# Patient Record
Sex: FEMALE | Race: WHITE | NOT HISPANIC OR LATINO | Employment: OTHER | URBAN - METROPOLITAN AREA
[De-identification: names, ages, dates, MRNs, and addresses within clinical notes are randomized per-mention and may not be internally consistent; named-entity substitution may affect disease eponyms.]

---

## 2018-12-11 ENCOUNTER — OFFICE VISIT (OUTPATIENT)
Dept: URGENT CARE | Facility: CLINIC | Age: 53
End: 2018-12-11
Payer: MEDICARE

## 2018-12-11 VITALS
BODY MASS INDEX: 36.96 KG/M2 | SYSTOLIC BLOOD PRESSURE: 153 MMHG | HEIGHT: 66 IN | DIASTOLIC BLOOD PRESSURE: 99 MMHG | RESPIRATION RATE: 18 BRPM | TEMPERATURE: 98 F | WEIGHT: 230 LBS | HEART RATE: 93 BPM | OXYGEN SATURATION: 96 %

## 2018-12-11 DIAGNOSIS — J40 BRONCHITIS: Primary | ICD-10-CM

## 2018-12-11 DIAGNOSIS — R35.0 FREQUENCY OF URINATION: ICD-10-CM

## 2018-12-11 LAB
BILIRUB UR QL STRIP: NEGATIVE
GLUCOSE UR QL STRIP: NEGATIVE
KETONES UR QL STRIP: NEGATIVE
LEUKOCYTE ESTERASE UR QL STRIP: NEGATIVE
PH, POC UA: 7 (ref 5–8)
POC BLOOD, URINE: NEGATIVE
POC NITRATES, URINE: NEGATIVE
PROT UR QL STRIP: NEGATIVE
SP GR UR STRIP: 1 (ref 1–1.03)
UROBILINOGEN UR STRIP-ACNC: NORMAL (ref 0.1–1.1)

## 2018-12-11 PROCEDURE — 96372 THER/PROPH/DIAG INJ SC/IM: CPT | Mod: 59,S$GLB,, | Performed by: PHYSICIAN ASSISTANT

## 2018-12-11 PROCEDURE — 81003 URINALYSIS AUTO W/O SCOPE: CPT | Mod: QW,S$GLB,, | Performed by: PHYSICIAN ASSISTANT

## 2018-12-11 PROCEDURE — 99215 OFFICE O/P EST HI 40 MIN: CPT | Mod: 25,S$GLB,, | Performed by: PHYSICIAN ASSISTANT

## 2018-12-11 PROCEDURE — 94640 AIRWAY INHALATION TREATMENT: CPT | Mod: S$GLB,,, | Performed by: PHYSICIAN ASSISTANT

## 2018-12-11 RX ORDER — ALBUTEROL SULFATE 0.83 MG/ML
2.5 SOLUTION RESPIRATORY (INHALATION)
Status: COMPLETED | OUTPATIENT
Start: 2018-12-11 | End: 2018-12-11

## 2018-12-11 RX ORDER — IPRATROPIUM BROMIDE 0.5 MG/2.5ML
0.5 SOLUTION RESPIRATORY (INHALATION)
Status: COMPLETED | OUTPATIENT
Start: 2018-12-11 | End: 2018-12-11

## 2018-12-11 RX ORDER — BETAMETHASONE SODIUM PHOSPHATE AND BETAMETHASONE ACETATE 3; 3 MG/ML; MG/ML
6 INJECTION, SUSPENSION INTRA-ARTICULAR; INTRALESIONAL; INTRAMUSCULAR; SOFT TISSUE
Status: COMPLETED | OUTPATIENT
Start: 2018-12-11 | End: 2018-12-11

## 2018-12-11 RX ORDER — PHENAZOPYRIDINE HYDROCHLORIDE 200 MG/1
200 TABLET, FILM COATED ORAL 3 TIMES DAILY PRN
Qty: 30 TABLET | Refills: 0 | Status: SHIPPED | OUTPATIENT
Start: 2018-12-11 | End: 2018-12-18

## 2018-12-11 RX ORDER — IPRATROPIUM BROMIDE AND ALBUTEROL SULFATE 2.5; .5 MG/3ML; MG/3ML
3 SOLUTION RESPIRATORY (INHALATION) EVERY 6 HOURS PRN
Qty: 25 VIAL | Refills: 2 | Status: SHIPPED | OUTPATIENT
Start: 2018-12-11 | End: 2019-04-01

## 2018-12-11 RX ORDER — BENZONATATE 200 MG/1
200 CAPSULE ORAL 3 TIMES DAILY PRN
Qty: 60 CAPSULE | Refills: 1 | Status: SHIPPED | OUTPATIENT
Start: 2018-12-11 | End: 2018-12-18

## 2018-12-11 RX ADMIN — IPRATROPIUM BROMIDE 0.5 MG: 0.5 SOLUTION RESPIRATORY (INHALATION) at 01:12

## 2018-12-11 RX ADMIN — ALBUTEROL SULFATE 2.5 MG: 0.83 SOLUTION RESPIRATORY (INHALATION) at 01:12

## 2018-12-11 RX ADMIN — BETAMETHASONE SODIUM PHOSPHATE AND BETAMETHASONE ACETATE 6 MG: 3; 3 INJECTION, SUSPENSION INTRA-ARTICULAR; INTRALESIONAL; INTRAMUSCULAR; SOFT TISSUE at 02:12

## 2018-12-11 NOTE — PROGRESS NOTES
"Subjective:       Patient ID: Yara Santos is a 53 y.o. female.    Vitals:  height is 5' 6" (1.676 m) and weight is 104.3 kg (230 lb). Her oral temperature is 97.6 °F (36.4 °C). Her blood pressure is 153/99 (abnormal) and her pulse is 93. Her respiration is 18 and oxygen saturation is 96%.     Chief Complaint: Urinary Tract Infection    Pt states cough is getting worse with brown sputum. Also reports dysuria, frequency x 1 month. States urine is concentrated and foul smelling.      Urinary Tract Infection    This is a new problem. The current episode started more than 1 month ago. The problem has been gradually worsening. The quality of the pain is described as shooting. Associated symptoms include frequency and urgency. Pertinent negatives include no chills, nausea, vomiting or rash. She has tried nothing for the symptoms. The treatment provided no relief.       Constitution: Negative for chills, fatigue and fever.   HENT: Negative for congestion and sore throat.    Neck: Negative for painful lymph nodes.   Cardiovascular: Negative for chest pain and leg swelling.   Eyes: Negative for double vision and blurred vision.   Respiratory: Negative for cough and shortness of breath.    Gastrointestinal: Negative for nausea, vomiting and diarrhea.   Genitourinary: Positive for frequency and urgency. Negative for dysuria and history of kidney stones.   Musculoskeletal: Negative for joint pain, joint swelling, muscle cramps and muscle ache.   Skin: Negative for color change, pale, rash and bruising.   Allergic/Immunologic: Negative for seasonal allergies.   Neurological: Negative for dizziness, history of vertigo, light-headedness, passing out and headaches.   Hematologic/Lymphatic: Negative for swollen lymph nodes.   Psychiatric/Behavioral: Negative for nervous/anxious, sleep disturbance and depression. The patient is not nervous/anxious.        Objective:      Physical Exam   Constitutional: She is oriented to person, " place, and time. She appears well-developed and well-nourished. She is cooperative.  Non-toxic appearance. She does not appear ill. No distress.   HENT:   Head: Normocephalic and atraumatic.   Right Ear: Hearing, tympanic membrane, external ear and ear canal normal.   Left Ear: Hearing, tympanic membrane, external ear and ear canal normal.   Nose: Nose normal. No mucosal edema, rhinorrhea or nasal deformity. No epistaxis. Right sinus exhibits no maxillary sinus tenderness and no frontal sinus tenderness. Left sinus exhibits no maxillary sinus tenderness and no frontal sinus tenderness.   Mouth/Throat: Uvula is midline, oropharynx is clear and moist and mucous membranes are normal. No trismus in the jaw. Normal dentition. No uvula swelling. No posterior oropharyngeal erythema.   Eyes: Conjunctivae and lids are normal. No scleral icterus.   Sclera clear bilat   Neck: Trachea normal, full passive range of motion without pain and phonation normal. Neck supple.   Cardiovascular: Normal rate, regular rhythm, normal heart sounds, intact distal pulses and normal pulses.   Pulmonary/Chest: Effort normal. No respiratory distress. She has decreased breath sounds in the right upper field, the right middle field, the right lower field, the left upper field, the left middle field and the left lower field. She has wheezes in the right upper field and the left upper field.   Abdominal: Soft. Normal appearance and bowel sounds are normal. She exhibits no distension. There is no tenderness. There is no rigidity, no rebound, no guarding, no CVA tenderness, no tenderness at McBurney's point and negative Jimenez's sign.   Musculoskeletal: Normal range of motion. She exhibits no edema or deformity.   Neurological: She is alert and oriented to person, place, and time. She exhibits normal muscle tone. Coordination normal.   Skin: Skin is warm, dry and intact. She is not diaphoretic. No pallor.   Psychiatric: She has a normal mood and  affect. Her speech is normal and behavior is normal. Judgment and thought content normal. Cognition and memory are normal.   Nursing note and vitals reviewed.      Assessment:       1. Bronchitis    2. Frequency of urination        Plan:         Bronchitis  -     ipratropium 0.02 % nebulizer solution 0.5 mg  -     albuterol nebulizer solution 2.5 mg  -     betamethasone acetate-betamethasone sodium phosphate injection 6 mg  -     benzonatate (TESSALON) 200 MG capsule; Take 1 capsule (200 mg total) by mouth 3 (three) times daily as needed for Cough.  Dispense: 60 capsule; Refill: 1  -     NEBULIZER FOR HOME USE  -     NEBULIZER KIT (SUPPLIES) FOR HOME USE  -     albuterol-ipratropium (DUO-NEB) 2.5 mg-0.5 mg/3 mL nebulizer solution; Take 3 mLs by nebulization every 6 (six) hours as needed for Wheezing. Rescue  Dispense: 25 vial; Refill: 2    Frequency of urination  -     POCT Urinalysis, Dipstick, Automated, W/O Scope  -     phenazopyridine (PYRIDIUM) 200 MG tablet; Take 1 tablet (200 mg total) by mouth 3 (three) times daily as needed for Pain.  Dispense: 30 tablet; Refill: 0  -     Ambulatory referral to Urology      Results for orders placed or performed in visit on 12/11/18   POCT Urinalysis, Dipstick, Automated, W/O Scope   Result Value Ref Range    POC Blood, Urine Negative Negative    POC Bilirubin, Urine Negative Negative    POC Urobilinogen, Urine normal 0.1 - 1.1    POC Ketones, Urine Negative Negative    POC Protein, Urine Negative Negative    POC Nitrates, Urine Negative Negative    POC Glucose, Urine Negative Negative    pH, UA 7.0 5 - 8    POC Specific Gravity, Urine 1.005 1.003 - 1.029    POC Leukocytes, Urine Negative Negative        patient reports urinary symptoms for 1 month but no pain. Will refer to Urology given negative UA.     Patient Instructions       How to Quit Smoking  Smoking is one of the hardest habits to break. About half of all people who have ever smoked have been able to quit. Most  people who still smoke want to quit. Here are some of the best ways to stop smoking.    Keep trying  Most smokers make many attempts at quitting before they are successful. Its important not to give up.  Go cold turkey  Most former smokers quit cold turkey (all at once). Trying to cut back gradually doesn't seem to work as well, perhaps because it continues the smoking habit. Also, it is possible to inhale more while smoking fewer cigarettes. This results in the same amount of nicotine in your body.  Get support  Support programs can be a big help, especially for heavy smokers. These groups offer lectures, ways to change behavior, and peer support. Here are some ways to find a support program:  · Free national quitline: 800-QUIT-NOW (243-808-1742).  · Hospital quit-smoking programs.  · American Lung Association: (615.151.4362).  · American Cancer Society (311-310-9055).  Support at home is important too. Nonsmokers can offer praise and encouragement. If the smoker in your life finds it hard to quit, encourage them to keep trying.  Over-the-counter medicines  Nicotine replacement therapy may make quitting easier. Certain aids, such as the nicotine patch, gum, and lozenges, are available without a prescription. It is best to use these under a doctors care, though. The skin patch provides a steady supply of nicotine. Nicotine gum and lozenges give temporary bursts of low levels of nicotine. Both methods reduce the craving for cigarettes. Warning: If you have nausea, vomiting, dizziness, weakness, or a fast heartbeat, stop using these products and see your doctor.  Prescription medicines  After reviewing your smoking patterns and past attempts to quit, your doctor may offer a prescription medicine such as bupropion, varenicline, a nicotine inhaler, or nasal spray. Each has advantages and side effects. Your doctor can review these with you.  Health benefits of quitting  The benefits of quitting start right away and  "keep improving the longer you go without smoking. These benefits occur at any age.  So whether you are 17 or 70, quitting is a good decision. Some of the benefits include:  · 20 minutes: Blood pressure and pulse return to normal.  · 8 hours: Oxygen levels return to normal.  · 2 days: Ability to smell and taste begin to improve as damaged nerves regrow.  · 2 to 3 weeks: Circulation and lung function improve.  · 1 to 9 months: Coughing, congestion, and shortness of breath decrease; tiredness decreases.  · 1 year: Risk of heart attack decreases by half.  · 5 years: Risk of lung cancer decreases by half; risk of stroke becomes the same as a nonsmokers.  For more on how to quit smoking, try these online resources:   · [employer].gov  · "Clearing the Air" booklet from the National [employer] [place]: smokefree.gov/sites/default/files/pdf/clearing-the-air-accessible.pdf  Date Last Reviewed: 3/1/2017  © 9647-9847 [employer]. [address] [place] [address]. All rights reserved. This information is not intended as a substitute for professional medical care. Always follow your healthcare professional's instructions.        COPD Flare    You have had a flare-up of your COPD.  COPD, or chronic obstructive pulmonary disease, is a common lung disease. It causes your airways to become irritated and narrower. This makes it harder for you to breathe. Emphysema and chronic bronchitis are both types of COPD. This is a chronic condition, which means you always have it. Sometimes it gets worse. When this happens, it is called a flare-up.  Symptoms of COPD  People with COPD may have symptoms most of the time. In a flare-up, your symptoms get worse. These symptoms may mean you are having a flare-up:  · Shortness of breath, shallow or rapid breathing, or wheezing that gets worse  · Lung infection  · Cough that gets worse  · More mucus, thicker mucus or mucus of a different color  · Tiredness, decreased energy, or " trouble doing your usual activities  · Fever  · Chest tightness  · Your symptoms dont get better even when you use your usual medicines, inhalers, and nebulizer  · Trouble talking  · You feel confused  Causes of flare-ups  Unfortunately, a flare-up can happen even though you did everything right, and you followed your doctors instructions. Some causes of flare-ups are:  · Smoking or secondhand smoke  · Colds, the flu, or respiratory infections  · Air pollution  · Sudden change in the weather  · Dust, irritating chemicals, or strong fumes  · Not taking your medicines as prescribed  Home care  Here are some things you can do at home to treat a flare-up:  · Try not to panic. This makes it harder to breathe, and keeps you from doing the right things.  · Dont smoke or be around others who are smoking.  · Try to drink more fluids than usual during a flare-up, unless your doctor has told you not to because of heart and kidney problems. More fluids can help loosen the mucus.  · Use your inhalers and nebulizer, if you have one, as you have been told to.  · If you were given antibiotics, take them until they are used up or your doctor tells you to stop. Its important to finish the antibiotics, even though you feel better. This will make sure the infection has cleared.  · If you were given prednisone or another steroid, finish it even if you feel better.  Preventing a flare-up  Even though flare-ups happen, the best way to treat one is to prevent it before it starts. Here are some pointers:  · Dont smoke or be around others who are smoking.  · Take your medicines as you have been told.  · Talk with your doctor about getting a flu shot every year. Also find out if you need a pneumonia shot.  · If there is a weather advisory warning to stay indoors, try to stay inside when possible.  · Try to eat healthy and get plenty of sleep.  · Try to avoid things that usually set you off, like dust, chemical fumes, hairsprays, or  strong perfumes.  Follow-up care  Follow up with your healthcare provider, or as advised.  If a culture was done, you will be told if your treatment needs to be changed. You can call as directed for the results.  If X-rays were done, you will be notified of any new findings that may affect your care.  Call 911  Call 911 if any of these occur:  · You have trouble breathing  · You feel confused or its difficult to wake you up  · You faint or lose consciousness  · You have a rapid heart rate  · You have new pain in your chest, arm, shoulder, neck or upper back  When to seek medical advice  Call your healthcare provider right away if any of these occur:  · Wheezing or shortness of breath gets worse  · You need to use your inhalers more often than usual without relief  · Fever of 100.4°F (38ºC) or higher, or as directed by your healthcare provider  · Coughing up lots of dark-colored or bloody mucus (sputum)  · Chest pain with each breath  · You do not start to get better within 24 hours  · Swelling of your ankles gets worse  · Dizziness or weakness  Date Last Reviewed: 9/1/2016  © 2974-1997 [employer]. [address] [place] [address]. All rights reserved. This information is not intended as a substitute for professional medical care. Always follow your healthcare professional's instructions.        Using a Nebulizer (Adult)    A nebulizer turns medicine into a mist. You breathe the mist in through a mask or a mouthpiece. To use your nebulizer, follow the steps below.  With a mask  · Put the correct dose of medicine in the cup. Attach the top as directed.  · Connect one end of the tubing to the cup and the other end to the nebulizer.  · Attach the mask to the cup.  · Place the mask over your nose and mouth. Make sure it fits securely and comfortably.  · Turn on the nebulizer.  · Take slow, deep breaths, keeping the nebulizer upright, until all the medicine is gone. This takes 10-15 minutes.  Be  sure to follow directions you are given for cleaning the nebulizer and the mask.   With a mouthpiece    · Put the correct dose of medicine in the cup. Attach the top as directed.  · Connect one end of the tubing to the cup and the other end to the nebulizer.  · Attach the mouthpiece to the cup.  · Put the mouthpiece between your teeth and close your lips around it. Keep your tongue below the mouthpiece.  · Turn on the nebulizer.  · Take slow, deep breaths through the mouthpiece, keeping the nebulizer upright, until all the medicine is gone. This takes 10-15 minutes.  Be sure to follow directions you are given for cleaning the nebulizer and the mouthpiece.   Date Last Reviewed: 10/1/2016  © 5417-2610 [employer]. [address] [place] [address], [place] [address]. All rights reserved. This information is not intended as a substitute for professional medical care. Always follow your healthcare professional's instructions.    You received a steroid shot today - this can elevate your blood pressure, elevate your blood sugar, water weight gain, nervous energy, redness to the face and dimpling of the skin where the shot goes in.    If you were prescribed a narcotic medication, do not drive or operate heavy equipment or machinery while taking these medications.  You must understand that you've received an Urgent Care treatment only and that you may be released before all your medical problems are known or treated. You, the patient, will arrange for follow up care as instructed.  Follow up with your PCP or specialty clinic as directed in the next 1-2 weeks if not improved or as needed.  You can call [phone] to schedule an appointment with the appropriate provider.  If your condition worsens we recommend that you receive another evaluation at the emergency room immediately or contact your primary medical clinics after hours call service to discuss your concerns.  Please return here or go to the Emergency  Department for any concerns or worsening of condition.

## 2018-12-11 NOTE — PATIENT INSTRUCTIONS
How to Quit Smoking  Smoking is one of the hardest habits to break. About half of all people who have ever smoked have been able to quit. Most people who still smoke want to quit. Here are some of the best ways to stop smoking.    Keep trying  Most smokers make many attempts at quitting before they are successful. Its important not to give up.  Go cold turkey  Most former smokers quit cold turkey (all at once). Trying to cut back gradually doesn't seem to work as well, perhaps because it continues the smoking habit. Also, it is possible to inhale more while smoking fewer cigarettes. This results in the same amount of nicotine in your body.  Get support  Support programs can be a big help, especially for heavy smokers. These groups offer lectures, ways to change behavior, and peer support. Here are some ways to find a support program:  · Free national quitline: 800-QUIT-NOW (428-742-8027).  · Hospital quit-smoking programs.  · American Lung Association: (543.160.4057).  · American Cancer Society (676-491-6467).  Support at home is important too. Nonsmokers can offer praise and encouragement. If the smoker in your life finds it hard to quit, encourage them to keep trying.  Over-the-counter medicines  Nicotine replacement therapy may make quitting easier. Certain aids, such as the nicotine patch, gum, and lozenges, are available without a prescription. It is best to use these under a doctors care, though. The skin patch provides a steady supply of nicotine. Nicotine gum and lozenges give temporary bursts of low levels of nicotine. Both methods reduce the craving for cigarettes. Warning: If you have nausea, vomiting, dizziness, weakness, or a fast heartbeat, stop using these products and see your doctor.  Prescription medicines  After reviewing your smoking patterns and past attempts to quit, your doctor may offer a prescription medicine such as bupropion, varenicline, a nicotine inhaler, or nasal spray. Each has  "advantages and side effects. Your doctor can review these with you.  Health benefits of quitting  The benefits of quitting start right away and keep improving the longer you go without smoking. These benefits occur at any age.  So whether you are 17 or 70, quitting is a good decision. Some of the benefits include:  · 20 minutes: Blood pressure and pulse return to normal.  · 8 hours: Oxygen levels return to normal.  · 2 days: Ability to smell and taste begin to improve as damaged nerves regrow.  · 2 to 3 weeks: Circulation and lung function improve.  · 1 to 9 months: Coughing, congestion, and shortness of breath decrease; tiredness decreases.  · 1 year: Risk of heart attack decreases by half.  · 5 years: Risk of lung cancer decreases by half; risk of stroke becomes the same as a nonsmokers.  For more on how to quit smoking, try these online resources:   · [employer]  · "Clearing the Air" booklet from the National Cancer [place]: [employer].gov/sites/default/files/pdf/clearing-the-air-accessible.pdf  Date Last Reviewed: 3/1/2017  © 7829-8172 [employer]. [address] [place] [address]. All rights reserved. This information is not intended as a substitute for professional medical care. Always follow your healthcare professional's instructions.        COPD Flare    You have had a flare-up of your COPD.  COPD, or chronic obstructive pulmonary disease, is a common lung disease. It causes your airways to become irritated and narrower. This makes it harder for you to breathe. Emphysema and chronic bronchitis are both types of COPD. This is a chronic condition, which means you always have it. Sometimes it gets worse. When this happens, it is called a flare-up.  Symptoms of COPD  People with COPD may have symptoms most of the time. In a flare-up, your symptoms get worse. These symptoms may mean you are having a flare-up:  · Shortness of breath, shallow or rapid breathing, or wheezing that gets " worse  · Lung infection  · Cough that gets worse  · More mucus, thicker mucus or mucus of a different color  · Tiredness, decreased energy, or trouble doing your usual activities  · Fever  · Chest tightness  · Your symptoms dont get better even when you use your usual medicines, inhalers, and nebulizer  · Trouble talking  · You feel confused  Causes of flare-ups  Unfortunately, a flare-up can happen even though you did everything right, and you followed your doctors instructions. Some causes of flare-ups are:  · Smoking or secondhand smoke  · Colds, the flu, or respiratory infections  · Air pollution  · Sudden change in the weather  · Dust, irritating chemicals, or strong fumes  · Not taking your medicines as prescribed  Home care  Here are some things you can do at home to treat a flare-up:  · Try not to panic. This makes it harder to breathe, and keeps you from doing the right things.  · Dont smoke or be around others who are smoking.  · Try to drink more fluids than usual during a flare-up, unless your doctor has told you not to because of heart and kidney problems. More fluids can help loosen the mucus.  · Use your inhalers and nebulizer, if you have one, as you have been told to.  · If you were given antibiotics, take them until they are used up or your doctor tells you to stop. Its important to finish the antibiotics, even though you feel better. This will make sure the infection has cleared.  · If you were given prednisone or another steroid, finish it even if you feel better.  Preventing a flare-up  Even though flare-ups happen, the best way to treat one is to prevent it before it starts. Here are some pointers:  · Dont smoke or be around others who are smoking.  · Take your medicines as you have been told.  · Talk with your doctor about getting a flu shot every year. Also find out if you need a pneumonia shot.  · If there is a weather advisory warning to stay indoors, try to stay inside when  possible.  · Try to eat healthy and get plenty of sleep.  · Try to avoid things that usually set you off, like dust, chemical fumes, hairsprays, or strong perfumes.  Follow-up care  Follow up with your healthcare provider, or as advised.  If a culture was done, you will be told if your treatment needs to be changed. You can call as directed for the results.  If X-rays were done, you will be notified of any new findings that may affect your care.  Call 911  Call 911 if any of these occur:  · You have trouble breathing  · You feel confused or its difficult to wake you up  · You faint or lose consciousness  · You have a rapid heart rate  · You have new pain in your chest, arm, shoulder, neck or upper back  When to seek medical advice  Call your healthcare provider right away if any of these occur:  · Wheezing or shortness of breath gets worse  · You need to use your inhalers more often than usual without relief  · Fever of 100.4°F (38ºC) or higher, or as directed by your healthcare provider  · Coughing up lots of dark-colored or bloody mucus (sputum)  · Chest pain with each breath  · You do not start to get better within 24 hours  · Swelling of your ankles gets worse  · Dizziness or weakness  Date Last Reviewed: 9/1/2016  © 3498-0824 [employer]. [address] [place] [address], [place] [address]. All rights reserved. This information is not intended as a substitute for professional medical care. Always follow your healthcare professional's instructions.        Using a Nebulizer (Adult)    A nebulizer turns medicine into a mist. You breathe the mist in through a mask or a mouthpiece. To use your nebulizer, follow the steps below.  With a mask  · Put the correct dose of medicine in the cup. Attach the top as directed.  · Connect one end of the tubing to the cup and the other end to the nebulizer.  · Attach the mask to the cup.  · Place the mask over your nose and mouth. Make sure it fits securely and  comfortably.  · Turn on the nebulizer.  · Take slow, deep breaths, keeping the nebulizer upright, until all the medicine is gone. This takes 10-15 minutes.  Be sure to follow directions you are given for cleaning the nebulizer and the mask.   With a mouthpiece    · Put the correct dose of medicine in the cup. Attach the top as directed.  · Connect one end of the tubing to the cup and the other end to the nebulizer.  · Attach the mouthpiece to the cup.  · Put the mouthpiece between your teeth and close your lips around it. Keep your tongue below the mouthpiece.  · Turn on the nebulizer.  · Take slow, deep breaths through the mouthpiece, keeping the nebulizer upright, until all the medicine is gone. This takes 10-15 minutes.  Be sure to follow directions you are given for cleaning the nebulizer and the mouthpiece.   Date Last Reviewed: 10/1/2016  © 6348-8362 [employer]. [address] [place] [address]. All rights reserved. This information is not intended as a substitute for professional medical care. Always follow your healthcare professional's instructions.    You received a steroid shot today - this can elevate your blood pressure, elevate your blood sugar, water weight gain, nervous energy, redness to the face and dimpling of the skin where the shot goes in.    If you were prescribed a narcotic medication, do not drive or operate heavy equipment or machinery while taking these medications.  You must understand that you've received an Urgent Care treatment only and that you may be released before all your medical problems are known or treated. You, the patient, will arrange for follow up care as instructed.  Follow up with your PCP or specialty clinic as directed in the next 1-2 weeks if not improved or as needed.  You can call [phone] to schedule an appointment with the appropriate provider.  If your condition worsens we recommend that you receive another evaluation at the emergency  room immediately or contact your primary medical clinics after hours call service to discuss your concerns.  Please return here or go to the Emergency Department for any concerns or worsening of condition.

## 2019-01-05 ENCOUNTER — TELEPHONE (OUTPATIENT)
Dept: UROLOGY | Facility: CLINIC | Age: 54
End: 2019-01-05

## 2019-10-04 ENCOUNTER — TELEPHONE (OUTPATIENT)
Dept: NEUROLOGY | Facility: CLINIC | Age: 54
End: 2019-10-04

## 2019-10-04 NOTE — TELEPHONE ENCOUNTER
----- Message from Kimmie Jose sent at 10/4/2019  3:14 PM CDT -----  Contact: patient  Type:  Sooner Apoointment Request    Caller is requesting a sooner appointment.  Caller declined first available appointment listed below.  Caller will not accept being placed on the waitlist and is requesting a message be sent to doctor.    Name of Caller:  patient  When is the first available appointment? n/a  Best Call Back Number:  [phone]  Additional Information:  Patient has referral in [facility], not able to schedule- please call to schedule, thank you!

## 2019-10-04 NOTE — TELEPHONE ENCOUNTER
Spoke with pt and informed of no available appointments at this time. Pt added to wait list. Given [facility] [place] number for sooner appointment. Verbalized understanding.

## 2019-11-21 ENCOUNTER — TELEPHONE (OUTPATIENT)
Dept: NEUROLOGY | Facility: CLINIC | Age: 54
End: 2019-11-21

## 2019-11-21 NOTE — TELEPHONE ENCOUNTER
Spoke to the pt's daughter, appt scheduled.  Directions given.  Notified to be at appt for 0915 am.  Verbalized understanding.

## 2020-01-17 ENCOUNTER — TELEPHONE (OUTPATIENT)
Dept: NEUROLOGY | Facility: CLINIC | Age: 55
End: 2020-01-17

## 2020-01-17 NOTE — TELEPHONE ENCOUNTER
----- Message from Janice Patiño sent at 1/17/2020 12:58 PM CST -----  Contact: Margy Frey (Daughter)  Type: Needs Medical Advice    Who Called:  Margy Frey (Daughter)  Best Call Back Number:   Additional Information: Calling to reschedule patient's upcoming appointment. Wanting to move to any date in February around the same time. She advised that if she does not answer then to just pick a date and schedule it and leave it in a message and she will make it work.

## 2020-01-22 ENCOUNTER — LAB VISIT (OUTPATIENT)
Dept: LAB | Facility: HOSPITAL | Age: 55
End: 2020-01-22
Attending: PSYCHIATRY & NEUROLOGY
Payer: MEDICARE

## 2020-01-22 ENCOUNTER — OFFICE VISIT (OUTPATIENT)
Dept: NEUROLOGY | Facility: CLINIC | Age: 55
End: 2020-01-22
Payer: MEDICARE

## 2020-01-22 VITALS
WEIGHT: 242.31 LBS | HEART RATE: 84 BPM | DIASTOLIC BLOOD PRESSURE: 75 MMHG | SYSTOLIC BLOOD PRESSURE: 127 MMHG | RESPIRATION RATE: 20 BRPM | BODY MASS INDEX: 38.03 KG/M2 | HEIGHT: 67 IN

## 2020-01-22 DIAGNOSIS — F32.A DEPRESSION, UNSPECIFIED DEPRESSION TYPE: ICD-10-CM

## 2020-01-22 DIAGNOSIS — M06.9 RHEUMATOID ARTHRITIS, INVOLVING UNSPECIFIED SITE, UNSPECIFIED RHEUMATOID FACTOR PRESENCE: ICD-10-CM

## 2020-01-22 DIAGNOSIS — R40.0 HAS DAYTIME DROWSINESS: ICD-10-CM

## 2020-01-22 DIAGNOSIS — M79.7 FIBROMYALGIA: ICD-10-CM

## 2020-01-22 DIAGNOSIS — G47.00 INSOMNIA, UNSPECIFIED TYPE: ICD-10-CM

## 2020-01-22 DIAGNOSIS — R41.3 MEMORY LOSS: Primary | ICD-10-CM

## 2020-01-22 DIAGNOSIS — R41.3 MEMORY LOSS: ICD-10-CM

## 2020-01-22 LAB
AMMONIA PLAS-SCNC: 20 UMOL/L (ref 10–50)
FOLATE SERPL-MCNC: >40 NG/ML (ref 4–24)

## 2020-01-22 PROCEDURE — 36415 COLL VENOUS BLD VENIPUNCTURE: CPT | Mod: PO

## 2020-01-22 PROCEDURE — 99205 OFFICE O/P NEW HI 60 MIN: CPT | Mod: S$PBB,,, | Performed by: PSYCHIATRY & NEUROLOGY

## 2020-01-22 PROCEDURE — 99999 PR PBB SHADOW E&M-EST. PATIENT-LVL IV: ICD-10-PCS | Mod: PBBFAC,,, | Performed by: PSYCHIATRY & NEUROLOGY

## 2020-01-22 PROCEDURE — 82746 ASSAY OF FOLIC ACID SERUM: CPT

## 2020-01-22 PROCEDURE — 99999 PR PBB SHADOW E&M-EST. PATIENT-LVL IV: CPT | Mod: PBBFAC,,, | Performed by: PSYCHIATRY & NEUROLOGY

## 2020-01-22 PROCEDURE — 99205 PR OFFICE/OUTPT VISIT, NEW, LEVL V, 60-74 MIN: ICD-10-PCS | Mod: S$PBB,,, | Performed by: PSYCHIATRY & NEUROLOGY

## 2020-01-22 PROCEDURE — 84425 ASSAY OF VITAMIN B-1: CPT

## 2020-01-22 PROCEDURE — 82140 ASSAY OF AMMONIA: CPT

## 2020-01-22 PROCEDURE — 99214 OFFICE O/P EST MOD 30 MIN: CPT | Mod: PBBFAC,PN | Performed by: PSYCHIATRY & NEUROLOGY

## 2020-01-22 NOTE — LETTER
January 22, 2020      Remy Gibson MD  [address]  [place] [address]           [facility]  [address] [place] [address]  Phone: [phone]  Fax: [phone]          Patient: Yara Santos   MR Number: [id number]   YOB: 1965   Date of Visit: 1/22/2020       Dear Dr. Remy Gibson:    Thank you for referring Yara Santos to me for evaluation. Attached you will find relevant portions of my assessment and plan of care.    If you have questions, please do not hesitate to call me. I look forward to following Yara Santos along with you.    Sincerely,    Soham Molina Jr., MD    Enclosure  CC:  No Recipients    If you would like to receive this communication electronically, please contact [email or web address] or [phone] to request more information on [employer] access.    For providers and/or their staff who would like to refer a patient to [facility], please contact us through our one-stop-shop provider referral line, [facility], at [phone].    If you feel you have received this communication in error or would no longer like to receive these types of communications, please e-mail [email or web address]

## 2020-01-22 NOTE — PROGRESS NOTES
Date of service:  1/22/2020    Chief complaint:  Memory Loss    History of present illness:  The patient is a 54 y.o. female referred for evaluation of memory loss. This issue began 6-7 years ago. It involves short-term memory more than long-term memory.  She reports no clear difficulties with executive function.  There are some issues with multiple step processes. She has no problems with ADLs. She does get lost in familiar areas commonly.  She is actually not driving because of this. There are no hallucinations. There are no obvious personality changes.  She endorse depression.  She denies SI/HI.     She does endorse daytime drowsiness.  It is unclear if she snores or not.  She has issues with both falling asleep and staying asleep.    She also endorses chronic pain.      Past Medical History:   Diagnosis Date    b Hypertension     12/21/17 RXd Valsartan 40 Mg 1/2 Tab Daily; Losartan 25 Mg Caused Confusion; Lisinopril Caused Hives; 9/15/17 RXd A Low Salt Diet    b White Coat Syndrome     c Hypercholesterolemia     3/8/17 Referred To Dr. Parnell For Repatha; Statins Worsen Her Lichen Planus; Was On Pravastatin 20 Mg qHS); Other Statins Have Also Worsened Her Lichen Planus    d Type 2 DM     ** 2/18/16 Referred To [facility]; She Stopped Her NPH Insulin In 01/2016    e Hypothyroidism     6/3/19 Increased 25 Mcg qAM Levothyroxine To 50 Mcg qAM With TFTs In 4 [place]    f Obesity     Her Mother Weighed 600 Lbs    f Osteopenia     8/12/19 Offered RX For Fosamax 35 Mg Weekly, And RXd OTC D3 5K IU Daily, And OTC CaCO3 600 Mg Daily    i COPD With TUD     i Tobacco Use Disorder     4/1/19 RXd Wellbutrin- Mg qAM X 4-6 Months And PRN 2 Mg Nicotine Gum; Chantix Caused Side Effects    j Chronic constipation     Dr. CHIKIS kumar GERD With dysphagia     Dr. CHIKIS kumar H/O Colon Polyps     Dr. CHIKIS kumar Irritable Bowel Syndrome     Dr. CHIKIS JEAN.H.      Dr. CHIKIS Pearl    l Bilateral Knee Arthritis     l Carpal tunnel syndrome     l Lumbar Spinal DDD     Dr. Charissa Govea    l Rheumatoid Arthritis ###    19 Referred To Dr. Jose Cruz Kaur; 10/2/19 RXd Neurontin 300 Mg BID; 10/7/19 RF = 16.7 (0.0-15.0); 19 STEPHANIE, CPK, ESR = Normal    l Tarsal Tunnel Syndrome     m Chronic Fatigue 2015     m Family H/O Alzheimer's Disease     m Memory Loss ###    10/2/19 And 19 Referred To Dr. Alie Levin    n Alcoholism, Sober Since 2013     n Depression And Anxiety     19 RXd Effexor-XR 37.5 Mg qAM; 10/2/19 RXd Lexapro 10 Mg qHS (But This Caused S/Es)    o Allergic rhinosinusitis     p OU Cataracts     Dr. Xi Cordero    q Bilateral Lower Extremity Cellulitis 17 Dr. Webb RXd Doxycycline 100 Mg Bid X 10 Days    q BLE Lichen Planus     Dr. Louise In [place]    q BLE Varicose Veins     q Chronic Bilateral Lower Extremity Edema     q Facial flushing     q Vitamin B12 Deficiency     q Vitamin D Deficiency     On OTC D3 5K IU Daily       Past Surgical History:   Procedure Laterality Date     SECTION      x2       Family History   Problem Relation Age of Onset    Arthritis Mother     Diabetes Mother     Hyperlipidemia Mother     Cancer Mother         uterine     Allergies Mother     Ovarian cancer Mother 58    Aneurysm Father     Hyperlipidemia Father     Cancer Maternal Uncle         brain    Cancer Paternal Aunt         breast, bone and lung     Breast cancer Paternal Aunt 78    Breast cancer Maternal Aunt 65       Social History     Socioeconomic History    Marital status:      Spouse name: Not on file    Number of children: Not on file    Years of education: Not on file    Highest education level: Not on file   Occupational History    Not on file   Social Needs    Financial resource strain: Very hard    Food insecurity:     Worry: Often true     Inability: Often true     Transportation needs:     Medical: Yes     Non-medical: Yes   Tobacco Use    Smoking status: Former Smoker     Packs/day: 0.50     Start date: 1960     Last attempt to quit: 2019     Years since quittin.8    Smokeless tobacco: Never Used   Substance and Sexual Activity    Alcohol use: No     Frequency: Never     Binge frequency: Never    Drug use: No    Sexual activity: Not on file   Lifestyle    Physical activity:     Days per week: 0 days     Minutes per session: 0 min    Stress: Very much   Relationships    Social connections:     Talks on phone: Never     Gets together: Never     Attends Judaism service: Not on file     Active member of club or organization: No     Attends meetings of clubs or organizations: Never     Relationship status:    Other Topics Concern    Not on file   Social History Narrative    Not on file        Review of patient's allergies indicates:   Allergen Reactions    Iodine and iodide containing products Blisters    Asa [aspirin] Itching and Other (See Comments)     Skin problem      Atorvastatin      Worsened Lichen Planus    Crestor [rosuvastatin]      Worsens her lichen planus    Lisinopril      Hives    Losartan      Confusion    Lovastatin      Worsened Lichen Planus    Salicylates     Sulfur, elemental     Valsartan      breakouts    Latex Other (See Comments)     Skin problem      Nsaids (non-steroidal anti-inflammatory drug)     Tylenol [acetaminophen] Other (See Comments)     Skin inflammation        Review of Systems   General/Constitutional:  No unintentional weight loss, No change in appetite  Eyes/Vision:  No change in vision, No double vision  ENT:  No frequent nose bleeds, No ringing in the ears  Respiratory:  No cough, No wheezing  Cardiovascular:  No chest pain, No palpitations  Gastrointestinal:  No jaundice, No nausea/vomiting  Genitourinary:  No incontinence, No burning with urination  Hematologic/Lymphatic:  + easy  "bruising/bleeding, + night sweats  Neurological:  + numbness, + weakness  Endocrine:  + fatigue, + heat/cold intolerance  Allergy/Immunologic:  No fevers, No chills  Musculoskeletal:  + muscle pain, + joint pain   Psychiatric:  No thoughts of harming self/others, + depression  Integumentary:  + rashes, + sores that do not heal    Physical exam:  /75   Pulse 84   Resp 20   Ht 5' 7" (1.702 m)   Wt 109.9 kg (242 lb 4.6 oz)   BMI 37.95 kg/m²   General: Well developed, obese.  No acute distress.  ENT: Mucus membranes moist.  Atraumatic external nose and ears.  Lymphatic: No apparent lymphadenopathy.  Cardiovascular: Regular rate and rhythm.  Pulmonary: No increased work of breathing.  Abdomen/GI: No guarding.  Musculoskeletal: No obvious joint deformities, moves all extremities well.    Neurological exam:  Mental status: Awake and alert.  Oriented x4.  Speech fluent and appropriate.  Recent and remote memory appear to be intact.  Fund of knowledge seems normal.  MMSE = 30/30.  Cranial nerves: Pupils equal round and reactive to light, extraocular movements intact, facial strength and sensation intact bilaterally, palate and tongue midline, hearing grossly intact bilaterally.  Motor: 5 out of 5 strength throughout the upper and lower extremities bilaterally. Normal bulk and tone.  Sensation: Intact to light touch and temperature bilaterally.  DTR: 1+ at the knees and biceps bilaterally.  Coordination: Finger-nose-finger testing intact bilaterally.  Gait: Antalgic gait. Mild difficulty with tandem.      Activities of Daily Living    Bathing: Independent  Dressing: Independent  Grooming: Independent  Mouth Care: Independent  Toileting: Independent  Transferring Bed/Chair: Independent  Walking: Independent  Climbing: Needs Help  Eating: Independent      Instrumental Activities of Daily Living    Shopping: Needs Help  Cooking: Dependent  Managing Medications: Independent  Using the phone and looking up numbers: " "Needs Help     Doing Housework: Needs Help  Doing Laundry: Needs Help  Driving or using public transportation: Dependent  Managing finances: Needs Help     Caregiver name: Margy Frey   Relationship to the patient: daughter  Does the patient have a living will? no  Does the patient have a designated healthcare POA? no  Does the patient have a designated general POA? no    Have educational materials/resources been provided? yes   Functional Assessment Staging  4   Decreased ability to perform complex tasks, e.g. planning dinner for guests, handling personal finances (such as forgetting to pay bills), difficulty marketing, etc.*     Depression Patient Health Questionnaire 1/22/2020   Over the last two weeks how often have you been bothered by little interest or pleasure in doing things 3   Over the last two weeks how often have you been bothered by feeling down, depressed or hopeless 3   PHQ-2 Total Score 6       Data base:  Notes of the referring physician were reviewed.  Briefly summarized, these indicate concern for a number of issues, including memory loss, fibromyalgia, DM, and depression.    CT head:  "No acute intracranial abnormality."  I independently visualized the images of this study and interpreted them.  No acute intracranial process was appreciated.     Neuropsychological testing:  NA    Assessment and plan:  The patient is a 54 y.o. female with a complicated PMH referred for evaluation of memory loss. I feel that the differential diagnosis includes both MCI and pseudodementia.  I favor the latter with contributions from depression, chronic pain, and poor sleep.  Given her reported history of recurrent head trauma, though, she does have some potential cause for the former. I think a reasonable workup would feature serologies and neuropsychological testing.  I think a sleep clinic evaluation would be of benefit.  I also think that the involvement of psychiatry to provide intensive focus on addressing " her mood disorder would be of help, so I have made such a referral.  She does have rheumatologic consultation pending for fibromyalgia and rheumatoid arthritis.  I have encouraged her to keep this. We will plan on seeing the patient back in a few months.

## 2020-01-23 ENCOUNTER — PATIENT MESSAGE (OUTPATIENT)
Dept: NEUROLOGY | Facility: CLINIC | Age: 55
End: 2020-01-23

## 2020-01-28 LAB — VIT B1 BLD-MCNC: 63 UG/L (ref 38–122)

## 2020-09-15 ENCOUNTER — OFFICE VISIT (OUTPATIENT)
Dept: PAIN MEDICINE | Facility: CLINIC | Age: 55
End: 2020-09-15
Payer: MEDICARE

## 2020-09-15 VITALS
TEMPERATURE: 98 F | RESPIRATION RATE: 20 BRPM | HEART RATE: 96 BPM | SYSTOLIC BLOOD PRESSURE: 168 MMHG | OXYGEN SATURATION: 99 % | DIASTOLIC BLOOD PRESSURE: 79 MMHG | WEIGHT: 258.69 LBS | HEIGHT: 67 IN | BODY MASS INDEX: 40.6 KG/M2

## 2020-09-15 DIAGNOSIS — M47.816 LUMBAR SPONDYLOSIS: ICD-10-CM

## 2020-09-15 DIAGNOSIS — G89.29 CHRONIC LOW BACK PAIN, UNSPECIFIED BACK PAIN LATERALITY, UNSPECIFIED WHETHER SCIATICA PRESENT: ICD-10-CM

## 2020-09-15 DIAGNOSIS — M54.50 CHRONIC LOW BACK PAIN, UNSPECIFIED BACK PAIN LATERALITY, UNSPECIFIED WHETHER SCIATICA PRESENT: ICD-10-CM

## 2020-09-15 DIAGNOSIS — M54.9 DORSALGIA, UNSPECIFIED: ICD-10-CM

## 2020-09-15 DIAGNOSIS — M51.36 DDD (DEGENERATIVE DISC DISEASE), LUMBAR: ICD-10-CM

## 2020-09-15 DIAGNOSIS — Z11.9 SCREENING EXAMINATION FOR INFECTIOUS DISEASE: ICD-10-CM

## 2020-09-15 DIAGNOSIS — M54.12 CERVICAL RADICULOPATHY: ICD-10-CM

## 2020-09-15 DIAGNOSIS — M54.16 BILATERAL LUMBAR RADICULOPATHY: Primary | ICD-10-CM

## 2020-09-15 DIAGNOSIS — M47.812 CERVICAL SPONDYLOSIS: ICD-10-CM

## 2020-09-15 DIAGNOSIS — G89.29 CHRONIC NECK PAIN: ICD-10-CM

## 2020-09-15 DIAGNOSIS — M50.30 DDD (DEGENERATIVE DISC DISEASE), CERVICAL: ICD-10-CM

## 2020-09-15 DIAGNOSIS — M54.2 CHRONIC NECK PAIN: ICD-10-CM

## 2020-09-15 PROCEDURE — 99205 PR OFFICE/OUTPT VISIT, NEW, LEVL V, 60-74 MIN: ICD-10-PCS | Mod: S$PBB,,, | Performed by: ANESTHESIOLOGY

## 2020-09-15 PROCEDURE — 99205 OFFICE O/P NEW HI 60 MIN: CPT | Mod: S$PBB,,, | Performed by: ANESTHESIOLOGY

## 2020-09-15 PROCEDURE — 99999 PR PBB SHADOW E&M-EST. PATIENT-LVL V: ICD-10-PCS | Mod: PBBFAC,,, | Performed by: ANESTHESIOLOGY

## 2020-09-15 PROCEDURE — 99215 OFFICE O/P EST HI 40 MIN: CPT | Mod: PBBFAC,PN | Performed by: ANESTHESIOLOGY

## 2020-09-15 PROCEDURE — 99999 PR PBB SHADOW E&M-EST. PATIENT-LVL V: CPT | Mod: PBBFAC,,, | Performed by: ANESTHESIOLOGY

## 2020-09-15 RX ORDER — SODIUM CHLORIDE, SODIUM LACTATE, POTASSIUM CHLORIDE, CALCIUM CHLORIDE 600; 310; 30; 20 MG/100ML; MG/100ML; MG/100ML; MG/100ML
INJECTION, SOLUTION INTRAVENOUS CONTINUOUS
Status: CANCELLED | OUTPATIENT
Start: 2020-09-24
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
## 2020-09-15 NOTE — LETTER
September 15, 2020      Remy Gibson MD  [address]  [place] [address]           [place] [facility]  [address]  [facility] [address]  Phone: [phone]  Fax: [phone]          Patient: Yara Santos   MR Number: [id number]   YOB: 1965   Date of Visit: 9/15/2020       Dear Dr. Remy Gibson:    Thank you for referring Yara Santos to me for evaluation. Attached you will find relevant portions of my assessment and plan of care.    If you have questions, please do not hesitate to call me. I look forward to following Yara Santos along with you.    Sincerely,    John Vinson MD    Enclosure  CC:  No Recipients    If you would like to receive this communication electronically, please contact [email or web address] or [phone] to request more information on [facility] Link access.    For providers and/or their staff who would like to refer a patient to [facility], please contact us through our one-stop-shop provider referral line, [facility], [place], at [phone].    If you feel you have received this communication in error or would no longer like to receive these types of communications, please e-mail [email or web address]

## 2020-09-21 ENCOUNTER — LAB VISIT (OUTPATIENT)
Dept: FAMILY MEDICINE | Facility: CLINIC | Age: 55
End: 2020-09-21
Payer: MEDICARE

## 2020-09-21 DIAGNOSIS — Z11.9 SCREENING EXAMINATION FOR INFECTIOUS DISEASE: ICD-10-CM

## 2020-09-21 PROCEDURE — U0003 INFECTIOUS AGENT DETECTION BY NUCLEIC ACID (DNA OR RNA); SEVERE ACUTE RESPIRATORY SYNDROME CORONAVIRUS 2 (SARS-COV-2) (CORONAVIRUS DISEASE [COVID-19]), AMPLIFIED PROBE TECHNIQUE, MAKING USE OF HIGH THROUGHPUT TECHNOLOGIES AS DESCRIBED BY CMS-2020-01-R: HCPCS

## 2020-09-22 LAB — SARS-COV-2 RNA RESP QL NAA+PROBE: NOT DETECTED

## 2020-09-23 ENCOUNTER — TELEPHONE (OUTPATIENT)
Dept: SURGERY | Facility: HOSPITAL | Age: 55
End: 2020-09-23

## 2020-09-23 NOTE — TELEPHONE ENCOUNTER
Spoke with pt's daughter to complete preop phone call. Pt scheduled for cervical CORBY tomorrow, 9/24/20, with Dr. Vinson. Daughter states pt takes Prednisone daily. Please advise. Thank you!

## 2020-09-24 ENCOUNTER — HOSPITAL ENCOUNTER (OUTPATIENT)
Facility: HOSPITAL | Age: 55
Discharge: HOME | End: 2020-09-24
Attending: ANESTHESIOLOGY | Admitting: ANESTHESIOLOGY
Payer: MEDICARE

## 2020-09-24 ENCOUNTER — HOSPITAL ENCOUNTER (OUTPATIENT)
Dept: RADIOLOGY | Facility: HOSPITAL | Age: 55
Discharge: HOME | End: 2020-09-24
Attending: ANESTHESIOLOGY
Payer: MEDICARE

## 2020-09-24 DIAGNOSIS — M54.12 CERVICAL RADICULOPATHY: Primary | ICD-10-CM

## 2020-09-24 DIAGNOSIS — M54.16 BILATERAL LUMBAR RADICULOPATHY: ICD-10-CM

## 2020-09-24 LAB — GLUCOSE SERPL-MCNC: 124 MG/DL (ref 70–110)

## 2020-09-24 PROCEDURE — 62321 NJX INTERLAMINAR CRV/THRC: CPT | Mod: ,,, | Performed by: ANESTHESIOLOGY

## 2020-09-24 PROCEDURE — 82962 GLUCOSE BLOOD TEST: CPT | Mod: PO | Performed by: ANESTHESIOLOGY

## 2020-09-24 PROCEDURE — 62321 NJX INTERLAMINAR CRV/THRC: CPT | Mod: PO | Performed by: ANESTHESIOLOGY

## 2020-09-24 PROCEDURE — A4216 STERILE WATER/SALINE, 10 ML: HCPCS | Mod: PO | Performed by: ANESTHESIOLOGY

## 2020-09-24 PROCEDURE — 76000 FLUOROSCOPY <1 HR PHYS/QHP: CPT | Mod: TC,PO

## 2020-09-24 PROCEDURE — A9579 GAD-BASE MR CONTRAST NOS,1ML: HCPCS | Mod: PO | Performed by: ANESTHESIOLOGY

## 2020-09-24 PROCEDURE — 25000003 PHARM REV CODE 250: Mod: PO | Performed by: ANESTHESIOLOGY

## 2020-09-24 PROCEDURE — 63600175 PHARM REV CODE 636 W HCPCS: Mod: PO | Performed by: ANESTHESIOLOGY

## 2020-09-24 PROCEDURE — 25500020 PHARM REV CODE 255: Mod: PO | Performed by: ANESTHESIOLOGY

## 2020-09-24 PROCEDURE — 62321 PR INJ CERV/THORAC, W/GUIDANCE: ICD-10-PCS | Mod: ,,, | Performed by: ANESTHESIOLOGY

## 2020-09-24 RX ORDER — SODIUM CHLORIDE 9 MG/ML
INJECTION, SOLUTION INTRAMUSCULAR; INTRAVENOUS; SUBCUTANEOUS
Status: DISCONTINUED | OUTPATIENT
Start: 2020-09-24 | End: 2020-09-24 | Stop reason: HOSPADM

## 2020-09-24 RX ORDER — METHYLPREDNISOLONE ACETATE 80 MG/ML
INJECTION, SUSPENSION INTRA-ARTICULAR; INTRALESIONAL; INTRAMUSCULAR; SOFT TISSUE
Status: DISCONTINUED | OUTPATIENT
Start: 2020-09-24 | End: 2020-09-24 | Stop reason: HOSPADM

## 2020-09-24 RX ORDER — SODIUM CHLORIDE, SODIUM LACTATE, POTASSIUM CHLORIDE, CALCIUM CHLORIDE 600; 310; 30; 20 MG/100ML; MG/100ML; MG/100ML; MG/100ML
INJECTION, SOLUTION INTRAVENOUS CONTINUOUS
Status: DISCONTINUED | OUTPATIENT
Start: 2020-09-24 | End: 2020-09-24 | Stop reason: HOSPADM

## 2020-09-24 RX ORDER — MIDAZOLAM HYDROCHLORIDE 2 MG/2ML
INJECTION, SOLUTION INTRAMUSCULAR; INTRAVENOUS
Status: DISCONTINUED | OUTPATIENT
Start: 2020-09-24 | End: 2020-09-24 | Stop reason: HOSPADM

## 2020-09-24 RX ORDER — LIDOCAINE HYDROCHLORIDE 10 MG/ML
INJECTION, SOLUTION EPIDURAL; INFILTRATION; INTRACAUDAL; PERINEURAL
Status: DISCONTINUED | OUTPATIENT
Start: 2020-09-24 | End: 2020-09-24 | Stop reason: HOSPADM

## 2020-09-24 RX ADMIN — SODIUM CHLORIDE, SODIUM LACTATE, POTASSIUM CHLORIDE, AND CALCIUM CHLORIDE: .6; .31; .03; .02 INJECTION, SOLUTION INTRAVENOUS at 02:09

## 2020-09-24 NOTE — DISCHARGE SUMMARY
[facility]  Discharge Note  Short Stay    Admit Date: 9/24/2020    Discharge Date: 9/24/2020    Attending Physician: John Vinson MD     Discharge Provider: John Vinson    Diagnoses:  Active Hospital Problems    Diagnosis  POA    *Cervical radiculopathy [M54.12]  Yes      Resolved Hospital Problems   No resolved problems to display.       Discharged Condition: good    Final Diagnoses: Cervical radiculopathy [M54.12]    Disposition: Home or Self Care    Hospital Course: no complications, uneventful    Outcome of Hospitalization, Treatment, Procedure, or Surgery:  Patient was admitted for outpatient procedure. The patient underwent procedure without complications and are discharged home    Follow up/Patient Instructions:  Follow up as scheduled in Pain Management clinic in 3-4 weeks/Patient has received instructions and follow up date and time    Medications:  Continue previous medications    Discharge Procedure Orders   Call MD for:  temperature >100.4     Call MD for:  severe uncontrolled pain     Call MD for:  redness, tenderness, or signs of infection (pain, swelling, redness, odor or green/yellow discharge around incision site)     Call MD for:  severe persistent headache     No dressing needed         Discharge Procedure Orders (must include Diet, Follow-up, Activity):   Discharge Procedure Orders (must include Diet, Follow-up, Activity)   Call MD for:  temperature >100.4     Call MD for:  severe uncontrolled pain     Call MD for:  redness, tenderness, or signs of infection (pain, swelling, redness, odor or green/yellow discharge around incision site)     Call MD for:  severe persistent headache     No dressing needed

## 2020-09-24 NOTE — OP NOTE
PROCEDURE DATE: 9/24/2020    Procedure: C7-T1 cervical interlaminar epidural steroid injection under utilizing fluoroscopy.    Diagnosis: Cervical Radiculopathy    POSTOP DIAGNOSIS: SAME    Physician: John Vinson MD    Medications injected:  Methylprednisone 80mg followed by a slow injection of 4 mL sterile, preservative-free normal saline.    Local anesthetic used: Lidocaine 1%, 4 ml.    Sedation Medications: 2mg versed    Complications:  none    Estimated blood loss: none    Technique:  A time-out was taken to identify patient and procedure prior to starting the procedure.  With the patient laying in a prone position with the neck in a mid-flexed forward position, the area was prepped and draped in the usual sterile fashion using ChloraPrep and a fenestrated drape.  The area was determined under AP fluoroscopic guidance.  Local anesthetic was given using a 25-gauge 1.5 inch needle by raising a wheal and then infiltrating ventrally.  A 3.5 inch 20-gauge Touhy needle was introduced under fluoroscopic guidance to meet the lamina of C7.  The needle was then hinged under the lamina then advanced using loss of resistance technique.  Once the tip of the needle was in the desired position, the contrast dye Omnipaque was injected to determine placement and no uptake.  The steroid was then injected slowly followed by a slow injection of 4 mL of the sterile preservative-free normal saline.  The patient tolerated the procedure well.    The patient was monitored after the procedure and was given post-procedure and discharge instructions to follow at home. The patient was discharged in a stable condition.    Event Time In   In Facility 1333   In Pre-Procedure 1353   Physician Available    Anesthesia Available    Pre-Op: Bedside Procedure Start    Pre-Op: Bedside Procedure Stop    Pre-Procedure Complete 1430   Out of Pre-Procedure    Anesthesia Start    Anesthesia Start Data Collection    Setup Start    Setup Complete    In  Room 1507   Prep Start    Procedure Prep Complete    Procedure Start 1512   Procedure Closing    Emergence    Procedure Finish 1516   Sedation Start 1506   Scope In    Extent Reached    Scope Out    Sedation End 1516   Out of Room 1518   Cleanup Start    Cleanup Complete    Cosmetic Start    Cosmetic Stop    Pain Mgmt In Room    Pain Mgmt Out Room    In Recovery    Anesthesia Finish    Bedside Procedure Start    Bedside Procedure Stop    Recovery Care Complete    Out of Recovery    To Phase II    In Phase II    Pain Mgmt Recovery Start    Pain Mgmt Recovery Stop    Obs Rec Start    Obs Rec Stop    Phase II Care Complete    Out of Phase II    Procedural Care Complete    Discharge    Pain Follow Up Needed    Pain Follow Up Complete      Moderate sedation was achieved with midazolam 2mg.  Continuous monitoring of EKG, blood pressure and pulse oximetry was provided by a registered nurse during the entire course of the procedure under my supervision and recorded in the patient's medical record.   Total time for sedation was 10 minutes.

## 2020-09-25 VITALS
WEIGHT: 258 LBS | BODY MASS INDEX: 40.49 KG/M2 | SYSTOLIC BLOOD PRESSURE: 140 MMHG | HEART RATE: 84 BPM | OXYGEN SATURATION: 97 % | HEIGHT: 67 IN | DIASTOLIC BLOOD PRESSURE: 71 MMHG | RESPIRATION RATE: 16 BRPM | TEMPERATURE: 98 F

## 2020-09-29 ENCOUNTER — HOSPITAL ENCOUNTER (OUTPATIENT)
Dept: RADIOLOGY | Facility: HOSPITAL | Age: 55
Discharge: HOME | End: 2020-09-29
Attending: ANESTHESIOLOGY
Payer: MEDICARE

## 2020-09-29 DIAGNOSIS — M47.816 LUMBAR SPONDYLOSIS: ICD-10-CM

## 2020-09-29 DIAGNOSIS — M54.16 BILATERAL LUMBAR RADICULOPATHY: ICD-10-CM

## 2020-09-29 DIAGNOSIS — M54.9 DORSALGIA, UNSPECIFIED: ICD-10-CM

## 2020-09-29 DIAGNOSIS — M51.36 DDD (DEGENERATIVE DISC DISEASE), LUMBAR: ICD-10-CM

## 2020-09-29 PROCEDURE — 72148 MRI LUMBAR SPINE W/O DYE: CPT | Mod: 26,,, | Performed by: RADIOLOGY

## 2020-09-29 PROCEDURE — 72148 MRI LUMBAR SPINE WITHOUT CONTRAST: ICD-10-PCS | Mod: 26,,, | Performed by: RADIOLOGY

## 2020-09-29 PROCEDURE — 72148 MRI LUMBAR SPINE W/O DYE: CPT | Mod: TC,PO

## 2020-10-20 ENCOUNTER — OFFICE VISIT (OUTPATIENT)
Dept: PAIN MEDICINE | Facility: CLINIC | Age: 55
End: 2020-10-20
Payer: MEDICARE

## 2020-10-20 VITALS
WEIGHT: 264.13 LBS | DIASTOLIC BLOOD PRESSURE: 76 MMHG | HEART RATE: 90 BPM | SYSTOLIC BLOOD PRESSURE: 152 MMHG | BODY MASS INDEX: 41.37 KG/M2 | RESPIRATION RATE: 20 BRPM | TEMPERATURE: 98 F

## 2020-10-20 DIAGNOSIS — M47.816 LUMBAR SPONDYLOSIS: ICD-10-CM

## 2020-10-20 DIAGNOSIS — Z11.9 SCREENING EXAMINATION FOR INFECTIOUS DISEASE: ICD-10-CM

## 2020-10-20 DIAGNOSIS — M54.16 LUMBAR RADICULOPATHY: Primary | ICD-10-CM

## 2020-10-20 DIAGNOSIS — M54.12 CERVICAL RADICULOPATHY: ICD-10-CM

## 2020-10-20 DIAGNOSIS — M51.36 DDD (DEGENERATIVE DISC DISEASE), LUMBAR: ICD-10-CM

## 2020-10-20 PROCEDURE — 99213 PR OFFICE/OUTPT VISIT, EST, LEVL III, 20-29 MIN: ICD-10-PCS | Mod: S$PBB,,, | Performed by: PHYSICIAN ASSISTANT

## 2020-10-20 PROCEDURE — 99215 OFFICE O/P EST HI 40 MIN: CPT | Mod: PBBFAC,PN | Performed by: PHYSICIAN ASSISTANT

## 2020-10-20 PROCEDURE — 99999 PR PBB SHADOW E&M-EST. PATIENT-LVL V: ICD-10-PCS | Mod: PBBFAC,,, | Performed by: PHYSICIAN ASSISTANT

## 2020-10-20 PROCEDURE — 99213 OFFICE O/P EST LOW 20 MIN: CPT | Mod: S$PBB,,, | Performed by: PHYSICIAN ASSISTANT

## 2020-10-20 PROCEDURE — 99999 PR PBB SHADOW E&M-EST. PATIENT-LVL V: CPT | Mod: PBBFAC,,, | Performed by: PHYSICIAN ASSISTANT

## 2020-10-20 RX ORDER — ALPRAZOLAM 0.5 MG/1
1 TABLET, ORALLY DISINTEGRATING ORAL ONCE AS NEEDED
Status: CANCELLED | OUTPATIENT
Start: 2020-11-12 | End: 2032-04-09

## 2020-10-25 NOTE — H&P (VIEW-ONLY)
This note was completed with dictation software and grammatical errors may exist.    CC: neck pain, left arm pain, back pain left greater than right leg pain    HPI:   The patient is a 55-year-old woman with a history of diabetes, rheumatoid arthritis with chronic back pain who presents in referral from Dr. Gibson for  Back pain radiating into the bilateral legs, neck pain radiating into the left arm.  She is status post C7-T1 interlaminar epidural steroid injection on 09/24/2020 with 100% relief.  The patient is new to me.  She no longer has any neck pain.  She does report some intermittent numbness in her left hand but otherwise she is doing well in terms of her cervical spine.  She does have pain across the low back radiating to the left buttock and posterior thigh.  The pain is worse with standing and walking and improved with sitting.  She reports intermittent weakness in her left leg as well as intermittent numbness in both legs.  She denies bladder or bowel incontinence.    Pain intervention history: She had done epidural steroid injections for her back in the past, many years ago.   She is status post C7-T1 interlaminar epidural steroid injection on 09/24/2020 with 100% relief.      Antineuropathics: gabapentin 400 milligrams twice daily with some relief of her upper back and low back pain  NSAIDs: cannot tolerate NSAIDs  Physical therapy: had done multiple rounds of physical therapy for her back in the past with increased pain  Antidepressants:  Muscle relaxers:  Opioids: She had received a prescription for Percocet 10/325 on 07/10/2020 which she takes sparingly, she still has some left over, does not like taking these  Antiplatelets/Anticoagulants:   She smokes marijuana on a regular basis, reports that this seems to help her pain in the neck and back and much of her arthritic pain, denies any major side effects from this.  She has used oils and edibles as well, generally has been using a vaporizer as  well.    ROS:  She reports weight gain, easy bruising, diabetes, joint stiffness, joint swelling, back pain, memory loss, dizziness, difficulty sleeping, anxiety, depression and loss of balance.  Balance of review of systems is negative.    Lab Results   Component Value Date    HGBA1C 6.7 (H) 07/01/2020       Lab Results   Component Value Date    WBC 10.72 07/01/2020    HGB 15.4 07/01/2020    HCT 47.7 07/01/2020    MCV 94 07/01/2020     07/01/2020             Past Medical History:   Diagnosis Date    a Premature Ventricular Contractions     Dr. Austin Collier    b Hypertension     12/21/17 RXd Valsartan 40 Mg 1/2 Tab Daily; Losartan 25 Mg Caused Confusion; Lisinopril Caused Hives; 9/15/17 RXd A Low Salt Diet    b Microalbuminuria ########    Losartan Caused Confusion; Lisinopril Caused Hives    b White Coat Syndrome     c Hypercholesterolemia     7/5/20 And 3/8/17 Referred To Dr. Christian Parnell For Repatha Or Praluent; Statins Worsen Her Lichen Planus; Was On Pravastatin 20 Mg qHS); Other Statins Have Also Worsened Her Lichen Planus    d Type 2 DM     ** 2/18/16 Referred To [facility]; She Stopped Her NPH Insulin In 01/2016    e Hypothyroidism     7/5/20 Decreased 50 Mcg qAM Levothyroxine To 50 Mcg QOD And 25 Mcg QOD With TFTs In 4 Months; 6/3/19 Increased 25 Mcg qAM Levothyroxine To 50 Mcg qAM     f Obesity     Her Mother Weighed 600 Lbs    f Osteopenia     8/12/19 Offered RX For Fosamax 35 Mg Weekly, And RXd OTC D3 5K IU Daily, And OTC CaCO3 600 Mg Daily    i COPD With TUD     i Dyspnea 09/08/2020    Dr. Austin Collier On 9/8/20 Ordered An Echocardiogram    i Tobacco Use Disorder #############    4/1/19 RXd Wellbutrin- Mg qAM X 4-6 Months And PRN 2 Mg Nicotine Gum; Chantix Caused Side Effects    j Chronic constipation     Dr. CHIKIS Pearl    j Chronic Elevated Hepatic Transaminases ####    Dr. CHIKIS Pearl; 7/5/20 RXd OTC Vitamin E 800 IU Daily    j GERD With dysphagia     Dr. DIOP  Pancho kumar H/O Colon Polyps ###    Dr. CHIKIS kumar Irritable Bowel Syndrome     Dr. CHIKIS kumar Nonalcoholic Steatohepatitis (N.A.S.H.) ####    Dr. CHIKIS Pearl; 20 RXd OTC Vitamin E 800 IU Daily    l Bilateral Knee Arthritis     l Carpal tunnel syndrome     l Lumbar Spinal DDD     Dr. Charissa alicea Rheumatoid Arthritis ###    19 Referred To Dr. Jose Cruz Kaur; 10/2/19 RXd Neurontin 300 Mg BID; 10/7/19 RF = 16.7 (0.0-15.0); 19 STEPHANIE, CPK, ESR = Normal    l Tarsal Tunnel Syndrome     m Chronic Fatigue ###    m Family H/O Alzheimer's Disease     m Memory Loss ###    10/2/19 And 19 Referred To Dr. Alie naranjo Vitamin B12 Deficiency ###    n Alcoholism, Sober Since 2013     n Depression And Anxiety     19 RXd Effexor-XR 37.5 Mg qAM; 10/2/19 RXd Lexapro 10 Mg qHS (But This Caused S/Es)    o Allergic rhinosinusitis     p OU Cataracts     Dr. Xi Cordero    q BLE Lichen Planus #############    Dr. Louise In [place]    q BLE Varicose Veins     q Chronic Bilateral Lower Extremity Edema     q Facial flushing     q Vitamin D Deficiency     On OTC D3 5K IU Daily       Past Surgical History:   Procedure Laterality Date     SECTION      x2    COLONOSCOPY      EPIDURAL STEROID INJECTION INTO CERVICAL SPINE N/A 2020    Procedure: Injection-steroid-epidural-cervical to left;  Surgeon: John Vinson MD;  Location: [facility] OR;  Service: Pain Management;  Laterality: N/A;    ESOPHAGOGASTRODUODENOSCOPY         Social History     Socioeconomic History    Marital status:      Spouse name: Not on file    Number of children: Not on file    Years of education: Not on file    Highest education level: Not on file   Occupational History    Not on file   Social Needs    Financial resource strain: Very hard    Food insecurity     Worry: Often true     Inability: Often true    Transportation needs      Medical: Yes     Non-medical: Yes   Tobacco Use    Smoking status: Former Smoker     Packs/day: 0.50     Start date: 1960     Quit date: 2019     Years since quittin.5    Smokeless tobacco: Never Used   Substance and Sexual Activity    Alcohol use: No     Frequency: Never     Binge frequency: Never    Drug use: No    Sexual activity: Not on file   Lifestyle    Physical activity     Days per week: 0 days     Minutes per session: 0 min    Stress: Very much   Relationships    Social connections     Talks on phone: Never     Gets together: Never     Attends Rastafari service: Not on file     Active member of club or organization: No     Attends meetings of clubs or organizations: Never     Relationship status:    Other Topics Concern    Not on file   Social History Narrative    Not on file         Medications/Allergies: See med card    Vitals:    10/20/20 1110   BP: (!) 152/76   Pulse: 90   Resp: 20   Temp: 98 °F (36.7 °C)   TempSrc: Temporal   Weight: 119.8 kg (264 lb 1.8 oz)   PainSc:   8   PainLoc: Back         Physical exam:  Gen: A and O x3, pleasant, well-groomed  Skin: No rashes or obvious lesions  HEENT: PERRLA, no obvious deformities on ears or in canals.Trachea midline.  CVS: Regular rate and rhythm, normal palpable pulses.  Resp: Clear to auscultation bilaterally, no wheezes or rales.  Abdomen: Soft, NT/ND.  Musculoskeletal: Able to heel walk, toe walk. No antalgic gait.     Neuro:  Upper extremities: 5/5 strength bilaterally   Lower extremities: 5/5 strength bilaterally  Reflexes: Brachioradialis 2+, Bicep 0+, Tricep 1+. Patellar 1+, Achilles 1+ bilaterally.  Sensory: Intact and symmetrical to light touch and pinprick in C2-T1 dermatomes bilaterally , except for decreased sensation to light touch throughout the 1st through 3rd digits and left lateral forearm.  Intact and symmetrical to light touch and pinprick in L2-S1 dermatomes bilaterally.    Cervical Spine:  Cervical spine:  ROM is full in flexion, extension and lateral rotation without pain.  Spurling's maneuver is negative bilaterally.  Myofascial exam:  No tenderness to palpation to the cervical paraspinous muscles.    Lumbar spine:  Lumbar spine:   Range of motion is moderately decreased with flexion with increased pain at about 50°, severely reduced with extension with severe increased pain in the midline low back especially with oblique extension either side.  Rony's test causes no increased pain on either side.    Supine straight leg raise is   Positive at 45° on the left side.  Internal and external rotation of the hip causes no increased pain on either side.  Myofascial exam:  There is tenderness to palpation over the midline lumbar spine and lumbar paraspinous musculature.      Imagin20 MRI C-spine:  ALIGNMENT: Normal.  Lateral masses of C1 and C2 are congruent.  Slight reversal of the normal lordotic curvature.   BONES: Vertebral body heights are maintained.  Multilevel endplate changes with mild type 1 endplate changes at C4-5.  No aggressive bone marrow signal.   PARASPINAL AREA: Normal.   CERVICAL DISC LEVELS:  C2-C3: No disc herniation or significant posterior osseous ridging. No significant spinal canal or foraminal stenosis.   C3-C4: Mild disc osteophyte complex with prominent central component.  Moderate left facet hypertrophy.  Slight ventral cord flattening with preserved ventral and dorsal CSF.  Mild left foraminal stenosis.   C4-C5: Mild disc osteophyte complex.  Mild-moderate left facet hypertrophy.  Mild ventral cord flattening within sliver preserved ventral and preserved dorsal CSF.  Moderate right and mild-moderate left foraminal stenosis.   C5-C6: Mild-moderate disc osteophyte complex with prominent bilateral uncovertebral spurring.  Mild ventral cord flattening within sliver preserved ventral and preserved dorsal CSF.  Moderate-severe bilateral foraminal stenosis.   C6-C7: Mild-moderate disc  osteophyte complex.  Slight ventral cord flattening with preserved ventral and dorsal CSF.  Moderate left and mild right foraminal stenosis.   C7-T1: No disc herniation or significant posterior osseous ridging.  Mild left facet hypertrophy.  No significant spinal canal or foraminal stenosis.     6/4/18 MRI C-spine:  There is mild lumbar dextrocurvature.  The vertebral body alignment and vertebral body heights are maintained.  The paravertebral soft tissues appear within normal limits.  No marrow replacement process or fracture.  The visualized distal spinal cord and conus medullaris are within normal limits.  The conus terminates at L1.   L1-2: Normal disc signal and disc space height.  Minimal disc bulge seen.  No central canal foraminal stenosis   L2-3: There is disc desiccation and mild disc space narrowing.  There is mild moderate diffuse disc bulge asymmetric right results in mild right lateral recess narrowing with mild abutment of the descending right L3 nerve root.  There is mild moderate right-sided foraminal stenosis.  No central canal stenosis   L3-4: There is disc desiccation.  There is mild moderate diffuse disc bulge.  There is mild bilateral facet arthrosis.  There is mild moderate left neural foraminal stenosis.  There is no central canal stenosis.   L4-5 there is disc desiccation and mild disc space narrowing.  There is mild moderate bilateral facet arthrosis.  There is moderate diffuse disc bulge asymmetric left resulting in mild moderate left-sided foraminal stenosis with mild abutment of the exited left L4 nerve root.  No central canal stenosis.   L5-S1: There is mild diffuse disc bulge.  There is moderate to severe right and mild moderate left facet arthrosis.  There is no central canal stenosis or significant neural foraminal narrowing.      X-ray right and left hip 08/28/2018:  Normal right and left hips.  No significant osteoarthritis.    DEXA 7/29/19:  Spine bone mineral density is 1.047  g/cm 2 with T-score -1.1 and Z-score -1.4.  This compares to previous bone mineral density measurement of 1.075 and T-score of -0.9.  Femoral total mean bone mineral density measurement is 0.958 g/cm 2 with T-score -0.4 and Z-score -0.5.  This compares to previous bone mineral density measurement of 1.028 and T-score of 0.2.  FRAX measurement is provided of 10 year major osteoporotic fracture 10.9 % and hip fracture 0.8 %.    Assessment:  The patient is a 55-year-old woman with a history of diabetes, rheumatoid arthritis with chronic back pain who presents in referral from Dr. Gibson for  Back pain radiating into the bilateral legs, neck pain radiating into the left arm.    1. Lumbar radiculopathy  Vital signs    Verify informed consent    Notify physician     Notify physician     Notify physician (specify)    Diet NPO    Case Request Operating Room: Injection-steroid-epidural-lumbar, l5/s1 to left    Place in Outpatient    alprazolam ODT dissolvable tablet 1 mg   2. DDD (degenerative disc disease), lumbar     3. Lumbar spondylosis     4. Screening examination for infectious disease  COVID-19 Routine Screening   5. Cervical radiculopathy           Plan:  1.  The patient had complete relief following the cervical CORBY.  This can be repeated in the future if necessary.  2.  For her low back and leg pain I will schedule her for an L5/S1 interlaminar epidural steroid injection to the left.  If she does not have relief we can consider diagnostic medial branch nerve blocks for her back pain.  3.  Follow-up in 4 weeks postprocedure sooner as needed.

## 2020-10-25 NOTE — PROGRESS NOTES
This note was completed with dictation software and grammatical errors may exist.    CC: neck pain, left arm pain, back pain left greater than right leg pain    HPI:   The patient is a 55-year-old woman with a history of diabetes, rheumatoid arthritis with chronic back pain who presents in referral from Dr. Gibson for  Back pain radiating into the bilateral legs, neck pain radiating into the left arm.  She is status post C7-T1 interlaminar epidural steroid injection on 09/24/2020 with 100% relief.  The patient is new to me.  She no longer has any neck pain.  She does report some intermittent numbness in her left hand but otherwise she is doing well in terms of her cervical spine.  She does have pain across the low back radiating to the left buttock and posterior thigh.  The pain is worse with standing and walking and improved with sitting.  She reports intermittent weakness in her left leg as well as intermittent numbness in both legs.  She denies bladder or bowel incontinence.    Pain intervention history: She had done epidural steroid injections for her back in the past, many years ago.   She is status post C7-T1 interlaminar epidural steroid injection on 09/24/2020 with 100% relief.      Antineuropathics: gabapentin 400 milligrams twice daily with some relief of her upper back and low back pain  NSAIDs: cannot tolerate NSAIDs  Physical therapy: had done multiple rounds of physical therapy for her back in the past with increased pain  Antidepressants:  Muscle relaxers:  Opioids: She had received a prescription for Percocet 10/325 on 07/10/2020 which she takes sparingly, she still has some left over, does not like taking these  Antiplatelets/Anticoagulants:   She smokes marijuana on a regular basis, reports that this seems to help her pain in the neck and back and much of her arthritic pain, denies any major side effects from this.  She has used oils and edibles as well, generally has been using a vaporizer as  well.    ROS:  She reports weight gain, easy bruising, diabetes, joint stiffness, joint swelling, back pain, memory loss, dizziness, difficulty sleeping, anxiety, depression and loss of balance.  Balance of review of systems is negative.    Lab Results   Component Value Date    HGBA1C 6.7 (H) 07/01/2020       Lab Results   Component Value Date    WBC 10.72 07/01/2020    HGB 15.4 07/01/2020    HCT 47.7 07/01/2020    MCV 94 07/01/2020     07/01/2020             Past Medical History:   Diagnosis Date    a Premature Ventricular Contractions     Dr. Austin Collier    b Hypertension     12/21/17 RXd Valsartan 40 Mg 1/2 Tab Daily; Losartan 25 Mg Caused Confusion; Lisinopril Caused Hives; 9/15/17 RXd A Low Salt Diet    b Microalbuminuria ########    Losartan Caused Confusion; Lisinopril Caused Hives    b White Coat Syndrome     c Hypercholesterolemia     7/5/20 And 3/8/17 Referred To Dr. Christian Parnell For Repatha Or Praluent; Statins Worsen Her Lichen Planus; Was On Pravastatin 20 Mg qHS); Other Statins Have Also Worsened Her Lichen Planus    d Type 2 DM     ** 2/18/16 Referred To [facility]; She Stopped Her NPH Insulin In 01/2016    e Hypothyroidism     7/5/20 Decreased 50 Mcg qAM Levothyroxine To 50 Mcg QOD And 25 Mcg QOD With TFTs In 4 Months; 6/3/19 Increased 25 Mcg qAM Levothyroxine To 50 Mcg qAM     f Obesity     Her Mother Weighed 600 Lbs    f Osteopenia     8/12/19 Offered RX For Fosamax 35 Mg Weekly, And RXd OTC D3 5K IU Daily, And OTC CaCO3 600 Mg Daily    i COPD With TUD     i Dyspnea 09/08/2020    Dr. Austin Collier On 9/8/20 Ordered An Echocardiogram    i Tobacco Use Disorder #############    4/1/19 RXd Wellbutrin- Mg qAM X 4-6 Months And PRN 2 Mg Nicotine Gum; Chantix Caused Side Effects    j Chronic constipation     Dr. CHIKIS Pearl    j Chronic Elevated Hepatic Transaminases ####    Dr. CHIKIS Pearl; 7/5/20 RXd OTC Vitamin E 800 IU Daily    j GERD With dysphagia     Dr. DIOP  Pancho kumar H/O Colon Polyps ###    Dr. CHIKIS kumar Irritable Bowel Syndrome     Dr. CHIKIS kumar Nonalcoholic Steatohepatitis (N.A.S.H.) ####    Dr. CHIKIS Pearl; 20 RXd OTC Vitamin E 800 IU Daily    l Bilateral Knee Arthritis     l Carpal tunnel syndrome     l Lumbar Spinal DDD     Dr. Charissa alicea Rheumatoid Arthritis ###    19 Referred To Dr. Jose Cruz Kaur; 10/2/19 RXd Neurontin 300 Mg BID; 10/7/19 RF = 16.7 (0.0-15.0); 19 STEPHANIE, CPK, ESR = Normal    l Tarsal Tunnel Syndrome     m Chronic Fatigue ###    m Family H/O Alzheimer's Disease     m Memory Loss ###    10/2/19 And 19 Referred To Dr. Alie naranjo Vitamin B12 Deficiency ###    n Alcoholism, Sober Since 2013     n Depression And Anxiety     19 RXd Effexor-XR 37.5 Mg qAM; 10/2/19 RXd Lexapro 10 Mg qHS (But This Caused S/Es)    o Allergic rhinosinusitis     p OU Cataracts     Dr. Xi Cordero    q BLE Lichen Planus #############    Dr. Louise In [place]    q BLE Varicose Veins     q Chronic Bilateral Lower Extremity Edema     q Facial flushing     q Vitamin D Deficiency     On OTC D3 5K IU Daily       Past Surgical History:   Procedure Laterality Date     SECTION      x2    COLONOSCOPY      EPIDURAL STEROID INJECTION INTO CERVICAL SPINE N/A 2020    Procedure: Injection-steroid-epidural-cervical to left;  Surgeon: John Vinson MD;  Location[facility];  Service: Pain Management;  Laterality: N/A;    ESOPHAGOGASTRODUODENOSCOPY         Social History     Socioeconomic History    Marital status:      Spouse name: Not on file    Number of children: Not on file    Years of education: Not on file    Highest education level: Not on file   Occupational History    Not on file   Social Needs    Financial resource strain: Very hard    Food insecurity     Worry: Often true     Inability: Often true    Transportation needs      Medical: Yes     Non-medical: Yes   Tobacco Use    Smoking status: Former Smoker     Packs/day: 0.50     Start date: 1960     Quit date: 2019     Years since quittin.5    Smokeless tobacco: Never Used   Substance and Sexual Activity    Alcohol use: No     Frequency: Never     Binge frequency: Never    Drug use: No    Sexual activity: Not on file   Lifestyle    Physical activity     Days per week: 0 days     Minutes per session: 0 min    Stress: Very much   Relationships    Social connections     Talks on phone: Never     Gets together: Never     Attends [employer] service: Not on file     Active member of club or organization: No     Attends meetings of clubs or organizations: Never     Relationship status:    Other Topics Concern    Not on file   Social History Narrative    Not on file         Medications/Allergies: See med card    Vitals:    10/20/20 1110   BP: (!) 152/76   Pulse: 90   Resp: 20   Temp: 98 °F (36.7 °C)   TempSrc: Temporal   Weight: 119.8 kg (264 lb 1.8 oz)   PainSc:   8   PainLoc: Back         Physical exam:  Gen: A and O x3, pleasant, well-groomed  Skin: No rashes or obvious lesions  HEENT: PERRLA, no obvious deformities on ears or in canals.Trachea midline.  CVS: Regular rate and rhythm, normal palpable pulses.  Resp: Clear to auscultation bilaterally, no wheezes or rales.  Abdomen: Soft, NT/ND.  Musculoskeletal: Able to heel walk, toe walk. No antalgic gait.     Neuro:  Upper extremities: 5/5 strength bilaterally   Lower extremities: 5/5 strength bilaterally  Reflexes: Brachioradialis 2+, Bicep 0+, Tricep 1+. Patellar 1+, Achilles 1+ bilaterally.  Sensory: Intact and symmetrical to light touch and pinprick in C2-T1 dermatomes bilaterally , except for decreased sensation to light touch throughout the 1st through 3rd digits and left lateral forearm.  Intact and symmetrical to light touch and pinprick in L2-S1 dermatomes bilaterally.    Cervical Spine:  Cervical spine:  ROM is full in flexion, extension and lateral rotation without pain.  Spurling's maneuver is negative bilaterally.  Myofascial exam:  No tenderness to palpation to the cervical paraspinous muscles.    Lumbar spine:  Lumbar spine:   Range of motion is moderately decreased with flexion with increased pain at about 50°, severely reduced with extension with severe increased pain in the midline low back especially with oblique extension either side.  Rony's test causes no increased pain on either side.    Supine straight leg raise is   Positive at 45° on the left side.  Internal and external rotation of the hip causes no increased pain on either side.  Myofascial exam:  There is tenderness to palpation over the midline lumbar spine and lumbar paraspinous musculature.      Imagin20 MRI C-spine:  ALIGNMENT: Normal.  Lateral masses of C1 and C2 are congruent.  Slight reversal of the normal lordotic curvature.   BONES: Vertebral body heights are maintained.  Multilevel endplate changes with mild type 1 endplate changes at C4-5.  No aggressive bone marrow signal.   PARASPINAL AREA: Normal.   CERVICAL DISC LEVELS:  C2-C3: No disc herniation or significant posterior osseous ridging. No significant spinal canal or foraminal stenosis.   C3-C4: Mild disc osteophyte complex with prominent central component.  Moderate left facet hypertrophy.  Slight ventral cord flattening with preserved ventral and dorsal CSF.  Mild left foraminal stenosis.   C4-C5: Mild disc osteophyte complex.  Mild-moderate left facet hypertrophy.  Mild ventral cord flattening within sliver preserved ventral and preserved dorsal CSF.  Moderate right and mild-moderate left foraminal stenosis.   C5-C6: Mild-moderate disc osteophyte complex with prominent bilateral uncovertebral spurring.  Mild ventral cord flattening within sliver preserved ventral and preserved dorsal CSF.  Moderate-severe bilateral foraminal stenosis.   C6-C7: Mild-moderate disc  osteophyte complex.  Slight ventral cord flattening with preserved ventral and dorsal CSF.  Moderate left and mild right foraminal stenosis.   C7-T1: No disc herniation or significant posterior osseous ridging.  Mild left facet hypertrophy.  No significant spinal canal or foraminal stenosis.     6/4/18 MRI C-spine:  There is mild lumbar dextrocurvature.  The vertebral body alignment and vertebral body heights are maintained.  The paravertebral soft tissues appear within normal limits.  No marrow replacement process or fracture.  The visualized distal spinal cord and conus medullaris are within normal limits.  The conus terminates at L1.   L1-2: Normal disc signal and disc space height.  Minimal disc bulge seen.  No central canal foraminal stenosis   L2-3: There is disc desiccation and mild disc space narrowing.  There is mild moderate diffuse disc bulge asymmetric right results in mild right lateral recess narrowing with mild abutment of the descending right L3 nerve root.  There is mild moderate right-sided foraminal stenosis.  No central canal stenosis   L3-4: There is disc desiccation.  There is mild moderate diffuse disc bulge.  There is mild bilateral facet arthrosis.  There is mild moderate left neural foraminal stenosis.  There is no central canal stenosis.   L4-5 there is disc desiccation and mild disc space narrowing.  There is mild moderate bilateral facet arthrosis.  There is moderate diffuse disc bulge asymmetric left resulting in mild moderate left-sided foraminal stenosis with mild abutment of the exited left L4 nerve root.  No central canal stenosis.   L5-S1: There is mild diffuse disc bulge.  There is moderate to severe right and mild moderate left facet arthrosis.  There is no central canal stenosis or significant neural foraminal narrowing.      X-ray right and left hip 08/28/2018:  Normal right and left hips.  No significant osteoarthritis.    DEXA 7/29/19:  Spine bone mineral density is 1.047  g/cm 2 with T-score -1.1 and Z-score -1.4.  This compares to previous bone mineral density measurement of 1.075 and T-score of -0.9.  Femoral total mean bone mineral density measurement is 0.958 g/cm 2 with T-score -0.4 and Z-score -0.5.  This compares to previous bone mineral density measurement of 1.028 and T-score of 0.2.  FRAX measurement is provided of 10 year major osteoporotic fracture 10.9 % and hip fracture 0.8 %.    Assessment:  The patient is a 55-year-old woman with a history of diabetes, rheumatoid arthritis with chronic back pain who presents in referral from Dr. Gibson for  Back pain radiating into the bilateral legs, neck pain radiating into the left arm.    1. Lumbar radiculopathy  Vital signs    Verify informed consent    Notify physician     Notify physician     Notify physician (specify)    Diet NPO    Case Request Operating Room: Injection-steroid-epidural-lumbar, l5/s1 to left    Place in Outpatient    alprazolam ODT dissolvable tablet 1 mg   2. DDD (degenerative disc disease), lumbar     3. Lumbar spondylosis     4. Screening examination for infectious disease  COVID-19 Routine Screening   5. Cervical radiculopathy           Plan:  1.  The patient had complete relief following the cervical CORBY.  This can be repeated in the future if necessary.  2.  For her low back and leg pain I will schedule her for an L5/S1 interlaminar epidural steroid injection to the left.  If she does not have relief we can consider diagnostic medial branch nerve blocks for her back pain.  3.  Follow-up in 4 weeks postprocedure sooner as needed.

## 2020-11-09 ENCOUNTER — LAB VISIT (OUTPATIENT)
Dept: FAMILY MEDICINE | Facility: CLINIC | Age: 55
End: 2020-11-09
Payer: MEDICARE

## 2020-11-09 DIAGNOSIS — Z11.9 SCREENING EXAMINATION FOR INFECTIOUS DISEASE: ICD-10-CM

## 2020-11-09 PROCEDURE — U0003 INFECTIOUS AGENT DETECTION BY NUCLEIC ACID (DNA OR RNA); SEVERE ACUTE RESPIRATORY SYNDROME CORONAVIRUS 2 (SARS-COV-2) (CORONAVIRUS DISEASE [COVID-19]), AMPLIFIED PROBE TECHNIQUE, MAKING USE OF HIGH THROUGHPUT TECHNOLOGIES AS DESCRIBED BY CMS-2020-01-R: HCPCS

## 2020-11-10 LAB — SARS-COV-2 RNA RESP QL NAA+PROBE: NOT DETECTED

## 2020-11-11 ENCOUNTER — TELEPHONE (OUTPATIENT)
Dept: PAIN MEDICINE | Facility: CLINIC | Age: 55
End: 2020-11-11

## 2020-11-11 NOTE — TELEPHONE ENCOUNTER
----- Message from Azul Pereira RN sent at 11/11/2020  2:30 PM CST -----  Patient is scheduled for lumbar CORBY tomorrow 11/12. Patient's daughter states she has been taking minocycline for skin allergies, but has not used in 1 week and prednisone which she stopped taking 2 days ago. Please advise on if this will interfere with procedure. Thanks!

## 2020-11-12 ENCOUNTER — HOSPITAL ENCOUNTER (OUTPATIENT)
Facility: HOSPITAL | Age: 55
Discharge: HOME | End: 2020-11-12
Attending: ANESTHESIOLOGY | Admitting: ANESTHESIOLOGY
Payer: MEDICARE

## 2020-11-12 ENCOUNTER — HOSPITAL ENCOUNTER (OUTPATIENT)
Dept: RADIOLOGY | Facility: HOSPITAL | Age: 55
Discharge: HOME | End: 2020-11-12
Attending: ANESTHESIOLOGY
Payer: MEDICARE

## 2020-11-12 DIAGNOSIS — M54.16 LUMBAR RADICULOPATHY: ICD-10-CM

## 2020-11-12 DIAGNOSIS — M51.36 DDD (DEGENERATIVE DISC DISEASE), LUMBAR: ICD-10-CM

## 2020-11-12 LAB — GLUCOSE SERPL-MCNC: 118 MG/DL (ref 70–110)

## 2020-11-12 PROCEDURE — 62323 NJX INTERLAMINAR LMBR/SAC: CPT | Mod: PO | Performed by: ANESTHESIOLOGY

## 2020-11-12 PROCEDURE — 25000003 PHARM REV CODE 250: Mod: PO | Performed by: ANESTHESIOLOGY

## 2020-11-12 PROCEDURE — 63600175 PHARM REV CODE 636 W HCPCS: Mod: PO | Performed by: ANESTHESIOLOGY

## 2020-11-12 PROCEDURE — 76000 FLUOROSCOPY <1 HR PHYS/QHP: CPT | Mod: TC,PO

## 2020-11-12 PROCEDURE — A9579 GAD-BASE MR CONTRAST NOS,1ML: HCPCS | Mod: PO | Performed by: ANESTHESIOLOGY

## 2020-11-12 PROCEDURE — 62323 NJX INTERLAMINAR LMBR/SAC: CPT | Mod: ,,, | Performed by: ANESTHESIOLOGY

## 2020-11-12 PROCEDURE — 62323 PR INJ LUMBAR/SACRAL, W/IMAGING GUIDANCE: ICD-10-PCS | Mod: ,,, | Performed by: ANESTHESIOLOGY

## 2020-11-12 PROCEDURE — 25500020 PHARM REV CODE 255: Mod: PO | Performed by: ANESTHESIOLOGY

## 2020-11-12 RX ORDER — ALPRAZOLAM 0.5 MG/1
1 TABLET, ORALLY DISINTEGRATING ORAL ONCE AS NEEDED
Status: COMPLETED | OUTPATIENT
Start: 2020-11-12 | End: 2020-11-12

## 2020-11-12 RX ORDER — METHYLPREDNISOLONE ACETATE 80 MG/ML
INJECTION, SUSPENSION INTRA-ARTICULAR; INTRALESIONAL; INTRAMUSCULAR; SOFT TISSUE
Status: DISCONTINUED | OUTPATIENT
Start: 2020-11-12 | End: 2020-11-12 | Stop reason: HOSPADM

## 2020-11-12 RX ORDER — LIDOCAINE HYDROCHLORIDE 10 MG/ML
INJECTION, SOLUTION EPIDURAL; INFILTRATION; INTRACAUDAL; PERINEURAL
Status: DISCONTINUED | OUTPATIENT
Start: 2020-11-12 | End: 2020-11-12 | Stop reason: HOSPADM

## 2020-11-12 RX ADMIN — ALPRAZOLAM 1 MG: 0.5 TABLET, ORALLY DISINTEGRATING ORAL at 02:11

## 2020-11-12 NOTE — PLAN OF CARE
Patient awake, alert and oriented. Tolerating PO. Denies complaints of pain. Incision without redness swelling or drainage. Discharge instructions reviewed with patient. Patient verbalized understanding. Patient discharged home with family.

## 2020-11-12 NOTE — OP NOTE

## 2020-11-12 NOTE — DISCHARGE SUMMARY
[facility]  Discharge Note  Short Stay    Procedure(s) (LRB):  Injection-steroid-epidural-lumbar, l5/s1 to left (N/A)    OUTCOME: Patient tolerated treatment/procedure well without complication and is now ready for discharge.    DISPOSITION: Home or Self Care    FINAL DIAGNOSIS:  Lumbar radiculopathy    FOLLOWUP: In clinic    DISCHARGE INSTRUCTIONS:    Discharge Procedure Orders   No dressing needed

## 2020-11-13 VITALS
SYSTOLIC BLOOD PRESSURE: 129 MMHG | RESPIRATION RATE: 16 BRPM | OXYGEN SATURATION: 97 % | TEMPERATURE: 97 F | HEART RATE: 90 BPM | DIASTOLIC BLOOD PRESSURE: 60 MMHG

## 2020-11-18 ENCOUNTER — HOSPITAL ENCOUNTER (OUTPATIENT)
Dept: RADIOLOGY | Facility: HOSPITAL | Age: 55
Discharge: HOME | End: 2020-11-18
Attending: INTERNAL MEDICINE
Payer: MEDICARE

## 2020-11-18 ENCOUNTER — OFFICE VISIT (OUTPATIENT)
Dept: RHEUMATOLOGY | Facility: CLINIC | Age: 55
End: 2020-11-18
Payer: MEDICARE

## 2020-11-18 VITALS
BODY MASS INDEX: 41.38 KG/M2 | DIASTOLIC BLOOD PRESSURE: 82 MMHG | WEIGHT: 264.19 LBS | TEMPERATURE: 97 F | SYSTOLIC BLOOD PRESSURE: 139 MMHG

## 2020-11-18 DIAGNOSIS — M79.7 FIBROMYALGIA: ICD-10-CM

## 2020-11-18 DIAGNOSIS — M16.0 PRIMARY OSTEOARTHRITIS OF BOTH HIPS: ICD-10-CM

## 2020-11-18 DIAGNOSIS — M19.90 OSTEOARTHRITIS, UNSPECIFIED OSTEOARTHRITIS TYPE, UNSPECIFIED SITE: ICD-10-CM

## 2020-11-18 DIAGNOSIS — R76.8 RHEUMATOID FACTOR POSITIVE: ICD-10-CM

## 2020-11-18 DIAGNOSIS — E66.01 CLASS 2 SEVERE OBESITY DUE TO EXCESS CALORIES WITH SERIOUS COMORBIDITY IN ADULT, UNSPECIFIED BMI: ICD-10-CM

## 2020-11-18 DIAGNOSIS — R76.8 RHEUMATOID FACTOR POSITIVE: Primary | ICD-10-CM

## 2020-11-18 DIAGNOSIS — R53.81 PHYSICAL DECONDITIONING: ICD-10-CM

## 2020-11-18 DIAGNOSIS — F41.8 MIXED ANXIETY DEPRESSIVE DISORDER: ICD-10-CM

## 2020-11-18 PROCEDURE — 73560 X-RAY EXAM OF KNEE 1 OR 2: CPT | Mod: TC,50

## 2020-11-18 PROCEDURE — 73521 X-RAY EXAM HIPS BI 2 VIEWS: CPT | Mod: TC

## 2020-11-18 PROCEDURE — 99205 PR OFFICE/OUTPT VISIT, NEW, LEVL V, 60-74 MIN: ICD-10-PCS | Mod: S$GLB,,, | Performed by: INTERNAL MEDICINE

## 2020-11-18 PROCEDURE — 99205 OFFICE O/P NEW HI 60 MIN: CPT | Mod: S$GLB,,, | Performed by: INTERNAL MEDICINE

## 2020-11-18 RX ORDER — MELOXICAM 15 MG/1
15 TABLET ORAL DAILY PRN
Qty: 30 TABLET | Refills: 5 | Status: SHIPPED | OUTPATIENT
Start: 2020-11-18 | End: 2021-05-05

## 2020-11-18 NOTE — PROGRESS NOTES
"      [facility] RHEUMATOLOGY           New patient visit    Notes dictated to M*Modal. Please forgive any unintentional errors.  Subjective:       Patient ID:   NAME: Yara Santos : 1965     55 y.o. female    Referring Doc: No ref. provider found  Other Physicians:    Chief Complaint:  Rheumatoid factor elevated      HPI:  This lady was referred for the evaluation of a positive rheumatoid factor.  This test was drawn sometime in late 2019 based on a presentation of generalized muscle and joint pain.  The patient states that she has been having more muscle pain than joint pain though she has noted some enlargement of the joints of both hands.  She has not noted though any red, hot, and/or grossly swollen joints.  She has stiffness throughout the day though this is worse with exertion than it is in the morning.  She has taken over-the-counter medications with limited benefit.  She has had "reactions to several anti-inflammatory drugs including Celebrex and Aleve.  These reactions did not include swelling of the lips or difficulty breathing.  She was diagnosed many years ago with fibromyalgia.  She also has a diagnosis of chronic anxiety-depressive disorder.  She has also had reactions to multiple antidepressants including Prozac, Celexa, and Cymbalta.  All of these medications made her more depressed, and in all 3 instances, an increase in suicidal ideations.  She denies any suicidal gestures and has not been seen in the emergency room at any time for psychiatric issues.  She is not under the care of a psychologist or psychiatrist tells me that that is not necessary.  She has a great deal of difficulty with sleep and wakes many times at night for issues of generalized pain.  She also is awakened by nocturia 3-4 times nightly.    Her past medical history is positive for hypertension, hyperlipidemia, diabetes, and COPD.  Additionally, she has a history of reflux with esophageal stricture requiring " dilatation.    ROS:   GEN:      no fever, night sweats or weight loss  SKIN:     no rashes, bruising, no Raynauds, no photosensitivity  HEENT:  no acute changes in vision, no mouth ulcers, no sicca symptoms, no scalp tenderness, jaw claudication.  CV:        no CP, PND, BRICEÑO or orthopnea, no palpitations  PULM:   no SOB, cough, hemoptysis, sputum or pleuritic pain  GI:          No dysphagia, GERD, hematemesis; no abdominal pain, nausea, vomiting, constipation, diarrhea, melanotic stools, BRBPR.  :        no hematuria, dysuria  NEURO: no paresthesias, headaches, visual disturbances  MUSCULOSKELETAL:    PSYCH: No insomnia, no anxiety, no depression    Past Medical/Surgical History:  Past Medical History:   Diagnosis Date    a Premature Ventricular Contractions     Dr. Austin Collier    b Hypertension     12/21/17 RXd Valsartan 40 Mg 1/2 Tab Daily; Losartan 25 Mg Caused Confusion; Lisinopril Caused Hives; 9/15/17 RXd A Low Salt Diet    b Microalbuminuria ########    Losartan Caused Confusion; Lisinopril Caused Hives    b White Coat Syndrome     c Hypercholesterolemia     7/5/20 And 3/8/17 Referred To Dr. Christian Parnell For Repatha Or Praluent; Statins Worsen Her Lichen Planus; Was On Pravastatin 20 Mg qHS); Other Statins Have Also Worsened Her Lichen Planus    d Type 2 DM     ** 2/18/16 Referred To [facility]; She Stopped Her NPH Insulin In 01/2016    e Hypothyroidism     7/5/20 Decreased 50 Mcg qAM Levothyroxine To 50 Mcg QOD And 25 Mcg QOD With TFTs In 4 Months; 6/3/19 Increased 25 Mcg qAM Levothyroxine To 50 Mcg qAM     f Obesity     Her Mother Weighed 600 Lbs    f Osteopenia     8/12/19 Offered RX For Fosamax 35 Mg Weekly, And RXd OTC D3 5K IU Daily, And OTC CaCO3 600 Mg Daily    i COPD With TUD     i Dyspnea 09/08/2020    Dr. Austin Collier On 9/8/20 Ordered An Echocardiogram    i Tobacco Use Disorder #############    4/1/19 RXd Wellbutrin- Mg qAM X 4-6 Months And PRN 2 Mg Nicotine Gum; Chantix Caused Side  Effects    j Chronic constipation     Dr. CHIKIS kumar Chronic Elevated Hepatic Transaminases ####    Dr. CHIKIS Pearl; 20 RXd OTC Vitamin E 800 IU Daily    j GERD With dysphagia     Dr. CHIKIS kumar H/O Colon Polyps ###    Dr. CHIKIS kumar Irritable Bowel Syndrome     Dr. CHIKIS kumar Nonalcoholic Steatohepatitis (N.A.S.H.) ####    Dr. CHIKIS Pearl; 20 RXd OTC Vitamin E 800 IU Daily    l Bilateral Knee Arthritis     l Carpal tunnel syndrome     l Lumbar Spinal DDD     Dr. Charissa Govea    l Rheumatoid Arthritis ###    19 Referred To Dr. Jose Cruz Kaur; 10/2/19 RXd Neurontin 300 Mg BID; 10/7/19 RF = 16.7 (0.0-15.0); 19 STEPHANIE, CPK, ESR = Normal    l Tarsal Tunnel Syndrome     m Chronic Fatigue ###    m Family H/O Alzheimer's Disease     m Memory Loss ###    10/2/19 And 19 Referred To Dr. Alie Levin    m Vitamin B12 Deficiency ###    n Alcoholism, Sober Since 2013     n Depression And Anxiety     19 RXd Effexor-XR 37.5 Mg qAM; 10/2/19 RXd Lexapro 10 Mg qHS (But This Caused S/Es)    o Allergic rhinosinusitis     p OU Cataracts     Dr. Xi Cordero    q BLE Lichen Planus #############    Dr. Louise In [place]    q BLE Varicose Veins     q Chronic Bilateral Lower Extremity Edema     q Facial flushing     q Vitamin D Deficiency     On OTC D3 5K IU Daily     Past Surgical History:   Procedure Laterality Date     SECTION      x2    COLONOSCOPY      EPIDURAL STEROID INJECTION INTO CERVICAL SPINE N/A 2020    Procedure: Injection-steroid-epidural-cervical to left;  Surgeon: John Vinson MD;  Location: [facility] OR;  Service: Pain Management;  Laterality: N/A;    EPIDURAL STEROID INJECTION INTO LUMBAR SPINE N/A 2020    Procedure: Injection-steroid-epidural-lumbar, l5/s1 to left;  Surgeon: John Vinson MD;  Location: [facility] OR;  Service: Pain Management;  Laterality: N/A;     ESOPHAGOGASTRODUODENOSCOPY         Allergies:  Review of patient's allergies indicates:   Allergen Reactions    Iodine and iodide containing products Blisters    Asa [aspirin] Itching and Other (See Comments)     Skin problem      Atorvastatin      Worsened Lichen Planus    Contact metal agent      Other reaction(s): Other (See Comments)    Crestor [rosuvastatin]      Worsens her lichen planus    Lisinopril      Hives    Losartan      Confusion    Lovastatin      Worsened Lichen Planus    Salicylates     Sulfur      Other reaction(s): Other (See Comments)    Sulfur, elemental     Valsartan      breakouts    Latex Other (See Comments)     Skin problem      Nsaids (non-steroidal anti-inflammatory drug)     Tylenol [acetaminophen] Other (See Comments)     Skin inflammation        Social/Family History:  Social History     Socioeconomic History    Marital status:      Spouse name: Not on file    Number of children: Not on file    Years of education: Not on file    Highest education level: Not on file   Occupational History    Not on file   Social Needs    Financial resource strain: Very hard    Food insecurity     Worry: Often true     Inability: Often true    Transportation needs     Medical: Yes     Non-medical: Yes   Tobacco Use    Smoking status: Former Smoker     Packs/day: 0.50     Start date: 1960     Quit date: 2019     Years since quittin.6    Smokeless tobacco: Never Used   Substance and Sexual Activity    Alcohol use: No     Frequency: Never     Binge frequency: Never    Drug use: No    Sexual activity: Not on file   Lifestyle    Physical activity     Days per week: 0 days     Minutes per session: 0 min    Stress: Very much   Relationships    Social connections     Talks on phone: Never     Gets together: Never     Attends Baptism service: Not on file     Active member of club or organization: No     Attends meetings of clubs or organizations: Never      Relationship status:    Other Topics Concern    Not on file   Social History Narrative    Not on file     Family History   Problem Relation Age of Onset    Arthritis Mother     Diabetes Mother     Hyperlipidemia Mother     Cancer Mother         uterine     Allergies Mother     Ovarian cancer Mother 58    Aneurysm Father     Hyperlipidemia Father     Cancer Maternal Uncle         brain    Cancer Paternal Aunt         breast, bone and lung     Breast cancer Paternal Aunt 78    Breast cancer Maternal Aunt 65     FAMILY HISTORY: negative for Connective Tissue Disease      Medications:    Current Outpatient Medications:     azelastine (ASTELIN) 137 mcg (0.1 %) nasal spray, 1 spray (137 mcg total) by Nasal route 2 (two) times daily as needed for Rhinitis., Disp: 90 mL, Rfl: 3    blood sugar diagnostic (ACCU-CHEK TEJA PLUS TEST STRP) Strp, 1 each by Other route before breakfast. For diagnosis code E11.65, Disp: 100 strip, Rfl: 3    calcium carbonate (OS-DEANNA) 600 mg calcium (1,500 mg) Tab, Take 1 tablet (600 mg total) by mouth once daily., Disp: 90 tablet, Rfl: 1    cetirizine (ZYRTEC) 10 MG tablet, Take 1 tablet (10 mg total) by mouth daily as needed., Disp: 90 tablet, Rfl: 3    cholecalciferol, vitamin D3, 5,000 unit Tab, VITAMIN D-3 5000 UNIT TABS, Disp: , Rfl:     compressor, for nebulizer [place], Take 1 each by nebulization every 6 (six) hours as needed., Disp: 1 each, Rfl: 0    cyclobenzaprine (FLEXERIL) 10 MG tablet, Take 1 tablet by mouth twice daily as needed, Disp: 60 tablet, Rfl: 0    ezetimibe (ZETIA) 10 mg tablet, Take 1 tablet (10 mg total) by mouth once daily., Disp: 30 tablet, Rfl: 6    folic acid (FOLVITE) 1 MG tablet, TK 1 T PO  D, Disp: , Rfl:     gabapentin (NEURONTIN) 400 MG capsule, Take 1 capsule (400 mg total) by mouth 2 (two) times daily., Disp: 180 capsule, Rfl: 1    halobetasol (ULTRAVATE) 0.05 % cream, SHAKA EXT AA  BID, Disp: , Rfl:     ipratropium (ATROVENT HFA)  "17 mcg/actuation inhaler, INHALE 2 PUFFS BY MOUTH TWICE DAILY AS DIRECTED, Disp: 38.7 g, Rfl: 6    LANCETS (ACCU-CHEK MULTICLIX LANCET MISC), 1 lancet by Misc.(Non-Drug; Combo Route) route once daily., Disp: , Rfl:     levothyroxine (SYNTHROID) 50 MCG tablet, Take 50 Mcg every other morning alternating with 25 Mcg every other morning, Disp: 45 tablet, Rfl: 3    methotrexate 2.5 MG Tab, Take 15 mg by mouth every 7 days. 8 tablets once a week, Disp: , Rfl:     mupirocin (BACTROBAN) 2 % ointment, Apply topically 2 (two) times daily as needed., Disp: 30 g, Rfl: 1    omalizumab (XOLAIR) 150 mg injection, Inject into the skin every 28 days., Disp: , Rfl:     omeprazole (PRILOSEC) 20 MG capsule, TAKE 2 CAPSULES BY MOUTH DAILY, Disp: 180 capsule, Rfl: 1    promethazine (PHENERGAN) 6.25 mg/5 mL syrup, TAKE 1 TEASPOONFUL BY MOUTH EVERY 6 HOURS AS NEEDED, Disp: , Rfl:     syringe with needle (MEDSAVER SYRINGE) 3 mL 22 x 1 1/2" Syrg, 1 Syringe by Misc.(Non-Drug; Combo Route) route every 30 days., Disp: 12 each, Rfl: 1    TRULICITY 0.75 mg/0.5 mL PnIj, INJ 1 PEN WEEKLY, Disp: , Rfl: 5    VENTOLIN HFA 90 mcg/actuation inhaler, INHALE 2 PUFFS BY MOUTH EVERY 4 TO 6 HOURS AS NEEDED, Disp: 54 g, Rfl: 1    vitamin E 400 UNIT capsule, Take 2 capsules (800 Units total) by mouth once daily., Disp: , Rfl:     hydrOXYzine HCL (ATARAX) 25 MG tablet, , Disp: , Rfl:     meloxicam (MOBIC) 15 MG tablet, Take 1 tablet (15 mg total) by mouth daily as needed for Pain., Disp: 30 tablet, Rfl: 5    minocycline (MINOCIN,DYNACIN) 100 MG capsule, Take 100 mg by mouth 2 (two) times daily., Disp: , Rfl:     predniSONE (DELTASONE) 5 MG tablet, Take 5 mg by mouth 2 (two) times daily. , Disp: , Rfl:     Objective:     Vitals:  Blood pressure 139/82, temperature 96.8 °F (36 °C), weight 119.8 kg (264 lb 3.2 oz).    Physical Examination:   GEN:     wn/wd female in no apparent distress  SKIN:    no rashes, no sclerodactyly, no Raynaud's, no " periungual erythema, no digital tip tapering, no nailbed pitting  HEAD:   no alopecia, no scalp tenderness, no temporal artery tenderness or induration.  EYES:   no conjunctival pallor, no icterus  ENT:     no thrush, no mucosal dryness or ulcerations, adequate oral hygiene & dentition.  NECK:  supple x 6, no masses, no thyromegaly, no lymphadenopathy.  CV:        S1 and S2, RRR, no murmurs, gallop or rubs  CHEST: Normal respiratory effort;  normal breath sounds/no adventitious sounds. No signs of consolidation.  ABD:      non-tender and non-distended; soft; normal bowel sounds; no rebound, guarding, or tenderness. No hepatosplenomegaly.  Musculoskeletal:  No evidence of inflammatory change of the small joints of the hands and feet.  Small Heberden's and Bonnie's nodes are found bilaterally.  There is no interosseous atrophy.  Range of motion of the wrists is preserved bilaterally.  Examination of large joints, including the elbows, shoulders, hips, and knees is significant for mild to moderate degenerative change with crepitus noted in both shoulders and a 5 degree valgus deformity of the right knee.  Rony's sign is equivocal on the right and negative on the left.  Muscle strength is 5-/5 x 2 in the upper extremities and 4+/5 x 2 in the lowers.  There is moderate atrophy of the vastii bilaterally.  Trigger points are reactive in 8 of 8 tested locations.  Range of motion of the back is greater than 90° lumbar flexion.  Posture is unremarkable.  Gait is antalgic favoring the right knee and hip.  EXTREM: no clubbing, cyanosis or edema. normal pulses. Tomas's - x2  NEURO:  grossly intact; motor/sensory WNL; no tremors  PSYCH:  Mental Status Exam      Pt was alert and oriented to date, time and place. Pt was a good historian throughout the evaluation.      General: Pt presented as well nourished, casually dressed, neatly groomed with good hygiene. Pt had good eye contact with evaluator and was cooperative.       Speech: Pt's speech was accelerated with good articulation.      Thought process is logical, coherent, sequential, organized, and goal-oriented.     Attention span, memory, and concentration appear intact.      Psychomotor activity: Pt ambulates with an antalgic gait, and did not exhibit psychomotor restlessness or retardation.      Judgement/Insight: Pt's insight and judgment are intact.      Intellectual functioning: Consistent with educational level and within normal limits.      Mood and Affect: Pt presents with anxious mood and affect.     Risks:  Patient does not appear at high risk of suicide or homicide      Labs:   Lab Results   Component Value Date    WBC 10.72 07/01/2020    HGB 15.4 07/01/2020    HCT 47.7 07/01/2020    MCV 94 07/01/2020     07/01/2020   CMP@  Sodium   Date Value Ref Range Status   07/01/2020 139 136 - 145 mmol/L Final     Potassium   Date Value Ref Range Status   07/01/2020 4.7 3.5 - 5.1 mmol/L Final     Chloride   Date Value Ref Range Status   07/01/2020 103 95 - 110 mmol/L Final     CO2   Date Value Ref Range Status   07/01/2020 32 (H) 22 - 31 mmol/L Final     Glucose   Date Value Ref Range Status   07/01/2020 116 (H) 70 - 110 mg/dL Final     Comment:     The ADA recommends the following guidelines for fasting glucose:  Normal:       less than 100 mg/dL  Prediabetes:  100 mg/dL to 125 mg/dL  Diabetes:     126 mg/dL or higher       BUN   Date Value Ref Range Status   07/01/2020 13 7 - 18 mg/dL Final     Creatinine   Date Value Ref Range Status   07/01/2020 0.83 0.50 - 1.40 mg/dL Final     Calcium   Date Value Ref Range Status   07/01/2020 9.7 8.4 - 10.2 mg/dL Final     Total Protein   Date Value Ref Range Status   07/01/2020 7.1 6.0 - 8.4 g/dL Final     Albumin   Date Value Ref Range Status   07/01/2020 4.4 3.5 - 5.2 g/dL Final     Total Bilirubin   Date Value Ref Range Status   07/01/2020 0.9 0.2 - 1.3 mg/dL Final     Alkaline Phosphatase   Date Value Ref Range Status    07/01/2020 90 38 - 145 U/L Final     AST ([place])   Date Value Ref Range Status   02/08/2016 25 14 - 36 U/L Final     AST   Date Value Ref Range Status   07/01/2020 77 (H) 14 - 36 U/L Final     ALT   Date Value Ref Range Status   07/01/2020 104 (H) 0 - 35 U/L Final     CPK   Date Value Ref Range Status   10/07/2019 54 (L) 55 - 170 U/L Final     Rheumatoid Factor   Date Value Ref Range Status   10/07/2019 16.7 (H) 0.0 - 15.0 IU/mL Final     Comment:     Warning:  Pathologically high levels of IgG (>2500 mg/dL) may   interfere with quantification of RF.         Radiology/Diagnostic Studies:    RF= 16.7 IU//ml    Assessment/Discussion/Plan:   55 y.o. female with generalized musculoskeletal pain without evidence of inflammatory arthritis or myositis, with a very low titer RF-not suggestive of rheumatoid arthritis  2) history of fibromyalgia-active and untreated  3) osteoarthritis, generalized with evidence of advanced disease of the weight-bearing joints, particularly the right hip  4) obesity and deconditioning  5) anxiety-depressive disorder    PLAN:  To begin with, I discussed rheumatoid arthritis with her in detail.  She was accompanied by her daughter followed the conversation closely.  I explained to her that I do not see any evidence of inflammatory joint disease nor do I see changes of chronic inflammatory disease.  Furthermore, I discussed the rheumatoid factor itself and the extremely low titer of her result.  I have ordered a CCP and acute phase reactants.  If these are normal, I am confident that RA is not the diagnosis.  I note that she has been on methotrexate from her dermatologist who explained to her that he wanted to help her control the RA until she actually got to see a rheumatologist.  She has been on it for about 2 months does not feel it is made any difference in how she feels.  I have instructed her to discontinue it today.    We discussed fibromyalgia and the linkage between  anxiety-depressive disorders and a dysfunctional sleep cycle that results in muscular exhaustion.  I have explained to her that I usually recommend an SSRI or SNRI though given her history of suicidal thinking under the influence of such drugs, I did not recommend trying another drug in those 2 classes.  I have strongly recommended counseling as a means to developed coping strategies.  She promises to consider it.    The osteoarthritis will be addressed by increasing acetaminophen to 1 g 3 times daily.  I also provided her with a prescription for meloxicam 15 mg which she may take once daily as needed for joint pain that does not respond to Tylenol.  As she has had reactions to other NSAIDs in the past, I asked her to take her 1st dose in the presence of her daughter and to discontinue it if she has any problems, physical or otherwise.  I have also ordered x-rays of her hips and knees today.  I will likely refer her to orthopedics once I have reviewed the films.    Discussing her weight problem and deconditioning, I have begun by asking her to walk 100 ft daily using a 4 pronged cane for support.  She seemed willing try that and her daughter seemed supportive.  I have told her that if she does it on a daily basis she will likely find it easier to do after just a couple of weeks.  If that is the case, she should extend the walk further.  I talked about weight loss in general terms only and suggested a goal of 1 lb per month.  Her daughter does most of the shopping and does quite a bit of baking.  Strategies for avoiding high calorie foods were discussed with them both.    Her anxiety depressive disorder is complicated by its chronicity and its severity.  I have talked about stress management with her in general terms and I have provided her with literature on that.  I have also discussed a referral to mental health as discussed above.  I will approach this in more depth at the next visit.  She is not suicidal  today.    Appropriate blood testing was ordered.  Literature was provided on osteoarthritis, fibromyalgia, stress management, and the rheumatoid factor.    RTC:  I will see her back in 2-3 months or sooner if needed            Electronically signed by Homer Kaur MD    More than 1 hour was spent on this patient            Answers for HPI/ROS submitted by the patient on 11/17/2020   fever: No  eye redness: No  mouth sores: No  headaches: No  shortness of breath: No  chest pain: No  trouble swallowing: No  diarrhea: No  constipation: No  unexpected weight change: Yes  genital sore: No  dysuria: No  During the last 3 days, have you had a skin rash?: Yes  Bruises or bleeds easily: Yes  cough: No

## 2020-11-19 DIAGNOSIS — M19.90 OSTEOARTHRITIS, UNSPECIFIED OSTEOARTHRITIS TYPE, UNSPECIFIED SITE: ICD-10-CM

## 2020-11-19 DIAGNOSIS — M25.552 HIP PAIN, LEFT: ICD-10-CM

## 2020-11-19 DIAGNOSIS — M25.551 RIGHT HIP PAIN: Primary | ICD-10-CM

## 2020-11-24 ENCOUNTER — OFFICE VISIT (OUTPATIENT)
Dept: PAIN MEDICINE | Facility: CLINIC | Age: 55
End: 2020-11-24
Payer: MEDICARE

## 2020-11-24 VITALS
BODY MASS INDEX: 41.73 KG/M2 | WEIGHT: 266.44 LBS | TEMPERATURE: 98 F | RESPIRATION RATE: 20 BRPM | OXYGEN SATURATION: 98 % | DIASTOLIC BLOOD PRESSURE: 67 MMHG | HEART RATE: 88 BPM | SYSTOLIC BLOOD PRESSURE: 140 MMHG

## 2020-11-24 DIAGNOSIS — M25.552 BILATERAL HIP PAIN: ICD-10-CM

## 2020-11-24 DIAGNOSIS — M25.551 BILATERAL HIP PAIN: ICD-10-CM

## 2020-11-24 DIAGNOSIS — M54.16 LUMBAR RADICULOPATHY: Primary | ICD-10-CM

## 2020-11-24 DIAGNOSIS — M51.36 DDD (DEGENERATIVE DISC DISEASE), LUMBAR: ICD-10-CM

## 2020-11-24 PROCEDURE — 99213 OFFICE O/P EST LOW 20 MIN: CPT | Mod: S$PBB,,, | Performed by: PHYSICIAN ASSISTANT

## 2020-11-24 PROCEDURE — 99213 PR OFFICE/OUTPT VISIT, EST, LEVL III, 20-29 MIN: ICD-10-PCS | Mod: S$PBB,,, | Performed by: PHYSICIAN ASSISTANT

## 2020-11-24 PROCEDURE — 99215 OFFICE O/P EST HI 40 MIN: CPT | Mod: PBBFAC,PN | Performed by: PHYSICIAN ASSISTANT

## 2020-11-24 PROCEDURE — 99999 PR PBB SHADOW E&M-EST. PATIENT-LVL V: ICD-10-PCS | Mod: PBBFAC,,, | Performed by: PHYSICIAN ASSISTANT

## 2020-11-24 PROCEDURE — 99999 PR PBB SHADOW E&M-EST. PATIENT-LVL V: CPT | Mod: PBBFAC,,, | Performed by: PHYSICIAN ASSISTANT

## 2020-11-29 ENCOUNTER — PATIENT MESSAGE (OUTPATIENT)
Dept: ADMINISTRATIVE | Facility: OTHER | Age: 55
End: 2020-11-29

## 2020-11-29 NOTE — PROGRESS NOTES
This note was completed with dictation software and grammatical errors may exist.    CC: neck pain, left arm pain, back pain left greater than right leg pain    HPI:   The patient is a 55-year-old woman with a history of diabetes, rheumatoid arthritis with chronic back pain who presents in referral from Dr. Gibson for  Back pain radiating into the bilateral legs, neck pain radiating into the left arm.  She is status post L5/S1 interlaminar epidural steroid injection to the left on 11/12/2020 with 50% relief of her back pain and 100% relief of her left leg pain.  Her main complaint today is bilateral groin pain and popping in her hips.  She is going to have an MRI next week.  She denies weakness, numbness, bladder or bowel incontinence.    Pain intervention history: She had done epidural steroid injections for her back in the past, many years ago.   She is status post C7-T1 interlaminar epidural steroid injection on 09/24/2020 with 100% relief.   She is status post L5/S1 interlaminar epidural steroid injection to the left on 11/12/2020 with 50% relief of her back pain and 100% relief of her left leg pain.      Antineuropathics: gabapentin 400 milligrams twice daily with some relief of her upper back and low back pain  NSAIDs: cannot tolerate NSAIDs  Physical therapy: had done multiple rounds of physical therapy for her back in the past with increased pain  Antidepressants:  Muscle relaxers:  Opioids: She had received a prescription for Percocet 10/325 on 07/10/2020 which she takes sparingly, she still has some left over, does not like taking these  Antiplatelets/Anticoagulants:   She smokes marijuana on a regular basis, reports that this seems to help her pain in the neck and back and much of her arthritic pain, denies any major side effects from this.  She has used oils and edibles as well, generally has been using a vaporizer as well.    ROS:  She reports weight gain, easy bruising, diabetes, joint stiffness,  joint swelling, back pain, memory loss, dizziness, difficulty sleeping, anxiety, depression and loss of balance.  Balance of review of systems is negative.    Lab Results   Component Value Date    HGBA1C 6.7 (H) 07/01/2020       Lab Results   Component Value Date    WBC 10.72 07/01/2020    HGB 15.4 07/01/2020    HCT 47.7 07/01/2020    MCV 94 07/01/2020     07/01/2020             Past Medical History:   Diagnosis Date    a Premature Ventricular Contractions     Dr. Austin Collier    b Hypertension     12/21/17 RXd Valsartan 40 Mg 1/2 Tab Daily; Losartan 25 Mg Caused Confusion; Lisinopril Caused Hives; 9/15/17 RXd A Low Salt Diet    b Microalbuminuria ########    Losartan Caused Confusion; Lisinopril Caused Hives    b White Coat Syndrome     c Hypercholesterolemia     7/5/20 And 3/8/17 Referred To Dr. Christian Parnell For Repatha Or Praluent; Statins Worsen Her Lichen Planus; Was On Pravastatin 20 Mg qHS); Other Statins Have Also Worsened Her Lichen Planus    d Type 2 DM     ** 2/18/16 Referred To DM [facility]; She Stopped Her NPH Insulin In 01/2016    e Hypothyroidism     7/5/20 Decreased 50 Mcg qAM Levothyroxine To 50 Mcg QOD And 25 Mcg QOD With TFTs In 4 Months; 6/3/19 Increased 25 Mcg qAM Levothyroxine To 50 Mcg qAM     f Obesity     Her Mother Weighed 600 Lbs    f Osteopenia     8/12/19 Offered RX For Fosamax 35 Mg Weekly, And RXd OTC D3 5K IU Daily, And OTC CaCO3 600 Mg Daily    i COPD With TUD     i Dyspnea 09/08/2020    Dr. Austin Collier On 9/8/20 Ordered An Echocardiogram    i Tobacco Use Disorder #############    4/1/19 RXd Wellbutrin- Mg qAM X 4-6 Months And PRN 2 Mg Nicotine Gum; Chantix Caused Side Effects    j Chronic constipation     Dr. CHIKIS kumar Chronic Elevated Hepatic Transaminases ####    Dr. CHIKIS Pearl; 7/5/20 RXd OTC Vitamin E 800 IU Daily    j GERD With dysphagia     Dr. CHIKIS kumar H/O Colon Polyps ###    Dr. CHIKIS Pearl    j  Irritable Bowel Syndrome     Dr. CHIKIS kumar Nonalcoholic Steatohepatitis (N.A.S.H.) ####    Dr. CHIKIS Pearl; 20 RXd OTC Vitamin E 800 IU Daily    l Bilateral Knee Arthritis     l Carpal tunnel syndrome     l Lumbar Spinal DDD     Dr. Charissa alicea Rheumatoid Arthritis ###    19 Referred To Dr. Jose Cruz Kaur; 10/2/19 RXd Neurontin 300 Mg BID; 10/7/19 RF = 16.7 (0.0-15.0); 19 STEPHANIE, CPK, ESR = Normal    l Tarsal Tunnel Syndrome     m Chronic Fatigue ###    m Family H/O Alzheimer's Disease     m Memory Loss ###    10/2/19 And 19 Referred To Dr. Alie naranjo Vitamin B12 Deficiency ###    n Alcoholism, Sober Since 2013     n Depression And Anxiety     19 RXd Effexor-XR 37.5 Mg qAM; 10/2/19 RXd Lexapro 10 Mg qHS (But This Caused S/Es)    o Allergic rhinosinusitis     p OU Cataracts     Dr. Xi Cordero    q BLE Lichen Planus #############    Dr. Louise In [place]    q BLE Varicose Veins     q Chronic Bilateral Lower Extremity Edema     q Facial flushing     q Vitamin D Deficiency     On OTC D3 5K IU Daily       Past Surgical History:   Procedure Laterality Date     SECTION      x2    COLONOSCOPY      EPIDURAL STEROID INJECTION INTO CERVICAL SPINE N/A 2020    Procedure: Injection-steroid-epidural-cervical to left;  Surgeon: John Vinson MD;  Location: [facility] OR;  Service: Pain Management;  Laterality: N/A;    EPIDURAL STEROID INJECTION INTO LUMBAR SPINE N/A 2020    Procedure: Injection-steroid-epidural-lumbar, l5/s1 to left;  Surgeon: John Vinson MD;  Location: [facility] OR;  Service: Pain Management;  Laterality: N/A;    ESOPHAGOGASTRODUODENOSCOPY         Social History     Socioeconomic History    Marital status:      Spouse name: Not on file    Number of children: Not on file    Years of education: Not on file    Highest education level: Not on file   Occupational History    Not on file    Social Needs    Financial resource strain: Very hard    Food insecurity     Worry: Often true     Inability: Often true    Transportation needs     Medical: Yes     Non-medical: Yes   Tobacco Use    Smoking status: Former Smoker     Packs/day: 0.50     Start date: 1960     Quit date: 2019     Years since quittin.6    Smokeless tobacco: Never Used   Substance and Sexual Activity    Alcohol use: No     Frequency: Never     Binge frequency: Never    Drug use: No    Sexual activity: Not on file   Lifestyle    Physical activity     Days per week: 0 days     Minutes per session: 0 min    Stress: Very much   Relationships    Social connections     Talks on phone: Never     Gets together: Never     Attends Yazidi service: Not on file     Active member of club or organization: No     Attends meetings of clubs or organizations: Never     Relationship status:    Other Topics Concern    Not on file   Social History Narrative    Not on file         Medications/Allergies: See med card    Vitals:    20 1123   BP: (!) 140/67   Pulse: 88   Resp: 20   Temp: 98.4 °F (36.9 °C)   TempSrc: Temporal   SpO2: 98%   Weight: 120.8 kg (266 lb 6.8 oz)   PainSc:   6   PainLoc: Hip         Physical exam:  Gen: A and O x3, pleasant, well-groomed  Skin: No rashes or obvious lesions  HEENT: PERRLA, no obvious deformities on ears or in canals.Trachea midline.  CVS: Regular rate and rhythm, normal palpable pulses.  Resp: Clear to auscultation bilaterally, no wheezes or rales.  Abdomen: Soft, NT/ND.  Musculoskeletal: Able to heel walk, toe walk. No antalgic gait.     Neuro:  Upper extremities: 5/5 strength bilaterally   Lower extremities: 5/5 strength bilaterally  Reflexes: Brachioradialis 2+, Bicep 0+, Tricep 1+. Patellar 1+, Achilles 1+ bilaterally.  Sensory: Intact and symmetrical to light touch and pinprick in C2-T1 dermatomes bilaterally , except for decreased sensation to light touch throughout the 1st  through 3rd digits and left lateral forearm.  Intact and symmetrical to light touch and pinprick in L2-S1 dermatomes bilaterally.    Cervical Spine:  Cervical spine: ROM is full in flexion, extension and lateral rotation without pain.  Spurling's maneuver is negative bilaterally.  Myofascial exam:  No tenderness to palpation to the cervical paraspinous muscles.    Lumbar spine:  Lumbar spine:   Range of motion is moderately decreased with flexion with increased pain at about 50°, severely reduced with extension with severe increased pain in the midline low back especially with oblique extension either side.  Rony's test causes no increased pain on either side.    Supine straight leg raise is negative bilaterally.  Internal and external rotation of the hip causes severe groin pain bilaterally.  Myofascial exam:  There is tenderness to palpation over the midline lumbar spine and lumbar paraspinous musculature.      Imagin20 MRI C-spine:  ALIGNMENT: Normal.  Lateral masses of C1 and C2 are congruent.  Slight reversal of the normal lordotic curvature.   BONES: Vertebral body heights are maintained.  Multilevel endplate changes with mild type 1 endplate changes at C4-5.  No aggressive bone marrow signal.   PARASPINAL AREA: Normal.   CERVICAL DISC LEVELS:  C2-C3: No disc herniation or significant posterior osseous ridging. No significant spinal canal or foraminal stenosis.   C3-C4: Mild disc osteophyte complex with prominent central component.  Moderate left facet hypertrophy.  Slight ventral cord flattening with preserved ventral and dorsal CSF.  Mild left foraminal stenosis.   C4-C5: Mild disc osteophyte complex.  Mild-moderate left facet hypertrophy.  Mild ventral cord flattening within sliver preserved ventral and preserved dorsal CSF.  Moderate right and mild-moderate left foraminal stenosis.   C5-C6: Mild-moderate disc osteophyte complex with prominent bilateral uncovertebral spurring.  Mild ventral cord  flattening within sliver preserved ventral and preserved dorsal CSF.  Moderate-severe bilateral foraminal stenosis.   C6-C7: Mild-moderate disc osteophyte complex.  Slight ventral cord flattening with preserved ventral and dorsal CSF.  Moderate left and mild right foraminal stenosis.   C7-T1: No disc herniation or significant posterior osseous ridging.  Mild left facet hypertrophy.  No significant spinal canal or foraminal stenosis.     6/4/18 MRI C-spine:  There is mild lumbar dextrocurvature.  The vertebral body alignment and vertebral body heights are maintained.  The paravertebral soft tissues appear within normal limits.  No marrow replacement process or fracture.  The visualized distal spinal cord and conus medullaris are within normal limits.  The conus terminates at L1.   L1-2: Normal disc signal and disc space height.  Minimal disc bulge seen.  No central canal foraminal stenosis   L2-3: There is disc desiccation and mild disc space narrowing.  There is mild moderate diffuse disc bulge asymmetric right results in mild right lateral recess narrowing with mild abutment of the descending right L3 nerve root.  There is mild moderate right-sided foraminal stenosis.  No central canal stenosis   L3-4: There is disc desiccation.  There is mild moderate diffuse disc bulge.  There is mild bilateral facet arthrosis.  There is mild moderate left neural foraminal stenosis.  There is no central canal stenosis.   L4-5 there is disc desiccation and mild disc space narrowing.  There is mild moderate bilateral facet arthrosis.  There is moderate diffuse disc bulge asymmetric left resulting in mild moderate left-sided foraminal stenosis with mild abutment of the exited left L4 nerve root.  No central canal stenosis.   L5-S1: There is mild diffuse disc bulge.  There is moderate to severe right and mild moderate left facet arthrosis.  There is no central canal stenosis or significant neural foraminal narrowing.      X-ray right  and left hip 08/28/2018:  Normal right and left hips.  No significant osteoarthritis.    DEXA 7/29/19:  Spine bone mineral density is 1.047 g/cm 2 with T-score -1.1 and Z-score -1.4.  This compares to previous bone mineral density measurement of 1.075 and T-score of -0.9.  Femoral total mean bone mineral density measurement is 0.958 g/cm 2 with T-score -0.4 and Z-score -0.5.  This compares to previous bone mineral density measurement of 1.028 and T-score of 0.2.  FRAX measurement is provided of 10 year major osteoporotic fracture 10.9 % and hip fracture 0.8 %.    Assessment:  The patient is a 55-year-old woman with a history of diabetes, rheumatoid arthritis with chronic back pain who presents in referral from Dr. Gibson for  Back pain radiating into the bilateral legs, neck pain radiating into the left arm.    1. Lumbar radiculopathy     2. DDD (degenerative disc disease), lumbar     3. Bilateral hip pain           Plan:  1.  The patient did well following the L5/S1 interlaminar epidural steroid injection to the left.  This can be repeated in the future if necessary.  2.  She is going to have an MRI her hips next week and will contact us if her physician requests hip injections.    Greater than 50% of this 15 minutes visit was spent counseling the patient.

## 2020-12-03 ENCOUNTER — HOSPITAL ENCOUNTER (OUTPATIENT)
Dept: RADIOLOGY | Facility: HOSPITAL | Age: 55
Discharge: HOME | End: 2020-12-03
Attending: INTERNAL MEDICINE
Payer: MEDICARE

## 2020-12-03 DIAGNOSIS — M19.90 OSTEOARTHRITIS, UNSPECIFIED OSTEOARTHRITIS TYPE, UNSPECIFIED SITE: ICD-10-CM

## 2020-12-03 DIAGNOSIS — M25.551 RIGHT HIP PAIN: ICD-10-CM

## 2020-12-03 DIAGNOSIS — M25.552 HIP PAIN, LEFT: ICD-10-CM

## 2020-12-03 DIAGNOSIS — M19.90 OSTEOARTHRITIS, UNSPECIFIED OSTEOARTHRITIS TYPE, UNSPECIFIED SITE: Primary | ICD-10-CM

## 2020-12-03 PROCEDURE — 73721 MRI JNT OF LWR EXTRE W/O DYE: CPT | Mod: TC,PO,RT

## 2020-12-03 PROCEDURE — 73721 MRI JNT OF LWR EXTRE W/O DYE: CPT | Mod: TC,PO,LT

## 2020-12-03 NOTE — PROGRESS NOTES
Patient notified of MRI result and need to see ortho. Patient verbalized understanding of all. Patient referred to Dr. Carter.

## 2020-12-09 ENCOUNTER — PATIENT MESSAGE (OUTPATIENT)
Dept: PAIN MEDICINE | Facility: CLINIC | Age: 55
End: 2020-12-09

## 2020-12-09 DIAGNOSIS — M25.552 BILATERAL HIP PAIN: Primary | ICD-10-CM

## 2020-12-09 DIAGNOSIS — M25.551 BILATERAL HIP PAIN: Primary | ICD-10-CM

## 2020-12-09 NOTE — TELEPHONE ENCOUNTER
Yes, I took a look at your hip MRIs, lets have you see orthopedics.  It looks like Dr. Kaur did put in a referral for this, I will have 1 of my nurses check on this.  Can we schedule her with this referral?

## 2020-12-11 ENCOUNTER — PATIENT MESSAGE (OUTPATIENT)
Dept: PAIN MEDICINE | Facility: CLINIC | Age: 55
End: 2020-12-11

## 2021-01-19 ENCOUNTER — TELEPHONE (OUTPATIENT)
Dept: ORTHOPEDICS | Facility: CLINIC | Age: 56
End: 2021-01-19

## 2021-01-20 ENCOUNTER — OFFICE VISIT (OUTPATIENT)
Dept: ORTHOPEDICS | Facility: CLINIC | Age: 56
End: 2021-01-20
Payer: MEDICARE

## 2021-01-20 VITALS — HEIGHT: 67 IN | BODY MASS INDEX: 41.75 KG/M2 | WEIGHT: 266 LBS

## 2021-01-20 DIAGNOSIS — M25.552 BILATERAL HIP PAIN: Primary | ICD-10-CM

## 2021-01-20 DIAGNOSIS — M25.551 BILATERAL HIP PAIN: Primary | ICD-10-CM

## 2021-01-20 PROCEDURE — 99999 PR PBB SHADOW E&M-EST. PATIENT-LVL IV: ICD-10-PCS | Mod: PBBFAC,,, | Performed by: ORTHOPAEDIC SURGERY

## 2021-01-20 PROCEDURE — 99999 PR PBB SHADOW E&M-EST. PATIENT-LVL IV: CPT | Mod: PBBFAC,,, | Performed by: ORTHOPAEDIC SURGERY

## 2021-01-20 PROCEDURE — 99204 PR OFFICE/OUTPT VISIT, NEW, LEVL IV, 45-59 MIN: ICD-10-PCS | Mod: S$PBB,,, | Performed by: ORTHOPAEDIC SURGERY

## 2021-01-20 PROCEDURE — 99214 OFFICE O/P EST MOD 30 MIN: CPT | Mod: PBBFAC,PN | Performed by: ORTHOPAEDIC SURGERY

## 2021-01-20 PROCEDURE — 99204 OFFICE O/P NEW MOD 45 MIN: CPT | Mod: S$PBB,,, | Performed by: ORTHOPAEDIC SURGERY

## 2021-03-10 ENCOUNTER — OFFICE VISIT (OUTPATIENT)
Dept: RHEUMATOLOGY | Facility: CLINIC | Age: 56
End: 2021-03-10
Payer: MEDICARE

## 2021-03-10 VITALS
SYSTOLIC BLOOD PRESSURE: 141 MMHG | BODY MASS INDEX: 38.56 KG/M2 | TEMPERATURE: 97 F | DIASTOLIC BLOOD PRESSURE: 88 MMHG | WEIGHT: 246.19 LBS

## 2021-03-10 DIAGNOSIS — M79.7 FIBROMYALGIA: ICD-10-CM

## 2021-03-10 DIAGNOSIS — M19.90 OSTEOARTHRITIS, UNSPECIFIED OSTEOARTHRITIS TYPE, UNSPECIFIED SITE: Primary | ICD-10-CM

## 2021-03-10 PROCEDURE — 99213 PR OFFICE/OUTPT VISIT, EST, LEVL III, 20-29 MIN: ICD-10-PCS | Mod: S$GLB,,, | Performed by: INTERNAL MEDICINE

## 2021-03-10 PROCEDURE — 99213 OFFICE O/P EST LOW 20 MIN: CPT | Mod: S$GLB,,, | Performed by: INTERNAL MEDICINE

## 2021-05-31 ENCOUNTER — OFFICE VISIT (OUTPATIENT)
Dept: PAIN MEDICINE | Facility: CLINIC | Age: 56
End: 2021-05-31
Payer: MEDICARE

## 2021-05-31 VITALS
HEART RATE: 93 BPM | TEMPERATURE: 97 F | DIASTOLIC BLOOD PRESSURE: 72 MMHG | OXYGEN SATURATION: 99 % | RESPIRATION RATE: 20 BRPM | BODY MASS INDEX: 38.03 KG/M2 | WEIGHT: 242.81 LBS | SYSTOLIC BLOOD PRESSURE: 160 MMHG

## 2021-05-31 DIAGNOSIS — M54.16 LUMBAR RADICULOPATHY: Primary | ICD-10-CM

## 2021-05-31 DIAGNOSIS — M51.36 DDD (DEGENERATIVE DISC DISEASE), LUMBAR: ICD-10-CM

## 2021-05-31 DIAGNOSIS — M54.12 CERVICAL RADICULOPATHY: ICD-10-CM

## 2021-05-31 DIAGNOSIS — M25.551 BILATERAL HIP PAIN: ICD-10-CM

## 2021-05-31 DIAGNOSIS — M25.552 BILATERAL HIP PAIN: ICD-10-CM

## 2021-05-31 DIAGNOSIS — M50.30 DDD (DEGENERATIVE DISC DISEASE), CERVICAL: ICD-10-CM

## 2021-05-31 PROCEDURE — 99215 OFFICE O/P EST HI 40 MIN: CPT | Mod: PBBFAC,PN | Performed by: PHYSICIAN ASSISTANT

## 2021-05-31 PROCEDURE — 99999 PR PBB SHADOW E&M-EST. PATIENT-LVL V: ICD-10-PCS | Mod: PBBFAC,,, | Performed by: PHYSICIAN ASSISTANT

## 2021-05-31 PROCEDURE — 99214 PR OFFICE/OUTPT VISIT, EST, LEVL IV, 30-39 MIN: ICD-10-PCS | Mod: S$PBB,,, | Performed by: PHYSICIAN ASSISTANT

## 2021-05-31 PROCEDURE — 99214 OFFICE O/P EST MOD 30 MIN: CPT | Mod: S$PBB,,, | Performed by: PHYSICIAN ASSISTANT

## 2021-05-31 PROCEDURE — 99999 PR PBB SHADOW E&M-EST. PATIENT-LVL V: CPT | Mod: PBBFAC,,, | Performed by: PHYSICIAN ASSISTANT

## 2021-05-31 RX ORDER — ALPRAZOLAM 0.5 MG/1
1 TABLET, ORALLY DISINTEGRATING ORAL ONCE AS NEEDED
Status: CANCELLED | OUTPATIENT
Start: 2021-06-21 | End: 2032-11-16

## 2021-06-14 ENCOUNTER — PATIENT MESSAGE (OUTPATIENT)
Dept: SURGERY | Facility: HOSPITAL | Age: 56
End: 2021-06-14

## 2021-06-15 ENCOUNTER — PATIENT MESSAGE (OUTPATIENT)
Dept: SURGERY | Facility: HOSPITAL | Age: 56
End: 2021-06-15

## 2021-06-17 ENCOUNTER — PATIENT MESSAGE (OUTPATIENT)
Dept: SURGERY | Facility: HOSPITAL | Age: 56
End: 2021-06-17

## 2021-06-21 ENCOUNTER — HOSPITAL ENCOUNTER (OUTPATIENT)
Dept: RADIOLOGY | Facility: HOSPITAL | Age: 56
Discharge: HOME | End: 2021-06-21
Attending: ANESTHESIOLOGY
Payer: MEDICARE

## 2021-06-21 ENCOUNTER — HOSPITAL ENCOUNTER (OUTPATIENT)
Facility: HOSPITAL | Age: 56
Discharge: HOME | End: 2021-06-21
Attending: ANESTHESIOLOGY | Admitting: ANESTHESIOLOGY
Payer: MEDICARE

## 2021-06-21 VITALS
OXYGEN SATURATION: 98 % | DIASTOLIC BLOOD PRESSURE: 64 MMHG | TEMPERATURE: 97 F | HEART RATE: 83 BPM | RESPIRATION RATE: 16 BRPM | SYSTOLIC BLOOD PRESSURE: 117 MMHG

## 2021-06-21 DIAGNOSIS — M54.16 LUMBAR RADICULOPATHY: ICD-10-CM

## 2021-06-21 DIAGNOSIS — M54.16 BILATERAL LUMBAR RADICULOPATHY: ICD-10-CM

## 2021-06-21 PROCEDURE — 62323 NJX INTERLAMINAR LMBR/SAC: CPT | Mod: PO | Performed by: ANESTHESIOLOGY

## 2021-06-21 PROCEDURE — 62323 NJX INTERLAMINAR LMBR/SAC: CPT | Mod: ,,, | Performed by: ANESTHESIOLOGY

## 2021-06-21 PROCEDURE — 76000 FLUOROSCOPY <1 HR PHYS/QHP: CPT | Mod: TC,PO

## 2021-06-21 PROCEDURE — 25000003 PHARM REV CODE 250: Mod: PO | Performed by: ANESTHESIOLOGY

## 2021-06-21 PROCEDURE — 63600175 PHARM REV CODE 636 W HCPCS: Mod: PO | Performed by: ANESTHESIOLOGY

## 2021-06-21 PROCEDURE — 62323 PR INJ LUMBAR/SACRAL, W/IMAGING GUIDANCE: ICD-10-PCS | Mod: ,,, | Performed by: ANESTHESIOLOGY

## 2021-06-21 PROCEDURE — A9579 GAD-BASE MR CONTRAST NOS,1ML: HCPCS | Mod: PO | Performed by: ANESTHESIOLOGY

## 2021-06-21 PROCEDURE — 25500020 PHARM REV CODE 255: Mod: PO | Performed by: ANESTHESIOLOGY

## 2021-06-21 RX ORDER — ALPRAZOLAM 0.5 MG/1
1 TABLET, ORALLY DISINTEGRATING ORAL ONCE AS NEEDED
Status: COMPLETED | OUTPATIENT
Start: 2021-06-21 | End: 2021-06-21

## 2021-06-21 RX ORDER — LIDOCAINE HYDROCHLORIDE 10 MG/ML
INJECTION, SOLUTION EPIDURAL; INFILTRATION; INTRACAUDAL; PERINEURAL
Status: DISCONTINUED | OUTPATIENT
Start: 2021-06-21 | End: 2021-06-21 | Stop reason: HOSPADM

## 2021-06-21 RX ORDER — METHYLPREDNISOLONE ACETATE 80 MG/ML
INJECTION, SUSPENSION INTRA-ARTICULAR; INTRALESIONAL; INTRAMUSCULAR; SOFT TISSUE
Status: DISCONTINUED | OUTPATIENT
Start: 2021-06-21 | End: 2021-06-21 | Stop reason: HOSPADM

## 2021-06-21 RX ADMIN — ALPRAZOLAM 1 MG: 0.5 TABLET, ORALLY DISINTEGRATING ORAL at 03:06

## 2021-07-19 ENCOUNTER — OFFICE VISIT (OUTPATIENT)
Dept: PAIN MEDICINE | Facility: CLINIC | Age: 56
End: 2021-07-19
Payer: MEDICARE

## 2021-07-19 VITALS
SYSTOLIC BLOOD PRESSURE: 146 MMHG | RESPIRATION RATE: 18 BRPM | BODY MASS INDEX: 37.69 KG/M2 | OXYGEN SATURATION: 96 % | HEART RATE: 98 BPM | WEIGHT: 240.63 LBS | TEMPERATURE: 98 F | DIASTOLIC BLOOD PRESSURE: 72 MMHG

## 2021-07-19 DIAGNOSIS — M25.552 BILATERAL HIP PAIN: Primary | ICD-10-CM

## 2021-07-19 DIAGNOSIS — M25.551 BILATERAL HIP PAIN: Primary | ICD-10-CM

## 2021-07-19 DIAGNOSIS — M51.36 DDD (DEGENERATIVE DISC DISEASE), LUMBAR: ICD-10-CM

## 2021-07-19 DIAGNOSIS — M54.16 LUMBAR RADICULOPATHY: ICD-10-CM

## 2021-07-19 PROCEDURE — 99214 OFFICE O/P EST MOD 30 MIN: CPT | Mod: S$PBB,,, | Performed by: PHYSICIAN ASSISTANT

## 2021-07-19 PROCEDURE — 99214 PR OFFICE/OUTPT VISIT, EST, LEVL IV, 30-39 MIN: ICD-10-PCS | Mod: S$PBB,,, | Performed by: PHYSICIAN ASSISTANT

## 2021-07-19 PROCEDURE — 99999 PR PBB SHADOW E&M-EST. PATIENT-LVL V: ICD-10-PCS | Mod: PBBFAC,,, | Performed by: PHYSICIAN ASSISTANT

## 2021-07-19 PROCEDURE — 99999 PR PBB SHADOW E&M-EST. PATIENT-LVL V: CPT | Mod: PBBFAC,,, | Performed by: PHYSICIAN ASSISTANT

## 2021-07-19 PROCEDURE — 99215 OFFICE O/P EST HI 40 MIN: CPT | Mod: PBBFAC,PN | Performed by: PHYSICIAN ASSISTANT

## 2021-08-03 ENCOUNTER — HOSPITAL ENCOUNTER (OUTPATIENT)
Dept: RADIOLOGY | Facility: HOSPITAL | Age: 56
Discharge: HOME | End: 2021-08-03
Attending: PHYSICIAN ASSISTANT
Payer: MEDICARE

## 2021-08-03 DIAGNOSIS — M51.36 DDD (DEGENERATIVE DISC DISEASE), LUMBAR: ICD-10-CM

## 2021-08-03 PROCEDURE — 72148 MRI LUMBAR SPINE WITHOUT CONTRAST: ICD-10-PCS | Mod: 26,,, | Performed by: RADIOLOGY

## 2021-08-03 PROCEDURE — 72148 MRI LUMBAR SPINE W/O DYE: CPT | Mod: 26,,, | Performed by: RADIOLOGY

## 2021-08-03 PROCEDURE — 72148 MRI LUMBAR SPINE W/O DYE: CPT | Mod: TC,PO

## 2021-08-04 ENCOUNTER — TELEPHONE (OUTPATIENT)
Dept: PAIN MEDICINE | Facility: CLINIC | Age: 56
End: 2021-08-04

## 2021-08-04 ENCOUNTER — HOSPITAL ENCOUNTER (OUTPATIENT)
Dept: RADIOLOGY | Facility: HOSPITAL | Age: 56
Discharge: HOME | End: 2021-08-04
Attending: PHYSICIAN ASSISTANT
Payer: MEDICARE

## 2021-08-04 DIAGNOSIS — M25.551 RIGHT HIP PAIN: ICD-10-CM

## 2021-08-04 DIAGNOSIS — M25.552 LEFT HIP PAIN: ICD-10-CM

## 2021-08-04 PROCEDURE — 77002 IR ASPIRATION INJECTION LARGE JOINT W FLUORO: ICD-10-PCS | Mod: 26,,, | Performed by: RADIOLOGY

## 2021-08-04 PROCEDURE — 20610 DRAIN/INJ JOINT/BURSA W/O US: CPT | Mod: 50,,, | Performed by: RADIOLOGY

## 2021-08-04 PROCEDURE — 25500020 PHARM REV CODE 255: Mod: PO | Performed by: PHYSICIAN ASSISTANT

## 2021-08-04 PROCEDURE — 20610 IR ASPIRATION INJECTION LARGE JOINT W FLUORO: ICD-10-PCS | Mod: 50,,, | Performed by: RADIOLOGY

## 2021-08-04 PROCEDURE — A9577 INJ MULTIHANCE: HCPCS | Mod: PO | Performed by: PHYSICIAN ASSISTANT

## 2021-08-04 PROCEDURE — 20610 DRAIN/INJ JOINT/BURSA W/O US: CPT | Mod: PO

## 2021-08-04 PROCEDURE — 25000003 PHARM REV CODE 250: Mod: PO | Performed by: PHYSICIAN ASSISTANT

## 2021-08-04 PROCEDURE — 77002 NEEDLE LOCALIZATION BY XRAY: CPT | Mod: 26,,, | Performed by: RADIOLOGY

## 2021-08-04 PROCEDURE — 63600175 PHARM REV CODE 636 W HCPCS: Mod: PO | Performed by: PHYSICIAN ASSISTANT

## 2021-08-04 RX ORDER — BUPIVACAINE HYDROCHLORIDE 2.5 MG/ML
10 INJECTION, SOLUTION EPIDURAL; INFILTRATION; INTRACAUDAL ONCE
Status: COMPLETED | OUTPATIENT
Start: 2021-08-04 | End: 2021-08-04

## 2021-08-04 RX ADMIN — GADOBENATE DIMEGLUMINE 10 ML: 529 INJECTION, SOLUTION INTRAVENOUS at 01:08

## 2021-08-04 RX ADMIN — TRIAMCINOLONE ACETONIDE 10 MG: 10 INJECTION, SUSPENSION INTRA-ARTICULAR; INTRALESIONAL at 01:08

## 2021-08-04 RX ADMIN — BUPIVACAINE HYDROCHLORIDE 25 MG: 2.5 INJECTION, SOLUTION EPIDURAL; INFILTRATION; INTRACAUDAL; PERINEURAL at 01:08

## 2021-08-11 ENCOUNTER — OFFICE VISIT (OUTPATIENT)
Dept: NEUROSURGERY | Facility: CLINIC | Age: 56
End: 2021-08-11
Payer: MEDICARE

## 2021-08-11 VITALS
DIASTOLIC BLOOD PRESSURE: 77 MMHG | BODY MASS INDEX: 37.75 KG/M2 | RESPIRATION RATE: 18 BRPM | HEART RATE: 85 BPM | SYSTOLIC BLOOD PRESSURE: 129 MMHG | WEIGHT: 240.5 LBS | HEIGHT: 67 IN

## 2021-08-11 DIAGNOSIS — M51.36 DDD (DEGENERATIVE DISC DISEASE), LUMBAR: ICD-10-CM

## 2021-08-11 PROCEDURE — 99215 OFFICE O/P EST HI 40 MIN: CPT | Mod: PBBFAC,PN | Performed by: NEUROLOGICAL SURGERY

## 2021-08-11 PROCEDURE — 99999 PR PBB SHADOW E&M-EST. PATIENT-LVL V: ICD-10-PCS | Mod: PBBFAC,,, | Performed by: NEUROLOGICAL SURGERY

## 2021-08-11 PROCEDURE — 99205 OFFICE O/P NEW HI 60 MIN: CPT | Mod: S$PBB,,, | Performed by: NEUROLOGICAL SURGERY

## 2021-08-11 PROCEDURE — 99999 PR PBB SHADOW E&M-EST. PATIENT-LVL V: CPT | Mod: PBBFAC,,, | Performed by: NEUROLOGICAL SURGERY

## 2021-08-11 PROCEDURE — 99205 PR OFFICE/OUTPT VISIT, NEW, LEVL V, 60-74 MIN: ICD-10-PCS | Mod: S$PBB,,, | Performed by: NEUROLOGICAL SURGERY

## 2022-02-15 PROBLEM — G35 MULTIPLE SCLEROSIS: Status: ACTIVE | Noted: 2022-02-15

## 2022-04-08 ENCOUNTER — OFFICE VISIT (OUTPATIENT)
Dept: PAIN MEDICINE | Facility: CLINIC | Age: 57
End: 2022-04-08
Payer: MEDICARE

## 2022-04-08 VITALS
SYSTOLIC BLOOD PRESSURE: 193 MMHG | HEIGHT: 68 IN | WEIGHT: 255.19 LBS | BODY MASS INDEX: 38.67 KG/M2 | HEART RATE: 78 BPM | DIASTOLIC BLOOD PRESSURE: 95 MMHG

## 2022-04-08 DIAGNOSIS — M51.36 DDD (DEGENERATIVE DISC DISEASE), LUMBAR: Primary | ICD-10-CM

## 2022-04-08 DIAGNOSIS — M47.816 LUMBAR SPONDYLOSIS: ICD-10-CM

## 2022-04-08 DIAGNOSIS — M50.30 DDD (DEGENERATIVE DISC DISEASE), CERVICAL: ICD-10-CM

## 2022-04-08 DIAGNOSIS — M54.16 LUMBAR RADICULOPATHY: ICD-10-CM

## 2022-04-08 DIAGNOSIS — M54.12 CERVICAL RADICULOPATHY: ICD-10-CM

## 2022-04-08 PROCEDURE — 99214 OFFICE O/P EST MOD 30 MIN: CPT | Mod: PBBFAC,PN | Performed by: PHYSICIAN ASSISTANT

## 2022-04-08 PROCEDURE — 99999 PR PBB SHADOW E&M-EST. PATIENT-LVL IV: ICD-10-PCS | Mod: PBBFAC,,, | Performed by: PHYSICIAN ASSISTANT

## 2022-04-08 PROCEDURE — 99999 PR PBB SHADOW E&M-EST. PATIENT-LVL IV: CPT | Mod: PBBFAC,,, | Performed by: PHYSICIAN ASSISTANT

## 2022-04-08 PROCEDURE — 99214 PR OFFICE/OUTPT VISIT, EST, LEVL IV, 30-39 MIN: ICD-10-PCS | Mod: S$PBB,,, | Performed by: PHYSICIAN ASSISTANT

## 2022-04-08 PROCEDURE — 99214 OFFICE O/P EST MOD 30 MIN: CPT | Mod: S$PBB,,, | Performed by: PHYSICIAN ASSISTANT

## 2022-04-08 NOTE — PROGRESS NOTES
This note was completed with dictation software and grammatical errors may exist.    CC: neck pain, left arm pain, back pain, left leg pain    HPI:   The patient is a 56-year-old woman with a history of diabetes, rheumatoid arthritis with chronic back pain who presents in referral from Dr. Gibson for  Back pain radiating into the bilateral legs, neck pain radiating into the left arm.  She returns in follow-up today with neck and back pain.  Since her last visit she has seen Dr. Riggins who suggested she may benefit from a microdiskectomy at L3/4 for her left leg pain.  She has not followed up since.  She currently describes left low back pain with radiation to the left buttock lateral hip, left groin, left anterior and posterior thigh.  She does not have pain extending below her left knee.  However she does have numbness and weakness in her left leg.  She also complains of left-sided neck pain radiating to the left trapezius muscle in throughout the left arm and into her hand and fingers.  This is also associated with weakness and numbness.  She has been having difficulty with her blood pressure and has a visit on 04/20 with a cardiologist.  She does report having intermittent chest pain recently but denies this today.  She also denies headache.  She denies bladder or bowel incontinence.    Pain intervention history: She had done epidural steroid injections for her back in the past, many years ago.   She is status post C7-T1 interlaminar epidural steroid injection on 09/24/2020 with 100% relief.   She is status post L5/S1 interlaminar epidural steroid injection to the left on 11/12/2020 with 50% relief of her back pain and 100% relief of her left leg pain.   She is status post L5/S1 interlaminar epidural steroid injection on 06/21/2021 with 80% relief.     Spine surgeries:    Antineuropathics: gabapentin 400 milligrams twice daily with some relief of her upper back and low back pain  NSAIDs: cannot tolerate  NSAIDs  Physical therapy: had done multiple rounds of physical therapy for her back in the past with increased pain  Antidepressants:  Muscle relaxers:  Opioids: She had received a prescription for Percocet 10/325 on 07/10/2020 which she takes sparingly, she still has some left over, does not like taking these  Antiplatelets/Anticoagulants:    She smokes marijuana on a regular basis, reports that this seems to help her pain in the neck and back and much of her arthritic pain, denies any major side effects from this.  She has used oils and edibles as well, generally has been using a vaporizer as well.    ROS:  She reports weight gain, easy bruising, diabetes, joint stiffness, joint swelling, back pain, memory loss, dizziness, difficulty sleeping, anxiety, depression and loss of balance.  Balance of review of systems is negative.    Lab Results   Component Value Date    HGBA1C 6.2 (H) 02/15/2022       Lab Results   Component Value Date    WBC 9.70 02/15/2022    HGB 14.1 02/15/2022    HCT 44.6 02/15/2022    MCV 92 02/15/2022     02/15/2022             Past Medical History:   Diagnosis Date    a Premature Ventricular Contractions     Dr. Austin Collier    b Hypertension     12/21/17 RXd Valsartan 40 Mg 1/2 Tab Daily; Losartan 25 Mg Caused Confusion; Lisinopril Caused Hives; 9/15/17 RXd A Low Salt Diet    b Microalbuminuria ########    Losartan Caused Confusion; Lisinopril Caused Hives    b White Coat Syndrome ####    c Hypercholesterolemia     7/5/20 And 3/8/17 Referred To Dr. Christian Parnell For Repatha Or Praluent; Statins Worsen Her Lichen Planus; Was On Pravastatin 20 Mg qHS); Other Statins Have Also Worsened Her Lichen Planus    d Type 2 DM     ** 2/18/16 Referred To DM [facility]; She Stopped Her NPH Insulin In 01/2016    e Hypothyroidism     7/5/20 Decreased 50 Mcg qAM Levothyroxine To 50 Mcg QOD And 25 Mcg QOD; 6/3/19 Increased 25 Mcg qAM Levothyroxine To 50 Mcg qAM     f Obesity     Her Mother Weighed 600 Lbs     f Osteopenia     8/12/19 Offered RX For Fosamax 35 Mg Weekly, And RXd OTC D3 5K IU Daily, And OTC CaCO3 600 Mg Daily    i COPD With TUD     i Dyspnea 09/08/2020    Dr. Austin Collier On 9/8/20 Ordered An Echocardiogram    i Tobacco Use Disorder #############    4/1/19 RXd Wellbutrin- Mg qAM X 4-6 Months And PRN 2 Mg Nicotine Gum; Chantix Caused Side Effects    j Chronic constipation     Dr. CHIKIS Pearl    j Chronic Elevated Hepatic Transaminases ####    Dr. CHIKIS Pearl; 7/5/20 RXd OTC Vitamin E 800 IU Daily    j GERD With dysphagia     Dr. CHIKIS Pearl    j H/O Colon Polyps ###    Dr. CHIKIS Pearl    j Irritable Bowel Syndrome     Dr. CHIKIS Pearl    j Nonalcoholic Steatohepatitis (N.A.S.H.) ####    Dr. CHIKIS Pearl; 7/5/20 RXd OTC Vitamin E 800 IU Daily    l Bilateral Hip Osteoarthritis     Dr. Homer Kaur In 12/2020 Ordered Bilateral Hip MRIs And Referred To Orthopedic Surgery    l Bilateral Knee Arthritis     l Carpal tunnel syndrome     l Lumbar Spinal DDD     Dr. Charissa Govea    l Rheumatoid Arthritis ###    Dr. Homer Kaur; 10/2/19 RXd Neurontin 300 Mg BID; 10/7/19 RF = 16.7 (0.0-15.0); 11/5/19 STEPHANIE, CPK, ESR = Normal    l Tarsal Tunnel Syndrome     m Chronic Fatigue ###    m Family H/O Alzheimer's Disease     m Memory Loss ###    10/2/19 And 4/1/19 Referred To Dr. Alie Levin    m Vitamin B12 Deficiency ###    n Alcoholism, Sober Since 05/2013     n Depression And Anxiety     7/27/21 RXd Zoloft 50 Mg qHS; 11/13/19 RXd Effexor-XR 37.5 Mg qAM; 10/2/19 RXd Lexapro 10 Mg qHS (But This Caused S/Es)    o Allergic rhinosinusitis     p OU Cataracts     Dr. Xi Cordero    q BLE Lichen Planus #############    Dr. Louise In [place]    q BLE Varicose Veins     q Chronic Bilateral Lower Extremity Edema     q Facial flushing     q Vitamin D Deficiency     On OTC D3 5K IU Daily    Urinary tract infection     Wellness Visit  2021        Past Surgical History:   Procedure Laterality Date     SECTION      x2    COLONOSCOPY      EPIDURAL STEROID INJECTION INTO CERVICAL SPINE N/A 2020    Procedure: Injection-steroid-epidural-cervical to left;  Surgeon: John Vinson MD;  Location: [facility] OR;  Service: Pain Management;  Laterality: N/A;    EPIDURAL STEROID INJECTION INTO LUMBAR SPINE N/A 2020    Procedure: Injection-steroid-epidural-lumbar, l5/s1 to left;  Surgeon: John Vinson MD;  Location: [facility] OR;  Service: Pain Management;  Laterality: N/A;    EPIDURAL STEROID INJECTION INTO LUMBAR SPINE N/A 2021    Procedure: Injection-steroid-epidural-lumbar L5/S1;  Surgeon: John Vinson MD;  Location: [facility] OR;  Service: Pain Management;  Laterality: N/A;    ESOPHAGOGASTRODUODENOSCOPY         Social History     Socioeconomic History    Marital status:    Tobacco Use    Smoking status: Former Smoker     Packs/day: 0.50     Start date: 1960     Quit date: 2019     Years since quitting: 3.0    Smokeless tobacco: Never Used   Substance and Sexual Activity    Alcohol use: No    Drug use: No     Social Determinants of Health     Financial Resource Strain: High Risk    Difficulty of Paying Living Expenses: Very hard   Food Insecurity: Food Insecurity Present    Worried About Running Out of Food in the Last Year: Sometimes true    Ran Out of Food in the Last Year: Often true   Transportation Needs: Unmet Transportation Needs    Lack of Transportation (Medical): Yes    Lack of Transportation (Non-Medical): Yes   Physical Activity: Inactive    Days of Exercise per Week: 1 day    Minutes of Exercise per Session: 0 min   Stress: Stress Concern Present    Feeling of Stress : Very much   Social Connections: Unknown    Frequency of Communication with Friends and Family: Once a week    Frequency of Social Gatherings with Friends and Family: Never    Active Member of Clubs or  "Organizations: No    Attends Club or Organization Meetings: Never    Marital Status:          Medications/Allergies: See med card    Vitals:    22 1012   BP: (!) 193/95   Pulse: 78   Weight: 115.8 kg (255 lb 2.9 oz)   Height: 5' 8.4" (1.737 m)   PainSc:   8   PainLoc: Back         Physical exam:  Gen: A and O x3, pleasant, well-groomed    Exam deferred due to elevated blood pressure today.      Imagin20 MRI C-spine:  ALIGNMENT: Normal.  Lateral masses of C1 and C2 are congruent.  Slight reversal of the normal lordotic curvature.   BONES: Vertebral body heights are maintained.  Multilevel endplate changes with mild type 1 endplate changes at C4-5.  No aggressive bone marrow signal.   PARASPINAL AREA: Normal.   CERVICAL DISC LEVELS:  C2-C3: No disc herniation or significant posterior osseous ridging. No significant spinal canal or foraminal stenosis.   C3-C4: Mild disc osteophyte complex with prominent central component.  Moderate left facet hypertrophy.  Slight ventral cord flattening with preserved ventral and dorsal CSF.  Mild left foraminal stenosis.   C4-C5: Mild disc osteophyte complex.  Mild-moderate left facet hypertrophy.  Mild ventral cord flattening within sliver preserved ventral and preserved dorsal CSF.  Moderate right and mild-moderate left foraminal stenosis.   C5-C6: Mild-moderate disc osteophyte complex with prominent bilateral uncovertebral spurring.  Mild ventral cord flattening within sliver preserved ventral and preserved dorsal CSF.  Moderate-severe bilateral foraminal stenosis.   C6-C7: Mild-moderate disc osteophyte complex.  Slight ventral cord flattening with preserved ventral and dorsal CSF.  Moderate left and mild right foraminal stenosis.   C7-T1: No disc herniation or significant posterior osseous ridging.  Mild left facet hypertrophy.  No significant spinal canal or foraminal stenosis.     18 MRI C-spine:  There is mild lumbar dextrocurvature.  The vertebral " body alignment and vertebral body heights are maintained.  The paravertebral soft tissues appear within normal limits.  No marrow replacement process or fracture.  The visualized distal spinal cord and conus medullaris are within normal limits.  The conus terminates at L1.   L1-2: Normal disc signal and disc space height.  Minimal disc bulge seen.  No central canal foraminal stenosis   L2-3: There is disc desiccation and mild disc space narrowing.  There is mild moderate diffuse disc bulge asymmetric right results in mild right lateral recess narrowing with mild abutment of the descending right L3 nerve root.  There is mild moderate right-sided foraminal stenosis.  No central canal stenosis   L3-4: There is disc desiccation.  There is mild moderate diffuse disc bulge.  There is mild bilateral facet arthrosis.  There is mild moderate left neural foraminal stenosis.  There is no central canal stenosis.   L4-5 there is disc desiccation and mild disc space narrowing.  There is mild moderate bilateral facet arthrosis.  There is moderate diffuse disc bulge asymmetric left resulting in mild moderate left-sided foraminal stenosis with mild abutment of the exited left L4 nerve root.  No central canal stenosis.   L5-S1: There is mild diffuse disc bulge.  There is moderate to severe right and mild moderate left facet arthrosis.  There is no central canal stenosis or significant neural foraminal narrowing.      X-ray right and left hip 08/28/2018:  Normal right and left hips.  No significant osteoarthritis.    DEXA 7/29/19:  Spine bone mineral density is 1.047 g/cm 2 with T-score -1.1 and Z-score -1.4.  This compares to previous bone mineral density measurement of 1.075 and T-score of -0.9.  Femoral total mean bone mineral density measurement is 0.958 g/cm 2 with T-score -0.4 and Z-score -0.5.  This compares to previous bone mineral density measurement of 1.028 and T-score of 0.2.  FRAX measurement is provided of 10 year major  osteoporotic fracture 10.9 % and hip fracture 0.8 %.    Assessment:  The patient is a 56-year-old woman with a history of diabetes, rheumatoid arthritis with chronic back pain who presents in referral from Dr. Gibson for  Back pain radiating into the bilateral legs, neck pain radiating into the left arm.    1. DDD (degenerative disc disease), lumbar  MRI Lumbar Spine Without Contrast   2. Lumbar radiculopathy     3. Lumbar spondylosis     4. Cervical radiculopathy     5. DDD (degenerative disc disease), cervical           Plan:  1. I am going to update the patient's lumbar spine MRI and have her follow-up with Dr. Riggins for her left leg pain.  We discussed that if she has an L3/4 microdiskectomy that was discussed during her neurosurgery consultation in her leg pain resolves but she continues to have back pain, we will consider diagnostic medial branch nerve blocks for facet mediated pain.  Lastly, we will consider spinal cord stimulation.  2. We also discussed repeating a cervical CORBY for her left neck and left arm pain in the future.  3. She will contact us for follow-up in the future.

## 2022-04-20 ENCOUNTER — OFFICE VISIT (OUTPATIENT)
Dept: RHEUMATOLOGY | Facility: CLINIC | Age: 57
End: 2022-04-20
Payer: MEDICARE

## 2022-04-20 VITALS — WEIGHT: 258.5 LBS | BODY MASS INDEX: 38.85 KG/M2 | SYSTOLIC BLOOD PRESSURE: 102 MMHG | DIASTOLIC BLOOD PRESSURE: 79 MMHG

## 2022-04-20 DIAGNOSIS — R76.8 RHEUMATOID FACTOR POSITIVE: ICD-10-CM

## 2022-04-20 DIAGNOSIS — M19.90 OSTEOARTHRITIS, UNSPECIFIED OSTEOARTHRITIS TYPE, UNSPECIFIED SITE: Primary | ICD-10-CM

## 2022-04-20 DIAGNOSIS — R53.81 PHYSICAL DECONDITIONING: ICD-10-CM

## 2022-04-20 PROBLEM — I51.89 GRADE I DIASTOLIC DYSFUNCTION: Status: ACTIVE | Noted: 2022-04-20

## 2022-04-20 PROCEDURE — 99214 PR OFFICE/OUTPT VISIT, EST, LEVL IV, 30-39 MIN: ICD-10-PCS | Mod: S$GLB,,, | Performed by: INTERNAL MEDICINE

## 2022-04-20 PROCEDURE — 99214 OFFICE O/P EST MOD 30 MIN: CPT | Mod: S$GLB,,, | Performed by: INTERNAL MEDICINE

## 2022-04-20 RX ORDER — HYDROXYCHLOROQUINE SULFATE 200 MG/1
200 TABLET, FILM COATED ORAL 2 TIMES DAILY
COMMUNITY
Start: 2022-03-27 | End: 2023-02-20 | Stop reason: CLARIF

## 2022-04-20 RX ORDER — PREDNISONE 5 MG/1
5 TABLET ORAL 2 TIMES DAILY
COMMUNITY
Start: 2022-03-27 | End: 2022-05-03

## 2022-04-20 RX ORDER — HYDROXYZINE HYDROCHLORIDE 25 MG/1
25 TABLET, FILM COATED ORAL NIGHTLY PRN
COMMUNITY
Start: 2022-04-08 | End: 2023-03-30

## 2022-04-20 RX ORDER — MELOXICAM 15 MG/1
15 TABLET ORAL DAILY
COMMUNITY
Start: 2022-03-08 | End: 2022-04-20

## 2022-04-20 RX ORDER — HALOBETASOL PROPIONATE 0.5 MG/G
CREAM TOPICAL 2 TIMES DAILY
COMMUNITY
Start: 2022-04-11 | End: 2023-02-20 | Stop reason: CLARIF

## 2022-04-20 RX ORDER — CYANOCOBALAMIN 1000 UG/ML
1000 INJECTION, SOLUTION INTRAMUSCULAR; SUBCUTANEOUS
COMMUNITY
Start: 2022-03-11 | End: 2022-05-09 | Stop reason: SDUPTHER

## 2022-04-20 NOTE — PROGRESS NOTES
[facility] RHEUMATOLOGY           Follow-up visit    Notes dictated to M*Modal. Please forgive any unintended errors.  Subjective:       Patient ID:   NAME: Yara Santos : 1965     56 y.o. female    Referring Doc: No ref. provider found  Other Physicians:    Chief Complaint:  Osteoarthritis      HPI/Interval History:  The patient continues muscle and joint pain.  This is most troublesome in the back.  She had been taking meloxicam with very limited benefit.  Her primary care physician switched her to ibuprofen 800 mg 3 times daily.  She is doing somewhat better with that.  She does however notice that she feels unsteady and in fact has fallen several times.  She is doing very little exercise in part because of gait insecurity.  She has a cane though she did not bring it with her from the car to this office.  This hallway is every bit of a 1000 ft and I reminded her that she must use a cane any time she is walking outside of her house.    ROS:   GEN:    no fever, night sweats or weight loss  SKIN:   no rashes, bruising, or swelling, no Raynauds, no photosensitivity  HEENT: no changes in vision, no mouth ulcers, no sicca symptoms, no scalp tenderness, no jaw claudication.  CV:      no CP, PND, BRICEÑO, orthopnea, no palpitations  PULM: no SOB, cough, hemoptysis, sputum or pleuritic pain  GI:        no dysphagia, no GERD, no hematemesis, no abdominal pain, nausea, vomiting, constipation, diarrhea, melanotic stools, BRBPR  :      no hematuria, dysuria  NEURO: no paresthesias, headaches, acute visual disturbances  MUSCULOSKELETAL:  As above  PSYCH:   No increased insomnia, no increased anxiety, no increased depression    Past Medical/Surgical History:  Past Medical History:   Diagnosis Date    a Premature Ventricular Contractions     Dr. Austin ernst Hypertension     17 RXd Valsartan 40 Mg 1/2 Tab Daily; Losartan 25 Mg Caused Confusion; Lisinopril Caused Hives; 9/15/17 RXd A Low Salt Diet    b  Microalbuminuria ########    Losartan Caused Confusion; Lisinopril Caused Hives    b White Coat Syndrome ####    c Hypercholesterolemia     7/5/20 And 3/8/17 Referred To Dr. Christian Parnell For Repatha Or Praluent; Statins Worsen Her Lichen Planus; Was On Pravastatin 20 Mg qHS); Other Statins Have Also Worsened Her Lichen Planus    d Type 2 DM     ** 2/18/16 Referred To DM [facility]; She Stopped Her NPH Insulin In 01/2016    e Hypothyroidism     7/5/20 Decreased 50 Mcg qAM Levothyroxine To 50 Mcg QOD And 25 Mcg QOD; 6/3/19 Increased 25 Mcg qAM Levothyroxine To 50 Mcg qAM     f Obesity     Her Mother Weighed 600 Lbs    f Osteopenia     8/12/19 Offered RX For Fosamax 35 Mg Weekly, And RXd OTC D3 5K IU Daily, And OTC CaCO3 600 Mg Daily    i COPD With TUD     i Dyspnea 09/08/2020    Dr. Austin Collier On 9/8/20 Ordered An Echocardiogram    i Tobacco Use Disorder #############    4/1/19 RXd Wellbutrin- Mg qAM X 4-6 Months And PRN 2 Mg Nicotine Gum; Chantix Caused Side Effects    j Chronic constipation     Dr. CHIKIS kumar Chronic Elevated Hepatic Transaminases ####    Dr. CHIKIS Pearl; 7/5/20 RXd OTC Vitamin E 800 IU Daily    j GERD With dysphagia     Dr. CHIKIS kumar H/O Colon Polyps ###    Dr. CHIKIS kumar Irritable Bowel Syndrome     Dr. CHIKIS kumar Nonalcoholic Steatohepatitis (N.A.S.H.) ####    Dr. CHIKIS Pearl; 7/5/20 RXd OTC Vitamin E 800 IU Daily    l Bilateral Hip Osteoarthritis     Dr. Homer Kaur In 12/2020 Ordered Bilateral Hip MRIs And Referred To Orthopedic Surgery    l Bilateral Knee Arthritis     l Carpal tunnel syndrome     l Lumbar Spinal DDD     Dr. Charissa Govea    l Rheumatoid Arthritis ###    Dr. Homer Kaur; 10/2/19 RXd Neurontin 300 Mg BID; 10/7/19 RF = 16.7 (0.0-15.0); 11/5/19 STEPHANIE, CPK, ESR = Normal    l Tarsal Tunnel Syndrome     m Chronic Fatigue ###    m Family H/O Alzheimer's Disease     m Memory Loss ###     10/2/19 And 19 Referred To Dr. Alie naranjo Vitamin B12 Deficiency ###    n Alcoholism, Sober Since 2013     n Depression And Anxiety     21 RXd Zoloft 50 Mg qHS; 19 RXd Effexor-XR 37.5 Mg qAM; 10/2/19 RXd Lexapro 10 Mg qHS (But This Caused S/Es)    o Allergic rhinosinusitis     p OU Cataracts     Dr. Xi Cordero    q BLE Lichen Planus #############    Dr. Louise In [place]    q BLE Varicose Veins     q Chronic Bilateral Lower Extremity Edema     q Facial flushing     q Vitamin D Deficiency     On OTC D3 5K IU Daily    Urinary tract infection     Wellness Visit 2021      Past Surgical History:   Procedure Laterality Date     SECTION      x2    COLONOSCOPY      EPIDURAL STEROID INJECTION INTO CERVICAL SPINE N/A 2020    Procedure: Injection-steroid-epidural-cervical to left;  Surgeon: John Vinson MD;  Location: [facility] OR;  Service: Pain Management;  Laterality: N/A;    EPIDURAL STEROID INJECTION INTO LUMBAR SPINE N/A 2020    Procedure: Injection-steroid-epidural-lumbar, l5/s1 to left;  Surgeon: John Vinson MD;  Location: [facility] OR;  Service: Pain Management;  Laterality: N/A;    EPIDURAL STEROID INJECTION INTO LUMBAR SPINE N/A 2021    Procedure: Injection-steroid-epidural-lumbar L5/S1;  Surgeon: John Vinson MD;  Location: [facility] OR;  Service: Pain Management;  Laterality: N/A;    ESOPHAGOGASTRODUODENOSCOPY         Allergies:  Review of patient's allergies indicates:   Allergen Reactions    Iodine and iodide containing products Blisters    Asa [aspirin] Itching and Other (See Comments)     Skin problem      Atorvastatin      Worsened Lichen Planus    Contact metal agent      Other reaction(s): Other (See Comments)    Crestor [rosuvastatin]      Worsens her lichen planus    Lasix [furosemide]      rash    Lisinopril      Hives    Losartan      Confusion    Lovastatin      Worsened Lichen Planus    Salicylates      Sulfur      Other reaction(s): Other (See Comments)    Sulfur, elemental     Valsartan      breakouts    Latex Other (See Comments)     Skin problem         Social/Family History:  Social History     Socioeconomic History    Marital status:    Tobacco Use    Smoking status: Former Smoker     Packs/day: 0.50     Start date: 1/1/1960     Quit date: 4/2/2019     Years since quitting: 3.0    Smokeless tobacco: Never Used   Substance and Sexual Activity    Alcohol use: No    Drug use: No     Social Determinants of Health     Financial Resource Strain: High Risk    Difficulty of Paying Living Expenses: Very hard   Food Insecurity: Food Insecurity Present    Worried About Running Out of Food in the Last Year: Sometimes true    Ran Out of Food in the Last Year: Often true   Transportation Needs: Unmet Transportation Needs    Lack of Transportation (Medical): Yes    Lack of Transportation (Non-Medical): Yes   Physical Activity: Inactive    Days of Exercise per Week: 1 day    Minutes of Exercise per Session: 0 min   Stress: Stress Concern Present    Feeling of Stress : Very much   Social Connections: Unknown    Frequency of Communication with Friends and Family: Once a week    Frequency of Social Gatherings with Friends and Family: Never    Active Member of Clubs or Organizations: No    Attends Club or Organization Meetings: Never    Marital Status:      Family History   Problem Relation Age of Onset    Arthritis Mother     Diabetes Mother     Hyperlipidemia Mother     Cancer Mother         uterine     Allergies Mother     Ovarian cancer Mother 58    Aneurysm Father     Hyperlipidemia Father     Cancer Maternal Uncle         brain    Cancer Paternal Aunt         breast, bone and lung     Breast cancer Paternal Aunt 78    Breast cancer Maternal Aunt 65     FAMILY HISTORY: negative for Connective Tissue Disease      Medications:    Current Outpatient Medications:     albuterol  (VENTOLIN HFA) 90 mcg/actuation inhaler, Inhale 2 puffs into the lungs every 4 (four) hours as needed for Wheezing. Rescue, Disp: 54 g, Rfl: 1    azelastine (ASTELIN) 137 mcg (0.1 %) nasal spray, 1 spray (137 mcg total) by Nasal route 2 (two) times daily as needed for Rhinitis., Disp: 90 mL, Rfl: 3    blood sugar diagnostic (ACCU-CHEK TEJA PLUS TEST STRP) Strp, 1 each by Other route before breakfast. For diagnosis code E11.65, Disp: 100 strip, Rfl: 3    cetirizine (ZYRTEC) 10 MG tablet, Take 1 tablet (10 mg total) by mouth daily as needed., Disp: 90 tablet, Rfl: 3    cholecalciferol, vitamin D3, 5,000 unit Tab, VITAMIN D-3 5000 UNIT TABS, Disp: , Rfl:     cyclobenzaprine (FLEXERIL) 10 MG tablet, Take 1 tablet (10 mg total) by mouth 2 (two) times daily as needed., Disp: 60 tablet, Rfl: 0    diclofenac sodium (VOLTAREN) 1 % Gel, Apply 2 g topically 3 (three) times daily as needed (pain)., Disp: 350 g, Rfl: 11    ezetimibe (ZETIA) 10 mg tablet, Take 1 tablet by mouth once daily, Disp: 90 tablet, Rfl: 0    gabapentin (NEURONTIN) 400 MG capsule, Take 1 capsule by mouth twice daily, Disp: 180 capsule, Rfl: 3    ibuprofen (ADVIL,MOTRIN) 800 MG tablet, Take 1 tablet by mouth twice daily as needed for pain, Disp: 180 tablet, Rfl: 3    ipratropium (ATROVENT HFA) 17 mcg/actuation inhaler, INHALE 2 PUFFS BY MOUTH TWICE DAILY AS DIRECTED, Disp: 38.7 g, Rfl: 6    LANCETS (ACCU-CHEK MULTICLIX LANCET MISC), 1 lancet by Misc.(Non-Drug; Combo Route) route once daily., Disp: , Rfl:     levothyroxine (SYNTHROID) 50 MCG tablet, TAKE 1 TABLET BY MOUTH ONCE DAILY BEFORE BREAKFAST, Disp: 90 tablet, Rfl: 3    mupirocin (BACTROBAN) 2 % ointment, Apply topically 2 (two) times daily as needed., Disp: 22 g, Rfl: 1    omeprazole (PRILOSEC) 20 MG capsule, Take 2 capsules (40 mg total) by mouth once daily., Disp: 180 capsule, Rfl: 1    ondansetron (ZOFRAN-ODT) 4 MG TbDL, Take 1 tablet (4 mg total) by mouth every 6 (six) hours as  needed. DISSOLVE 1 TABLET IN MOUTH THREE TIMES DAILY AS NEEDED, Disp: 30 tablet, Rfl: 3    sertraline (ZOLOFT) 100 MG tablet, Take 1 tablet (100 mg total) by mouth 2 (two) times daily., Disp: 180 tablet, Rfl: 3      Objective:     Vitals:  Weight 117.3 kg (258 lb 8 oz).    Physical Examination:   GEN: wn/wd female in no apparent distress  SKIN: no rashes, no sclerodactyly, no Raynaud's, no periungual erythema, no digital tip ulcerations, no nailbed pitting  HEAD: no alopecia, no scalp tenderness, no temporal artery tenderness or induration.  EYES: no pallor, no icterus, PERRLA  ENT:  no thrush, no mucosal dryness or ulcerations, adequate oral hygiene & dentition.  NECK: supple x 6, no masses, no thyromegaly, no lymphadenopathy.  CV:   S1 and S2 regular, no murmurs, gallop or rubs  CHEST: Normal respiratory effort;  normal breath sounds/no adventitious sounds. No signs of consolidation.  ABD: non-tender and non-distended; soft; normal bowel sounds; no rebound/guarding or tenderness. No hepatosplenomegaly.  Musculoskeletal:  Heberden's and Bonnie's unchanged.  No evidence of synovitis/inflammatory arthritis  EXTREM: no clubbing, cyanosis or edema. normal pulses.  NEURO:  grossly intact; motor/sensory WNL; no tremors  PSYCH:  normal mood, affect and behavior    Labs:   Lab Results   Component Value Date    WBC 9.70 02/15/2022    HGB 14.1 02/15/2022    HCT 44.6 02/15/2022    MCV 92 02/15/2022     02/15/2022   CMP@  Sodium   Date Value Ref Range Status   02/15/2022 140 136 - 145 mmol/L Final     Potassium   Date Value Ref Range Status   02/15/2022 4.9 3.5 - 5.1 mmol/L Final     Chloride   Date Value Ref Range Status   02/15/2022 103 95 - 110 mmol/L Final     CO2   Date Value Ref Range Status   02/15/2022 31 22 - 31 mmol/L Final     Glucose   Date Value Ref Range Status   02/15/2022 102 70 - 110 mg/dL Final     Comment:     The ADA recommends the following guidelines for fasting glucose:    Normal:       less  than 100 mg/dL    Prediabetes:  100 mg/dL to 125 mg/dL    Diabetes:     126 mg/dL or higher       BUN   Date Value Ref Range Status   02/15/2022 17 7 - 18 mg/dL Final     Creatinine   Date Value Ref Range Status   02/15/2022 0.69 0.50 - 1.40 mg/dL Final     Calcium   Date Value Ref Range Status   02/15/2022 9.9 8.4 - 10.2 mg/dL Final     Total Protein   Date Value Ref Range Status   02/15/2022 7.0 6.0 - 8.4 g/dL Final     Albumin   Date Value Ref Range Status   02/15/2022 4.3 3.5 - 5.2 g/dL Final     Total Bilirubin   Date Value Ref Range Status   02/15/2022 0.5 0.2 - 1.3 mg/dL Final     Alkaline Phosphatase   Date Value Ref Range Status   02/15/2022 119 38 - 145 U/L Final     AST ([place])   Date Value Ref Range Status   02/08/2016 25 14 - 36 U/L Final     AST   Date Value Ref Range Status   02/15/2022 62 (H) 14 - 36 U/L Final     ALT   Date Value Ref Range Status   02/15/2022 73 (H) 0 - 35 U/L Final     CPK   Date Value Ref Range Status   02/15/2022 46 (L) 55 - 170 U/L Final     CRP   Date Value Ref Range Status   11/18/2020 0.70 <0.76 mg/dL Final     Rheumatoid Factor   Date Value Ref Range Status   10/07/2019 16.7 (H) 0.0 - 15.0 IU/mL Final     Comment:     Warning:  Pathologically high levels of IgG (>2500 mg/dL) may   interfere with quantification of RF.         Radiology/Diagnostic Studies:    None    Assessment/Discussion/Plan:   56 y.o. female with osteoarthritis-on ibuprofen 800 mg 3 times daily  2) CHRONICALLY ELEVATED LFTS- workup apparently underway  3) GAIT UNSTEADINESS-multifactorial including mechanical issues, obesity, and deconditioning    PLAN:  I do not think that my role in her care is important.  Her NSAID prescription has been changed by Dr. Gibson and I believe that it was done quite appropriately.  Naturally, he will have to monitor her GI and renal toxicity more closely than is generally required with meloxicam.    I recommend a GI evaluation for her elevated transaminases and a  physical therapy referral for gait training and low back school.    It may be worthwhile to consider a referral to physiatry.    RTC:  I will see her back on an as needed or rereferral basis        Electronically signed by Homer Kaur MD                    Answers for HPI/ROS submitted by the patient on 4/20/2022  fever: No  eye redness: No  mouth sores: No  headaches: Yes  shortness of breath: Yes  chest pain: No  trouble swallowing: No  diarrhea: No  constipation: Yes  unexpected weight change: Yes  genital sore: No  dysuria: No  During the last 3 days, have you had a skin rash?: Yes  Bruises or bleeds easily: Yes  cough: Yes

## 2022-04-25 ENCOUNTER — HOSPITAL ENCOUNTER (OUTPATIENT)
Dept: RADIOLOGY | Facility: HOSPITAL | Age: 57
Discharge: HOME | End: 2022-04-25
Attending: OBSTETRICS & GYNECOLOGY
Payer: MEDICARE

## 2022-04-25 ENCOUNTER — OFFICE VISIT (OUTPATIENT)
Dept: OBSTETRICS AND GYNECOLOGY | Facility: CLINIC | Age: 57
End: 2022-04-25
Payer: MEDICARE

## 2022-04-25 ENCOUNTER — HOSPITAL ENCOUNTER (OUTPATIENT)
Dept: RADIOLOGY | Facility: HOSPITAL | Age: 57
Discharge: HOME | End: 2022-04-25
Attending: PHYSICIAN ASSISTANT
Payer: MEDICARE

## 2022-04-25 VITALS
HEIGHT: 68 IN | DIASTOLIC BLOOD PRESSURE: 82 MMHG | BODY MASS INDEX: 38.72 KG/M2 | SYSTOLIC BLOOD PRESSURE: 134 MMHG | WEIGHT: 255.5 LBS

## 2022-04-25 DIAGNOSIS — Z12.31 ENCOUNTER FOR SCREENING MAMMOGRAM FOR MALIGNANT NEOPLASM OF BREAST: ICD-10-CM

## 2022-04-25 DIAGNOSIS — Z80.49 FAMILY HISTORY OF UTERINE CANCER: ICD-10-CM

## 2022-04-25 DIAGNOSIS — M51.36 DDD (DEGENERATIVE DISC DISEASE), LUMBAR: ICD-10-CM

## 2022-04-25 DIAGNOSIS — Z01.411 ENCOUNTER FOR GYNECOLOGICAL EXAMINATION (GENERAL) (ROUTINE) WITH ABNORMAL FINDINGS: Primary | ICD-10-CM

## 2022-04-25 DIAGNOSIS — Z78.0 OSTEOPENIA AFTER MENOPAUSE: ICD-10-CM

## 2022-04-25 DIAGNOSIS — Z01.419 GYNECOLOGIC EXAM NORMAL: ICD-10-CM

## 2022-04-25 DIAGNOSIS — N95.0 PMB (POSTMENOPAUSAL BLEEDING): ICD-10-CM

## 2022-04-25 DIAGNOSIS — R82.90 BAD ODOR OF URINE: ICD-10-CM

## 2022-04-25 DIAGNOSIS — M85.80 OSTEOPENIA AFTER MENOPAUSE: ICD-10-CM

## 2022-04-25 PROCEDURE — 87077 CULTURE AEROBIC IDENTIFY: CPT | Performed by: OBSTETRICS & GYNECOLOGY

## 2022-04-25 PROCEDURE — 77063 BREAST TOMOSYNTHESIS BI: CPT | Mod: 26,,, | Performed by: RADIOLOGY

## 2022-04-25 PROCEDURE — 72148 MRI LUMBAR SPINE W/O DYE: CPT | Mod: TC,PO

## 2022-04-25 PROCEDURE — 87088 URINE BACTERIA CULTURE: CPT | Performed by: OBSTETRICS & GYNECOLOGY

## 2022-04-25 PROCEDURE — 77067 SCR MAMMO BI INCL CAD: CPT | Mod: TC,PN

## 2022-04-25 PROCEDURE — 87624 HPV HI-RISK TYP POOLED RSLT: CPT | Performed by: OBSTETRICS & GYNECOLOGY

## 2022-04-25 PROCEDURE — G0101 CA SCREEN;PELVIC/BREAST EXAM: HCPCS | Mod: PBBFAC,PN | Performed by: OBSTETRICS & GYNECOLOGY

## 2022-04-25 PROCEDURE — G0101 PR CA SCREEN;PELVIC/BREAST EXAM: ICD-10-PCS | Mod: S$PBB,,, | Performed by: OBSTETRICS & GYNECOLOGY

## 2022-04-25 PROCEDURE — 77063 BREAST TOMOSYNTHESIS BI: CPT | Mod: TC,PN

## 2022-04-25 PROCEDURE — 77067 SCR MAMMO BI INCL CAD: CPT | Mod: 26,,, | Performed by: RADIOLOGY

## 2022-04-25 PROCEDURE — 87186 SC STD MICRODIL/AGAR DIL: CPT | Performed by: OBSTETRICS & GYNECOLOGY

## 2022-04-25 PROCEDURE — 87086 URINE CULTURE/COLONY COUNT: CPT | Performed by: OBSTETRICS & GYNECOLOGY

## 2022-04-25 PROCEDURE — 77063 MAMMO DIGITAL SCREENING BILAT WITH TOMO: ICD-10-PCS | Mod: 26,,, | Performed by: RADIOLOGY

## 2022-04-25 PROCEDURE — 72148 MRI LUMBAR SPINE W/O DYE: CPT | Mod: 26,,, | Performed by: RADIOLOGY

## 2022-04-25 PROCEDURE — 99215 OFFICE O/P EST HI 40 MIN: CPT | Mod: PBBFAC,25,PN | Performed by: OBSTETRICS & GYNECOLOGY

## 2022-04-25 PROCEDURE — G0101 CA SCREEN;PELVIC/BREAST EXAM: HCPCS | Mod: S$PBB,,, | Performed by: OBSTETRICS & GYNECOLOGY

## 2022-04-25 PROCEDURE — 72148 MRI LUMBAR SPINE WITHOUT CONTRAST: ICD-10-PCS | Mod: 26,,, | Performed by: RADIOLOGY

## 2022-04-25 PROCEDURE — 77067 MAMMO DIGITAL SCREENING BILAT WITH TOMO: ICD-10-PCS | Mod: 26,,, | Performed by: RADIOLOGY

## 2022-04-25 PROCEDURE — 99999 PR PBB SHADOW E&M-EST. PATIENT-LVL V: ICD-10-PCS | Mod: PBBFAC,,, | Performed by: OBSTETRICS & GYNECOLOGY

## 2022-04-25 PROCEDURE — 88175 CYTOPATH C/V AUTO FLUID REDO: CPT | Performed by: OBSTETRICS & GYNECOLOGY

## 2022-04-25 PROCEDURE — 81001 URINALYSIS AUTO W/SCOPE: CPT | Performed by: OBSTETRICS & GYNECOLOGY

## 2022-04-25 PROCEDURE — 99999 PR PBB SHADOW E&M-EST. PATIENT-LVL V: CPT | Mod: PBBFAC,,, | Performed by: OBSTETRICS & GYNECOLOGY

## 2022-04-25 NOTE — PROGRESS NOTES
Chief Complaint   Patient presents with    Establish Care    Well Woman       History and Physical:  Yara Santos is a 56 y.o. F who presents today for her routine annual GYN exam. The patient has no Gynecology complaints.       No LMP recorded. Patient is postmenopausal.  Last Pap: unsure, maybe 7 years  History of abnormal pap: never  Last Mammogram: 9/2020 BIR[facility]2  Colonosocpy: needs one, scheduled to see GI  DEXA:7/2019 Osteopenia, needs repeat  PCP: Remy Gibson MD 2/2022 routine labs  Immunization status: stated as current, but no records available. Sp Zoster & Pneumococcal  Body mass index is 38.85 kg/m².     Admits to family h/o uterine cancer, states she has had one or two episode of spotting within the last year, no current bleeding.   Complains of urinary odor  Admits to hemorrhoids.     Allergies:   Review of patient's allergies indicates:   Allergen Reactions    Iodine and iodide containing products Blisters    Asa [aspirin] Itching and Other (See Comments)     Skin problem      Atorvastatin      Worsened Lichen Planus    Contact metal agent      Other reaction(s): Other (See Comments)    Crestor [rosuvastatin]      Worsens her lichen planus    Lasix [furosemide]      rash    Lisinopril      Hives    Losartan      Confusion    Lovastatin      Worsened Lichen Planus    Salicylates     Sulfur      Other reaction(s): Other (See Comments)    Sulfur, elemental     Valsartan      breakouts    Latex Other (See Comments)     Skin problem       Past Medical History:   Diagnosis Date    a Premature Ventricular Contractions     Dr. Austin ernst Hypertension     12/21/17 RXd Valsartan 40 Mg 1/2 Tab Daily; Losartan 25 Mg Caused Confusion; Lisinopril Caused Hives; 9/15/17 RXd A Low Salt Diet    b Microalbuminuria ########    Losartan Caused Confusion; Lisinopril Caused Hives    b White Coat Syndrome ####    c Hypercholesterolemia     7/5/20 And 3/8/17 Referred To Dr. Christian Parnell  For Repatha Or Praluent; Statins Worsen Her Lichen Planus; Was On Pravastatin 20 Mg qHS); Other Statins Have Also Worsened Her Lichen Planus    d Type 2 DM     ** 2/18/16 Referred To DM [facility]; She Stopped Her NPH Insulin In 01/2016    e Hypothyroidism     7/5/20 Decreased 50 Mcg qAM Levothyroxine To 50 Mcg QOD And 25 Mcg QOD; 6/3/19 Increased 25 Mcg qAM Levothyroxine To 50 Mcg qAM     f Obesity     Her Mother Weighed 600 Lbs    f Osteopenia     8/12/19 Offered RX For Fosamax 35 Mg Weekly, And RXd OTC D3 5K IU Daily, And OTC CaCO3 600 Mg Daily    i COPD With TUD     i Dyspnea 09/08/2020    Dr. Austin Collier On 9/8/20 Ordered An Echocardiogram    i Tobacco Use Disorder #############    4/1/19 RXd Wellbutrin- Mg qAM X 4-6 Months And PRN 2 Mg Nicotine Gum; Chantix Caused Side Effects    j Chronic constipation     Dr. CHIKIS kumar Chronic Elevated Hepatic Transaminases ####    Dr. CHIKIS Pearl; 7/5/20 RXd OTC Vitamin E 800 IU Daily    j GERD With dysphagia     Dr. CHIKIS kumar H/O Colon Polyps ###    Dr. CHIKIS kumar Irritable Bowel Syndrome     Dr. CHIKIS kumar Nonalcoholic Steatohepatitis (N.A.S.H.) ####    Dr. CHIKIS Pearl; 7/5/20 RXd OTC Vitamin E 800 IU Daily    l Bilateral Hip Osteoarthritis     Dr. Homer Kaur In 12/2020 Ordered Bilateral Hip MRIs And Referred To Orthopedic Surgery    l Bilateral Knee Arthritis     l Carpal tunnel syndrome     l Lumbar Spinal DDD     Dr. Charissa Govea    l Rheumatoid Arthritis ###    Dr. Homer Kaur; 10/2/19 RXd Neurontin 300 Mg BID; 10/7/19 RF = 16.7 (0.0-15.0); 11/5/19 STEPHANIE, CPK, ESR = Normal    l Tarsal Tunnel Syndrome     m Chronic Fatigue ###    m Family H/O Alzheimer's Disease     m Memory Loss ###    10/2/19 And 4/1/19 Referred To Dr. Alie naranjo Vitamin B12 Deficiency ###    n Alcoholism, Sober Since 05/2013     n Depression And Anxiety     7/27/21 RXd Zoloft 50 Mg qHS;  19 RXd Effexor-XR 37.5 Mg qAM; 10/2/19 RXd Lexapro 10 Mg qHS (But This Caused S/Es)    o Allergic rhinosinusitis     p OU Cataracts     Dr. Xi Cordero    q BLE Lichen Planus #############    Dr. Louise In [place]    q BLE Varicose Veins     q Chronic Bilateral Lower Extremity Edema     q Facial flushing     q Vitamin D Deficiency     On OTC D3 5K IU Daily    Urinary tract infection     Wellness Visit 2021      Past Surgical History:   Procedure Laterality Date     SECTION      x2    COLONOSCOPY      EPIDURAL STEROID INJECTION INTO CERVICAL SPINE N/A 2020    Procedure: Injection-steroid-epidural-cervical to left;  Surgeon: John Vinson MD;  Location: [facility] OR;  Service: Pain Management;  Laterality: N/A;    EPIDURAL STEROID INJECTION INTO LUMBAR SPINE N/A 2020    Procedure: Injection-steroid-epidural-lumbar, l5/s1 to left;  Surgeon: John Vinson MD;  Location: [facility] OR;  Service: Pain Management;  Laterality: N/A;    EPIDURAL STEROID INJECTION INTO LUMBAR SPINE N/A 2021    Procedure: Injection-steroid-epidural-lumbar L5/S1;  Surgeon: John Vinson MD;  Location: [facility] OR;  Service: Pain Management;  Laterality: N/A;    ESOPHAGOGASTRODUODENOSCOPY       MEDS:   Current Outpatient Medications on File Prior to Visit   Medication Sig Dispense Refill    albuterol (VENTOLIN HFA) 90 mcg/actuation inhaler Inhale 2 puffs into the lungs every 4 (four) hours as needed for Wheezing. Rescue 54 g 1    azelastine (ASTELIN) 137 mcg (0.1 %) nasal spray 1 spray (137 mcg total) by Nasal route 2 (two) times daily as needed for Rhinitis. 90 mL 3    blood sugar diagnostic (ACCU-CHEK TEJA PLUS TEST STRP) Strp 1 each by Other route before breakfast. For diagnosis code E11.65 100 strip 3    cholecalciferol, vitamin D3, 5,000 unit Tab VITAMIN D-3 5000 UNIT TABS      cyanocobalamin 1,000 mcg/mL injection Inject 1,000 mcg into the muscle every 30 days.       cyclobenzaprine (FLEXERIL) 10 MG tablet Take 1 tablet by mouth twice daily as needed 60 tablet 3    diclofenac sodium (VOLTAREN) 1 % Gel Apply 2 g topically 3 (three) times daily as needed (pain). 350 g 11    ezetimibe (ZETIA) 10 mg tablet Take 1 tablet by mouth once daily 90 tablet 0    gabapentin (NEURONTIN) 400 MG capsule Take 1 capsule by mouth twice daily 180 capsule 3    halobetasol (ULTRAVATE) 0.05 % cream Apply topically 2 (two) times daily.      hydroCHLOROthiazide (HYDRODIURIL) 12.5 MG Tab Take 1 tablet (12.5 mg total) by mouth once daily. 30 tablet 11    hydrOXYchloroQUINE (PLAQUENIL) 200 mg tablet Take 200 mg by mouth 2 (two) times daily.      hydrOXYzine HCL (ATARAX) 25 MG tablet Take 25 mg by mouth nightly.      ibuprofen (ADVIL,MOTRIN) 800 MG tablet Take 1 tablet by mouth twice daily as needed for pain 180 tablet 3    ipratropium (ATROVENT HFA) 17 mcg/actuation inhaler INHALE 2 PUFFS BY MOUTH TWICE DAILY AS DIRECTED 38.7 g 6    LANCETS (ACCU-CHEK MULTICLIX LANCET MISC) 1 lancet by Misc.(Non-Drug; Combo Route) route once daily.      levothyroxine (SYNTHROID) 50 MCG tablet TAKE 1 TABLET BY MOUTH ONCE DAILY BEFORE BREAKFAST 90 tablet 3    mupirocin (BACTROBAN) 2 % ointment Apply topically 2 (two) times daily as needed. 22 g 1    omalizumab (XOLAIR) 150 mg/mL injection Inject 150 mg into the skin.      omeprazole (PRILOSEC) 20 MG capsule Take 2 capsules (40 mg total) by mouth once daily. 180 capsule 1    ondansetron (ZOFRAN-ODT) 4 MG TbDL Take 1 tablet (4 mg total) by mouth every 6 (six) hours as needed. DISSOLVE 1 TABLET IN MOUTH THREE TIMES DAILY AS NEEDED 30 tablet 3    predniSONE (DELTASONE) 5 MG tablet Take 5 mg by mouth 2 (two) times daily.      sertraline (ZOLOFT) 100 MG tablet Take 1 tablet (100 mg total) by mouth 2 (two) times daily. 180 tablet 3    cetirizine (ZYRTEC) 10 MG tablet Take 1 tablet (10 mg total) by mouth daily as needed. 90 tablet 3     No current  facility-administered medications on file prior to visit.     OB History        2    Para   2    Term   2            AB        Living   2       SAB        IAB        Ectopic        Multiple        Live Births                   Social History     Socioeconomic History    Marital status:    Tobacco Use    Smoking status: Former Smoker     Packs/day: 0.50     Start date: 1960     Quit date: 2019     Years since quitting: 3.0    Smokeless tobacco: Never Used   Substance and Sexual Activity    Alcohol use: No    Drug use: No     Social Determinants of Health     Financial Resource Strain: High Risk    Difficulty of Paying Living Expenses: Very hard   Food Insecurity: Food Insecurity Present    Worried About Running Out of Food in the Last Year: Sometimes true    Ran Out of Food in the Last Year: Often true   Transportation Needs: Unmet Transportation Needs    Lack of Transportation (Medical): Yes    Lack of Transportation (Non-Medical): Yes   Physical Activity: Unknown    Days of Exercise per Week: Patient refused    Minutes of Exercise per Session: 0 min   Stress: Stress Concern Present    Feeling of Stress : Very much   Social Connections: Unknown    Frequency of Communication with Friends and Family: Once a week    Frequency of Social Gatherings with Friends and Family: Never    Active Member of Clubs or Organizations: Patient refused    Attends Club or Organization Meetings: Never    Marital Status:    Housing Stability: Unknown    Unable to Pay for Housing in the Last Year: Patient refused     Family History   Problem Relation Age of Onset    Arthritis Mother     Diabetes Mother     Hyperlipidemia Mother     Cancer Mother         uterine     Allergies Mother     Ovarian cancer Mother 58    Aneurysm Father     Hyperlipidemia Father     Cancer Maternal Uncle         brain    Cancer Paternal Aunt         breast, bone and lung     Breast cancer Paternal  "Aunt 78    Breast cancer Maternal Aunt 65       Past medical and surgical history reviewed.   I have reviewed the patient's medical history in detail and updated the computerized patient record.    Review of System:   General: no chills, fever, night sweats, weight gain or weight loss  Breasts: no new or changing breast lumps, nipple discharge or masses.  Gastrointestinal: no abdominal pain, change in bowel habits, or black or bloody stools  Genito-Urinary: no incontinence, urinary frequency/urgency or vulvar/vaginal symptoms, pelvic pain    Physical Exam:   /82   Ht 5' 8" (1.727 m)   Wt 115.9 kg (255 lb 8.2 oz)   BMI 38.85 kg/m²   Constitutional: She appears alert and responsive. She appears well-developed, well-groomed, and well-nourished. No distress.  Breasts: are symmetrical.  Right breast exhibits no inverted nipple, no mass, no nipple discharge, no skin change and no tenderness.  Left breast exhibits no inverted nipple, no mass, no nipple discharge, no skin change and no tenderness.  Abdominal: Soft. She exhibits no distension, hernias or masses. There is no tenderness. No enlargement of liver edge or spleen.  There is no rebound and no guarding.   Genitourinary:    External rectal exam shows no thrombosed external hemorrhoids, no lesions.     Pelvic exam was performed with patient supine.   No labial fusion, and symmetrical.    There is no rash, lesion or injury on the right labia.   There is no rash, lesion or injury on the left labia.   No bleeding and no signs of injury around the vaginal introitus, urethral meatus is normal size and without prolapse or lesions, urethra well supported. The cervix is visualized with no discharge, lesions or friability.   No vaginal discharge found.    No significant Cystocele, Enterocele or rectocele, and cervix and uterus well supported.   Bimanual exam:   The urethra is normal to palpation and there are no palpable vaginal wall masses.   Uterus is not deviated, " not enlarged, not fixed, normal shape and not tender.   Cervix exhibits no motion tenderness.    Right adnexum displays no mass or nodularity and no tenderness.   Left adnexum displays no mass or nodularity and no tenderness.    Assessment/Plan:   Encounter for gynecological examination (general) (routine) with abnormal findings    Gynecologic exam normal  -     Liquid-Based Pap Smear, Screening  -     HPV High Risk Genotypes, PCR    Encounter for screening mammogram for malignant neoplasm of breast   -     Mammo Digital Screening Bilat w/ Iftikhar; Future; Expected date: 04/25/2022    Osteopenia after menopause  -     DXA Bone Density Spine And Hip; Future; Expected date: 04/25/2022    PMB (postmenopausal bleeding)  -     US Pelvis Comp with Transvag NON-OB (xpd; Future; Expected date: 04/25/2022    Family history of uterine cancer  -     US Pelvis Comp with Transvag NON-OB (xpd; Future; Expected date: 04/25/2022    Bad odor of urine  -     Urinalysis, Reflex to Urine Culture Urine, Clean Catch      Follow up in 1 year.

## 2022-04-26 LAB
BACTERIA #/AREA URNS AUTO: ABNORMAL /HPF
BILIRUB UR QL STRIP: NEGATIVE
CLARITY UR REFRACT.AUTO: ABNORMAL
COLOR UR AUTO: YELLOW
GLUCOSE UR QL STRIP: NEGATIVE
HGB UR QL STRIP: NEGATIVE
HYALINE CASTS UR QL AUTO: 2 /LPF
KETONES UR QL STRIP: NEGATIVE
LEUKOCYTE ESTERASE UR QL STRIP: ABNORMAL
MICROSCOPIC COMMENT: ABNORMAL
NITRITE UR QL STRIP: POSITIVE
PH UR STRIP: 5 [PH] (ref 5–8)
PROT UR QL STRIP: NEGATIVE
RBC #/AREA URNS AUTO: 2 /HPF (ref 0–4)
SP GR UR STRIP: 1.01 (ref 1–1.03)
SQUAMOUS #/AREA URNS AUTO: 1 /HPF
URN SPEC COLLECT METH UR: ABNORMAL
WBC #/AREA URNS AUTO: 21 /HPF (ref 0–5)

## 2022-04-27 ENCOUNTER — TELEPHONE (OUTPATIENT)
Dept: OBSTETRICS AND GYNECOLOGY | Facility: CLINIC | Age: 57
End: 2022-04-27
Payer: MEDICARE

## 2022-04-27 DIAGNOSIS — N30.00 ACUTE CYSTITIS WITHOUT HEMATURIA: Primary | ICD-10-CM

## 2022-04-27 RX ORDER — NITROFURANTOIN 25; 75 MG/1; MG/1
100 CAPSULE ORAL 2 TIMES DAILY
Qty: 10 CAPSULE | Refills: 0 | Status: SHIPPED | OUTPATIENT
Start: 2022-04-27 | End: 2022-05-02

## 2022-04-27 NOTE — TELEPHONE ENCOUNTER
Patient aware and verbalized understanding.  Pt. Would like to know why she keeps getting theses UTI's..

## 2022-04-27 NOTE — TELEPHONE ENCOUNTER
----- Message from Lisa Crocker MD sent at 4/27/2022  3:31 PM CDT -----  Your urinalysis is consistent with a urinary tract infection.  I have called in a prescription for an antibiotic, nitrofurantoin, to assist in eliminating this infection.   If you have any further questions or concerns, please do not hesitate to contact me.      Lisa Crocker MD

## 2022-04-28 LAB — BACTERIA UR CULT: ABNORMAL

## 2022-04-29 ENCOUNTER — OFFICE VISIT (OUTPATIENT)
Dept: GASTROENTEROLOGY | Facility: CLINIC | Age: 57
End: 2022-04-29
Payer: MEDICARE

## 2022-04-29 VITALS — BODY MASS INDEX: 40.42 KG/M2 | HEIGHT: 67 IN | WEIGHT: 257.5 LBS

## 2022-04-29 DIAGNOSIS — Z86.010 HISTORY OF COLON POLYPS: ICD-10-CM

## 2022-04-29 DIAGNOSIS — R10.10 UPPER ABDOMINAL PAIN: ICD-10-CM

## 2022-04-29 DIAGNOSIS — R19.4 CHANGE IN BOWEL HABITS: Primary | ICD-10-CM

## 2022-04-29 DIAGNOSIS — R19.7 DIARRHEA, UNSPECIFIED TYPE: ICD-10-CM

## 2022-04-29 DIAGNOSIS — Z87.19 HISTORY OF GASTROESOPHAGEAL REFLUX (GERD): ICD-10-CM

## 2022-04-29 LAB
HPV HR 12 DNA SPEC QL NAA+PROBE: NEGATIVE
HPV16 AG SPEC QL: NEGATIVE
HPV18 DNA SPEC QL NAA+PROBE: NEGATIVE

## 2022-04-29 PROCEDURE — 99999 PR PBB SHADOW E&M-EST. PATIENT-LVL V: CPT | Mod: PBBFAC,,, | Performed by: NURSE PRACTITIONER

## 2022-04-29 PROCEDURE — 99999 PR PBB SHADOW E&M-EST. PATIENT-LVL V: ICD-10-PCS | Mod: PBBFAC,,, | Performed by: NURSE PRACTITIONER

## 2022-04-29 PROCEDURE — 99204 OFFICE O/P NEW MOD 45 MIN: CPT | Mod: S$PBB,,, | Performed by: NURSE PRACTITIONER

## 2022-04-29 PROCEDURE — 99204 PR OFFICE/OUTPT VISIT, NEW, LEVL IV, 45-59 MIN: ICD-10-PCS | Mod: S$PBB,,, | Performed by: NURSE PRACTITIONER

## 2022-04-29 PROCEDURE — 99215 OFFICE O/P EST HI 40 MIN: CPT | Mod: PBBFAC,PO | Performed by: NURSE PRACTITIONER

## 2022-04-29 RX ORDER — DICYCLOMINE HYDROCHLORIDE 10 MG/1
10 CAPSULE ORAL
Qty: 120 CAPSULE | Refills: 0 | Status: SHIPPED | OUTPATIENT
Start: 2022-04-29 | End: 2022-06-03

## 2022-04-29 NOTE — PROGRESS NOTES
"Subjective:       Patient ID: Yara Santos is a 56 y.o. female, Body mass index is 40.33 kg/m².    Chief Complaint: Abdominal Pain and Change in bowel habits      Patient is new to me. Former patient of Dr. Pearl.     Diarrhea   This is a new problem. The current episode started more than 1 month ago (Started ~ three months ago). The problem occurs 2 to 4 times per day. The problem has been unchanged. Diarrhea characteristics: describes stool as soft or "pudding"; denies bloody stools. The patient states that diarrhea awakens her from sleep. Associated symptoms include abdominal pain (upper abdominal pain; describes as sore or pressure) and increased flatus. Pertinent negatives include no bloating, chills, coughing, fever, vomiting or weight loss. Nothing aggravates the symptoms. Risk factors include recent antibiotic use (denies recent hospitalization or foreign travel). Treatments tried: Past: trial of Bentyl- helped. Her past medical history is significant for irritable bowel syndrome. There is no history of bowel resection, inflammatory bowel disease or a recent abdominal surgery.     Review of Systems   Constitutional: Negative for appetite change, chills, fever, unexpected weight change and weight loss.   HENT: Negative for trouble swallowing.    Respiratory: Negative for cough and shortness of breath.    Cardiovascular: Negative for chest pain.   Gastrointestinal: Positive for abdominal pain (upper abdominal pain; describes as sore or pressure), diarrhea, flatus and nausea. Negative for abdominal distention, anal bleeding, bloating, blood in stool, constipation (Hx of constipation), rectal pain and vomiting.        Hx of GERD- reports occ heartburn; taking Omeprazole helps   Genitourinary: Negative for difficulty urinating and dysuria.   Musculoskeletal: Positive for back pain. Negative for gait problem.   Skin: Negative for rash.   Neurological: Negative for speech difficulty.   Psychiatric/Behavioral: " Negative for confusion.       Past Medical History:   Diagnosis Date    a Premature Ventricular Contractions     Dr. Austin ernst Hypertension     12/21/17 RXd Valsartan 40 Mg 1/2 Tab Daily; Losartan 25 Mg Caused Confusion; Lisinopril Caused Hives; 9/15/17 RXd A Low Salt Diet    b Microalbuminuria ########    Losartan Caused Confusion; Lisinopril Caused Hives    b White Coat Syndrome ####    c Hypercholesterolemia     7/5/20 And 3/8/17 Referred To Dr. Christian Parnell For Repatha Or Praluent; Statins Worsen Her Lichen Planus; Was On Pravastatin 20 Mg qHS); Other Statins Have Also Worsened Her Lichen Planus    d Type 2 DM     ** 2/18/16 Referred To DM [facility]; She Stopped Her NPH Insulin In 01/2016    e Hypothyroidism     7/5/20 Decreased 50 Mcg qAM Levothyroxine To 50 Mcg QOD And 25 Mcg QOD; 6/3/19 Increased 25 Mcg qAM Levothyroxine To 50 Mcg qAM     f Obesity     Her Mother Weighed 600 Lbs    f Osteopenia     8/12/19 Offered RX For Fosamax 35 Mg Weekly, And RXd OTC D3 5K IU Daily, And OTC CaCO3 600 Mg Daily    i COPD With TUD     i Dyspnea 09/08/2020    Dr. Austin Collier On 9/8/20 Ordered An Echocardiogram    i Tobacco Use Disorder #############    4/1/19 RXd Wellbutrin- Mg qAM X 4-6 Months And PRN 2 Mg Nicotine Gum; Chantix Caused Side Effects    j Chronic constipation     Dr. CHIKIS kumar Chronic Elevated Hepatic Transaminases ####    Dr. CHIKIS Pearl; 7/5/20 RXd OTC Vitamin E 800 IU Daily    j GERD With dysphagia     Dr. CHIKIS kumar H/O Colon Polyps ###    Dr. CHIKIS kumar Irritable Bowel Syndrome     Dr. CHIKIS kumar Nonalcoholic Steatohepatitis (N.A.S.H.) ####    Dr. CHIKIS Pearl; 7/5/20 RXd OTC Vitamin E 800 IU Daily    l Bilateral Hip Osteoarthritis     Dr. Homer Kaur In 12/2020 Ordered Bilateral Hip MRIs And Referred To Orthopedic Surgery    l Bilateral Knee Arthritis     l Carpal tunnel syndrome     l Lumbar Spinal  DDD     Dr. Charissa Govea    l Rheumatoid Arthritis ###    Dr. Homer Kaur; 10/2/19 RXd Neurontin 300 Mg BID; 10/7/19 RF = 16.7 (0.0-15.0); 19 STEPHANIE, CPK, ESR = Normal    l Tarsal Tunnel Syndrome     m Chronic Fatigue ###    m Family H/O Alzheimer's Disease     m Memory Loss ###    10/2/19 And 19 Referred To Dr. Alie Levin    m Vitamin B12 Deficiency ###    n Alcoholism, Sober Since 2013     n Depression And Anxiety     21 RXd Zoloft 50 Mg qHS; 19 RXd Effexor-XR 37.5 Mg qAM; 10/2/19 RXd Lexapro 10 Mg qHS (But This Caused S/Es)    o Allergic rhinosinusitis     p OU Cataracts     Dr. Xi Cordero    q BLE Lichen Planus #############    Dr. Louise In [place]    q BLE Varicose Veins     q Chronic Bilateral Lower Extremity Edema     q Facial flushing     q Vitamin D Deficiency     On OTC D3 5K IU Daily    Urinary tract infection     Wellness Visit 2021       Past Surgical History:   Procedure Laterality Date     SECTION      x2    COLONOSCOPY   ?    polyps removed per pt report    EPIDURAL STEROID INJECTION INTO CERVICAL SPINE N/A 2020    Procedure: Injection-steroid-epidural-cervical to left;  Surgeon: John Vinson MD;  Location: [facility] OR;  Service: Pain Management;  Laterality: N/A;    EPIDURAL STEROID INJECTION INTO LUMBAR SPINE N/A 2020    Procedure: Injection-steroid-epidural-lumbar, l5/s1 to left;  Surgeon: John Vinson MD;  Location: [facility] OR;  Service: Pain Management;  Laterality: N/A;    EPIDURAL STEROID INJECTION INTO LUMBAR SPINE N/A 2021    Procedure: Injection-steroid-epidural-lumbar L5/S1;  Surgeon: John Vinson MD;  Location: [facility] OR;  Service: Pain Management;  Laterality: N/A;    ESOPHAGOGASTRODUODENOSCOPY      UPPER GASTROINTESTINAL ENDOSCOPY   ?    unsure of results      Family History   Problem Relation Age of Onset    Arthritis Mother     Diabetes Mother     Hyperlipidemia Mother      Cancer Mother         uterine     Allergies Mother     Ovarian cancer Mother 58    Aneurysm Father     Hyperlipidemia Father     Breast cancer Maternal Aunt 65    Cancer Maternal Uncle         brain    Cancer Paternal Aunt         breast, bone and lung     Breast cancer Paternal Aunt 78    Breast cancer Maternal Cousin 43    Brain cancer Maternal Cousin       Wt Readings from Last 10 Encounters:   04/29/22 116.8 kg (257 lb 8 oz)   04/25/22 115.9 kg (255 lb 8.2 oz)   04/20/22 116.6 kg (257 lb)   04/20/22 117.3 kg (258 lb 8 oz)   04/08/22 115.8 kg (255 lb 2.9 oz)   08/11/21 109.1 kg (240 lb 8.4 oz)   07/19/21 109.1 kg (240 lb 10.1 oz)   05/31/21 110.2 kg (242 lb 13.4 oz)   05/05/21 112.2 kg (247 lb 6.4 oz)   03/10/21 111.7 kg (246 lb 3.2 oz)     Lab Results   Component Value Date    WBC 9.70 02/15/2022    HGB 14.1 02/15/2022    HCT 44.6 02/15/2022    MCV 92 02/15/2022     02/15/2022     CMP  Sodium   Date Value Ref Range Status   02/15/2022 140 136 - 145 mmol/L Final     Potassium   Date Value Ref Range Status   02/15/2022 4.9 3.5 - 5.1 mmol/L Final     Chloride   Date Value Ref Range Status   02/15/2022 103 95 - 110 mmol/L Final     CO2   Date Value Ref Range Status   02/15/2022 31 22 - 31 mmol/L Final     Glucose   Date Value Ref Range Status   02/15/2022 102 70 - 110 mg/dL Final     Comment:     The ADA recommends the following guidelines for fasting glucose:    Normal:       less than 100 mg/dL    Prediabetes:  100 mg/dL to 125 mg/dL    Diabetes:     126 mg/dL or higher       BUN   Date Value Ref Range Status   02/15/2022 17 7 - 18 mg/dL Final     Creatinine   Date Value Ref Range Status   02/15/2022 0.69 0.50 - 1.40 mg/dL Final     Calcium   Date Value Ref Range Status   02/15/2022 9.9 8.4 - 10.2 mg/dL Final     Total Protein   Date Value Ref Range Status   02/15/2022 7.0 6.0 - 8.4 g/dL Final     Albumin   Date Value Ref Range Status   02/15/2022 4.3 3.5 - 5.2 g/dL Final     Total Bilirubin    Date Value Ref Range Status   02/15/2022 0.5 0.2 - 1.3 mg/dL Final     Alkaline Phosphatase   Date Value Ref Range Status   02/15/2022 119 38 - 145 U/L Final     AST ([place])   Date Value Ref Range Status   02/08/2016 25 14 - 36 U/L Final     AST   Date Value Ref Range Status   02/15/2022 62 (H) 14 - 36 U/L Final     ALT   Date Value Ref Range Status   02/15/2022 73 (H) 0 - 35 U/L Final     Anion Gap   Date Value Ref Range Status   02/15/2022 6 (L) 8 - 16 mmol/L Final     eGFR if    Date Value Ref Range Status   02/15/2022 >60 >60 mL/min/1.73 m^2 Final     eGFR if non    Date Value Ref Range Status   02/15/2022 >60 >60 mL/min/1.73 m^2 Final     Comment:     Calculation used to obtain the estimated glomerular filtration  rate (eGFR) is the CKD-EPI equation.        Lab Results   Component Value Date    TSH 1.440 02/15/2022            Reviewed prior medical records including endoscopy history (see surgical history).     Objective:      Physical Exam  Constitutional:       General: She is not in acute distress.     Appearance: She is well-developed.   HENT:      Head: Normocephalic.      Right Ear: Hearing normal.      Left Ear: Hearing normal.      Nose: Nose normal.      Mouth/Throat:      Comments: Pt wearing mask due to COVID concerns  Eyes:      General: Lids are normal.      Conjunctiva/sclera: Conjunctivae normal.      Pupils: Pupils are equal, round, and reactive to light.   Neck:      Trachea: Trachea normal.   Cardiovascular:      Rate and Rhythm: Normal rate and regular rhythm.      Heart sounds: Normal heart sounds. No murmur heard.  Pulmonary:      Effort: Pulmonary effort is normal. No respiratory distress.      Breath sounds: Normal breath sounds. No stridor. No wheezing.   Abdominal:      General: Bowel sounds are normal. There is no distension.      Palpations: Abdomen is soft. There is no mass.      Tenderness: There is abdominal tenderness in the right upper  quadrant, epigastric area and left upper quadrant. There is no guarding or rebound.   Musculoskeletal:         General: Normal range of motion.      Cervical back: Normal range of motion.   Skin:     General: Skin is warm and dry.      Findings: No rash.      Comments: Non jaundiced   Neurological:      Mental Status: She is alert and oriented to person, place, and time.   Psychiatric:         Speech: Speech normal.         Behavior: Behavior normal. Behavior is cooperative.           Assessment:       1. Change in bowel habits    2. Diarrhea, unspecified type    3. Upper abdominal pain    4. History of colon polyps    5. History of gastroesophageal reflux (GERD)           Plan:   All diagnoses and orders for this visit:    Change in bowel habits & Diarrhea, unspecified type  - Stool Exam-Ova,Cysts,Parasites; Future; Expected date: 04/29/2022  - Stool culture; Future; Expected date: 04/29/2022  - Giardia / Cryptosporidum, EIA; Future; Expected date: 04/29/2022  - WBC, Stool; Future; Expected date: 04/29/2022  - Occult blood x 1, stool; Future; Expected date: 04/29/2022  - pH, stool; Future; Expected date: 04/29/2022  - Clostridium difficile EIA; Future; Expected date: 04/29/2022  - Recommended increase fiber in diet, especially soluble fiber since this can help bulk up the stool consistency and may help to slow down how fast the stool goes through the colon and can prevent diarrhea  - Restart: dicyclomine (BENTYL) 10 MG capsule; Take 1 capsule (10 mg total) by mouth before meals and at bedtime as needed (diarrhea; abdominal pain).  Dispense: 120 capsule; Refill: 0  - Schedule Colonoscopy    Upper abdominal pain  - US Abdomen Complete; Future; Expected date: 04/29/2022  - Restart: dicyclomine (BENTYL) 10 MG capsule; Take 1 capsule (10 mg total) by mouth before meals and at bedtime as needed (diarrhea; abdominal pain).  Dispense: 120 capsule; Refill: 0  - Continue Omeprazole 40 mg once daily  - Consider EGD pending  test results and/or if symptoms persist    History of colon polyps   - Schedule Colonoscopy    History of gastroesophageal reflux (GERD)   - Continue Omeprazole 40 mg once daily   - Take PPI in the morning 30 minutes before breakfast   - Recommend to avoid large meals, avoid eating within 3 hours of bedtime, elevate head of bed if nocturnal symptoms are present, smoking cessation (if current smoker), & weight loss (if overweight).    - Recommend minimize/avoid high-fat foods, chocolate, caffeine, citrus, alcohol, & tomato products.   - Advised to avoid/limit use of NSAID's, since they can cause GI upset, bleeding, and/or ulcers. If needed, take with food.     If no improvement in symptoms or symptoms worsen, call/follow-up at clinic or go to ER

## 2022-05-03 ENCOUNTER — HOSPITAL ENCOUNTER (OUTPATIENT)
Dept: RADIOLOGY | Facility: HOSPITAL | Age: 57
Discharge: HOME | End: 2022-05-03
Attending: OBSTETRICS & GYNECOLOGY
Payer: MEDICARE

## 2022-05-03 ENCOUNTER — OFFICE VISIT (OUTPATIENT)
Dept: NEUROSURGERY | Facility: CLINIC | Age: 57
End: 2022-05-03
Payer: MEDICARE

## 2022-05-03 VITALS
HEIGHT: 67 IN | DIASTOLIC BLOOD PRESSURE: 78 MMHG | RESPIRATION RATE: 18 BRPM | HEART RATE: 82 BPM | SYSTOLIC BLOOD PRESSURE: 140 MMHG | WEIGHT: 257.5 LBS | BODY MASS INDEX: 40.42 KG/M2

## 2022-05-03 DIAGNOSIS — Z80.49 FAMILY HISTORY OF UTERINE CANCER: ICD-10-CM

## 2022-05-03 DIAGNOSIS — M51.26 LUMBAR DISC HERNIATION: Primary | ICD-10-CM

## 2022-05-03 DIAGNOSIS — N95.0 PMB (POSTMENOPAUSAL BLEEDING): ICD-10-CM

## 2022-05-03 DIAGNOSIS — M85.80 OSTEOPENIA AFTER MENOPAUSE: ICD-10-CM

## 2022-05-03 DIAGNOSIS — Z78.0 OSTEOPENIA AFTER MENOPAUSE: ICD-10-CM

## 2022-05-03 LAB
FINAL PATHOLOGIC DIAGNOSIS: NORMAL
Lab: NORMAL

## 2022-05-03 PROCEDURE — 77080 DXA BONE DENSITY AXIAL: CPT | Mod: 26,,, | Performed by: RADIOLOGY

## 2022-05-03 PROCEDURE — 77080 DXA BONE DENSITY AXIAL: CPT | Mod: TC,PO

## 2022-05-03 PROCEDURE — 77080 DEXA BONE DENSITY SPINE HIP: ICD-10-PCS | Mod: 26,,, | Performed by: RADIOLOGY

## 2022-05-03 PROCEDURE — 76856 US EXAM PELVIC COMPLETE: CPT | Mod: 26,,, | Performed by: RADIOLOGY

## 2022-05-03 PROCEDURE — 76830 US PELVIS COMP WITH TRANSVAG NON-OB (XPD): ICD-10-PCS | Mod: 26,,, | Performed by: RADIOLOGY

## 2022-05-03 PROCEDURE — 76830 TRANSVAGINAL US NON-OB: CPT | Mod: 26,,, | Performed by: RADIOLOGY

## 2022-05-03 PROCEDURE — 76830 TRANSVAGINAL US NON-OB: CPT | Mod: TC,PO

## 2022-05-03 PROCEDURE — 99215 OFFICE O/P EST HI 40 MIN: CPT | Mod: S$PBB,,, | Performed by: NEUROLOGICAL SURGERY

## 2022-05-03 PROCEDURE — 99215 PR OFFICE/OUTPT VISIT, EST, LEVL V, 40-54 MIN: ICD-10-PCS | Mod: S$PBB,,, | Performed by: NEUROLOGICAL SURGERY

## 2022-05-03 PROCEDURE — 76856 US PELVIS COMP WITH TRANSVAG NON-OB (XPD): ICD-10-PCS | Mod: 26,,, | Performed by: RADIOLOGY

## 2022-05-03 NOTE — PROGRESS NOTES
Neurosurgery History & Physical    Patient ID: Yara Santos is a 56 y.o. female.    Chief Complaint   Patient presents with    Follow-up     Follow up with imaging, lumbar spine.  Patient states she is experiencing L sided LBP into the hip and groin area.  Patient states she feels some lumps on the L side of the lower back.  Also having some L arm numbness.        HPI:  Ms. Santos is a 56 year old female last seen in August of 2021.  At that time she was experiencing low back pain and left hip, groin, thigh, and knee pain.  Imaging revealed an L3-4 herniated disc to the left side.  She elected for conservative therapy and underwent epidural steroid injections.      Review of Systems   Constitutional: Negative for activity change and fever.   HENT: Negative for rhinorrhea, tinnitus, trouble swallowing and voice change.    Eyes: Negative for visual disturbance.   Respiratory: Negative for shortness of breath.    Cardiovascular: Negative for chest pain.   Gastrointestinal: Negative for nausea and vomiting.   Endocrine: Negative for cold intolerance, heat intolerance, polydipsia, polyphagia and polyuria.   Genitourinary: Negative for decreased urine volume, frequency and urgency.   Musculoskeletal: Positive for arthralgias and back pain. Negative for neck pain and neck stiffness.   Neurological: Negative for dizziness, tremors, seizures, syncope, facial asymmetry, speech difficulty, weakness, light-headedness, numbness and headaches.   Psychiatric/Behavioral: Negative for confusion.       Past Medical History:   Diagnosis Date    a Premature Ventricular Contractions     Dr. Austin ernst Hypertension     12/21/17 RXd Valsartan 40 Mg 1/2 Tab Daily; Losartan 25 Mg Caused Confusion; Lisinopril Caused Hives; 9/15/17 RXd A Low Salt Diet    b Microalbuminuria ########    Losartan Caused Confusion; Lisinopril Caused Hives    b White Coat Syndrome ####    c Hypercholesterolemia     7/5/20 And 3/8/17 Referred To   [facility] For Repatha Or Praluent; Statins Worsen Her Lichen Planus; Was On Pravastatin 20 Mg qHS); Other Statins Have Also Worsened Her Lichen Planus    d Type 2 DM     ** 2/18/16 Referred To [facility]; She Stopped Her NPH Insulin In 01/2016    e Hypothyroidism     7/5/20 Decreased 50 Mcg qAM Levothyroxine To 50 Mcg QOD And 25 Mcg QOD; 6/3/19 Increased 25 Mcg qAM Levothyroxine To 50 Mcg qAM     f Obesity     Her Mother Weighed 600 Lbs    f Osteopenia     8/12/19 Offered RX For Fosamax 35 Mg Weekly, And RXd OTC D3 5K IU Daily, And OTC CaCO3 600 Mg Daily    i COPD With TUD     i Dyspnea 09/08/2020    Dr. Austin Collier On 9/8/20 Ordered An Echocardiogram    i Tobacco Use Disorder #############    4/1/19 RXd Wellbutrin- Mg qAM X 4-6 Months And PRN 2 Mg Nicotine Gum; Chantix Caused Side Effects    j Chronic constipation     Dr. CHIKIS Pearl    j Chronic Elevated Hepatic Transaminases ####    Dr. CHIKIS Pearl; 7/5/20 RXd OTC Vitamin E 800 IU Daily    j GERD With dysphagia     Dr. CHIKIS Pearl    j H/O Colon Polyps ###    Dr. CHIKIS Pearl    j Irritable Bowel Syndrome     Dr. CHIKIS Pearl    j Nonalcoholic Steatohepatitis (N.A.S.H.) ####    Dr. CHIKIS Pearl; 7/5/20 RXd OTC Vitamin E 800 IU Daily    l Bilateral Hip Osteoarthritis     Dr. Homer Kaur In 12/2020 Ordered Bilateral Hip MRIs And Referred To Orthopedic Surgery    l Bilateral Knee Arthritis     l Carpal tunnel syndrome     l Lumbar Spinal DDD     Dr. Charissa Govea    l Rheumatoid Arthritis ###    Dr. Homer Kaur; 10/2/19 RXd Neurontin 300 Mg BID; 10/7/19 RF = 16.7 (0.0-15.0); 11/5/19 STEPHANIE, CPK, ESR = Normal    l Tarsal Tunnel Syndrome     m Chronic Fatigue ###    m Family H/O Alzheimer's Disease     m Memory Loss ###    10/2/19 And 4/1/19 Referred To Dr. Alie naranjo Vitamin B12 Deficiency ###    n Alcoholism, Sober Since 05/2013     n Depression And Anxiety     7/27/21 RXd Zoloft 50  Mg qHS; 11/13/19 RXd Effexor-XR 37.5 Mg qAM; 10/2/19 RXd Lexapro 10 Mg qHS (But This Caused S/Es)    o Allergic rhinosinusitis     p OU Cataracts     Dr. Xi Cordero    q BLE Lichen Planus #############    Dr. Louise In [place]    q BLE Varicose Veins     q Chronic Bilateral Lower Extremity Edema     q Facial flushing     q Vitamin D Deficiency     On OTC D3 5K IU Daily    Urinary tract infection     Wellness Visit 5/5/2021      Social History     Socioeconomic History    Marital status:    Tobacco Use    Smoking status: Former Smoker     Packs/day: 0.50     Start date: 1/1/1960     Quit date: 4/2/2019     Years since quitting: 3.0    Smokeless tobacco: Never Used   Substance and Sexual Activity    Alcohol use: No    Drug use: No     Social Determinants of Health     Financial Resource Strain: High Risk    Difficulty of Paying Living Expenses: Hard   Food Insecurity: Food Insecurity Present    Worried About Running Out of Food in the Last Year: Sometimes true    Ran Out of Food in the Last Year: Often true   Transportation Needs: Unmet Transportation Needs    Lack of Transportation (Medical): Yes    Lack of Transportation (Non-Medical): Yes   Physical Activity: Inactive    Days of Exercise per Week: 0 days    Minutes of Exercise per Session: 0 min   Stress: Stress Concern Present    Feeling of Stress : Rather much   Social Connections: Unknown    Frequency of Communication with Friends and Family: Never    Frequency of Social Gatherings with Friends and Family: Never    Active Member of Clubs or Organizations: No    Attends Club or Organization Meetings: Never    Marital Status:    Housing Stability: Low Risk     Unable to Pay for Housing in the Last Year: No    Number of Places Lived in the Last Year: 1    Unstable Housing in the Last Year: No     Family History   Problem Relation Age of Onset    Arthritis Mother     Diabetes Mother     Hyperlipidemia Mother      Cancer Mother         uterine     Allergies Mother     Ovarian cancer Mother 58    Aneurysm Father     Hyperlipidemia Father     Breast cancer Maternal Aunt 65    Cancer Maternal Uncle         brain    Cancer Paternal Aunt         breast, bone and lung     Breast cancer Paternal Aunt 78    Breast cancer Maternal Cousin 43    Brain cancer Maternal Cousin      Review of patient's allergies indicates:   Allergen Reactions    Iodine and iodide containing products Blisters    Asa [aspirin] Itching and Other (See Comments)     Skin problem      Atorvastatin      Worsened Lichen Planus    Contact metal agent      Other reaction(s): Other (See Comments)    Crestor [rosuvastatin]      Worsens her lichen planus    Lasix [furosemide]      rash    Lisinopril      Hives    Losartan      Confusion    Lovastatin      Worsened Lichen Planus    Salicylates     Sulfur      Other reaction(s): Other (See Comments)    Sulfur, elemental     Valsartan      breakouts    Latex Other (See Comments)     Skin problem         Current Outpatient Medications:     albuterol (VENTOLIN HFA) 90 mcg/actuation inhaler, Inhale 2 puffs into the lungs every 4 (four) hours as needed for Wheezing. Rescue, Disp: 54 g, Rfl: 1    azelastine (ASTELIN) 137 mcg (0.1 %) nasal spray, 1 spray (137 mcg total) by Nasal route 2 (two) times daily as needed for Rhinitis., Disp: 90 mL, Rfl: 3    blood sugar diagnostic (ACCU-CHEK TEJA PLUS TEST STRP) Strp, 1 each by Other route before breakfast. For diagnosis code E11.65, Disp: 100 strip, Rfl: 3    cholecalciferol, vitamin D3, 5,000 unit Tab, VITAMIN D-3 5000 UNIT TABS, Disp: , Rfl:     cyanocobalamin 1,000 mcg/mL injection, Inject 1,000 mcg into the muscle every 30 days., Disp: , Rfl:     cyclobenzaprine (FLEXERIL) 10 MG tablet, Take 1 tablet by mouth twice daily as needed, Disp: 60 tablet, Rfl: 3    diclofenac sodium (VOLTAREN) 1 % Gel, Apply 2 g topically 3 (three) times daily as  needed (pain)., Disp: 350 g, Rfl: 11    dicyclomine (BENTYL) 10 MG capsule, Take 1 capsule (10 mg total) by mouth before meals and at bedtime as needed (diarrhea; abdominal pain)., Disp: 120 capsule, Rfl: 0    ezetimibe (ZETIA) 10 mg tablet, Take 1 tablet by mouth once daily, Disp: 90 tablet, Rfl: 0    gabapentin (NEURONTIN) 400 MG capsule, Take 1 capsule by mouth twice daily, Disp: 180 capsule, Rfl: 3    halobetasol (ULTRAVATE) 0.05 % cream, Apply topically 2 (two) times daily., Disp: , Rfl:     hydroCHLOROthiazide (HYDRODIURIL) 12.5 MG Tab, Take 1 tablet (12.5 mg total) by mouth once daily., Disp: 30 tablet, Rfl: 11    hydrOXYchloroQUINE (PLAQUENIL) 200 mg tablet, Take 200 mg by mouth 2 (two) times daily., Disp: , Rfl:     hydrOXYzine HCL (ATARAX) 25 MG tablet, Take 25 mg by mouth nightly., Disp: , Rfl:     ibuprofen (ADVIL,MOTRIN) 800 MG tablet, Take 1 tablet by mouth twice daily as needed for pain, Disp: 180 tablet, Rfl: 3    ipratropium (ATROVENT HFA) 17 mcg/actuation inhaler, INHALE 2 PUFFS BY MOUTH TWICE DAILY AS DIRECTED, Disp: 38.7 g, Rfl: 6    LANCETS (ACCU-CHEK MULTICLIX LANCET MISC), 1 lancet by Misc.(Non-Drug; Combo Route) route once daily., Disp: , Rfl:     levothyroxine (SYNTHROID) 50 MCG tablet, TAKE 1 TABLET BY MOUTH ONCE DAILY BEFORE BREAKFAST, Disp: 90 tablet, Rfl: 3    mupirocin (BACTROBAN) 2 % ointment, Apply topically 2 (two) times daily as needed., Disp: 22 g, Rfl: 1    omalizumab (XOLAIR) 150 mg/mL injection, Inject 150 mg into the skin., Disp: , Rfl:     omeprazole (PRILOSEC) 20 MG capsule, Take 2 capsules (40 mg total) by mouth once daily., Disp: 180 capsule, Rfl: 1    ondansetron (ZOFRAN-ODT) 4 MG TbDL, Take 1 tablet (4 mg total) by mouth every 6 (six) hours as needed. DISSOLVE 1 TABLET IN MOUTH THREE TIMES DAILY AS NEEDED, Disp: 30 tablet, Rfl: 3    predniSONE (DELTASONE) 5 MG tablet, Take 5 mg by mouth 2 (two) times daily., Disp: , Rfl:     sertraline (ZOLOFT) 100 MG  "tablet, Take 1 tablet (100 mg total) by mouth 2 (two) times daily., Disp: 180 tablet, Rfl: 3    cetirizine (ZYRTEC) 10 MG tablet, Take 1 tablet (10 mg total) by mouth daily as needed., Disp: 90 tablet, Rfl: 3  Blood pressure (!) 140/78, pulse 82, resp. rate 18, height 5' 7" (1.702 m), weight 116.8 kg (257 lb 8 oz).      Neurologic Exam     Mental Status   Oriented to person, place, and time.   Attention: normal.   Speech: speech is normal   Level of consciousness: alert  Knowledge: good.     Cranial Nerves     CN III, IV, VI   Extraocular motions are normal.     CN VII   Facial expression full, symmetric.     Motor Exam   Muscle bulk: normal  Overall muscle tone: normal    Strength   Strength 5/5 except as noted.   Left strength: Pain limited, mild, left hip flexion weakness  Left iliopsoas: 4/5    Sensory Exam   Light touch normal.     Gait, Coordination, and Reflexes     Gait  Gait: normal    Coordination   Romberg: negative      Physical Exam  Eyes:      Extraocular Movements: EOM normal.   Neurological:      Mental Status: She is oriented to person, place, and time.      Coordination: Romberg Test normal.      Gait: Gait is intact.   Psychiatric:         Speech: Speech normal.         Imaging:  MRI of the lumbar spine dated 4/25/2022 is personally reviewed and discussed with the patient.  It is compared to MRI of the lumbar spine from 8/3/2021.  At L3-4 there is apparent disc herniation with left L3 neuroforaminal stenosis.  The amount of herniatedisc appears less than prior MRI which has decreased the lateral recess stenosis in this region however the foraminal stenosis remains.    Assessment/Plan:   56 year old female with low back pain and left sided radicular pain in the hip and groin.      Imaging reveals left sided L3 neroforaminal stenosis which is stable to prior MRI however the disc herniation in this area has decreased in volume.  I discussed medical and surgical options with the patient and she would " like to continue with pain management which I feel is reasonable.    Patient should return PRN any new issues.

## 2022-05-05 ENCOUNTER — TELEPHONE (OUTPATIENT)
Dept: DERMATOLOGY | Facility: CLINIC | Age: 57
End: 2022-05-05
Payer: MEDICARE

## 2022-05-05 NOTE — TELEPHONE ENCOUNTER
Pt advised that At this time Lisa Bliss MD, Mercedes Walker MD, Tye Muniz MD, Carmenza Valles MD, and Radha Massey MD are not taking new patients at this time. I would recommend calling [facility] about scheduling an appointment with a provider who is taking new patients.

## 2022-05-05 NOTE — TELEPHONE ENCOUNTER
----- Message from Sveta Quiroz sent at 5/5/2022  9:52 AM CDT -----  Patient wants an appt, she is diagnosed with Lichen Plantis.  She would like a 2nd opinion in regards to treatment.  She was seeing Dani Derm.  System is not offering any appts at all/  Please call her to schedule at [phone].  Thank you!

## 2022-05-10 ENCOUNTER — PATIENT MESSAGE (OUTPATIENT)
Dept: OBSTETRICS AND GYNECOLOGY | Facility: CLINIC | Age: 57
End: 2022-05-10
Payer: MEDICARE

## 2022-05-18 ENCOUNTER — TELEPHONE (OUTPATIENT)
Dept: GASTROENTEROLOGY | Facility: CLINIC | Age: 57
End: 2022-05-18
Payer: MEDICARE

## 2022-05-18 ENCOUNTER — HOSPITAL ENCOUNTER (OUTPATIENT)
Dept: RADIOLOGY | Facility: HOSPITAL | Age: 57
Discharge: HOME | End: 2022-05-18
Attending: NURSE PRACTITIONER
Payer: MEDICARE

## 2022-05-18 DIAGNOSIS — R10.10 UPPER ABDOMINAL PAIN: Primary | ICD-10-CM

## 2022-05-18 DIAGNOSIS — R10.10 UPPER ABDOMINAL PAIN: ICD-10-CM

## 2022-05-18 DIAGNOSIS — K80.20 CALCULUS OF GALLBLADDER WITHOUT CHOLECYSTITIS WITHOUT OBSTRUCTION: ICD-10-CM

## 2022-05-18 PROCEDURE — 76700 US EXAM ABDOM COMPLETE: CPT | Mod: TC,PO

## 2022-05-18 PROCEDURE — 76700 US EXAM ABDOM COMPLETE: CPT | Mod: 26,,, | Performed by: RADIOLOGY

## 2022-05-18 PROCEDURE — 76700 US ABDOMEN COMPLETE: ICD-10-PCS | Mod: 26,,, | Performed by: RADIOLOGY

## 2022-05-18 NOTE — TELEPHONE ENCOUNTER
Called and notified pt of results and recommendations. Pt verbalized understanding and that the results be sent to her mychart, Results sent to pts mychart

## 2022-05-18 NOTE — TELEPHONE ENCOUNTER
Let patient know her US of abdomen showed an enlarged liver, fatty liver, and gallstones. Recommend a referral to general surgery for opinion on gallbladder. Recommend a low fat, low cholesterol diet and minimize alcohol and tylenol use for fatty liver.

## 2022-05-23 ENCOUNTER — TELEPHONE (OUTPATIENT)
Dept: GASTROENTEROLOGY | Facility: CLINIC | Age: 57
End: 2022-05-23
Payer: MEDICARE

## 2022-05-23 NOTE — TELEPHONE ENCOUNTER
----- Message from Mercedes Benjamin NP sent at 5/23/2022  1:37 PM CDT -----  Let patient know her remaining stool studies showed negative/normal results. Continue with previous recommendations including colonoscopy.

## 2022-05-24 ENCOUNTER — PATIENT MESSAGE (OUTPATIENT)
Dept: GASTROENTEROLOGY | Facility: CLINIC | Age: 57
End: 2022-05-24
Payer: MEDICARE

## 2022-06-09 ENCOUNTER — TELEPHONE (OUTPATIENT)
Dept: SURGERY | Facility: CLINIC | Age: 57
End: 2022-06-09
Payer: MEDICARE

## 2022-06-09 NOTE — TELEPHONE ENCOUNTER
I called patient and scheduled her to be seen on Wednesday, 6/15/22 @ 4:15pm, but told to check in after her 1pm appointment with ERIC Young and I'll try to fit her in sooner, but we do have patients scheduled prior to her appointment.  Patient understood.  [place]z

## 2022-06-09 NOTE — TELEPHONE ENCOUNTER
----- Message from Aby Peters sent at 6/8/2022  6:06 PM CDT -----  Contact: Patient  Type:  Patient Returning Call    Who Called:Patient   Who Left Message for Patient:Victoriano   Does the patient know what this is regarding?:Patient stated receive a call from  office from Victoriano no outgoing call documentation   Would the patient rather a call back or a response via [facility]? Call back   Best Call Back Number:[phone]  Additional Information: Please advice

## 2022-06-15 ENCOUNTER — OFFICE VISIT (OUTPATIENT)
Dept: SURGERY | Facility: CLINIC | Age: 57
End: 2022-06-15
Payer: MEDICARE

## 2022-06-15 ENCOUNTER — OFFICE VISIT (OUTPATIENT)
Dept: PAIN MEDICINE | Facility: CLINIC | Age: 57
End: 2022-06-15
Payer: MEDICARE

## 2022-06-15 VITALS
HEART RATE: 90 BPM | BODY MASS INDEX: 39.94 KG/M2 | SYSTOLIC BLOOD PRESSURE: 155 MMHG | DIASTOLIC BLOOD PRESSURE: 68 MMHG | WEIGHT: 254.5 LBS | OXYGEN SATURATION: 95 % | HEIGHT: 67 IN

## 2022-06-15 VITALS
HEART RATE: 90 BPM | HEIGHT: 67 IN | SYSTOLIC BLOOD PRESSURE: 155 MMHG | BODY MASS INDEX: 39.97 KG/M2 | DIASTOLIC BLOOD PRESSURE: 68 MMHG | TEMPERATURE: 98 F | WEIGHT: 254.63 LBS

## 2022-06-15 DIAGNOSIS — M50.30 DDD (DEGENERATIVE DISC DISEASE), CERVICAL: ICD-10-CM

## 2022-06-15 DIAGNOSIS — M54.16 LUMBAR RADICULOPATHY: Primary | ICD-10-CM

## 2022-06-15 DIAGNOSIS — M51.36 DDD (DEGENERATIVE DISC DISEASE), LUMBAR: ICD-10-CM

## 2022-06-15 DIAGNOSIS — K80.20 CALCULUS OF GALLBLADDER WITHOUT CHOLECYSTITIS WITHOUT OBSTRUCTION: ICD-10-CM

## 2022-06-15 DIAGNOSIS — K80.50 BILIARY COLIC: Primary | ICD-10-CM

## 2022-06-15 DIAGNOSIS — R10.10 UPPER ABDOMINAL PAIN: ICD-10-CM

## 2022-06-15 PROCEDURE — 99999 PR PBB SHADOW E&M-EST. PATIENT-LVL V: CPT | Mod: PBBFAC,,, | Performed by: PHYSICIAN ASSISTANT

## 2022-06-15 PROCEDURE — 99999 PR PBB SHADOW E&M-EST. PATIENT-LVL V: CPT | Mod: PBBFAC,,, | Performed by: SURGERY

## 2022-06-15 PROCEDURE — 99214 PR OFFICE/OUTPT VISIT, EST, LEVL IV, 30-39 MIN: ICD-10-PCS | Mod: S$PBB,,, | Performed by: PHYSICIAN ASSISTANT

## 2022-06-15 PROCEDURE — 99215 OFFICE O/P EST HI 40 MIN: CPT | Mod: PBBFAC,PO | Performed by: SURGERY

## 2022-06-15 PROCEDURE — 99999 PR PBB SHADOW E&M-EST. PATIENT-LVL V: ICD-10-PCS | Mod: PBBFAC,,, | Performed by: SURGERY

## 2022-06-15 PROCEDURE — 99204 PR OFFICE/OUTPT VISIT, NEW, LEVL IV, 45-59 MIN: ICD-10-PCS | Mod: S$PBB,,, | Performed by: SURGERY

## 2022-06-15 PROCEDURE — 99214 OFFICE O/P EST MOD 30 MIN: CPT | Mod: S$PBB,,, | Performed by: PHYSICIAN ASSISTANT

## 2022-06-15 PROCEDURE — 99215 OFFICE O/P EST HI 40 MIN: CPT | Mod: PBBFAC,27,PN | Performed by: PHYSICIAN ASSISTANT

## 2022-06-15 PROCEDURE — 99204 OFFICE O/P NEW MOD 45 MIN: CPT | Mod: S$PBB,,, | Performed by: SURGERY

## 2022-06-15 PROCEDURE — 99999 PR PBB SHADOW E&M-EST. PATIENT-LVL V: ICD-10-PCS | Mod: PBBFAC,,, | Performed by: PHYSICIAN ASSISTANT

## 2022-06-15 RX ORDER — SODIUM CHLORIDE, SODIUM LACTATE, POTASSIUM CHLORIDE, CALCIUM CHLORIDE 600; 310; 30; 20 MG/100ML; MG/100ML; MG/100ML; MG/100ML
INJECTION, SOLUTION INTRAVENOUS CONTINUOUS
Status: CANCELLED | OUTPATIENT
Start: 2022-06-21

## 2022-06-15 RX ORDER — METHOTREXATE 2.5 MG/1
250 TABLET ORAL
Status: ON HOLD | COMMUNITY
End: 2022-06-21 | Stop reason: HOSPADM

## 2022-06-15 NOTE — PROGRESS NOTES
Subjective:       Patient ID: Yara Santos is a 56 y.o. female.    Chief Complaint: No chief complaint on file.    HPI  Pleasant 55 yo F who is referred to me for evaluation of cholelithiasis.   Pt notes she has been having occasional bouts of epigastric pain and discomfort.  Notes occasional n/v.  No fever/chills. No changes in bowel habits.  She has had US demonstrating cholelithiassis.  Pt has no significant abdominal surgical history. Her PMhx is significnat for COPD, HTN, obesity.    '    Review of Systems   Constitutional: Negative for activity change and appetite change.   Cardiovascular: Negative for chest pain.   Gastrointestinal: Positive for abdominal pain. Negative for anal bleeding, blood in stool and diarrhea.         Objective:      Physical Exam  Vitals reviewed.   Cardiovascular:      Rate and Rhythm: Normal rate.      Pulses: Normal pulses.   Pulmonary:      Effort: Pulmonary effort is normal.   Abdominal:      General: Abdomen is flat. Bowel sounds are normal. There is no distension.      Tenderness: There is no abdominal tenderness.   Neurological:      General: No focal deficit present.      Mental Status: She is alert.   Psychiatric:         Mood and Affect: Mood normal.         Behavior: Behavior normal.         Lab Results   Component Value Date    WBC 9.70 02/15/2022    HGB 14.1 02/15/2022    HCT 44.6 02/15/2022    MCV 92 02/15/2022     02/15/2022       CMP  Sodium   Date Value Ref Range Status   02/15/2022 140 136 - 145 mmol/L Final     Potassium   Date Value Ref Range Status   02/15/2022 4.9 3.5 - 5.1 mmol/L Final     Chloride   Date Value Ref Range Status   02/15/2022 103 95 - 110 mmol/L Final     CO2   Date Value Ref Range Status   02/15/2022 31 22 - 31 mmol/L Final     Glucose   Date Value Ref Range Status   02/15/2022 102 70 - 110 mg/dL Final     Comment:     The ADA recommends the following guidelines for fasting glucose:    Normal:       less than 100 mg/dL    Prediabetes:   100 mg/dL to 125 mg/dL    Diabetes:     126 mg/dL or higher       BUN   Date Value Ref Range Status   02/15/2022 17 7 - 18 mg/dL Final     Creatinine   Date Value Ref Range Status   02/15/2022 0.69 0.50 - 1.40 mg/dL Final     Calcium   Date Value Ref Range Status   02/15/2022 9.9 8.4 - 10.2 mg/dL Final     Total Protein   Date Value Ref Range Status   02/15/2022 7.0 6.0 - 8.4 g/dL Final     Albumin   Date Value Ref Range Status   02/15/2022 4.3 3.5 - 5.2 g/dL Final     Total Bilirubin   Date Value Ref Range Status   02/15/2022 0.5 0.2 - 1.3 mg/dL Final     Alkaline Phosphatase   Date Value Ref Range Status   02/15/2022 119 38 - 145 U/L Final     AST ([place])   Date Value Ref Range Status   02/08/2016 25 14 - 36 U/L Final     AST   Date Value Ref Range Status   02/15/2022 62 (H) 14 - 36 U/L Final     ALT   Date Value Ref Range Status   02/15/2022 73 (H) 0 - 35 U/L Final     Anion Gap   Date Value Ref Range Status   02/15/2022 6 (L) 8 - 16 mmol/L Final     eGFR if    Date Value Ref Range Status   02/15/2022 >60 >60 mL/min/1.73 m^2 Final     eGFR if non    Date Value Ref Range Status   02/15/2022 >60 >60 mL/min/1.73 m^2 Final     Comment:     Calculation used to obtain the estimated glomerular filtration  rate (eGFR) is the CKD-EPI equation.        US:    Impression:     There is hepatomegaly and hepatic steatosis.  There is cholelithiasis.       Assessment:       Problem List Items Addressed This Visit    None     Visit Diagnoses     Upper abdominal pain        Calculus of gallbladder without cholecystitis without obstruction              Plan:       D/w ptand her daughter. I have explained to them that I suspect her symptoms are related to biliary colic.  As such I have offered lap christopher at a time that is convenient to pt.  Risks of surgery were d/w pt. Informed consent obtained. Pt will call the office to schedule.

## 2022-06-15 NOTE — H&P (VIEW-ONLY)
Subjective:       Patient ID: Yara Santos is a 56 y.o. female.    Chief Complaint: No chief complaint on file.    HPI  Pleasant 57 yo F who is referred to me for evaluation of cholelithiasis.   Pt notes she has been having occasional bouts of epigastric pain and discomfort.  Notes occasional n/v.  No fever/chills. No changes in bowel habits.  She has had US demonstrating cholelithiassis.  Pt has no significant abdominal surgical history. Her PMhx is significnat for COPD, HTN, obesity.    '    Review of Systems   Constitutional: Negative for activity change and appetite change.   Cardiovascular: Negative for chest pain.   Gastrointestinal: Positive for abdominal pain. Negative for anal bleeding, blood in stool and diarrhea.         Objective:      Physical Exam  Vitals reviewed.   Cardiovascular:      Rate and Rhythm: Normal rate.      Pulses: Normal pulses.   Pulmonary:      Effort: Pulmonary effort is normal.   Abdominal:      General: Abdomen is flat. Bowel sounds are normal. There is no distension.      Tenderness: There is no abdominal tenderness.   Neurological:      General: No focal deficit present.      Mental Status: She is alert.   Psychiatric:         Mood and Affect: Mood normal.         Behavior: Behavior normal.         Lab Results   Component Value Date    WBC 9.70 02/15/2022    HGB 14.1 02/15/2022    HCT 44.6 02/15/2022    MCV 92 02/15/2022     02/15/2022       CMP  Sodium   Date Value Ref Range Status   02/15/2022 140 136 - 145 mmol/L Final     Potassium   Date Value Ref Range Status   02/15/2022 4.9 3.5 - 5.1 mmol/L Final     Chloride   Date Value Ref Range Status   02/15/2022 103 95 - 110 mmol/L Final     CO2   Date Value Ref Range Status   02/15/2022 31 22 - 31 mmol/L Final     Glucose   Date Value Ref Range Status   02/15/2022 102 70 - 110 mg/dL Final     Comment:     The ADA recommends the following guidelines for fasting glucose:    Normal:       less than 100 mg/dL    Prediabetes:   100 mg/dL to 125 mg/dL    Diabetes:     126 mg/dL or higher       BUN   Date Value Ref Range Status   02/15/2022 17 7 - 18 mg/dL Final     Creatinine   Date Value Ref Range Status   02/15/2022 0.69 0.50 - 1.40 mg/dL Final     Calcium   Date Value Ref Range Status   02/15/2022 9.9 8.4 - 10.2 mg/dL Final     Total Protein   Date Value Ref Range Status   02/15/2022 7.0 6.0 - 8.4 g/dL Final     Albumin   Date Value Ref Range Status   02/15/2022 4.3 3.5 - 5.2 g/dL Final     Total Bilirubin   Date Value Ref Range Status   02/15/2022 0.5 0.2 - 1.3 mg/dL Final     Alkaline Phosphatase   Date Value Ref Range Status   02/15/2022 119 38 - 145 U/L Final     AST ([place])   Date Value Ref Range Status   02/08/2016 25 14 - 36 U/L Final     AST   Date Value Ref Range Status   02/15/2022 62 (H) 14 - 36 U/L Final     ALT   Date Value Ref Range Status   02/15/2022 73 (H) 0 - 35 U/L Final     Anion Gap   Date Value Ref Range Status   02/15/2022 6 (L) 8 - 16 mmol/L Final     eGFR if    Date Value Ref Range Status   02/15/2022 >60 >60 mL/min/1.73 m^2 Final     eGFR if non    Date Value Ref Range Status   02/15/2022 >60 >60 mL/min/1.73 m^2 Final     Comment:     Calculation used to obtain the estimated glomerular filtration  rate (eGFR) is the CKD-EPI equation.        US:    Impression:     There is hepatomegaly and hepatic steatosis.  There is cholelithiasis.       Assessment:       Problem List Items Addressed This Visit    None     Visit Diagnoses     Upper abdominal pain        Calculus of gallbladder without cholecystitis without obstruction              Plan:       D/w ptand her daughter. I have explained to them that I suspect her symptoms are related to biliary colic.  As such I have offered lap christopher at a time that is convenient to pt.  Risks of surgery were d/w pt. Informed consent obtained. Pt will call the office to schedule.

## 2022-06-17 NOTE — PROGRESS NOTES
This note was completed with dictation software and grammatical errors may exist.    CC: neck pain, left arm pain, back pain, left leg pain    HPI:   The patient is a 56-year-old woman with a history of diabetes, rheumatoid arthritis with chronic back pain who presents in referral from Dr. Gibson for  Back pain radiating into the bilateral legs, neck pain radiating into the left arm.  She returns in follow-up today with neck pain and low back pain.  Currently her back pain is greater than her neck.  Since her last visit she has seen Neurosurgery and has decided against an operation for now.  She would like to continue with conservative treatment.  She describes pain in the left low back radiating to the left buttock, lateral hip, and anterolateral thigh down to her knee.  The pain is much worse with walking and improved with sitting.  She reports intermittent weakness and numbness in her left leg.  She denies bladder or bowel incontinence.    Pain intervention history: She had done epidural steroid injections for her back in the past, many years ago.   She is status post C7-T1 interlaminar epidural steroid injection on 09/24/2020 with 100% relief.   She is status post L5/S1 interlaminar epidural steroid injection to the left on 11/12/2020 with 50% relief of her back pain and 100% relief of her left leg pain.   She is status post L5/S1 interlaminar epidural steroid injection on 06/21/2021 with 80% relief.     Spine surgeries:    Antineuropathics: gabapentin 400 milligrams twice daily with some relief of her upper back and low back pain  NSAIDs: cannot tolerate NSAIDs  Physical therapy: had done multiple rounds of physical therapy for her back in the past with increased pain  Antidepressants:  Muscle relaxers:  Opioids: She had received a prescription for Percocet 10/325 on 07/10/2020 which she takes sparingly, she still has some left over, does not like taking these  Antiplatelets/Anticoagulants:    She smokes  marijuana on a regular basis, reports that this seems to help her pain in the neck and back and much of her arthritic pain, denies any major side effects from this.  She has used oils and edibles as well, generally has been using a vaporizer as well.    ROS:  She reports weight gain, easy bruising, diabetes, joint stiffness, joint swelling, back pain, memory loss, dizziness, difficulty sleeping, anxiety, depression and loss of balance.  Balance of review of systems is negative.    Lab Results   Component Value Date    HGBA1C 6.2 (H) 02/15/2022       Lab Results   Component Value Date    WBC 9.70 02/15/2022    HGB 14.1 02/15/2022    HCT 44.6 02/15/2022    MCV 92 02/15/2022     02/15/2022             Past Medical History:   Diagnosis Date    a Premature Ventricular Contractions     Dr. Austin Collier    Asthma     b Hypertension     12/21/17 RXd Valsartan 40 Mg 1/2 Tab Daily; Losartan 25 Mg Caused Confusion; Lisinopril Caused Hives; 9/15/17 RXd A Low Salt Diet    b Microalbuminuria ########    Losartan Caused Confusion; Lisinopril Caused Hives    b White Coat Syndrome ####    c Hypercholesterolemia     7/5/20 And 3/8/17 Referred To Dr. Christian Parnell For Repatha Or Praluent; Statins Worsen Her Lichen Planus; Was On Pravastatin 20 Mg qHS); Other Statins Have Also Worsened Her Lichen Planus    d Type 2 DM     ** 2/18/16 Referred To DM [facility]; She Stopped Her NPH Insulin In 01/2016- Diet controlled    e Hypothyroidism     7/5/20 Decreased 50 Mcg qAM Levothyroxine To 50 Mcg QOD And 25 Mcg QOD; 6/3/19 Increased 25 Mcg qAM Levothyroxine To 50 Mcg qAM     f Obesity     Her Mother Weighed 600 Lbs    f Osteopenia     8/12/19 Offered RX For Fosamax 35 Mg Weekly, And RXd OTC D3 5K IU Daily, And OTC CaCO3 600 Mg Daily    History of acute bronchitis     i COPD With TUD     i Dyspnea 09/08/2020    Dr. Austin Collier On 9/8/20 Ordered An Echocardiogram    i Tobacco Use Disorder #############    4/1/19 RXd Wellbutrin-XL  150 Mg qAM X 4-6 Months And PRN 2 Mg Nicotine Gum; Chantix Caused Side Effects    j Chronic constipation     Dr. CHIKIS kumar Chronic Elevated Hepatic Transaminases ####    Dr. CHIKIS Pearl; 20 RXd OTC Vitamin E 800 IU Daily    j GERD With dysphagia     Dr. CHIKIS kumar H/O Colon Polyps ###    Dr. CHIKIS kumar Irritable Bowel Syndrome     Dr. CHIKIS kumar Nonalcoholic Steatohepatitis (N.A.S.H.) ####    Dr. CHIKIS Pearl; 20 RXd OTC Vitamin E 800 IU Daily    l Bilateral Hip Osteoarthritis     Dr. Homer Kaur In 2020 Ordered Bilateral Hip MRIs And Referred To Orthopedic Surgery    l Bilateral Knee Arthritis     l Carpal tunnel syndrome     l Lumbar Spinal DDD     Dr. Charissa Govea    l Rheumatoid Arthritis ###    Dr. Homer Kaur; 10/2/19 RXd Neurontin 300 Mg BID; 10/7/19 RF = 16.7 (0.0-15.0); 19 STEPHANIE, CPK, ESR = Normal    l Tarsal Tunnel Syndrome     m Chronic Fatigue ###    m Family H/O Alzheimer's Disease     m Memory Loss ###    10/2/19 And 19 Referred To Dr. Alie Levin    m Vitamin B12 Deficiency ###    n Alcoholism, Sober Since 2013     n Depression And Anxiety     21 RXd Zoloft 50 Mg qHS; 19 RXd Effexor-XR 37.5 Mg qAM; 10/2/19 RXd Lexapro 10 Mg qHS (But This Caused S/Es)    o Allergic rhinosinusitis     p OU Cataracts     Dr. Xi Cordero    q BLE Lichen Planus #############    Dr. Louise In K[place]    q BLE Varicose Veins     q Chronic Bilateral Lower Extremity Edema     q Facial flushing     q Vitamin D Deficiency     On OTC D3 5K IU Daily    Urinary tract infection     Wellness Visit 2021        Past Surgical History:   Procedure Laterality Date     SECTION      x2    COLONOSCOPY   ?    polyps removed per pt report    EPIDURAL STEROID INJECTION INTO CERVICAL SPINE N/A 2020    Procedure: Injection-steroid-epidural-cervical to left;  Surgeon: John DELGADILLO  MD Karel;  Location: [facility] OR;  Service: Pain Management;  Laterality: N/A;    EPIDURAL STEROID INJECTION INTO LUMBAR SPINE N/A 11/12/2020    Procedure: Injection-steroid-epidural-lumbar, l5/s1 to left;  Surgeon: John Vinson MD;  Location: [facility] OR;  Service: Pain Management;  Laterality: N/A;    EPIDURAL STEROID INJECTION INTO LUMBAR SPINE N/A 06/21/2021    Procedure: Injection-steroid-epidural-lumbar L5/S1;  Surgeon: John Vinson MD;  Location: [facility] OR;  Service: Pain Management;  Laterality: N/A;    ESOPHAGOGASTRODUODENOSCOPY      UPPER GASTROINTESTINAL ENDOSCOPY  2020 ?    unsure of results       Social History     Socioeconomic History    Marital status:    Tobacco Use    Smoking status: Former Smoker     Packs/day: 0.50     Start date: 1/1/1960     Quit date: 4/2/2019     Years since quitting: 3.2    Smokeless tobacco: Never Used   Substance and Sexual Activity    Alcohol use: No    Drug use: No     Social Determinants of Health     Financial Resource Strain: High Risk    Difficulty of Paying Living Expenses: Hard   Food Insecurity: Food Insecurity Present    Worried About Running Out of Food in the Last Year: Sometimes true    Ran Out of Food in the Last Year: Often true   Transportation Needs: Unmet Transportation Needs    Lack of Transportation (Medical): Yes    Lack of Transportation (Non-Medical): Yes   Physical Activity: Inactive    Days of Exercise per Week: 0 days    Minutes of Exercise per Session: 0 min   Stress: Stress Concern Present    Feeling of Stress : Rather much   Social Connections: Unknown    Frequency of Communication with Friends and Family: Never    Frequency of Social Gatherings with Friends and Family: Never    Active Member of Clubs or Organizations: No    Attends Club or Organization Meetings: Never    Marital Status:    Housing Stability: Low Risk     Unable to Pay for Housing in the Last Year: No    Number of Places Lived in  "the Last Year: 1    Unstable Housing in the Last Year: No         Medications/Allergies: See med card    Vitals:    06/15/22 1303   BP: (!) 155/68   Pulse: 90   SpO2: 95%   Weight: 115.5 kg (254 lb 8.3 oz)   Height: 5' 7" (1.702 m)   PainSc:   6   PainLoc: Back         Physical exam:  Gen: A and O x3, pleasant, well-groomed    Exam deferred due to elevated blood pressure today.      Imagin20 MRI C-spine:  ALIGNMENT: Normal.  Lateral masses of C1 and C2 are congruent.  Slight reversal of the normal lordotic curvature.   BONES: Vertebral body heights are maintained.  Multilevel endplate changes with mild type 1 endplate changes at C4-5.  No aggressive bone marrow signal.   PARASPINAL AREA: Normal.   CERVICAL DISC LEVELS:  C2-C3: No disc herniation or significant posterior osseous ridging. No significant spinal canal or foraminal stenosis.   C3-C4: Mild disc osteophyte complex with prominent central component.  Moderate left facet hypertrophy.  Slight ventral cord flattening with preserved ventral and dorsal CSF.  Mild left foraminal stenosis.   C4-C5: Mild disc osteophyte complex.  Mild-moderate left facet hypertrophy.  Mild ventral cord flattening within sliver preserved ventral and preserved dorsal CSF.  Moderate right and mild-moderate left foraminal stenosis.   C5-C6: Mild-moderate disc osteophyte complex with prominent bilateral uncovertebral spurring.  Mild ventral cord flattening within sliver preserved ventral and preserved dorsal CSF.  Moderate-severe bilateral foraminal stenosis.   C6-C7: Mild-moderate disc osteophyte complex.  Slight ventral cord flattening with preserved ventral and dorsal CSF.  Moderate left and mild right foraminal stenosis.   C7-T1: No disc herniation or significant posterior osseous ridging.  Mild left facet hypertrophy.  No significant spinal canal or foraminal stenosis.     18 MRI C-spine:  There is mild lumbar dextrocurvature.  The vertebral body alignment and " vertebral body heights are maintained.  The paravertebral soft tissues appear within normal limits.  No marrow replacement process or fracture.  The visualized distal spinal cord and conus medullaris are within normal limits.  The conus terminates at L1.   L1-2: Normal disc signal and disc space height.  Minimal disc bulge seen.  No central canal foraminal stenosis   L2-3: There is disc desiccation and mild disc space narrowing.  There is mild moderate diffuse disc bulge asymmetric right results in mild right lateral recess narrowing with mild abutment of the descending right L3 nerve root.  There is mild moderate right-sided foraminal stenosis.  No central canal stenosis   L3-4: There is disc desiccation.  There is mild moderate diffuse disc bulge.  There is mild bilateral facet arthrosis.  There is mild moderate left neural foraminal stenosis.  There is no central canal stenosis.   L4-5 there is disc desiccation and mild disc space narrowing.  There is mild moderate bilateral facet arthrosis.  There is moderate diffuse disc bulge asymmetric left resulting in mild moderate left-sided foraminal stenosis with mild abutment of the exited left L4 nerve root.  No central canal stenosis.   L5-S1: There is mild diffuse disc bulge.  There is moderate to severe right and mild moderate left facet arthrosis.  There is no central canal stenosis or significant neural foraminal narrowing.      X-ray right and left hip 08/28/2018:  Normal right and left hips.  No significant osteoarthritis.    DEXA 7/29/19:  Spine bone mineral density is 1.047 g/cm 2 with T-score -1.1 and Z-score -1.4.  This compares to previous bone mineral density measurement of 1.075 and T-score of -0.9.  Femoral total mean bone mineral density measurement is 0.958 g/cm 2 with T-score -0.4 and Z-score -0.5.  This compares to previous bone mineral density measurement of 1.028 and T-score of 0.2.  FRAX measurement is provided of 10 year major osteoporotic  fracture 10.9 % and hip fracture 0.8 %.    Assessment:  The patient is a 56-year-old woman with a history of diabetes, rheumatoid arthritis with chronic back pain who presents in referral from Dr. Gibson for  Back pain radiating into the bilateral legs, neck pain radiating into the left arm.    1. Lumbar radiculopathy  Vital signs    Place 18-22 [facility] IV     Verify informed consent    Notify physician     Notify physician     Notify physician (specify)    Diet NPO    Case Request Operating Room: Injection-steroid-epidural-lumbar L5/S1 spread to left    Place in Outpatient    lactated ringers infusion   2. DDD (degenerative disc disease), lumbar     3. DDD (degenerative disc disease), cervical           Plan:  1. I will schedule the patient to repeat and L5/S1 interlaminar epidural steroid injection to the left.  She has done well with this in the past.  If she does not have relief I will schedule her for a left L3/4 transforaminal epidural steroid injection.  2. Follow-up in 4 weeks postprocedure or sooner as needed.

## 2022-06-20 ENCOUNTER — TELEPHONE (OUTPATIENT)
Dept: SURGERY | Facility: CLINIC | Age: 57
End: 2022-06-20
Payer: MEDICARE

## 2022-06-20 NOTE — TELEPHONE ENCOUNTER
----- Message from Luly Neff sent at 6/20/2022  1:26 PM CDT -----  Contact: Franchesca  Type: Needs Medical Advice    Who: Called: pt daughter Franchesca   Best Call Back Number: [phone]  Inquiry/Question: She is calling in to speak to someone to see about getting dates to schedule her mothers surgery, she is taking care of her and having to take her so she has to do it around her schedule she is off from July 4th for the remainder of July so she can help her   please call to advise  Thank you~

## 2022-06-21 ENCOUNTER — TELEPHONE (OUTPATIENT)
Dept: PAIN MEDICINE | Facility: CLINIC | Age: 57
End: 2022-06-21
Payer: MEDICARE

## 2022-06-21 NOTE — TELEPHONE ENCOUNTER
----- Message from Norma Beth sent at 6/21/2022  9:23 AM CDT -----  Contact: Daughter  Patient has questions regarding the location of her scheduled outpatient procedure today. It shows it is at [facility] and she just wants to verify where they need to be. Please call her daughter back at # [phone]. Thank you.

## 2022-06-21 NOTE — TELEPHONE ENCOUNTER
Spoke with patient's daughter and verified location for procedure today and she voiced understanding

## 2022-06-24 ENCOUNTER — PATIENT MESSAGE (OUTPATIENT)
Dept: SURGERY | Facility: CLINIC | Age: 57
End: 2022-06-24
Payer: MEDICARE

## 2022-06-24 DIAGNOSIS — K80.50 BILIARY COLIC: Primary | ICD-10-CM

## 2022-06-24 RX ORDER — METRONIDAZOLE 500 MG/100ML
500 INJECTION, SOLUTION INTRAVENOUS
Status: CANCELLED | OUTPATIENT
Start: 2022-06-24

## 2022-06-24 RX ORDER — SODIUM CHLORIDE 9 MG/ML
INJECTION, SOLUTION INTRAVENOUS CONTINUOUS
Status: CANCELLED | OUTPATIENT
Start: 2022-06-24

## 2022-07-05 ENCOUNTER — PATIENT MESSAGE (OUTPATIENT)
Dept: PAIN MEDICINE | Facility: CLINIC | Age: 57
End: 2022-07-05
Payer: MEDICARE

## 2022-07-07 ENCOUNTER — PATIENT MESSAGE (OUTPATIENT)
Dept: SURGERY | Facility: CLINIC | Age: 57
End: 2022-07-07
Payer: MEDICARE

## 2022-07-07 NOTE — TELEPHONE ENCOUNTER
When she follows up after her cholecystectomy we can discuss scheduling a different injection for her.

## 2022-07-08 ENCOUNTER — TELEPHONE (OUTPATIENT)
Dept: SURGERY | Facility: HOSPITAL | Age: 57
End: 2022-07-08
Payer: MEDICARE

## 2022-07-08 NOTE — TELEPHONE ENCOUNTER
Spoke with patient. Pre-op instructions reviewed. Patient states she thinks she was told to hold her Advil, but she is not sure and would like a call from the clinic to discuss further. Please reach out to patient to discuss. Thank you!

## 2022-07-11 ENCOUNTER — ANESTHESIA EVENT (OUTPATIENT)
Dept: SURGERY | Facility: HOSPITAL | Age: 57
End: 2022-07-11
Payer: MEDICARE

## 2022-07-13 ENCOUNTER — HOSPITAL ENCOUNTER (OUTPATIENT)
Facility: HOSPITAL | Age: 57
Discharge: HOME | End: 2022-07-13
Attending: SURGERY | Admitting: SURGERY
Payer: MEDICARE

## 2022-07-13 ENCOUNTER — ANESTHESIA (OUTPATIENT)
Dept: SURGERY | Facility: HOSPITAL | Age: 57
End: 2022-07-13
Payer: MEDICARE

## 2022-07-13 VITALS
RESPIRATION RATE: 18 BRPM | WEIGHT: 251 LBS | BODY MASS INDEX: 39.39 KG/M2 | HEIGHT: 67 IN | HEART RATE: 72 BPM | OXYGEN SATURATION: 96 % | DIASTOLIC BLOOD PRESSURE: 56 MMHG | SYSTOLIC BLOOD PRESSURE: 123 MMHG | TEMPERATURE: 97 F

## 2022-07-13 DIAGNOSIS — K80.50 BILIARY COLIC: ICD-10-CM

## 2022-07-13 PROCEDURE — 36000708 HC OR TIME LEV III 1ST 15 MIN: Mod: PO | Performed by: SURGERY

## 2022-07-13 PROCEDURE — 47562 LAPAROSCOPIC CHOLECYSTECTOMY: CPT | Mod: ,,, | Performed by: SURGERY

## 2022-07-13 PROCEDURE — 88304 TISSUE EXAM BY PATHOLOGIST: CPT | Mod: 26,,, | Performed by: PATHOLOGY

## 2022-07-13 PROCEDURE — 63600175 PHARM REV CODE 636 W HCPCS: Mod: PO | Performed by: ANESTHESIOLOGY

## 2022-07-13 PROCEDURE — 25000003 PHARM REV CODE 250: Mod: PO | Performed by: SURGERY

## 2022-07-13 PROCEDURE — 88304 PR  SURG PATH,LEVEL III: ICD-10-PCS | Mod: 26,,, | Performed by: PATHOLOGY

## 2022-07-13 PROCEDURE — 27201423 OPTIME MED/SURG SUP & DEVICES STERILE SUPPLY: Mod: PO | Performed by: SURGERY

## 2022-07-13 PROCEDURE — 47562 PR LAP,CHOLECYSTECTOMY: ICD-10-PCS | Mod: ,,, | Performed by: SURGERY

## 2022-07-13 PROCEDURE — D9220A PRA ANESTHESIA: Mod: ANES,,, | Performed by: ANESTHESIOLOGY

## 2022-07-13 PROCEDURE — 63600175 PHARM REV CODE 636 W HCPCS: Mod: PO | Performed by: SURGERY

## 2022-07-13 PROCEDURE — S0030 INJECTION, METRONIDAZOLE: HCPCS | Mod: PO | Performed by: SURGERY

## 2022-07-13 PROCEDURE — 25000003 PHARM REV CODE 250: Mod: PO | Performed by: ANESTHESIOLOGY

## 2022-07-13 PROCEDURE — 37000008 HC ANESTHESIA 1ST 15 MINUTES: Mod: PO | Performed by: SURGERY

## 2022-07-13 PROCEDURE — D9220A PRA ANESTHESIA: ICD-10-PCS | Mod: CRNA,,, | Performed by: NURSE ANESTHETIST, CERTIFIED REGISTERED

## 2022-07-13 PROCEDURE — D9220A PRA ANESTHESIA: Mod: CRNA,,, | Performed by: NURSE ANESTHETIST, CERTIFIED REGISTERED

## 2022-07-13 PROCEDURE — D9220A PRA ANESTHESIA: ICD-10-PCS | Mod: ANES,,, | Performed by: ANESTHESIOLOGY

## 2022-07-13 PROCEDURE — 63600175 PHARM REV CODE 636 W HCPCS: Mod: PO | Performed by: NURSE ANESTHETIST, CERTIFIED REGISTERED

## 2022-07-13 PROCEDURE — 37000009 HC ANESTHESIA EA ADD 15 MINS: Mod: PO | Performed by: SURGERY

## 2022-07-13 PROCEDURE — 25000003 PHARM REV CODE 250: Mod: PO | Performed by: NURSE ANESTHETIST, CERTIFIED REGISTERED

## 2022-07-13 PROCEDURE — 36000709 HC OR TIME LEV III EA ADD 15 MIN: Mod: PO | Performed by: SURGERY

## 2022-07-13 PROCEDURE — 88304 TISSUE EXAM BY PATHOLOGIST: CPT | Performed by: PATHOLOGY

## 2022-07-13 PROCEDURE — 71000033 HC RECOVERY, INTIAL HOUR: Mod: PO | Performed by: SURGERY

## 2022-07-13 RX ORDER — KETAMINE HCL IN 0.9 % NACL 50 MG/5 ML
SYRINGE (ML) INTRAVENOUS
Status: DISCONTINUED | OUTPATIENT
Start: 2022-07-13 | End: 2022-07-13

## 2022-07-13 RX ORDER — FENTANYL CITRATE 50 UG/ML
25 INJECTION, SOLUTION INTRAMUSCULAR; INTRAVENOUS EVERY 5 MIN PRN
Status: DISCONTINUED | OUTPATIENT
Start: 2022-07-13 | End: 2022-07-13 | Stop reason: HOSPADM

## 2022-07-13 RX ORDER — OXYCODONE HYDROCHLORIDE 5 MG/1
5 TABLET ORAL
Status: DISCONTINUED | OUTPATIENT
Start: 2022-07-13 | End: 2022-07-13 | Stop reason: HOSPADM

## 2022-07-13 RX ORDER — ACETAMINOPHEN 10 MG/ML
INJECTION, SOLUTION INTRAVENOUS
Status: DISCONTINUED | OUTPATIENT
Start: 2022-07-13 | End: 2022-07-13

## 2022-07-13 RX ORDER — ROCURONIUM BROMIDE 10 MG/ML
INJECTION, SOLUTION INTRAVENOUS
Status: DISCONTINUED | OUTPATIENT
Start: 2022-07-13 | End: 2022-07-13

## 2022-07-13 RX ORDER — SODIUM CHLORIDE, SODIUM LACTATE, POTASSIUM CHLORIDE, CALCIUM CHLORIDE 600; 310; 30; 20 MG/100ML; MG/100ML; MG/100ML; MG/100ML
INJECTION, SOLUTION INTRAVENOUS CONTINUOUS
Status: DISCONTINUED | OUTPATIENT
Start: 2022-07-13 | End: 2022-07-13 | Stop reason: HOSPADM

## 2022-07-13 RX ORDER — LIDOCAINE HYDROCHLORIDE 10 MG/ML
1 INJECTION, SOLUTION EPIDURAL; INFILTRATION; INTRACAUDAL; PERINEURAL ONCE
Status: DISCONTINUED | OUTPATIENT
Start: 2022-07-13 | End: 2022-07-13 | Stop reason: HOSPADM

## 2022-07-13 RX ORDER — SODIUM CHLORIDE 9 MG/ML
INJECTION, SOLUTION INTRAVENOUS CONTINUOUS
Status: DISCONTINUED | OUTPATIENT
Start: 2022-07-13 | End: 2022-07-13 | Stop reason: HOSPADM

## 2022-07-13 RX ORDER — LIDOCAINE HCL/PF 100 MG/5ML
SYRINGE (ML) INTRAVENOUS
Status: DISCONTINUED | OUTPATIENT
Start: 2022-07-13 | End: 2022-07-13

## 2022-07-13 RX ORDER — HYDROCODONE BITARTRATE AND ACETAMINOPHEN 5; 325 MG/1; MG/1
1 TABLET ORAL EVERY 6 HOURS PRN
Qty: 30 TABLET | Refills: 0 | Status: SHIPPED | OUTPATIENT
Start: 2022-07-13 | End: 2022-08-12 | Stop reason: CLARIF

## 2022-07-13 RX ORDER — METRONIDAZOLE 500 MG/100ML
500 INJECTION, SOLUTION INTRAVENOUS
Status: COMPLETED | OUTPATIENT
Start: 2022-07-13 | End: 2022-07-13

## 2022-07-13 RX ORDER — SCOLOPAMINE TRANSDERMAL SYSTEM 1 MG/1
1 PATCH, EXTENDED RELEASE TRANSDERMAL
Status: DISCONTINUED | OUTPATIENT
Start: 2022-07-13 | End: 2022-07-13 | Stop reason: HOSPADM

## 2022-07-13 RX ORDER — BUPIVACAINE HYDROCHLORIDE AND EPINEPHRINE 2.5; 5 MG/ML; UG/ML
INJECTION, SOLUTION EPIDURAL; INFILTRATION; INTRACAUDAL; PERINEURAL
Status: DISCONTINUED | OUTPATIENT
Start: 2022-07-13 | End: 2022-07-13 | Stop reason: HOSPADM

## 2022-07-13 RX ORDER — SUCCINYLCHOLINE CHLORIDE 20 MG/ML
INJECTION INTRAMUSCULAR; INTRAVENOUS
Status: DISCONTINUED | OUTPATIENT
Start: 2022-07-13 | End: 2022-07-13

## 2022-07-13 RX ORDER — PROPOFOL 10 MG/ML
VIAL (ML) INTRAVENOUS
Status: DISCONTINUED | OUTPATIENT
Start: 2022-07-13 | End: 2022-07-13

## 2022-07-13 RX ORDER — MIDAZOLAM HYDROCHLORIDE 1 MG/ML
INJECTION INTRAMUSCULAR; INTRAVENOUS
Status: DISCONTINUED | OUTPATIENT
Start: 2022-07-13 | End: 2022-07-13

## 2022-07-13 RX ORDER — ONDANSETRON 2 MG/ML
4 INJECTION INTRAMUSCULAR; INTRAVENOUS EVERY 12 HOURS PRN
Status: DISCONTINUED | OUTPATIENT
Start: 2022-07-13 | End: 2022-07-13 | Stop reason: HOSPADM

## 2022-07-13 RX ORDER — ONDANSETRON 2 MG/ML
INJECTION INTRAMUSCULAR; INTRAVENOUS
Status: DISCONTINUED | OUTPATIENT
Start: 2022-07-13 | End: 2022-07-13

## 2022-07-13 RX ORDER — FENTANYL CITRATE 50 UG/ML
INJECTION, SOLUTION INTRAMUSCULAR; INTRAVENOUS
Status: DISCONTINUED | OUTPATIENT
Start: 2022-07-13 | End: 2022-07-13

## 2022-07-13 RX ORDER — LABETALOL HYDROCHLORIDE 5 MG/ML
INJECTION, SOLUTION INTRAVENOUS
Status: DISCONTINUED | OUTPATIENT
Start: 2022-07-13 | End: 2022-07-13

## 2022-07-13 RX ORDER — HYDROMORPHONE HYDROCHLORIDE 2 MG/ML
0.2 INJECTION, SOLUTION INTRAMUSCULAR; INTRAVENOUS; SUBCUTANEOUS EVERY 5 MIN PRN
Status: DISCONTINUED | OUTPATIENT
Start: 2022-07-13 | End: 2022-07-13 | Stop reason: HOSPADM

## 2022-07-13 RX ADMIN — FENTANYL CITRATE 50 MCG: 50 INJECTION, SOLUTION INTRAMUSCULAR; INTRAVENOUS at 10:07

## 2022-07-13 RX ADMIN — METRONIDAZOLE 500 MG: 500 INJECTION, SOLUTION INTRAVENOUS at 09:07

## 2022-07-13 RX ADMIN — SUGAMMADEX 400 MG: 100 INJECTION, SOLUTION INTRAVENOUS at 10:07

## 2022-07-13 RX ADMIN — ROCURONIUM BROMIDE 25 MG: 10 INJECTION, SOLUTION INTRAVENOUS at 10:07

## 2022-07-13 RX ADMIN — ACETAMINOPHEN 1000 MG: 10 INJECTION, SOLUTION INTRAVENOUS at 10:07

## 2022-07-13 RX ADMIN — ONDANSETRON 4 MG: 2 INJECTION INTRAMUSCULAR; INTRAVENOUS at 11:07

## 2022-07-13 RX ADMIN — ROCURONIUM BROMIDE 5 MG: 10 INJECTION, SOLUTION INTRAVENOUS at 10:07

## 2022-07-13 RX ADMIN — SUCCINYLCHOLINE CHLORIDE 160 MG: 20 INJECTION, SOLUTION INTRAMUSCULAR; INTRAVENOUS at 10:07

## 2022-07-13 RX ADMIN — SODIUM CHLORIDE, SODIUM LACTATE, POTASSIUM CHLORIDE, AND CALCIUM CHLORIDE: .6; .31; .03; .02 INJECTION, SOLUTION INTRAVENOUS at 09:07

## 2022-07-13 RX ADMIN — SCOPALAMINE 1 PATCH: 1 PATCH, EXTENDED RELEASE TRANSDERMAL at 09:07

## 2022-07-13 RX ADMIN — CEFTRIAXONE 2 G: 2 INJECTION, SOLUTION INTRAVENOUS at 10:07

## 2022-07-13 RX ADMIN — LABETALOL HYDROCHLORIDE 10 MG: 5 INJECTION, SOLUTION INTRAVENOUS at 10:07

## 2022-07-13 RX ADMIN — PROPOFOL 150 MG: 10 INJECTION, EMULSION INTRAVENOUS at 10:07

## 2022-07-13 RX ADMIN — MIDAZOLAM HYDROCHLORIDE 2 MG: 1 INJECTION, SOLUTION INTRAMUSCULAR; INTRAVENOUS at 10:07

## 2022-07-13 RX ADMIN — FENTANYL CITRATE 25 MCG: 50 INJECTION INTRAMUSCULAR; INTRAVENOUS at 11:07

## 2022-07-13 RX ADMIN — LIDOCAINE HYDROCHLORIDE 100 MG: 20 INJECTION, SOLUTION INTRAVENOUS at 10:07

## 2022-07-13 RX ADMIN — Medication 30 MG: at 10:07

## 2022-07-13 RX ADMIN — OXYCODONE 5 MG: 5 TABLET ORAL at 11:07

## 2022-07-13 RX ADMIN — ONDANSETRON 4 MG: 2 INJECTION, SOLUTION INTRAMUSCULAR; INTRAVENOUS at 10:07

## 2022-07-13 NOTE — ANESTHESIA PREPROCEDURE EVALUATION
07/13/2022  Yara Santos is a 56 y.o., female.      Pre-op Assessment    I have reviewed the Patient Summary Reports.     I have reviewed the Nursing Notes. I have reviewed the NPO Status.   I have reviewed the Medications.     Review of Systems  Anesthesia Hx:  No problems with previous Anesthesia    Social:  Alcohol Use, Former Smoker    Cardiovascular:   Hypertension, well controlled Dysrhythmias (PVCs) hyperlipidemia    Pulmonary:   COPD, moderate Asthma mild Shortness of breath    Renal/:  Renal/ Normal     Hepatic/GI:   GERD Liver Disease, Hepatitis IBS   Musculoskeletal:   Arthritis  DDD lumbar  Cervical & lumbar radiculopathy     Neurological:   Neuromuscular Disease, (Multiple Sclerosis)    Endocrine:   Diabetes, well controlled, type 2 Hypothyroidism  Obesity / BMI > 30, Morbid Obesity / BMI > 40  Psych:   Psychiatric History anxiety depression          Physical Exam  General: Well nourished, Cooperative, Alert and Oriented    Airway:  Mallampati: I   Mouth Opening: Normal  Neck ROM: Normal ROM        Anesthesia Plan  Type of Anesthesia, risks & benefits discussed:    Anesthesia Type: Gen ETT, Gen Supraglottic Airway, Gen Natural Airway, MAC  Intra-op Monitoring Plan: Standard ASA Monitors  Post Op Pain Control Plan: multimodal analgesia  Induction:  IV  Airway Plan: Direct, Video and Fiberoptic, Post-Induction  Informed Consent: Informed consent signed with the Patient and all parties understand the risks and agree with anesthesia plan.  All questions answered.   ASA Score: 3    Ready For Surgery From Anesthesia Perspective.     .

## 2022-07-13 NOTE — DISCHARGE INSTRUCTIONS
Department of General Surgery  [facility]  LAPAROSCOPIC CHOLECYSTECTOMY    DO:  Minimal activity for 48 hours.  Resume diet as tolerated.  May shower in 48 hours.  May remove outer dressing in 48 hours. Leave steri-strips in place. If steri-strips get wet, pat them dry. If they fall off, do not worry. They will fall off on their own. Do not pull them off.  Resume home medications as prescribed.    DO NOT:  Do not lift anything over 15 pounds until you have been released by your physician.  Do not soak in tub or pool.  Do not drive for 24 hours or while taking narcotic pain medication.  Do not take additional tylenol/acetaminophen while taking narcotic pain medication that contains tylenol/acetaminophen.     CALL PHYSICIAN FOR:  Redness, swelling, or bleeding from surgical site.  Fever greater than 101.  Drainage from the incision site.  Persistent pain not relieved by pain medication.    FOLLOW-UP APPOINTMENT: Call to schedule appointment in 10-14 days.    FOR EMERGENCIES: Contact your doctor at [phone].

## 2022-07-13 NOTE — ANESTHESIA PROCEDURE NOTES
Intubation    Date/Time: 7/13/2022 10:31 AM  Performed by: Alana Osborne CRNA  Authorized by: Dick Cerda MD     Intubation:     Induction:  Intravenous    Intubated:  Postinduction    Mask Ventilation:  Easy with oral airway    Attempts:  1    Attempted By:  CRNA    Method of Intubation:  Video laryngoscopy    Blade:  Cerda 3    Laryngeal View Grade: Grade I - full view of cords      Difficult Airway Encountered?: No      Complications:  Soft tissue trauma (Right upper lip)    Airway Device:  Oral endotracheal tube    Airway Device Size:  7.0    Style/Cuff Inflation:  Cuffed    Inflation Amount (mL):  2    Tube secured:  21    Secured at:  The lips    Placement Verified By:  Capnometry    Complicating Factors:  Obesity, short neck, poor neck/head extension, oropharyngeal edema or fat and bleeding in oropharynx    Findings Post-Intubation:  BS equal bilateral

## 2022-07-13 NOTE — TRANSFER OF CARE
"Anesthesia Transfer of Care Note    Patient: Yara Santos    Procedure(s) Performed: Procedure(s) (LRB):  CHOLECYSTECTOMY, LAPAROSCOPIC (N/A)    Patient location: PACU    Anesthesia Type: general    Transport from OR: Transported from OR on room air with adequate spontaneous ventilation    Post pain: pain needs to be addressed    Post assessment: no apparent anesthetic complications and tolerated procedure well    Post vital signs: stable    Level of consciousness: awake and sedated    Nausea/Vomiting: no nausea/vomiting    Complications: none    Transfer of care protocol was followed      Last vitals:   Visit Vitals  /71 (BP Location: Right arm, Patient Position: Lying)   Pulse 79   Temp 36.2 °C (97.1 °F) (Skin)   Resp 16   Ht 5' 7" (1.702 m)   Wt 113.9 kg (251 lb)   SpO2 (!) 93%   Breastfeeding No   BMI 39.31 kg/m²     "

## 2022-07-13 NOTE — ANESTHESIA POSTPROCEDURE EVALUATION
Anesthesia Post Evaluation    Patient: Yara Santos    Procedure(s) Performed: Procedure(s) (LRB):  CHOLECYSTECTOMY, LAPAROSCOPIC (N/A)    Final Anesthesia Type: general      Patient location during evaluation: PACU  Patient participation: Yes- Able to Participate  Level of consciousness: awake and alert and oriented  Post-procedure vital signs: reviewed and stable  Pain management: adequate  Airway patency: patent    PONV status at discharge: No PONV  Anesthetic complications: no      Cardiovascular status: blood pressure returned to baseline and stable  Respiratory status: unassisted and spontaneous ventilation  Hydration status: euvolemic  Follow-up not needed.          Vitals Value Taken Time   /56 07/13/22 1200   Temp 36.3 °C (97.3 °F) 07/13/22 1120   Pulse 72 07/13/22 1200   Resp 18 07/13/22 1200   SpO2 96 % 07/13/22 1200         No case tracking events are documented in the log.      Pain/Mirza Score: Pain Rating Prior to Med Admin: 6 (7/13/2022 11:54 AM)  Mirza Score: 9 (7/13/2022 11:30 AM)

## 2022-07-13 NOTE — BRIEF OP NOTE
Cent[facility] - Surgery  Brief Operative Note    Surgery Date: 7/13/2022     Surgeon(s) and Role:     * Luis Santos MD - Primary    Assisting Surgeon: None    Pre-op Diagnosis:  Biliary colic [K80.50]    Post-op Diagnosis:  Post-Op Diagnosis Codes:     * Biliary colic [K80.50]    Procedure(s) (LRB):  CHOLECYSTECTOMY, LAPAROSCOPIC (N/A)    Anesthesia: General    Operative Findings: Distended gallbladder.  Cholecystectomy performed without event    Estimated Blood Loss: 20 cc's        Specimens:   Specimen (24h ago, onward)             Start     Ordered    07/13/22 1059  Specimen to Pathology, Surgery General Surgery  Once        Comments: Pre-op Diagnosis: Biliary colic [K80.50]Procedure(s):CHOLECYSTECTOMY, LAPAROSCOPIC Number of specimens: 1Name of specimens: 1. GALLBLADDER AND CONTENTS     References:    Click here for ordering Quick Tip   Question Answer Comment   Procedure Type: General Surgery    Which provider would you like to cc? LUIS SANTOS    Release to patient Immediate        07/13/22 1059                  Discharge Note    OUTCOME: Patient tolerated treatment/procedure well without complication and is now ready for discharge.    DISPOSITION: Home or Self Care    FINAL DIAGNOSIS:  Biliary colic    FOLLOWUP: In clinic    DISCHARGE INSTRUCTIONS:    Discharge Procedure Orders   Diet general     Remove dressing in 48 hours     Activity as tolerated

## 2022-07-13 NOTE — OP NOTE
Date of surgery:  7/13/22    Staff surgeon:  Dr. Ra Santos    Preoperative diagnosis:  Biliary colic    Postoperative diagnosis:  The same    Procedure:  Laparoscopic cholecystectomy    Anesthesia:  General endotracheal anesthesia    Indication for procedure:  Pleasant 56 year-old female presented to the office with signs symptoms suggestive of biliary colic.  She is scheduled for laparoscopic cholecystectomy the above-mentioned date.    Description of procedure:  Following signing informed consent patient in the operating room placed supine position.  General endotracheal anesthesia was administered without event.  The abdomen is prepped and draped in standard fashion and appropriate time-out procedure was then performed.  Next a  small transverse incision above the umbilicus is made and carried down to  the deep dermal tissue.  The abdominal cavity is entered with a  Veress needle and pneumoperitoneum to 15 mmHg is established.  Next the abdomen is entered with an Optiview trocar under direct visualization.  Patient placed in reverse Trendelenburg and tilted towards the left side.  A 5 mm is placed in the epigastric trocar and 2 in the right subcostal margin.  All trocars were placed under direct visualization and after injection of quarter percent Marcaine with epi.  The fundus was grasped and held over the patient's liver.  This exposed the infundibulum which was grasped and held towards the patient's feet.  Next the triangle of Calot is dissected identifying the cystic duct and cystic artery in their usual anatomic locations. Two clips are placed distally on the cystic duct 1 clip proximally.  Two clips are placed on the cystic artery. The duct and artery are cut between clips.  Next cautery is used to remove the gallbladder from the hepatic fossa.  The gallbladder is then removed from the abdominal cavity with assistance of an Endo-Catch bag.  Having removed the gallbladder the hepatic fossa is evaluated   once again to ensure adequate hemostasis.  Next all trocars are removed under direct visualization.  I closed the umbilical fascia with 0 Vicryl suture.  Skin incision was closed with 4-0 Monocryl.  Patient is extubated taken recovery room stable condition.  Blood loss was 10 cc's

## 2022-07-18 ENCOUNTER — TELEPHONE (OUTPATIENT)
Dept: SURGERY | Facility: CLINIC | Age: 57
End: 2022-07-18
Payer: MEDICARE

## 2022-07-18 NOTE — TELEPHONE ENCOUNTER
----- Message from Yony Canas sent at 7/18/2022  9:32 AM CDT -----  Type:  Sooner Appointment Request    Caller is requesting a sooner appointment.  Caller declined first available appointment listed below.  Caller will not accept being placed on the waitlist and is requesting a message be sent to doctor.    Name of Caller:  Margy Frey (Daughter  When is the first available appointment?  08/25/22  Symptoms: Post Op-- Caller would like to be seen on 07/27/22  Best Call Back Number:  [phone]  Additional Information:

## 2022-07-18 NOTE — TELEPHONE ENCOUNTER
I called and spoke to patients Mother and schedule Yara on Wednesday, 7/27/22 @ 2:45pm for her post op @ the [place] office.  [employer]

## 2022-07-20 LAB
FINAL PATHOLOGIC DIAGNOSIS: NORMAL
Lab: NORMAL

## 2022-07-27 ENCOUNTER — OFFICE VISIT (OUTPATIENT)
Dept: SURGERY | Facility: CLINIC | Age: 57
End: 2022-07-27
Payer: MEDICARE

## 2022-07-27 VITALS
HEIGHT: 67 IN | SYSTOLIC BLOOD PRESSURE: 150 MMHG | WEIGHT: 247.81 LBS | BODY MASS INDEX: 38.89 KG/M2 | TEMPERATURE: 98 F | DIASTOLIC BLOOD PRESSURE: 72 MMHG | HEART RATE: 87 BPM

## 2022-07-27 DIAGNOSIS — Z09 POSTOP CHECK: Primary | ICD-10-CM

## 2022-07-27 PROCEDURE — 99999 PR PBB SHADOW E&M-EST. PATIENT-LVL V: CPT | Mod: PBBFAC,,, | Performed by: SURGERY

## 2022-07-27 PROCEDURE — 99024 POSTOP FOLLOW-UP VISIT: CPT | Mod: POP,,, | Performed by: SURGERY

## 2022-07-27 PROCEDURE — 99999 PR PBB SHADOW E&M-EST. PATIENT-LVL V: ICD-10-PCS | Mod: PBBFAC,,, | Performed by: SURGERY

## 2022-07-27 PROCEDURE — 99215 OFFICE O/P EST HI 40 MIN: CPT | Mod: PBBFAC,PO | Performed by: SURGERY

## 2022-07-27 PROCEDURE — 99024 PR POST-OP FOLLOW-UP VISIT: ICD-10-PCS | Mod: POP,,, | Performed by: SURGERY

## 2022-07-27 RX ORDER — COVID-19 MOLECULAR TEST ASSAY
KIT MISCELLANEOUS
COMMUNITY
Start: 2022-07-10 | End: 2022-08-12 | Stop reason: CLARIF

## 2022-07-27 RX ORDER — METHOTREXATE 2.5 MG/1
1 TABLET ORAL WEEKLY
COMMUNITY
Start: 2022-07-05 | End: 2022-08-24

## 2022-07-27 RX ORDER — GABAPENTIN 400 MG/1
400 CAPSULE ORAL 2 TIMES DAILY
COMMUNITY
Start: 2022-07-05 | End: 2022-07-27

## 2022-07-28 NOTE — PROGRESS NOTES
Cc: post op    HPI: 56 y.o.  female  2 weeks s/p lap christopher.   Pt notes that she is feeling ok.  COntinues to have some cramping and occasional nausea.  No longer having emesis.      PE: AFVSS    AAOx3  CTA  Soft/NT/nd  Inc: c/d/i        Path: RELIAPATH DIAGNOSIS:   GALLBLADDER, CHOLECYSTECTOMY:   Mild chronic cholecystitis with cholelithiasis.   Tova Burkett M.D.      A/P:   Pt doing well post surgery.   If nausea persists may need f/u and workup with GI.  Also given chronic elevated LFTs and concern for cirrhosis may need hepatology workup  F/U with me prn

## 2022-08-02 ENCOUNTER — OFFICE VISIT (OUTPATIENT)
Dept: PAIN MEDICINE | Facility: CLINIC | Age: 57
End: 2022-08-02
Payer: MEDICARE

## 2022-08-02 VITALS
HEIGHT: 67 IN | WEIGHT: 245.38 LBS | HEART RATE: 91 BPM | SYSTOLIC BLOOD PRESSURE: 120 MMHG | BODY MASS INDEX: 38.51 KG/M2 | OXYGEN SATURATION: 96 % | DIASTOLIC BLOOD PRESSURE: 59 MMHG

## 2022-08-02 DIAGNOSIS — M50.30 DDD (DEGENERATIVE DISC DISEASE), CERVICAL: ICD-10-CM

## 2022-08-02 DIAGNOSIS — M54.16 LUMBAR RADICULOPATHY: Primary | ICD-10-CM

## 2022-08-02 DIAGNOSIS — M51.36 DDD (DEGENERATIVE DISC DISEASE), LUMBAR: ICD-10-CM

## 2022-08-02 PROCEDURE — 99214 OFFICE O/P EST MOD 30 MIN: CPT | Mod: S$PBB,,, | Performed by: PHYSICIAN ASSISTANT

## 2022-08-02 PROCEDURE — 99999 PR PBB SHADOW E&M-EST. PATIENT-LVL V: ICD-10-PCS | Mod: PBBFAC,,, | Performed by: PHYSICIAN ASSISTANT

## 2022-08-02 PROCEDURE — 99999 PR PBB SHADOW E&M-EST. PATIENT-LVL V: CPT | Mod: PBBFAC,,, | Performed by: PHYSICIAN ASSISTANT

## 2022-08-02 PROCEDURE — 99214 PR OFFICE/OUTPT VISIT, EST, LEVL IV, 30-39 MIN: ICD-10-PCS | Mod: S$PBB,,, | Performed by: PHYSICIAN ASSISTANT

## 2022-08-02 PROCEDURE — 99215 OFFICE O/P EST HI 40 MIN: CPT | Mod: PBBFAC,PN | Performed by: PHYSICIAN ASSISTANT

## 2022-08-02 RX ORDER — SODIUM CHLORIDE, SODIUM LACTATE, POTASSIUM CHLORIDE, CALCIUM CHLORIDE 600; 310; 30; 20 MG/100ML; MG/100ML; MG/100ML; MG/100ML
INJECTION, SOLUTION INTRAVENOUS CONTINUOUS
Status: CANCELLED | OUTPATIENT
Start: 2022-08-16

## 2022-08-02 NOTE — PROGRESS NOTES
This note was completed with dictation software and grammatical errors may exist.    CC: neck pain, left arm pain, back pain, left leg pain    HPI:   The patient is a 56-year-old woman with a history of diabetes, rheumatoid arthritis with chronic back pain who presents in referral from Dr. Gibson for  Back pain radiating into the bilateral legs, neck pain radiating into the left arm.  She is status post L5/S1 interlaminar epidural steroid injection to the left on 06/21/2022 with 20% relief.  She continues to have low back pain that radiates into the left lateral hip, left anterior and lateral thigh and into her knee.  She reports numbness in her feet.  The pain is worse with standing walking and improved with sitting.  She does report both numbness coming from the feet up and from the low back down both legs.  She denies incontinence but does report intermittent weakness in her left leg.    Pain intervention history: She had done epidural steroid injections for her back in the past, many years ago.   She is status post C7-T1 interlaminar epidural steroid injection on 09/24/2020 with 100% relief.   She is status post L5/S1 interlaminar epidural steroid injection to the left on 11/12/2020 with 50% relief of her back pain and 100% relief of her left leg pain.   She is status post L5/S1 interlaminar epidural steroid injection on 06/21/2021 with 80% relief.   She is status post L5/S1 interlaminar epidural steroid injection to the left on 06/21/2022 with 20% relief.     Spine surgeries:    Antineuropathics: gabapentin 400 milligrams twice daily with some relief of her upper back and low back pain  NSAIDs: cannot tolerate NSAIDs  Physical therapy: had done multiple rounds of physical therapy for her back in the past with increased pain  Antidepressants:  Muscle relaxers:  Opioids: She had received a prescription for Percocet 10/325 on 07/10/2020 which she takes sparingly, she still has some left over, does not like  taking these  Antiplatelets/Anticoagulants:    She smokes marijuana on a regular basis, reports that this seems to help her pain in the neck and back and much of her arthritic pain, denies any major side effects from this.  She has used oils and edibles as well, generally has been using a vaporizer as well.    ROS:  She reports weight gain, easy bruising, diabetes, joint stiffness, joint swelling, back pain, memory loss, dizziness, difficulty sleeping, anxiety, depression and loss of balance.  Balance of review of systems is negative.    Lab Results   Component Value Date    HGBA1C 6.2 (H) 02/15/2022       Lab Results   Component Value Date    WBC 9.70 02/15/2022    HGB 14.1 02/15/2022    HCT 44.6 02/15/2022    MCV 92 02/15/2022     02/15/2022             Past Medical History:   Diagnosis Date    a Premature Ventricular Contractions     Dr. Austin Collier    Asthma     b Hypertension     12/21/17 RXd Valsartan 40 Mg 1/2 Tab Daily; Losartan 25 Mg Caused Confusion; Lisinopril Caused Hives; 9/15/17 RXd A Low Salt Diet    b Microalbuminuria ########    Losartan Caused Confusion; Lisinopril Caused Hives    b White Coat Syndrome ####    c Hypercholesterolemia     7/5/20 And 3/8/17 Referred To Dr. Christian Parnell For Repatha Or Praluent; Statins Worsen Her Lichen Planus; Was On Pravastatin 20 Mg qHS); Other Statins Have Also Worsened Her Lichen Planus    d Type 2 DM     ** 2/18/16 Referred To DM [facility]; She Stopped Her NPH Insulin In 01/2016- Diet controlled    e Hypothyroidism     7/5/20 Decreased 50 Mcg qAM Levothyroxine To 50 Mcg QOD And 25 Mcg QOD; 6/3/19 Increased 25 Mcg qAM Levothyroxine To 50 Mcg qAM     f Obesity     Her Mother Weighed 600 Lbs    f Osteopenia     8/12/19 Offered RX For Fosamax 35 Mg Weekly, And RXd OTC D3 5K IU Daily, And OTC CaCO3 600 Mg Daily    History of acute bronchitis     i COPD With TUD     i Dyspnea 09/08/2020    Dr. Austin Collier On 9/8/20 Ordered An Echocardiogram    i Tobacco  Use Disorder #############    19 RXd Wellbutrin- Mg qAM X 4-6 Months And PRN 2 Mg Nicotine Gum; Chantix Caused Side Effects    j Chronic constipation     Dr. CHIKIS kumar Chronic Elevated Hepatic Transaminases ####    Dr. CHIKIS Pearl; 20 RXd OTC Vitamin E 800 IU Daily    j GERD With dysphagia     Dr. CHIKIS kumar H/O Colon Polyps ###    Dr. CHIKIS kumar Irritable Bowel Syndrome     Dr. CHIKIS kumar Nonalcoholic Steatohepatitis (N.A.S.H.) ####    Dr. CHIKIS Pearl; 20 RXd OTC Vitamin E 800 IU Daily    l Bilateral Hip Osteoarthritis     Dr. Homer Kaur In 2020 Ordered Bilateral Hip MRIs And Referred To Orthopedic Surgery    l Bilateral Knee Arthritis     l Carpal tunnel syndrome     l Lumbar Spinal DDD     Dr. Charissa Govea    l Rheumatoid Arthritis ###    Dr. Homer Kaur; 10/2/19 RXd Neurontin 300 Mg BID; 10/7/19 RF = 16.7 (0.0-15.0); 19 STEPHANIE, CPK, ESR = Normal    l Tarsal Tunnel Syndrome     m Chronic Fatigue ###    m Family H/O Alzheimer's Disease     m Memory Loss ###    10/2/19 And 19 Referred To Dr. Alie naranjo Vitamin B12 Deficiency ###    n Alcoholism, Sober Since 2013     n Depression And Anxiety     21 RXd Zoloft 50 Mg qHS; 19 RXd Effexor-XR 37.5 Mg qAM; 10/2/19 RXd Lexapro 10 Mg qHS (But This Caused S/Es)    o Allergic rhinosinusitis     p OU Cataracts     Dr. Xi Cordero    q BLE Lichen Planus #############    Dr. Louise In [place]    q BLE Varicose Veins     q Chronic Bilateral Lower Extremity Edema     q Facial flushing     q Vitamin D Deficiency     On OTC D3 5K IU Daily    Urinary tract infection     Wellness Visit 2021        Past Surgical History:   Procedure Laterality Date     SECTION      x2    COLONOSCOPY   ?    polyps removed per pt report    EPIDURAL STEROID INJECTION INTO CERVICAL SPINE N/A 2020    Procedure:  Injection-steroid-epidural-cervical to left;  Surgeon: John Vinson MD;  Location: [facility] OR;  Service: Pain Management;  Laterality: N/A;    EPIDURAL STEROID INJECTION INTO LUMBAR SPINE N/A 11/12/2020    Procedure: Injection-steroid-epidural-lumbar, l5/s1 to left;  Surgeon: John Vinson MD;  Location: [facility] OR;  Service: Pain Management;  Laterality: N/A;    EPIDURAL STEROID INJECTION INTO LUMBAR SPINE N/A 06/21/2021    Procedure: Injection-steroid-epidural-lumbar L5/S1;  Surgeon: John Vinson MD;  Location: [facility];  Service: Pain Management;  Laterality: N/A;    EPIDURAL STEROID INJECTION INTO LUMBAR SPINE N/A 6/21/2022    Procedure: Injection-steroid-epidural-lumbar L5/S1 spread to left;  Surgeon: Jonh Vinson MD;  Location[facility];  Service: Pain Management;  Laterality: N/A;    ESOPHAGOGASTRODUODENOSCOPY      LAPAROSCOPIC CHOLECYSTECTOMY N/A 7/13/2022    Procedure: CHOLECYSTECTOMY, LAPAROSCOPIC;  Surgeon: Ra Santos MD;  Location: [facility];  Service: General;  Laterality: N/A;    UPPER GASTROINTESTINAL ENDOSCOPY  2020 ?    unsure of results       Social History     Socioeconomic History    Marital status:    Tobacco Use    Smoking status: Former Smoker     Packs/day: 0.50     Start date: 1/1/1960     Quit date: 4/2/2019     Years since quitting: 3.3    Smokeless tobacco: Never Used   Substance and Sexual Activity    Alcohol use: No    Drug use: No     Social Determinants of Health     Financial Resource Strain: High Risk    Difficulty of Paying Living Expenses: Hard   Food Insecurity: Food Insecurity Present    Worried About Running Out of Food in the Last Year: Sometimes true    Ran Out of Food in the Last Year: Often true   Transportation Needs: Unmet Transportation Needs    Lack of Transportation (Medical): Yes    Lack of Transportation (Non-Medical): Yes   Physical Activity: Inactive    Days of Exercise per Week: 0 days    Minutes of Exercise per  "Session: 0 min   Stress: Stress Concern Present    Feeling of Stress : Rather much   Social Connections: Unknown    Frequency of Communication with Friends and Family: Never    Frequency of Social Gatherings with Friends and Family: Never    Active Member of Clubs or Organizations: No    Attends Club or Organization Meetings: Never    Marital Status:    Housing Stability: Low Risk     Unable to Pay for Housing in the Last Year: No    Number of Places Lived in the Last Year: 1    Unstable Housing in the Last Year: No         Medications/Allergies: See med card    Vitals:    08/02/22 0813   BP: (!) 120/59   Pulse: 91   SpO2: 96%   Weight: 111.3 kg (245 lb 6 oz)   Height: 5' 7" (1.702 m)   PainSc:   8   PainLoc: Back         Physical exam:  Gen: A and O x3, pleasant, well-groomed  Skin: No rashes or obvious lesions  HEENT: PERRLA, no obvious deformities on ears or in canals.Trachea midline.  CVS: Regular rate and rhythm, normal palpable pulses.  Resp: Clear to auscultation bilaterally, no wheezes or rales.  Abdomen: Soft, NT/ND.  Musculoskeletal: Able to heel walk, toe walk. No antalgic gait.     Neuro:  Upper extremities: 5/5 strength bilaterally   Lower extremities: 5/5 strength bilaterally  Reflexes: Brachioradialis 2+, Bicep 0+, Tricep 1+. Patellar 1+, Achilles 1+ bilaterally.  Sensory: Intact and symmetrical to light touch and pinprick in C2-T1 dermatomes bilaterally , except for decreased sensation to light touch throughout the 1st through 3rd digits and left lateral forearm.  Intact and symmetrical to light touch and pinprick in L2-S1 dermatomes bilaterally.     Cervical Spine:  Cervical spine: ROM is full in flexion, extension and lateral rotation without pain.  Spurling's maneuver is negative bilaterally.  Myofascial exam:  No tenderness to palpation to the cervical paraspinous muscles.     Lumbar spine:  Lumbar spine:   Range of motion is moderately decreased with flexion with mild increased " low back pain during each maneuver.  Rony's test causes no increased pain on either side.    Supine straight leg raise is negative bilaterally.  Internal and external rotation of the hip causes severe groin pain bilaterally.  Myofascial exam:  There is tenderness to palpation over the midline lumbar spine and lumbar paraspinous musculature.      Imagin20 MRI C-spine:  ALIGNMENT: Normal.  Lateral masses of C1 and C2 are congruent.  Slight reversal of the normal lordotic curvature.   BONES: Vertebral body heights are maintained.  Multilevel endplate changes with mild type 1 endplate changes at C4-5.  No aggressive bone marrow signal.   PARASPINAL AREA: Normal.   CERVICAL DISC LEVELS:  C2-C3: No disc herniation or significant posterior osseous ridging. No significant spinal canal or foraminal stenosis.   C3-C4: Mild disc osteophyte complex with prominent central component.  Moderate left facet hypertrophy.  Slight ventral cord flattening with preserved ventral and dorsal CSF.  Mild left foraminal stenosis.   C4-C5: Mild disc osteophyte complex.  Mild-moderate left facet hypertrophy.  Mild ventral cord flattening within sliver preserved ventral and preserved dorsal CSF.  Moderate right and mild-moderate left foraminal stenosis.   C5-C6: Mild-moderate disc osteophyte complex with prominent bilateral uncovertebral spurring.  Mild ventral cord flattening within sliver preserved ventral and preserved dorsal CSF.  Moderate-severe bilateral foraminal stenosis.   C6-C7: Mild-moderate disc osteophyte complex.  Slight ventral cord flattening with preserved ventral and dorsal CSF.  Moderate left and mild right foraminal stenosis.   C7-T1: No disc herniation or significant posterior osseous ridging.  Mild left facet hypertrophy.  No significant spinal canal or foraminal stenosis.     18 MRI C-spine:  There is mild lumbar dextrocurvature.  The vertebral body alignment and vertebral body heights are maintained.  The  paravertebral soft tissues appear within normal limits.  No marrow replacement process or fracture.  The visualized distal spinal cord and conus medullaris are within normal limits.  The conus terminates at L1.   L1-2: Normal disc signal and disc space height.  Minimal disc bulge seen.  No central canal foraminal stenosis   L2-3: There is disc desiccation and mild disc space narrowing.  There is mild moderate diffuse disc bulge asymmetric right results in mild right lateral recess narrowing with mild abutment of the descending right L3 nerve root.  There is mild moderate right-sided foraminal stenosis.  No central canal stenosis   L3-4: There is disc desiccation.  There is mild moderate diffuse disc bulge.  There is mild bilateral facet arthrosis.  There is mild moderate left neural foraminal stenosis.  There is no central canal stenosis.   L4-5 there is disc desiccation and mild disc space narrowing.  There is mild moderate bilateral facet arthrosis.  There is moderate diffuse disc bulge asymmetric left resulting in mild moderate left-sided foraminal stenosis with mild abutment of the exited left L4 nerve root.  No central canal stenosis.   L5-S1: There is mild diffuse disc bulge.  There is moderate to severe right and mild moderate left facet arthrosis.  There is no central canal stenosis or significant neural foraminal narrowing.      X-ray right and left hip 08/28/2018:  Normal right and left hips.  No significant osteoarthritis.    DEXA 7/29/19:  Spine bone mineral density is 1.047 g/cm 2 with T-score -1.1 and Z-score -1.4.  This compares to previous bone mineral density measurement of 1.075 and T-score of -0.9.  Femoral total mean bone mineral density measurement is 0.958 g/cm 2 with T-score -0.4 and Z-score -0.5.  This compares to previous bone mineral density measurement of 1.028 and T-score of 0.2.  FRAX measurement is provided of 10 year major osteoporotic fracture 10.9 % and hip fracture 0.8  %.    Assessment:  The patient is a 56-year-old woman with a history of diabetes, rheumatoid arthritis with chronic back pain who presents in referral from Dr. Gibson for  Back pain radiating into the bilateral legs, neck pain radiating into the left arm.    1. Lumbar radiculopathy     2. DDD (degenerative disc disease), lumbar     3. DDD (degenerative disc disease), cervical           Plan:  1. The patient had mild relief following the interlaminar injection so we discussed trying a different approach.  She has foraminal stenosis on the left at L3/4 as well as L4/5.  I am going to schedule the patient for left L3/4 and L4/5 transforaminal epidural steroid injection.  2. Follow-up in 4 weeks postprocedure or sooner as needed.

## 2022-08-24 ENCOUNTER — TELEPHONE (OUTPATIENT)
Dept: HEPATOLOGY | Facility: CLINIC | Age: 57
End: 2022-08-24
Payer: MEDICARE

## 2022-08-24 ENCOUNTER — OFFICE VISIT (OUTPATIENT)
Dept: GASTROENTEROLOGY | Facility: CLINIC | Age: 57
End: 2022-08-24
Payer: MEDICARE

## 2022-08-24 VITALS — WEIGHT: 245.38 LBS | HEIGHT: 67 IN | BODY MASS INDEX: 38.51 KG/M2

## 2022-08-24 DIAGNOSIS — R19.8 IRREGULAR BOWEL HABITS: ICD-10-CM

## 2022-08-24 DIAGNOSIS — K52.9 CHRONIC DIARRHEA: ICD-10-CM

## 2022-08-24 DIAGNOSIS — K76.0 FATTY LIVER: ICD-10-CM

## 2022-08-24 DIAGNOSIS — Z86.010 HISTORY OF COLON POLYPS: ICD-10-CM

## 2022-08-24 DIAGNOSIS — Z90.49 S/P CHOLECYSTECTOMY: ICD-10-CM

## 2022-08-24 DIAGNOSIS — R11.2 NON-INTRACTABLE VOMITING WITH NAUSEA, UNSPECIFIED VOMITING TYPE: ICD-10-CM

## 2022-08-24 DIAGNOSIS — K21.9 GASTROESOPHAGEAL REFLUX DISEASE, UNSPECIFIED WHETHER ESOPHAGITIS PRESENT: ICD-10-CM

## 2022-08-24 DIAGNOSIS — R79.89 ELEVATED LFTS: ICD-10-CM

## 2022-08-24 DIAGNOSIS — R10.13 EPIGASTRIC PAIN: Primary | ICD-10-CM

## 2022-08-24 PROBLEM — Z79.899 DRUG-INDUCED IMMUNODEFICIENCY: Status: ACTIVE | Noted: 2022-08-24

## 2022-08-24 PROBLEM — D84.821 DRUG-INDUCED IMMUNODEFICIENCY: Status: ACTIVE | Noted: 2022-08-24

## 2022-08-24 PROCEDURE — 99999 PR PBB SHADOW E&M-EST. PATIENT-LVL V: ICD-10-PCS | Mod: PBBFAC,,, | Performed by: NURSE PRACTITIONER

## 2022-08-24 PROCEDURE — 99214 PR OFFICE/OUTPT VISIT, EST, LEVL IV, 30-39 MIN: ICD-10-PCS | Mod: S$PBB,,, | Performed by: NURSE PRACTITIONER

## 2022-08-24 PROCEDURE — 99215 OFFICE O/P EST HI 40 MIN: CPT | Mod: PBBFAC,PO | Performed by: NURSE PRACTITIONER

## 2022-08-24 PROCEDURE — 99214 OFFICE O/P EST MOD 30 MIN: CPT | Mod: S$PBB,,, | Performed by: NURSE PRACTITIONER

## 2022-08-24 PROCEDURE — 99999 PR PBB SHADOW E&M-EST. PATIENT-LVL V: CPT | Mod: PBBFAC,,, | Performed by: NURSE PRACTITIONER

## 2022-08-24 RX ORDER — FAMOTIDINE 40 MG/1
40 TABLET, FILM COATED ORAL NIGHTLY
Qty: 30 TABLET | Refills: 2 | Status: SHIPPED | OUTPATIENT
Start: 2022-08-24 | End: 2022-11-07

## 2022-08-24 NOTE — PROGRESS NOTES
Subjective:       Patient ID: Yara Santos is a 56 y.o. female, Body mass index is 38.43 kg/m².    Chief Complaint: Follow-up and Nausea      Established patient of myself. Former patient of Dr. Pearl.     Here with daughter, whom assisted with interview.     Abdominal Pain  This is a chronic problem. The current episode started more than 1 month ago. The onset quality is sudden. The problem occurs intermittently. The problem has been unchanged. The pain is located in the epigastric region. The quality of the pain is aching and a sensation of fullness. The abdominal pain radiates to the LUQ and RUQ. Associated symptoms include anorexia, diarrhea (chronic problem; improved since starting Bentyl; bowel movements are 2 times per day, consists of formed stool; stool studies done 5/18/22 unremarkable; c-scope recommended during last office visit not done), nausea (Zofran helps) and vomiting (consists of stomach contents; reports dry heaving in the morning). Pertinent negatives include no belching, constipation, dysuria, fever, flatus, frequency, hematochezia, melena or weight loss. The pain is aggravated by eating and palpation. The pain is relieved by nothing. She has tried proton pump inhibitors (Currently: Omeprazole 40 mg once daily and Bentyl 10 mg QID PRN- no improvement in abdominal pain) for the symptoms. Prior diagnostic workup includes GI consult, surgery and ultrasound. Her past medical history is significant for abdominal surgery, gallstones (S/P cholecystectomy in 6/2022- no improvement in abdominal pain or nausea) and GERD (Hx of GERD; reports frequent heartburn). There is no history of colon cancer, Crohn's disease, irritable bowel syndrome, pancreatitis, PUD or ulcerative colitis.     Review of Systems   Constitutional: Negative for appetite change, fever, unexpected weight change and weight loss.   HENT: Negative for trouble swallowing.    Respiratory: Negative for cough and shortness of breath.     Cardiovascular: Negative for chest pain.   Gastrointestinal: Positive for abdominal pain, anorexia, diarrhea (chronic problem; improved since starting Bentyl; bowel movements are 2 times per day, consists of formed stool; stool studies done 5/18/22 unremarkable; c-scope recommended during last office visit not done), nausea (Zofran helps) and vomiting (consists of stomach contents; reports dry heaving in the morning). Negative for abdominal distention, anal bleeding, blood in stool, constipation, flatus, hematochezia, melena and rectal pain.   Genitourinary: Negative for difficulty urinating, dysuria and frequency.   Musculoskeletal: Negative for gait problem.   Skin: Negative for rash.   Neurological: Negative for speech difficulty.   Psychiatric/Behavioral: Negative for confusion.       Past Medical History:   Diagnosis Date    a Premature Ventricular Contractions     Dr. Austin Collier    Asthma     b Hypertension     12/21/17 RXd Valsartan 40 Mg 1/2 Tab Daily; Losartan 25 Mg Caused Confusion; Lisinopril Caused Hives; 9/15/17 RXd A Low Salt Diet    b Microalbuminuria ########    Losartan Caused Confusion; Lisinopril Caused Hives    b White Coat Syndrome ####    c Hypercholesterolemia     7/5/20 And 3/8/17 Referred To Dr. Christian Parnell For Repatha Or Praluent; Statins Worsen Her Lichen Planus; Was On Pravastatin 20 Mg qHS); Other Statins Have Also Worsened Her Lichen Planus    d Type 2 DM     ** 2/18/16 Referred To DM [facility]; She Stopped Her NPH Insulin In 01/2016- Diet controlled    e Hypothyroidism     7/5/20 Decreased 50 Mcg qAM Levothyroxine To 50 Mcg QOD And 25 Mcg QOD; 6/3/19 Increased 25 Mcg qAM Levothyroxine To 50 Mcg qAM     f Obesity     Her Mother Weighed 600 Lbs    f Osteopenia     8/12/19 Offered RX For Fosamax 35 Mg Weekly, And RXd OTC D3 5K IU Daily, And OTC CaCO3 600 Mg Daily    History of acute bronchitis     i COPD With TUD     i Dyspnea 09/08/2020    Dr. Austin Collier On 9/8/20 Ordered An  Echocardiogram    i Tobacco Use Disorder #############    19 RXd Wellbutrin- Mg qAM X 4-6 Months And PRN 2 Mg Nicotine Gum; Chantix Caused Side Effects    j Chronic constipation     Dr. CHIKIS kumar Chronic Elevated Hepatic Transaminases ####    Dr. CHIKIS Pearl; 20 RXd OTC Vitamin E 800 IU Daily    j GERD With dysphagia     Dr. CHIKIS Pearl    j H/O Colon Polyps ###    Dr. CHIKIS kumar Irritable Bowel Syndrome     Dr. CHIKIS kumar Nonalcoholic Steatohepatitis (N.A.S.H.) ####    Dr. CHIKIS Pearl; 20 RXd OTC Vitamin E 800 IU Daily    l Bilateral Hip Osteoarthritis     Dr. Homer Kaur In 2020 Ordered Bilateral Hip MRIs And Referred To Orthopedic Surgery    l Bilateral Knee Arthritis     l Carpal tunnel syndrome     l Lumbar Spinal DDD     Dr. Charissa Govea    l Rheumatoid Arthritis ###    Dr. Homer Kaur; 10/2/19 RXd Neurontin 300 Mg BID; 10/7/19 RF = 16.7 (0.0-15.0); 19 STEPHANIE, CPK, ESR = Normal    l Tarsal Tunnel Syndrome     m Chronic Fatigue ###    m Family H/O Alzheimer's Disease     m Memory Loss ###    10/2/19 And 19 Referred To Dr. Alie Levin    m Vitamin B12 Deficiency ###    n Alcoholism, Sober Since 2013     n Depression And Anxiety     21 RXd Zoloft 50 Mg qHS; 19 RXd Effexor-XR 37.5 Mg qAM; 10/2/19 RXd Lexapro 10 Mg qHS (But This Caused S/Es)    o Allergic rhinosinusitis     p OU Cataracts     Dr. Xi Cordero    q BLE Lichen Planus #############    Dr. Louise In [place]    q BLE Varicose Veins     q Chronic Bilateral Lower Extremity Edema     q Facial flushing     q Vitamin D Deficiency     On OTC D3 5K IU Daily    Urinary tract infection     Wellness Visit 2021       Past Surgical History:   Procedure Laterality Date     SECTION      x2    COLONOSCOPY   ?    polyps removed per pt report    EPIDURAL STEROID INJECTION INTO CERVICAL SPINE N/A  09/24/2020    Procedure: Injection-steroid-epidural-cervical to left;  Surgeon: John Vinson MD;  Location: [facility] OR;  Service: Pain Management;  Laterality: N/A;    EPIDURAL STEROID INJECTION INTO LUMBAR SPINE N/A 11/12/2020    Procedure: Injection-steroid-epidural-lumbar, l5/s1 to left;  Surgeon: John Vinson MD;  Location: [facility] OR;  Service: Pain Management;  Laterality: N/A;    EPIDURAL STEROID INJECTION INTO LUMBAR SPINE N/A 06/21/2021    Procedure: Injection-steroid-epidural-lumbar L5/S1;  Surgeon: John Vinson MD;  Location: [facility];  Service: Pain Management;  Laterality: N/A;    EPIDURAL STEROID INJECTION INTO LUMBAR SPINE N/A 6/21/2022    Procedure: Injection-steroid-epidural-lumbar L5/S1 spread to left;  Surgeon: John Vinson MD;  Location: [facility];  Service: Pain Management;  Laterality: N/A;    ESOPHAGOGASTRODUODENOSCOPY      LAPAROSCOPIC CHOLECYSTECTOMY N/A 7/13/2022    Procedure: CHOLECYSTECTOMY, LAPAROSCOPIC;  Surgeon: Ra Santos MD;  Location: [facility];  Service: General;  Laterality: N/A;    TRANSFORAMINAL EPIDURAL INJECTION OF STEROID Left 8/16/2022    Procedure: Injection,steroid,epidural,transforaminal approach L3/4 and L4/5;  Surgeon: John Vinson MD;  Location: [facility];  Service: Pain Management;  Laterality: Left;    UPPER GASTROINTESTINAL ENDOSCOPY  2020 ?    unsure of results      Family History   Problem Relation Age of Onset    Arthritis Mother     Diabetes Mother     Hyperlipidemia Mother     Cancer Mother         uterine     Allergies Mother     Ovarian cancer Mother 58    Aneurysm Father     Hyperlipidemia Father     Breast cancer Maternal Aunt 65    Cancer Maternal Uncle         brain    Cancer Paternal Aunt         breast, bone and lung     Breast cancer Paternal Aunt 78    Breast cancer Maternal Cousin 43    Brain cancer Maternal Cousin       Wt Readings from Last 10 Encounters:   08/24/22 111.3 kg (245 lb 6 oz)    08/24/22 112 kg (247 lb)   08/24/22 111.5 kg (245 lb 12.8 oz)   08/16/22 112.3 kg (247 lb 9.2 oz)   08/02/22 111.3 kg (245 lb 6 oz)   07/27/22 112.4 kg (247 lb 12.8 oz)   07/13/22 113.9 kg (251 lb)   06/21/22 114.2 kg (251 lb 12.3 oz)   06/15/22 115.5 kg (254 lb 10.1 oz)   06/15/22 115.5 kg (254 lb 8.3 oz)     Lab Results   Component Value Date    WBC 9.70 02/15/2022    HGB 14.1 02/15/2022    HCT 44.6 02/15/2022    MCV 92 02/15/2022     02/15/2022     CMP  Sodium   Date Value Ref Range Status   02/15/2022 140 136 - 145 mmol/L Final     Potassium   Date Value Ref Range Status   02/15/2022 4.9 3.5 - 5.1 mmol/L Final     Chloride   Date Value Ref Range Status   02/15/2022 103 95 - 110 mmol/L Final     CO2   Date Value Ref Range Status   02/15/2022 31 22 - 31 mmol/L Final     Glucose   Date Value Ref Range Status   02/15/2022 102 70 - 110 mg/dL Final     Comment:     The ADA recommends the following guidelines for fasting glucose:    Normal:       less than 100 mg/dL    Prediabetes:  100 mg/dL to 125 mg/dL    Diabetes:     126 mg/dL or higher       BUN   Date Value Ref Range Status   02/15/2022 17 7 - 18 mg/dL Final     Creatinine   Date Value Ref Range Status   02/15/2022 0.69 0.50 - 1.40 mg/dL Final     Calcium   Date Value Ref Range Status   02/15/2022 9.9 8.4 - 10.2 mg/dL Final     Total Protein   Date Value Ref Range Status   02/15/2022 7.0 6.0 - 8.4 g/dL Final     Albumin   Date Value Ref Range Status   02/15/2022 4.3 3.5 - 5.2 g/dL Final     Total Bilirubin   Date Value Ref Range Status   02/15/2022 0.5 0.2 - 1.3 mg/dL Final     Alkaline Phosphatase   Date Value Ref Range Status   02/15/2022 119 38 - 145 U/L Final     AST ([place])   Date Value Ref Range Status   02/08/2016 25 14 - 36 U/L Final     AST   Date Value Ref Range Status   02/15/2022 62 (H) 14 - 36 U/L Final     ALT   Date Value Ref Range Status   02/15/2022 73 (H) 0 - 35 U/L Final     Anion Gap   Date Value Ref Range Status    02/15/2022 6 (L) 8 - 16 mmol/L Final     eGFR if    Date Value Ref Range Status   02/15/2022 >60 >60 mL/min/1.73 m^2 Final     eGFR if non    Date Value Ref Range Status   02/15/2022 >60 >60 mL/min/1.73 m^2 Final     Comment:     Calculation used to obtain the estimated glomerular filtration  rate (eGFR) is the CKD-EPI equation.               Reviewed prior medical records including radiology report of US of abdomen 5/18/22 & endoscopy history (see surgical history).     Objective:      Physical Exam  Constitutional:       General: She is not in acute distress.     Appearance: She is well-developed.   HENT:      Head: Normocephalic.      Right Ear: Hearing normal.      Left Ear: Hearing normal.      Nose: Nose normal.      Mouth/Throat:      Comments: Pt wearing mask due to COVID concerns  Eyes:      General: Lids are normal.      Conjunctiva/sclera: Conjunctivae normal.      Pupils: Pupils are equal, round, and reactive to light.   Neck:      Trachea: Trachea normal.   Cardiovascular:      Rate and Rhythm: Normal rate and regular rhythm.      Heart sounds: Normal heart sounds. No murmur heard.  Pulmonary:      Effort: Pulmonary effort is normal. No respiratory distress.      Breath sounds: Normal breath sounds. No stridor. No wheezing.   Abdominal:      General: Bowel sounds are normal. There is no distension.      Palpations: Abdomen is soft. There is no mass.      Tenderness: There is abdominal tenderness in the right upper quadrant, epigastric area and left upper quadrant. There is no guarding or rebound.      Comments: Well healed surgical scars noted   Musculoskeletal:         General: Normal range of motion.      Cervical back: Normal range of motion.   Skin:     General: Skin is warm and dry.      Findings: No rash.      Comments: Non jaundiced   Neurological:      Mental Status: She is alert and oriented to person, place, and time.   Psychiatric:         Speech: Speech  normal.         Behavior: Behavior normal. Behavior is cooperative.           Assessment:       1. Epigastric pain    2. Gastroesophageal reflux disease, unspecified whether esophagitis present    3. Non-intractable vomiting with nausea, unspecified vomiting type    4. Chronic diarrhea    5. Irregular bowel habits    6. S/P cholecystectomy    7. History of colon polyps    8. Fatty liver    9. Elevated LFTs           Plan:   All diagnoses and orders for this visit:    Epigastric pain, Gastroesophageal reflux disease, unspecified whether esophagitis present & Non-intractable vomiting with nausea, unspecified vomiting type  - Start: famotidine (PEPCID) 40 MG tablet; Take 1 tablet (40 mg total) by mouth nightly.  Dispense: 30 tablet; Refill: 2  - Continue Omeprazole 40 mg once daily  - Continue Zofran 8 mg every 8 hours PRN  - Schedule EGD  - Take PPI in the morning 30 minutes before breakfast  - Recommend to avoid large meals, avoid eating within 3 hours of bedtime, elevate head of bed if nocturnal symptoms are present, smoking cessation (if current smoker), & weight loss (if overweight).   - Recommend minimize/avoid high-fat foods, chocolate, caffeine, citrus, alcohol, & tomato products.  - Advised to avoid/limit use of NSAID's, since they can cause GI upset, bleeding, and/or ulcers. If needed, take with food.    - Consider gastric emptying study/CT of abdomen and pelvis if symptoms persist    Chronic diarrhea & Irregular bowel habits   - Continue Bentyl 10 mg QID PRN   - Recommended increase fiber in diet, especially soluble fiber since this can help bulk up the stool consistency and may help to slow down how fast the stool goes through the colon and can prevent diarrhea   - Schedule Colonoscopy    S/P cholecystectomy   - Recommend follow-up with general surgery for continued evaluation and management    History of colon polyps   - Schedule Colonoscopy    Fatty liver & Elevated LFTs  - Ambulatory referral/consult to  Hepatology; Future; Expected date: 08/31/2022  - For fatty liver recommend: low fat, low cholesterol diet, maintain good control of blood sugars and cholesterol levels, exercise, weight loss (if overweight), minimize/avoid alcohol and tylenol products    If no improvement in symptoms or symptoms worsen, call/follow-up at clinic or go to ER

## 2022-09-13 ENCOUNTER — OFFICE VISIT (OUTPATIENT)
Dept: PAIN MEDICINE | Facility: CLINIC | Age: 57
End: 2022-09-13
Payer: MEDICARE

## 2022-09-13 VITALS
WEIGHT: 248.44 LBS | BODY MASS INDEX: 38.99 KG/M2 | DIASTOLIC BLOOD PRESSURE: 63 MMHG | SYSTOLIC BLOOD PRESSURE: 134 MMHG | HEIGHT: 67 IN | HEART RATE: 75 BPM | OXYGEN SATURATION: 95 %

## 2022-09-13 DIAGNOSIS — M50.30 DDD (DEGENERATIVE DISC DISEASE), CERVICAL: ICD-10-CM

## 2022-09-13 DIAGNOSIS — M25.552 LEFT HIP PAIN: Primary | ICD-10-CM

## 2022-09-13 DIAGNOSIS — M47.816 LUMBAR SPONDYLOSIS: ICD-10-CM

## 2022-09-13 DIAGNOSIS — M51.36 DDD (DEGENERATIVE DISC DISEASE), LUMBAR: ICD-10-CM

## 2022-09-13 PROCEDURE — 99999 PR PBB SHADOW E&M-EST. PATIENT-LVL V: CPT | Mod: PBBFAC,,, | Performed by: PHYSICIAN ASSISTANT

## 2022-09-13 PROCEDURE — 99214 OFFICE O/P EST MOD 30 MIN: CPT | Mod: S$PBB,,, | Performed by: PHYSICIAN ASSISTANT

## 2022-09-13 PROCEDURE — 99999 PR PBB SHADOW E&M-EST. PATIENT-LVL V: ICD-10-PCS | Mod: PBBFAC,,, | Performed by: PHYSICIAN ASSISTANT

## 2022-09-13 PROCEDURE — 99215 OFFICE O/P EST HI 40 MIN: CPT | Mod: PBBFAC,PN | Performed by: PHYSICIAN ASSISTANT

## 2022-09-13 PROCEDURE — 99214 PR OFFICE/OUTPT VISIT, EST, LEVL IV, 30-39 MIN: ICD-10-PCS | Mod: S$PBB,,, | Performed by: PHYSICIAN ASSISTANT

## 2022-09-13 RX ORDER — ALPRAZOLAM 0.5 MG/1
1 TABLET, ORALLY DISINTEGRATING ORAL ONCE AS NEEDED
Status: CANCELLED | OUTPATIENT
Start: 2022-09-13 | End: 2034-02-09

## 2022-09-17 NOTE — PROGRESS NOTES
This note was completed with dictation software and grammatical errors may exist.    CC: neck pain, left arm pain, back pain, left leg pain    HPI:   The patient is a 57-year-old woman with a history of diabetes, rheumatoid arthritis with chronic back pain who presents in referral from Dr. Gibson for  Back pain radiating into the bilateral legs, neck pain radiating into the left arm.  She is status post left L3/4 and L4/5 transforaminal epidural steroid injection on 08/16/2022 with 80% relief.  She is very pleased with these results.  She states that her remaining worst pain is her left hip joint.  She feels this through her left groin into her left buttock.  She denies any significant radiation down her leg at this time.  The pain is worse with standing and walking and going from a seated to standing position.  She denies incontinence but continues to report some weakness in her left leg and left hip.    Pain intervention history: She had done epidural steroid injections for her back in the past, many years ago.   She is status post C7-T1 interlaminar epidural steroid injection on 09/24/2020 with 100% relief.   She is status post L5/S1 interlaminar epidural steroid injection to the left on 11/12/2020 with 50% relief of her back pain and 100% relief of her left leg pain.   She is status post L5/S1 interlaminar epidural steroid injection on 06/21/2021 with 80% relief.   She is status post L5/S1 interlaminar epidural steroid injection to the left on 06/21/2022 with 20% relief.  She is status post left L3/4 and L4/5 transforaminal epidural steroid injection on 08/16/2022 with 80% relief.      Spine surgeries:    Antineuropathics: gabapentin 400 milligrams twice daily with some relief of her upper back and low back pain  NSAIDs: cannot tolerate NSAIDs  Physical therapy: had done multiple rounds of physical therapy for her back in the past with increased pain  Antidepressants:  Muscle relaxers:  Opioids: She had  received a prescription for Percocet 10/325 on 07/10/2020 which she takes sparingly, she still has some left over, does not like taking these  Antiplatelets/Anticoagulants:    She smokes marijuana on a regular basis, reports that this seems to help her pain in the neck and back and much of her arthritic pain, denies any major side effects from this.  She has used oils and edibles as well, generally has been using a vaporizer as well.    ROS:  She reports weight gain, easy bruising, diabetes, joint stiffness, joint swelling, back pain, memory loss, dizziness, difficulty sleeping, anxiety, depression and loss of balance.  Balance of review of systems is negative.    Lab Results   Component Value Date    HGBA1C 6.8 (H) 08/29/2022       Lab Results   Component Value Date    WBC 10.86 08/29/2022    HGB 14.5 08/29/2022    HCT 44.8 08/29/2022    MCV 87 08/29/2022     08/29/2022             Past Medical History:   Diagnosis Date    a Premature Ventricular Contractions     Dr. Austin Collier    Asthma     b Hypertension     12/21/17 RXd Valsartan 40 Mg 1/2 Tab Daily; Losartan 25 Mg Caused Confusion; Lisinopril Caused Hives; 9/15/17 RXd A Low Salt Diet    b Microalbuminuria ########    Losartan Caused Confusion; Lisinopril Caused Hives    b White Coat Syndrome ####    c Hypercholesterolemia     7/5/20 And 3/8/17 Referred To Dr. Christian Parnell For Repatha Or Praluent; Statins Worsen Her Lichen Planus; Was On Pravastatin 20 Mg qHS); Other Statins Have Also Worsened Her Lichen Planus    d Type 2 DM     ** 2/18/16 Referred To [facility]; She Stopped Her NPH Insulin In 01/2016- Diet controlled    e Hypothyroidism     7/5/20 Decreased 50 Mcg qAM Levothyroxine To 50 Mcg QOD And 25 Mcg QOD; 6/3/19 Increased 25 Mcg qAM Levothyroxine To 50 Mcg qAM     f Obesity     Her Mother Weighed 600 Lbs    f Osteopenia     8/12/19 Offered RX For Fosamax 35 Mg Weekly, And RXd OTC D3 5K IU Daily, And OTC CaCO3 600 Mg Daily    History of acute  bronchitis     i COPD With TUD     i Dyspnea 2020    Dr. Austin Collier On 20 Ordered An Echocardiogram    i Tobacco Use Disorder #############    19 RXd Wellbutrin- Mg qAM X 4-6 Months And PRN 2 Mg Nicotine Gum; Chantix Caused Side Effects    j Chronic constipation     Dr. CHIKIS kmuar Chronic Elevated Hepatic Transaminases ####    Dr. CHIKIS Pearl; 20 RXd OTC Vitamin E 800 IU Daily    j GERD With dysphagia     Dr. CHIKIS kumar H/O Colon Polyps ###    Dr. CHIKIS kumar Irritable Bowel Syndrome     Dr. CHIKIS kumar Nonalcoholic Steatohepatitis (N.A.S.H.) ####    Dr. CHIKIS Pearl; 20 RXd OTC Vitamin E 800 IU Daily    l Bilateral Hip Osteoarthritis     Dr. Homer Kaur In 2020 Ordered Bilateral Hip MRIs And Referred To Orthopedic Surgery    l Bilateral Knee Arthritis     l Carpal tunnel syndrome     l Lumbar Spinal DDD     Dr. Charissa Govea    l Rheumatoid Arthritis ###    Dr. Homer Kaur; 10/2/19 RXd Neurontin 300 Mg BID; 10/7/19 RF = 16.7 (0.0-15.0); 19 STEPHANIE, CPK, ESR = Normal    l Tarsal Tunnel Syndrome     m Chronic Fatigue ###    m Family H/O Alzheimer's Disease     m Memory Loss ###    10/2/19 And 19 Referred To Dr. Alie Levin    m Vitamin B12 Deficiency ###    n Alcoholism, Sober Since 2013     n Depression And Anxiety     21 RXd Zoloft 50 Mg qHS; 19 RXd Effexor-XR 37.5 Mg qAM; 10/2/19 RXd Lexapro 10 Mg qHS (But This Caused S/Es)    o Allergic rhinosinusitis     p OU Cataracts     Dr. Xi Cordero    q BLE Lichen Planus #############    Dr. Louise In [place]    q BLE Varicose Veins     q Chronic Bilateral Lower Extremity Edema     q Facial flushing     q Vitamin D Deficiency     On OTC D3 5K IU Daily    Urinary tract infection     Wellness Visit 2021        Past Surgical History:   Procedure Laterality Date     SECTION      x2    COLONOSCOPY   ?    polyps removed per pt  report    EPIDURAL STEROID INJECTION INTO CERVICAL SPINE N/A 09/24/2020    Procedure: Injection-steroid-epidural-cervical to left;  Surgeon: John Vinson MD;  Location: [facility] OR;  Service: Pain Management;  Laterality: N/A;    EPIDURAL STEROID INJECTION INTO LUMBAR SPINE N/A 11/12/2020    Procedure: Injection-steroid-epidural-lumbar, l5/s1 to left;  Surgeon: John Vinson MD;  Location: [facility] OR;  Service: Pain Management;  Laterality: N/A;    EPIDURAL STEROID INJECTION INTO LUMBAR SPINE N/A 06/21/2021    Procedure: Injection-steroid-epidural-lumbar L5/S1;  Surgeon: John Vinson MD;  Location: [facility] OR;  Service: Pain Management;  Laterality: N/A;    EPIDURAL STEROID INJECTION INTO LUMBAR SPINE N/A 6/21/2022    Procedure: Injection-steroid-epidural-lumbar L5/S1 spread to left;  Surgeon: John Vinson MD;  Location: [facility];  Service: Pain Management;  Laterality: N/A;    ESOPHAGOGASTRODUODENOSCOPY      LAPAROSCOPIC CHOLECYSTECTOMY N/A 7/13/2022    Procedure: CHOLECYSTECTOMY, LAPAROSCOPIC;  Surgeon: Ra Santos MD;  Location: [facility];  Service: General;  Laterality: N/A;    TRANSFORAMINAL EPIDURAL INJECTION OF STEROID Left 8/16/2022    Procedure: Injection,steroid,epidural,transforaminal approach L3/4 and L4/5;  Surgeon: John Vinson MD;  Location: [facility];  Service: Pain Management;  Laterality: Left;    UPPER GASTROINTESTINAL ENDOSCOPY  2020 ?    unsure of results       Social History     Socioeconomic History    Marital status:    Tobacco Use    Smoking status: Former     Packs/day: 0.50     Types: Cigarettes     Quit date: 4/2/2019     Years since quitting: 3.4    Smokeless tobacco: Never   Substance and Sexual Activity    Alcohol use: No    Drug use: No     Social Determinants of Health     Financial Resource Strain: High Risk    Difficulty of Paying Living Expenses: Very hard   Food Insecurity: Food Insecurity Present    Worried About Running Out of Food in the Last  "Year: Sometimes true    Ran Out of Food in the Last Year: Sometimes true   Transportation Needs: Unmet Transportation Needs    Lack of Transportation (Medical): Yes    Lack of Transportation (Non-Medical): Yes   Physical Activity: Inactive    Days of Exercise per Week: 0 days    Minutes of Exercise per Session: 0 min   Stress: Stress Concern Present    Feeling of Stress : Very much   Social Connections: Unknown    Frequency of Communication with Friends and Family: Never    Frequency of Social Gatherings with Friends and Family: Never    Active Member of Clubs or Organizations: No    Attends Club or Organization Meetings: Never    Marital Status:    Housing Stability: Unknown    Unable to Pay for Housing in the Last Year: Patient refused    Number of Places Lived in the Last Year: 1    Unstable Housing in the Last Year: No         Medications/Allergies: See med card    Vitals:    09/13/22 1314   BP: 134/63   Pulse: 75   SpO2: 95%   Weight: 112.7 kg (248 lb 7.3 oz)   Height: 5' 7" (1.702 m)   PainSc:   6   PainLoc: Hip         Physical exam:  Gen: A and O x3, pleasant, well-groomed  Skin: No rashes or obvious lesions  HEENT: PERRLA, no obvious deformities on ears or in canals.Trachea midline.  CVS: Regular rate and rhythm, normal palpable pulses.  Resp: Clear to auscultation bilaterally, no wheezes or rales.  Abdomen: Soft, NT/ND.  Musculoskeletal: Able to heel walk, toe walk. No antalgic gait.     Neuro:  Upper extremities: 5/5 strength bilaterally   Lower extremities: 5/5 strength bilaterally  Reflexes: Brachioradialis 2+, Bicep 0+, Tricep 1+. Patellar 1+, Achilles 1+ bilaterally.  Sensory: Intact and symmetrical to light touch and pinprick in C2-T1 dermatomes bilaterally , except for decreased sensation to light touch throughout the 1st through 3rd digits and left lateral forearm.  Intact and symmetrical to light touch and pinprick in L2-S1 dermatomes bilaterally.     Cervical Spine:  Cervical spine: ROM is " full in flexion, extension and lateral rotation without pain.  Spurling's maneuver is negative bilaterally.  Myofascial exam:  No tenderness to palpation to the cervical paraspinous muscles.     Lumbar spine:  Lumbar spine:   Range of motion is moderately decreased with flexion with mild increased low back pain during each maneuver.  Rony's test causes no increased pain on either side.    Supine straight leg raise is negative bilaterally.  Internal and external rotation of the hip causes severe groin pain bilaterally.  Myofascial exam:  There is tenderness to palpation over the midline lumbar spine and lumbar paraspinous musculature.      Imagin20 MRI C-spine:  ALIGNMENT: Normal.  Lateral masses of C1 and C2 are congruent.  Slight reversal of the normal lordotic curvature.   BONES: Vertebral body heights are maintained.  Multilevel endplate changes with mild type 1 endplate changes at C4-5.  No aggressive bone marrow signal.   PARASPINAL AREA: Normal.   CERVICAL DISC LEVELS:  C2-C3: No disc herniation or significant posterior osseous ridging. No significant spinal canal or foraminal stenosis.   C3-C4: Mild disc osteophyte complex with prominent central component.  Moderate left facet hypertrophy.  Slight ventral cord flattening with preserved ventral and dorsal CSF.  Mild left foraminal stenosis.   C4-C5: Mild disc osteophyte complex.  Mild-moderate left facet hypertrophy.  Mild ventral cord flattening within sliver preserved ventral and preserved dorsal CSF.  Moderate right and mild-moderate left foraminal stenosis.   C5-C6: Mild-moderate disc osteophyte complex with prominent bilateral uncovertebral spurring.  Mild ventral cord flattening within sliver preserved ventral and preserved dorsal CSF.  Moderate-severe bilateral foraminal stenosis.   C6-C7: Mild-moderate disc osteophyte complex.  Slight ventral cord flattening with preserved ventral and dorsal CSF.  Moderate left and mild right foraminal  stenosis.   C7-T1: No disc herniation or significant posterior osseous ridging.  Mild left facet hypertrophy.  No significant spinal canal or foraminal stenosis.     6/4/18 MRI C-spine:  There is mild lumbar dextrocurvature.  The vertebral body alignment and vertebral body heights are maintained.  The paravertebral soft tissues appear within normal limits.  No marrow replacement process or fracture.  The visualized distal spinal cord and conus medullaris are within normal limits.  The conus terminates at L1.   L1-2: Normal disc signal and disc space height.  Minimal disc bulge seen.  No central canal foraminal stenosis   L2-3: There is disc desiccation and mild disc space narrowing.  There is mild moderate diffuse disc bulge asymmetric right results in mild right lateral recess narrowing with mild abutment of the descending right L3 nerve root.  There is mild moderate right-sided foraminal stenosis.  No central canal stenosis   L3-4: There is disc desiccation.  There is mild moderate diffuse disc bulge.  There is mild bilateral facet arthrosis.  There is mild moderate left neural foraminal stenosis.  There is no central canal stenosis.   L4-5 there is disc desiccation and mild disc space narrowing.  There is mild moderate bilateral facet arthrosis.  There is moderate diffuse disc bulge asymmetric left resulting in mild moderate left-sided foraminal stenosis with mild abutment of the exited left L4 nerve root.  No central canal stenosis.   L5-S1: There is mild diffuse disc bulge.  There is moderate to severe right and mild moderate left facet arthrosis.  There is no central canal stenosis or significant neural foraminal narrowing.      X-ray right and left hip 08/28/2018:  Normal right and left hips.  No significant osteoarthritis.    DEXA 7/29/19:  Spine bone mineral density is 1.047 g/cm 2 with T-score -1.1 and Z-score -1.4.  This compares to previous bone mineral density measurement of 1.075 and T-score of  -0.9.  Femoral total mean bone mineral density measurement is 0.958 g/cm 2 with T-score -0.4 and Z-score -0.5.  This compares to previous bone mineral density measurement of 1.028 and T-score of 0.2.  FRAX measurement is provided of 10 year major osteoporotic fracture 10.9 % and hip fracture 0.8 %.    Assessment:  The patient is a 57-year-old woman with a history of diabetes, rheumatoid arthritis with chronic back pain who presents in referral from Dr. Gibson for  Back pain radiating into the bilateral legs, neck pain radiating into the left arm.    1. Left hip pain  Vital signs    Verify informed consent    Notify physician     Notify physician     Notify physician (specify)    Diet NPO    Case Request Operating Room: Injection, Joint, Hip    Place in Outpatient    alprazolam ODT dissolvable tablet 1 mg      2. DDD (degenerative disc disease), lumbar        3. DDD (degenerative disc disease), cervical        4. Lumbar spondylosis              Plan:  1. Patient had excellent relief following the left L3/4 and L4/5 transforaminal epidural steroid injection.  This can be repeated in the future if necessary.  For her remaining pain we discussed that this is due to her left hip joint and I will schedule her for another left hip joint injection with Dr. Vinson this time.  We discussed physical therapy but she declined due to transportation.  2. Follow-up in 4 weeks postprocedure or sooner as needed.

## 2022-10-31 ENCOUNTER — OFFICE VISIT (OUTPATIENT)
Dept: PULMONOLOGY | Facility: CLINIC | Age: 57
End: 2022-10-31
Payer: MEDICARE

## 2022-10-31 VITALS
DIASTOLIC BLOOD PRESSURE: 73 MMHG | OXYGEN SATURATION: 95 % | WEIGHT: 245.25 LBS | BODY MASS INDEX: 38.41 KG/M2 | HEART RATE: 86 BPM | SYSTOLIC BLOOD PRESSURE: 164 MMHG

## 2022-10-31 DIAGNOSIS — J42 CHRONIC BRONCHITIS, UNSPECIFIED CHRONIC BRONCHITIS TYPE: ICD-10-CM

## 2022-10-31 DIAGNOSIS — J41.0 SIMPLE CHRONIC BRONCHITIS: ICD-10-CM

## 2022-10-31 DIAGNOSIS — J84.9 ILD (INTERSTITIAL LUNG DISEASE): Primary | ICD-10-CM

## 2022-10-31 DIAGNOSIS — U07.0 ACUTE LUNG INJURY ASSOCIATED WITH VAPING: ICD-10-CM

## 2022-10-31 DIAGNOSIS — F17.201 TOBACCO DEPENDENCE IN REMISSION: ICD-10-CM

## 2022-10-31 DIAGNOSIS — R05.3 CHRONIC COUGH: ICD-10-CM

## 2022-10-31 DIAGNOSIS — J84.10 CALCIFIED GRANULOMA OF LUNG: ICD-10-CM

## 2022-10-31 DIAGNOSIS — R06.02 SOB (SHORTNESS OF BREATH): ICD-10-CM

## 2022-10-31 PROCEDURE — 99204 OFFICE O/P NEW MOD 45 MIN: CPT | Mod: S$PBB,,, | Performed by: INTERNAL MEDICINE

## 2022-10-31 PROCEDURE — 99215 OFFICE O/P EST HI 40 MIN: CPT | Mod: PBBFAC,PO | Performed by: INTERNAL MEDICINE

## 2022-10-31 PROCEDURE — 99999 PR PBB SHADOW E&M-EST. PATIENT-LVL V: ICD-10-PCS | Mod: PBBFAC,,, | Performed by: INTERNAL MEDICINE

## 2022-10-31 PROCEDURE — 99999 PR PBB SHADOW E&M-EST. PATIENT-LVL V: CPT | Mod: PBBFAC,,, | Performed by: INTERNAL MEDICINE

## 2022-10-31 PROCEDURE — 99204 PR OFFICE/OUTPT VISIT, NEW, LEVL IV, 45-59 MIN: ICD-10-PCS | Mod: S$PBB,,, | Performed by: INTERNAL MEDICINE

## 2022-10-31 NOTE — PROGRESS NOTES
2022    Yara Santos  New Patient Consult    Chief Complaint   Patient presents with    COPD     New patient       HPI:   10/31/2022  Pt is a 56 yo female with asthma, HTN, arthritis, GERD, hypothyroidism, obesity referred for new evaluation due to ILD found on CT chest. Her daughter is present and assists w/ history.  Pt reports has had lung problems for years and years. Reports in past treated at [facility] for fungal pneumonia due to bird droppings ()  She has also been treated for COPD, frequent bronchitis. She recently has had exacerbation taking prednisone and z pack. Has tested neg for covid, has not checked for flu. Many sick contacts and just went on a trip. She went to altitude [place], at [facility] [place] had SOB, felt fevers, dizzy, had to use oxygen and lie in bed.  Inhalers: trelegy 200 once/d- new rx. Uses rescue albuterol 2-3x/day and feels benefit.  Nebulizer with duo neb uses 2x/day  This is the first exacerbation requiring prednisone so far this year.  Quit smoking 3 yr ago, 1-2 ppd since age 8  Quit vaping 3 wks- used juul and many other e cigarettes 1/2 cartridge daily- has entire drawer full.  She has been more short of breath gradual progressive for 1 year, symptoms vary.  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 Sister has RA with lung involvement  Has been told she has RA in past but Dr. Kaur said no RA, just OA. She sees derm with lichen planus and would use prednisone off and on, currently not on steroids.  No pets at home currently.  She lives in an older house in [place], mold visible ceilings of bathroom, earlier this year had mold in room- currently renovating.    The chief complaint problem is new to me.    PFSH:  Past Medical History:   Diagnosis Date    a Premature Ventricular Contractions     Dr. Austin Collier    Allergic rhinosinusitis     Asthma     Bilateral  Hip Osteoarthritis     Bilateral Knee Arthritis     Carpal tunnel syndrome     DDD (degenerative disc disease), lumbar     Depression And Anxiety     GERD     H/O Colon Polyps     Hypercholesterolemia     Hypertension     Hypothyroidism     Irritable Bowel Syndrome     Lumbar Spinal DDD     Nonalcoholic Steatohepatitis ([place]     Obesity     Osteopenia     OU Cataracts     Tarsal Tunnel Syndrome     Tobacco Use Disorder     Type 2 DM     Urinary tract infection     Vitamin B12 Deficiency     Vitamin D Deficiency          Past Surgical History:   Procedure Laterality Date     SECTION      x2    COLONOSCOPY   ?    polyps removed per pt report    EPIDURAL STEROID INJECTION INTO CERVICAL SPINE N/A 2020    Procedure: Injection-steroid-epidural-cervical to left;  Surgeon: John Vinson MD;  Location: [facility];  Service: Pain Management;  Laterality: N/A;    EPIDURAL STEROID INJECTION INTO LUMBAR SPINE N/A 2020    Procedure: Injection-steroid-epidural-lumbar, l5/s1 to left;  Surgeon: John Vinson MD;  Location: [facility];  Service: Pain Management;  Laterality: N/A;    EPIDURAL STEROID INJECTION INTO LUMBAR SPINE N/A 2021    Procedure: Injection-steroid-epidural-lumbar L5/S1;  Surgeon: John Vinson MD;  Location: [facility];  Service: Pain Management;  Laterality: N/A;    EPIDURAL STEROID INJECTION INTO LUMBAR SPINE N/A 2022    Procedure: Injection-steroid-epidural-lumbar L5/S1 spread to left;  Surgeon: John Vinson MD;  Location: [facility];  Service: Pain Management;  Laterality: N/A;    ESOPHAGOGASTRODUODENOSCOPY      INJECTION OF JOINT Left 2022    Procedure: INJECTION, JOINT- hip;  Surgeon: John Vinson MD;  Location: [facility];  Service: Pain Management;  Laterality: Left;    LAPAROSCOPIC CHOLECYSTECTOMY N/A 2022    Procedure: CHOLECYSTECTOMY, LAPAROSCOPIC;  Surgeon: Ra Santos MD;  Location: [facility];  Service: General;  Laterality: N/A;     TRANSFORAMINAL EPIDURAL INJECTION OF STEROID Left 8/16/2022    Procedure: Injection,steroid,epidural,transforaminal approach L3/4 and L4/5;  Surgeon: John Vinson MD;  Location: [facility];  Service: Pain Management;  Laterality: Left;    UPPER GASTROINTESTINAL ENDOSCOPY  2020 ?    unsure of results     Social History     Tobacco Use    Smoking status: Former     Packs/day: 0.00     Types: Cigarettes     Quit date: 4/2/2019     Years since quitting: 3.5    Smokeless tobacco: Never   Substance Use Topics    Alcohol use: No    Drug use: No     Family History   Problem Relation Age of Onset    Arthritis Mother     Diabetes Mother     Hyperlipidemia Mother     Cancer Mother         uterine     Allergies Mother     Ovarian cancer Mother 58    Aneurysm Father     Hyperlipidemia Father     Breast cancer Maternal Aunt 65    Cancer Maternal Uncle         brain    Cancer Paternal Aunt         breast, bone and lung     Breast cancer Paternal Aunt 78    Breast cancer Maternal Cousin 43    Brain cancer Maternal Cousin      Review of patient's allergies indicates:   Allergen Reactions    Iodine and iodide containing products Blisters    Asa [aspirin] Itching and Other (See Comments)     Skin problem      Atorvastatin      Worsened Lichen Planus    Contact metal agent      Other reaction(s): Other (See Comments)    Crestor [rosuvastatin]      Worsens her lichen planus    Lasix [furosemide]      rash    Lisinopril      Hives    Losartan      Confusion    Lovastatin      Worsened Lichen Planus    Salicylates     Sulfur      Other reaction(s): Other (See Comments)    Sulfur, elemental     Valsartan      breakouts    Latex Other (See Comments)     Skin problem         Performance Status:The patient's activity level is functions out of house.      Review of Systems:  a review of eleven systems covering constitutional, Eye, HEENT, Psych, Respiratory, Cardiac, GI, , Musculoskeletal, Endocrine, Dermatologic was negative except  for pertinent findings as listed ABOVE and below:  All negative with pertinent positives as above        Exam:Comprehensive exam done. BP (!) 164/73 (BP Location: Left arm, Patient Position: Sitting, BP Method: Large (Automatic))   Pulse 86   Wt 111.3 kg (245 lb 4.2 oz)   SpO2 95%   BMI 38.41 kg/m²   Exam included Vitals as listed, and patient's appearance and affect and alertness and mood, oral exam for yeast and hygiene and pharynx lesions and Mallapatti (M) score, neck with inspection for jvd and masses and thyroid abnormalities and lymph nodes (supraclavicular and infraclavicular nodes and axillary also examined and noted if abn), chest exam included symmetry and effort and fremitus and percussion and auscultation, cardiac exam included rhythm and gallops and murmur and rubs and jvd and edema, abdominal exam for mass and hepatosplenomegaly and tenderness and hernias and bowel sounds, Musculoskeletal exam with muscle tone and posture and mobility/gait and  strength, and skin for rashes and cyanosis and pallor and turgor, extremity for clubbing.  Findings were normal except for pertinent findings listed below:  Oropharynx with vague small white patches, M2  HR regular  Breath sounds with scant rales R lower and L upper  No edema/clubbing/joint swelling    Radiographs (ct chest and cxr) reviewed: view by direct vision   CT chest 8/31/22- diffuse parenchymal lung disease upper lobe predominant w/ ground glass and micronodules. There is a larger calcified LLL nodule and another calcified granuloma RML. Upper lobes have some fibrotic/emphysematous changes    Labs reviewed     Latest Reference Range & Units 04/23/21 08:47 02/15/22 10:18 08/29/22 10:08   Eos # 0.0 - 0.5 K/uL 0.6 (H) 0.1 0.2      Latest Reference Range & Units 08/11/21 08:47 02/15/22 10:18 08/29/22 10:08   CO2 22 - 31 mmol/L 32 (H) 31 32 (H)      Latest Reference Range & Units 08/11/21 08:47 02/15/22 10:18 08/29/22 10:08   AST 14 - 36 U/L 48 (H)  62 (H) 71 (H)   ALT 0 - 35 U/L 47 (H) 73 (H) 69 (H)     PFT will be done and results to be reviewed      Plan:  Clinical impression is resonably certain and repeated evaluation prn +/- follow up will be needed as below. ILD on background of emphysema, suspect vaping induced lung injury vs chronic HP due to mold exposure    Yara was seen today for copd.    Diagnoses and all orders for this visit:    ILD (interstitial lung disease)  -     RHEUMATOID FACTOR; Future  -     CYCLIC CITRUL PEPTIDE ANTIBODY, IGG; Future  -     Complete PFT with bronchodilator; Future  -     Sedimentation rate; Future  -     C-REACTIVE PROTEIN; Future  -     Six Minute Walk Test to qualify for Home Oxygen; Future    Chronic bronchitis, unspecified chronic bronchitis type  -     Ambulatory referral/consult to Pulmonology    SOB (shortness of breath)  -     Ambulatory referral/consult to Pulmonology    Chronic cough  -     Ambulatory referral/consult to Pulmonology    Calcified granuloma of lung    Simple chronic bronchitis    Tobacco dependence in remission    Acute lung injury associated with vaping      Follow up in about 2 months (around 12/31/2022).    Discussed with patient above for education the following:      Patient Instructions   Pulmonary function tests  6 min walk test  Go to the lab and get blood work  May request new rheumatology eval in the future  Suspect vaping caused some of the inflammation of lung tissue- need to avoid smoking/vaping

## 2022-11-01 NOTE — PATIENT INSTRUCTIONS
Pulmonary function tests  6 min walk test  Go to the lab and get blood work  May request new rheumatology eval in the future  Suspect vaping caused some of the inflammation of lung tissue- need to avoid smoking/vaping

## 2022-11-02 ENCOUNTER — TELEPHONE (OUTPATIENT)
Dept: GASTROENTEROLOGY | Facility: CLINIC | Age: 57
End: 2022-11-02
Payer: MEDICARE

## 2022-11-02 NOTE — TELEPHONE ENCOUNTER
Left detailed message including prep instructions for procedure next Wednesday, ASC to call with arrival time. Number provided

## 2022-11-03 ENCOUNTER — OFFICE VISIT (OUTPATIENT)
Dept: HEPATOLOGY | Facility: CLINIC | Age: 57
End: 2022-11-03
Payer: MEDICARE

## 2022-11-03 VITALS
TEMPERATURE: 99 F | DIASTOLIC BLOOD PRESSURE: 77 MMHG | WEIGHT: 245.56 LBS | HEART RATE: 93 BPM | BODY MASS INDEX: 38.54 KG/M2 | HEIGHT: 67 IN | RESPIRATION RATE: 19 BRPM | SYSTOLIC BLOOD PRESSURE: 124 MMHG

## 2022-11-03 DIAGNOSIS — I10 PRIMARY HYPERTENSION: ICD-10-CM

## 2022-11-03 DIAGNOSIS — K76.0 FATTY LIVER: Primary | ICD-10-CM

## 2022-11-03 DIAGNOSIS — E78.00 HYPERCHOLESTEROLEMIA: ICD-10-CM

## 2022-11-03 DIAGNOSIS — R74.8 ELEVATED LIVER ENZYMES: ICD-10-CM

## 2022-11-03 DIAGNOSIS — E11.65 TYPE 2 DIABETES MELLITUS WITH HYPERGLYCEMIA, WITHOUT LONG-TERM CURRENT USE OF INSULIN: ICD-10-CM

## 2022-11-03 DIAGNOSIS — E66.01 CLASS 2 SEVERE OBESITY DUE TO EXCESS CALORIES WITH SERIOUS COMORBIDITY IN ADULT, UNSPECIFIED BMI: ICD-10-CM

## 2022-11-03 PROCEDURE — 99999 PR PBB SHADOW E&M-EST. PATIENT-LVL V: ICD-10-PCS | Mod: PBBFAC,,, | Performed by: NURSE PRACTITIONER

## 2022-11-03 PROCEDURE — 99215 OFFICE O/P EST HI 40 MIN: CPT | Mod: S$PBB,,, | Performed by: NURSE PRACTITIONER

## 2022-11-03 PROCEDURE — 99215 OFFICE O/P EST HI 40 MIN: CPT | Mod: PBBFAC,PO | Performed by: NURSE PRACTITIONER

## 2022-11-03 PROCEDURE — 99215 PR OFFICE/OUTPT VISIT, EST, LEVL V, 40-54 MIN: ICD-10-PCS | Mod: S$PBB,,, | Performed by: NURSE PRACTITIONER

## 2022-11-03 PROCEDURE — 99999 PR PBB SHADOW E&M-EST. PATIENT-LVL V: CPT | Mod: PBBFAC,,, | Performed by: NURSE PRACTITIONER

## 2022-11-03 RX ORDER — FLUTICASONE FUROATE, UMECLIDINIUM BROMIDE AND VILANTEROL TRIFENATATE 200; 62.5; 25 UG/1; UG/1; UG/1
1 POWDER RESPIRATORY (INHALATION) DAILY
COMMUNITY
End: 2023-02-20 | Stop reason: CLARIF

## 2022-11-03 NOTE — PATIENT INSTRUCTIONS
Labs to rule out other causes of liver problems  Fibroscan to assess for any scarring/damage from fatty liver      FATTY LIVER EDUCATION:  There is no FDA approved therapy for non-alcoholic fatty liver disease (NAFLD); therefore, lifestyle changes are important:  1. Long term weight loss goal of 24 lbs    *Weight loss of 5% has been shown to reduce fat in the liver  *Weight loss of 7-10% has been shown to improve fatty liver, inflammation from fatty liver, and liver fibrosis (scarring/damage)    2. Decreasing daily calories is recommended for weight loss. Try to limit carbohydrate intake to LESS than 30-45 grams of carbs with a meal and LESS than 5-10 grams of carbs with a snack. Avoid drinking beverages with sugar/carbohydrates. Meeting with a dietician or using one of the weight loss apps below can be helpful to determine individualized calorie goals and add up carbs throughout the day. Look for snacks high in protein, low in carbohydrates/sugar.    3. Exercise as tolerated. Studies have shown that exercise improves fatty liver even without weight loss.     4. Recommend good control of cholesterol, blood pressure, blood sugar levels (as these are risk factors for fatty liver). Cholesterol lowering medications including statins are typically safe and beneficial (even if liver enzymes are elevated)    5. Limit alcohol consumption, no more than 1 serving(s) of alcohol in any day (1 serving is 5 ounces of wine, 12 ounces of beer, or 1.5 ounces of liquor). Do not recommend daily alcohol use or drinking alcohol most days per week.    In some people, fatty liver can progress to steatohepatitis (inflamatory fatty liver). This can cause liver scarring or damage (fibrosis) and possibly to cirrhosis. Cirrhosis increases risk of liver cancer and liver failure. Lifestyle changes now can help to decrease this risk.     Ask about our fatty liver/HOUSE clinical trials if you have fibrosis / scar tissue related to fatty  liver.        Additional Resources:    Websites with information about fatty liver and inflammation related to fatty liver (HOUSE): www.nashtruth.com and www.nashactually.com   [facility]: Non-Alcoholic Fatty Liver Disease (NAFLD)    Facebook support group with tips and recipes:   Non-Alcoholic Fatty Liver Disease (NAFLD) Diet & Nutrition Support     Can download BIO-IVT Group Pal or Lose It gagan to add up your carbohydrates throughout the day.      Try [email or web address] for recipes.    If interested in seeing a dietician to create a weight loss plan, contact the dietician team at [facility]: [email or web address].  You can also call to schedule a consult with a dietician: [phone]  *Virtual visits are available with one of our dieticians.     If you have diabetes or high blood pressure, you can meet with a Health  through [email or web address] program. Let us know if you are interested in a referral.     Let us know if you are interested in a referral to [facility].

## 2022-11-03 NOTE — PROGRESS NOTES
[facility] - New Patient     REFERRING PROVIDER: Mercedes Benjamin  PCP: Remy Gibson MD    Chief Complaint: Fatty liver       HISTORY     This is a 57 y.o. female referred for evaluation of fatty liver and elevated liver enzymes.     Known history of fatty liver for many years.     Fatty liver first noted on abd US in our system 5/18/22.  Liver mildly enlarged; spleen WNL. No liver lesions or biliary dilation. No findings to suggest advanced liver disease.      Review of labs:  - Transaminases: elevated since 5/2019, ALT>AST, mainly <100  - Alk phos: WNL  - Synthetic liver function: WNL  - Platelets: WNL    Workup thus far:  Serologies:   Lab Results   Component Value Date    ANASCREEN None Detected 10/07/2019    HEPCAB Negative 10/07/2019       Risk factors for fatty liver:   Weight -- Body mass index is 38.47 kg/m².        Weight is currently down a few lb                  Dyslipidemia -- yes                                Insulin resistance / diabetes -- yes, HgbA1c 6.8       Hypertension -- yes  Alcohol use -- none now; heavier drinking for ~5 years when she was a     Family history:  Dad and sister had fatty liver    Meds:  -Rx: sertraline, plaquenil  -OTC, herbs/supplements: n/a    Denies symptoms of hepatic decompensation including jaundice, ascites, cognitive problems to suggest hepatic encephalopathy, or GI bleeding.            Past medical history, surgical history, problem list, family history, social history, allergies: Reviewed and updated in the appropriate section of the electronic medical record.      Current Outpatient Medications   Medication Sig Dispense Refill    albuterol (VENTOLIN HFA) 90 mcg/actuation inhaler Inhale 2 puffs into the lungs every 4 (four) hours as needed for Wheezing. Rescue 54 g 1    albuterol-ipratropium (DUO-NEB) 2.5 mg-0.5 mg/3 mL nebulizer solution Take 3 mLs by nebulization every 6 (six) hours while awake. Rescue 270 mL 3    azelastine  (ASTELIN) 137 mcg (0.1 %) nasal spray 1 spray (137 mcg total) by Nasal route 2 (two) times daily as needed for Rhinitis. 90 mL 3    blood sugar diagnostic (ACCU-CHEK TEJA PLUS TEST STRP) Strp 1 each by Other route before breakfast. For diagnosis code E11.65 100 strip 3    blood sugar diagnostic Strp To check BG one times daily, to use with insurance preferred meter 100 strip 3    blood-glucose meter kit To check BG one times daily, to use with insurance preferred meter 1 each 0    cetirizine (ZYRTEC) 10 MG tablet Take 1 tablet (10 mg total) by mouth daily as needed. 90 tablet 3    cholecalciferol, vitamin D3, 5,000 unit Tab Take 5,000 Units by mouth once daily. VITAMIN D-3 5000 UNIT TABS      cyanocobalamin 1,000 mcg/mL injection Inject 1 mL (1,000 mcg total) into the muscle every 30 days. 10 mL 1    cyclobenzaprine (FLEXERIL) 10 MG tablet Take 1 tablet (10 mg total) by mouth 3 (three) times daily as needed for Muscle spasms. 90 tablet 3    ezetimibe (ZETIA) 10 mg tablet Take 1 tablet (10 mg total) by mouth every evening. 90 tablet 3    fluticasone-umeclidin-vilanter (TRELEGY ELLIPTA) 200-62.5-25 mcg inhaler Inhale 1 puff into the lungs once daily.      gabapentin (NEURONTIN) 300 MG capsule Take 1 capsule (300 mg total) by mouth 3 (three) times daily. 270 capsule 3    hydroCHLOROthiazide (HYDRODIURIL) 12.5 MG Tab Take 1 tablet (12.5 mg total) by mouth once daily. 30 tablet 11    hydrOXYzine HCL (ATARAX) 25 MG tablet Take 25 mg by mouth nightly as needed.      ibuprofen (ADVIL,MOTRIN) 800 MG tablet Take 800 mg by mouth 2 (two) times daily as needed.      ipratropium (ATROVENT HFA) 17 mcg/actuation inhaler INHALE 2 PUFFS BY MOUTH TWICE DAILY AS DIRECTED (Patient taking differently: Inhale 2 puffs into the lungs 2 (two) times daily. INHALE 2 PUFFS BY MOUTH TWICE DAILY AS DIRECTED) 38.7 g 6    LANCETS (ACCU-CHEK MULTICLIX LANCET MISC) 1 lancet by Misc.(Non-Drug; Combo Route) route once daily.      lancets Misc To check  BG one times daily, to use with insurance preferred meter 100 each 3    levothyroxine (SYNTHROID) 50 MCG tablet TAKE 1 TABLET BY MOUTH ONCE DAILY BEFORE BREAKFAST (Patient taking differently: Take 50 mcg by mouth before breakfast.) 90 tablet 3    mupirocin (BACTROBAN) 2 % ointment Apply topically 2 (two) times daily as needed. 22 g 1    omeprazole (PRILOSEC) 20 MG capsule Take 2 capsules (40 mg total) by mouth once daily. 180 capsule 1    omeprazole (PRILOSEC) 20 MG capsule Take 2 capsules by mouth once daily 180 capsule 3    ondansetron (ZOFRAN) 8 MG tablet Take 1 tablet (8 mg total) by mouth every 8 (eight) hours as needed for Nausea. 30 tablet 3    promethazine-dextromethorphan (PROMETHAZINE-DM) 6.25-15 mg/5 mL Syrp Take 5 mLs by mouth every 8 (eight) hours as needed (cough). 180 mL 0    sertraline (ZOLOFT) 100 MG tablet Take 1 tablet by mouth twice daily 180 tablet 3    diclofenac sodium (VOLTAREN) 1 % Gel Apply 2 g topically 3 (three) times daily as needed (pain). (Patient not taking: Reported on 11/3/2022) 350 g 11    dicyclomine (BENTYL) 10 MG capsule TAKE 1 CAPSULE BY MOUTH 4 TIMES DAILY BEFORE MEAL(S) AND AT BEDTIME AS NEEDED 120 capsule 0    famotidine (PEPCID) 40 MG tablet Take 1 tablet by mouth nightly 30 tablet 2    halobetasol (ULTRAVATE) 0.05 % cream Apply topically 2 (two) times daily.      hydrOXYchloroQUINE (PLAQUENIL) 200 mg tablet Take 200 mg by mouth 2 (two) times daily.      omalizumab (XOLAIR) 150 mg/mL injection Inject 150 mg into the skin every 28 days.       No current facility-administered medications for this visit.     Medication list reviewed and updated.      Review of Systems   Constitutional: Negative for fatigue or unexpected weight change.   Respiratory: Negative for shortness of breath.    Cardiovascular: Negative for leg swelling.  Gastrointestinal: Negative for abdominal distention, abdominal pain, diarrhea, nausea, and vomiting. Negative for blood in stool, melena, or  "hematemesis.  Musculoskeletal: Negative for myalgias.    Skin: Negative for itching.  Neurological: Negative for dizziness or tremors. Negative for confusion, slowed mentation, or memory loss.   Hematological: Does not bruise/bleed easily.   Psychiatric: Negative for mood changes or sleep disturbance.      Physical Exam   Constitutional: Well-nourished. No distress. Alert and oriented.  Eyes: No scleral icterus.   Pulmonary/Chest: Respiratory effort normal. No respiratory distress.   Abdominal: No distension, no ascites appreciated.   Extremities: No edema.   Neurological: No tremor or asterixis. Gait normal.  Skin: No jaundice. No spider telangiectasias or palmar erythema.  Psychiatric: Normal mood and affect. Speech, behavior, and thought content normal. No depression or anxiety noted.       Vitals reviewed.  /77 (BP Location: Left arm, Patient Position: Sitting, BP Method: Small (Automatic))   Pulse 93   Temp 98.7 °F (37.1 °C) (Oral)   Resp 19   Ht 5' 7" (1.702 m)   Wt 111.4 kg (245 lb 9.5 oz)   BMI 38.47 kg/m²         LABS & DIAGNOSTIC STUDIES     I have personally reviewed pertinent laboratory findings:    Lab Results   Component Value Date    ALT 69 (H) 08/29/2022    AST 71 (H) 08/29/2022    ALKPHOS 118 08/29/2022    BILITOT 0.6 08/29/2022    ALBUMIN 4.4 08/29/2022    INR 0.9 09/22/2017       Lab Results   Component Value Date    WBC 10.86 08/29/2022    HGB 14.5 08/29/2022    HCT 44.8 08/29/2022    MCV 87 08/29/2022     08/29/2022       Lab Results   Component Value Date     08/29/2022    K 4.6 08/29/2022    BUN 13 08/29/2022    CREATININE 0.78 08/29/2022    ESTGFRAFRICA >60 02/15/2022    EGFRNONAA >60 02/15/2022       Lab Results   Component Value Date    ANASCREEN None Detected 10/07/2019    HEPCAB Negative 10/07/2019    [id number] Negative 07/01/2020       No results found for: AFP    I have personally reviewed the following result reports:  Abdominal US - 5/18/22        ASSESSMENT & " PLAN     57 y.o. female with:    1. Fatty liver, elevated liver enzymes  -- Obtain Fibroscan for fibrosis and steatosis staging  -- Elevated liver enzymes are likely due to fatty liver though will complete serologic workup to rule out other causes of liver disease. Anticipate improvement in liver enzymes with weight loss and good control of metabolic risk factors.  -- Consider HOUSE clinical trials if testing suggests fibrosis     Fatty liver discussion:  - Reviewed risk of hepatic inflammation and subsequent fibrosis from fatty liver.  - At this time, the only treatment for fatty liver is weight loss and maintaining good control of risk factors (blood pressure, cholesterol, and blood sugar).   - Alcohol use is also a risk factor for fatty liver. If no advanced fibrosis, should limit alcohol to max 1 standard drinks/sitting. Do not recommend daily alcohol use or drinking alcohol most days per week.     2. Body mass index is 38.47 kg/m²., HTN, HLD, DM  -- Reviewed weight loss strategies including dietary changes and physical activity. Discussed low carbohydrate, low sugar diet. Recommend long-term weight loss goal of 10% (about 24 lb).   -- We discussed additional weight loss resources including dietician consult, [facility], and bariatric medicine consult.         Orders Placed This Encounter   Procedures    FibroScan [place] (Vibration Controlled Transient Elastography)    Hepatic Function Panel    Alpha-1-Antitrypsin    Ceruloplasmin    Anti-Smooth Muscle Antibody    IgG    STEPHANIE Screen w/Reflex    Ferritin    Iron and TIBC    Hepatitis A antibody, IgG    Hepatitis B Core Antibody, Total    Hepatitis B Surface Ab, Qualitative    Hepatitis B Surface Antigen       *See AVS for patient education and instructions.       Return to clinic 1-2 weeks after labs to complete Fibroscan and review results.      Thank you for allowing me to participate in the care of Yara Perales,  FNP-C  Hepatology        Duration of encounter: 45 min  This includes face to face time and non-face to face time preparing to see the patient (eg, review of tests), obtaining and/or reviewing separately obtained history, documenting clinical information in the electronic or other health record, independently interpreting results and communicating results to the patient/family/caregiver, or Care coordination.

## 2022-11-07 NOTE — OR NURSING
Medical history reviewed with Dr. Byrnes. Ok to proceed with EGD on 11/9/22. Anesthesia to evaluate DOS.

## 2022-11-09 ENCOUNTER — ANESTHESIA EVENT (OUTPATIENT)
Dept: ENDOSCOPY | Facility: HOSPITAL | Age: 57
End: 2022-11-09
Payer: MEDICARE

## 2022-11-09 ENCOUNTER — HOSPITAL ENCOUNTER (OUTPATIENT)
Facility: HOSPITAL | Age: 57
Discharge: HOME | End: 2022-11-09
Attending: INTERNAL MEDICINE | Admitting: INTERNAL MEDICINE
Payer: MEDICARE

## 2022-11-09 ENCOUNTER — ANESTHESIA (OUTPATIENT)
Dept: ENDOSCOPY | Facility: HOSPITAL | Age: 57
End: 2022-11-09
Payer: MEDICARE

## 2022-11-09 VITALS
WEIGHT: 245 LBS | BODY MASS INDEX: 38.45 KG/M2 | RESPIRATION RATE: 16 BRPM | HEIGHT: 67 IN | OXYGEN SATURATION: 98 % | TEMPERATURE: 97 F | SYSTOLIC BLOOD PRESSURE: 116 MMHG | HEART RATE: 71 BPM | DIASTOLIC BLOOD PRESSURE: 57 MMHG

## 2022-11-09 DIAGNOSIS — R10.13 EPIGASTRIC PAIN: ICD-10-CM

## 2022-11-09 LAB — GLUCOSE SERPL-MCNC: 139 MG/DL (ref 70–110)

## 2022-11-09 PROCEDURE — 63600175 PHARM REV CODE 636 W HCPCS: Mod: PO | Performed by: NURSE ANESTHETIST, CERTIFIED REGISTERED

## 2022-11-09 PROCEDURE — 43239 PR EGD, FLEX, W/BIOPSY, SGL/MULTI: ICD-10-PCS | Mod: ,,, | Performed by: INTERNAL MEDICINE

## 2022-11-09 PROCEDURE — 27201012 HC FORCEPS, HOT/COLD, DISP: Mod: PO | Performed by: INTERNAL MEDICINE

## 2022-11-09 PROCEDURE — 88305 TISSUE EXAM BY PATHOLOGIST: CPT | Performed by: PATHOLOGY

## 2022-11-09 PROCEDURE — 43239 EGD BIOPSY SINGLE/MULTIPLE: CPT | Mod: PO | Performed by: INTERNAL MEDICINE

## 2022-11-09 PROCEDURE — 43239 EGD BIOPSY SINGLE/MULTIPLE: CPT | Mod: ,,, | Performed by: INTERNAL MEDICINE

## 2022-11-09 PROCEDURE — D9220A PRA ANESTHESIA: Mod: ANES,,, | Performed by: ANESTHESIOLOGY

## 2022-11-09 PROCEDURE — 88305 TISSUE EXAM BY PATHOLOGIST: CPT | Mod: 26,,, | Performed by: PATHOLOGY

## 2022-11-09 PROCEDURE — 37000009 HC ANESTHESIA EA ADD 15 MINS: Mod: PO | Performed by: INTERNAL MEDICINE

## 2022-11-09 PROCEDURE — D9220A PRA ANESTHESIA: ICD-10-PCS | Mod: CRNA,,, | Performed by: NURSE ANESTHETIST, CERTIFIED REGISTERED

## 2022-11-09 PROCEDURE — 63600175 PHARM REV CODE 636 W HCPCS: Mod: PO | Performed by: INTERNAL MEDICINE

## 2022-11-09 PROCEDURE — 25000003 PHARM REV CODE 250: Mod: PO | Performed by: NURSE ANESTHETIST, CERTIFIED REGISTERED

## 2022-11-09 PROCEDURE — D9220A PRA ANESTHESIA: ICD-10-PCS | Mod: ANES,,, | Performed by: ANESTHESIOLOGY

## 2022-11-09 PROCEDURE — 88305 TISSUE EXAM BY PATHOLOGIST: ICD-10-PCS | Mod: 26,,, | Performed by: PATHOLOGY

## 2022-11-09 PROCEDURE — 82962 GLUCOSE BLOOD TEST: CPT | Mod: PO | Performed by: INTERNAL MEDICINE

## 2022-11-09 PROCEDURE — 37000008 HC ANESTHESIA 1ST 15 MINUTES: Mod: PO | Performed by: INTERNAL MEDICINE

## 2022-11-09 PROCEDURE — D9220A PRA ANESTHESIA: Mod: CRNA,,, | Performed by: NURSE ANESTHETIST, CERTIFIED REGISTERED

## 2022-11-09 RX ORDER — PROPOFOL 10 MG/ML
VIAL (ML) INTRAVENOUS
Status: DISCONTINUED | OUTPATIENT
Start: 2022-11-09 | End: 2022-11-09

## 2022-11-09 RX ORDER — LIDOCAINE HCL/PF 100 MG/5ML
SYRINGE (ML) INTRAVENOUS
Status: DISCONTINUED | OUTPATIENT
Start: 2022-11-09 | End: 2022-11-09

## 2022-11-09 RX ORDER — SODIUM CHLORIDE, SODIUM LACTATE, POTASSIUM CHLORIDE, CALCIUM CHLORIDE 600; 310; 30; 20 MG/100ML; MG/100ML; MG/100ML; MG/100ML
INJECTION, SOLUTION INTRAVENOUS CONTINUOUS
Status: DISCONTINUED | OUTPATIENT
Start: 2022-11-09 | End: 2022-11-09 | Stop reason: HOSPADM

## 2022-11-09 RX ORDER — SODIUM CHLORIDE 0.9 % (FLUSH) 0.9 %
10 SYRINGE (ML) INJECTION
Status: DISCONTINUED | OUTPATIENT
Start: 2022-11-09 | End: 2022-11-09 | Stop reason: HOSPADM

## 2022-11-09 RX ADMIN — PROPOFOL 30 MG: 10 INJECTION, EMULSION INTRAVENOUS at 12:11

## 2022-11-09 RX ADMIN — SODIUM CHLORIDE, SODIUM LACTATE, POTASSIUM CHLORIDE, AND CALCIUM CHLORIDE: .6; .31; .03; .02 INJECTION, SOLUTION INTRAVENOUS at 12:11

## 2022-11-09 RX ADMIN — PROPOFOL 90 MG: 10 INJECTION, EMULSION INTRAVENOUS at 12:11

## 2022-11-09 RX ADMIN — LIDOCAINE HYDROCHLORIDE 100 MG: 20 INJECTION, SOLUTION INTRAVENOUS at 12:11

## 2022-11-09 NOTE — ANESTHESIA POSTPROCEDURE EVALUATION
Anesthesia Post Evaluation    Patient: Yara Santos    Procedure(s) Performed: Procedure(s) (LRB):  EGD (ESOPHAGOGASTRODUODENOSCOPY) (N/A)    Final Anesthesia Type: general      Patient location during evaluation: PACU  Patient participation: Yes- Able to Participate  Level of consciousness: awake and alert and oriented  Post-procedure vital signs: reviewed and stable  Pain management: adequate  Airway patency: patent    PONV status at discharge: No PONV  Anesthetic complications: no      Cardiovascular status: blood pressure returned to baseline and stable  Respiratory status: unassisted and spontaneous ventilation  Hydration status: euvolemic  Follow-up not needed.          Vitals Value Taken Time   /57 11/09/22 1303   Temp   11/09/22 1422   Pulse 71 11/09/22 1303   Resp 16 11/09/22 1303   SpO2 98 % 11/09/22 1303         Event Time   Out of Recovery 13:15:00         Pain/Mirza Score: Mirza Score: 5 (11/9/2022 12:45 PM)

## 2022-11-09 NOTE — ANESTHESIA PREPROCEDURE EVALUATION
11/09/2022  Yara Santos is a 57 y.o., female.      Pre-op Assessment    I have reviewed the Patient Summary Reports.     I have reviewed the Nursing Notes. I have reviewed the NPO Status.   I have reviewed the Medications.     Review of Systems  Anesthesia Hx:  No problems with previous Anesthesia    Social:  Former Smoker    Cardiovascular:   Hypertension, well controlled Dysrhythmias (PVCs) hyperlipidemia    Pulmonary:   COPD, mild Asthma mild Shortness of breath    Renal/:  Renal/ Normal     Hepatic/GI:   GERD Liver Disease, (HOUSE) Hepatitis IBS   Musculoskeletal:   Arthritis (DDD lumbar)     Neurological:   Neuromuscular Disease,    Endocrine:   Diabetes, well controlled, type 2 Hypothyroidism  Obesity / BMI > 30, Morbid Obesity / BMI > 40  Psych:   Psychiatric History anxiety depression          Physical Exam  General: Well nourished, Cooperative, Alert and Oriented    Airway:  Mallampati: II   Mouth Opening: Normal  TM Distance: Normal  Neck ROM: Normal ROM        Anesthesia Plan  Type of Anesthesia, risks & benefits discussed:    Anesthesia Type: Gen ETT, Gen Supraglottic Airway, Gen Natural Airway, MAC  Intra-op Monitoring Plan: Standard ASA Monitors  Post Op Pain Control Plan: multimodal analgesia  Induction:  IV  Airway Plan: Direct, Video and Fiberoptic, Post-Induction  Informed Consent: Informed consent signed with the Patient and all parties understand the risks and agree with anesthesia plan.  All questions answered.   ASA Score: 3    Ready For Surgery From Anesthesia Perspective.     .

## 2022-11-09 NOTE — PROVATION PATIENT INSTRUCTIONS
Discharge Summary/Instructions after an Endoscopic Procedure  Patient Name: Yara Santos  Patient MRN: [id number]  Patient YOB: 1965 Wednesday, November 9, 2022  Brett Jasso MD  Dear patient,  As a result of recent federal legislation (The Federal Cures Act), you may   receive lab or pathology results from your procedure in your [facility]   account before your physician is able to contact you. Your physician or   their representative will relay the results to you with their   recommendations at their soonest availability.  Thank you,  RESTRICTIONS:  During your procedure today, you received medications for sedation.  These   medications may affect your judgment, balance and coordination.  Therefore,   for 24 hours, you have the following restrictions:   - DO NOT drive a car, operate machinery, make legal/financial decisions,   sign important papers or drink alcohol.    ACTIVITY:  Today: no heavy lifting, straining or running due to procedural   sedation/anesthesia.  The following day: return to full activity including work.  DIET:  Eat and drink normally unless instructed otherwise.     TREATMENT FOR COMMON SIDE EFFECTS:  - Mild abdominal pain, nausea, belching, bloating or excessive gas:  rest,   eat lightly and use a heating pad.  - Sore Throat: treat with throat lozenges and/or gargle with warm salt   water.  - Because air was used during the procedure, expelling large amounts of air   from your rectum or belching is normal.  - If a bowel prep was taken, you may not have a bowel movement for 1-3 days.    This is normal.  SYMPTOMS TO WATCH FOR AND REPORT TO YOUR PHYSICIAN:  1. Abdominal pain or bloating, other than gas cramps.  2. Chest pain.  3. Back pain.  4. Signs of infection such as: chills or fever occurring within 24 hours   after the procedure.  5. Rectal bleeding, which would show as bright red, maroon, or black stools.   (A tablespoon of blood from the rectum is not serious, especially  if   hemorrhoids are present.)  6. Vomiting.  7. Weakness or dizziness.  GO DIRECTLY TO THE NEAREST EMERGENCY ROOM IF YOU HAVE ANY OF THE FOLLOWING:      Difficulty breathing              Chills and/or fever over 101 F   Persistent vomiting and/or vomiting blood   Severe abdominal pain   Severe chest pain   Black, tarry stools   Bleeding- more than one tablespoon   Any other symptom or condition that you feel may need urgent attention  Your doctor recommends these additional instructions:  If any biopsies were taken, your doctors clinic will contact you in 1 to 2   weeks with any results.  We are waiting for your pathology results.   Continue your present medications.   You are being discharged to home.  For questions, problems or results please call your physician - Brett Jasso MD at Work:  [phone].  EMERGENCY PHONE NUMBER: [phone], LAB RESULTS: [phone]  IF A COMPLICATION OR EMERGENCY SITUATION ARISES AND YOU ARE UNABLE TO REACH   YOUR PHYSICIAN - GO DIRECTLY TO THE EMERGENCY ROOM.  ___________________________________________  Nurse Signature  ___________________________________________  Patient/Designated Responsible Party Signature  Brett Jasso MD  11/9/2022 12:43:41 PM  This report has been verified and signed electronically.  Dear patient,  As a result of recent federal legislation (The Federal Cures Act), you may   receive lab or pathology results from your procedure in your [facility]   account before your physician is able to contact you. Your physician or   their representative will relay the results to you with their   recommendations at their soonest availability.  Thank you.  PROVATION

## 2022-11-09 NOTE — DISCHARGE SUMMARY
[place] - Endoscopy  Discharge Note  Short Stay    Discharge Note  Short Stay      SUMMARY     Admit Date: 11/9/2022    Attending Physician: Brett Jasso MD     Discharge Physician: Brett Jasso MD    Discharge Date: 11/9/2022 12:45 PM    Final Diagnosis: Epigastric pain [R10.13]  Nausea and vomiting, intractability of vomiting not specified, unspecified vomiting type [R11.2]    Disposition: HOME OR SELF CARE    Patient Instructions:   Current Discharge Medication List        CONTINUE these medications which have NOT CHANGED    Details   albuterol (VENTOLIN HFA) 90 mcg/actuation inhaler Inhale 2 puffs into the lungs every 4 (four) hours as needed for Wheezing. Rescue  Qty: 54 g, Refills: 1      albuterol-ipratropium (DUO-NEB) 2.5 mg-0.5 mg/3 mL nebulizer solution Take 3 mLs by nebulization every 6 (six) hours while awake. Rescue  Qty: 270 mL, Refills: 3    Associated Diagnoses: Bronchitis with wheezing; COPD exacerbation      azelastine (ASTELIN) 137 mcg (0.1 %) nasal spray 1 spray (137 mcg total) by Nasal route 2 (two) times daily as needed for Rhinitis.  Qty: 90 mL, Refills: 3      cetirizine (ZYRTEC) 10 MG tablet Take 1 tablet (10 mg total) by mouth daily as needed.  Qty: 90 tablet, Refills: 3      cholecalciferol, vitamin D3, 5,000 unit Tab Take 5,000 Units by mouth once daily. VITAMIN D-3 5000 UNIT TABS      cyanocobalamin 1,000 mcg/mL injection Inject 1 mL (1,000 mcg total) into the muscle every 30 days.  Qty: 10 mL, Refills: 1      cyclobenzaprine (FLEXERIL) 10 MG tablet Take 1 tablet (10 mg total) by mouth 3 (three) times daily as needed for Muscle spasms.  Qty: 90 tablet, Refills: 3    Associated Diagnoses: Muscle spasm      dicyclomine (BENTYL) 10 MG capsule TAKE 1 CAPSULE BY MOUTH 4 TIMES DAILY BEFORE MEAL(S) AND AT BEDTIME AS NEEDED  Qty: 120 capsule, Refills: 0    Associated Diagnoses: Diarrhea, unspecified type; Upper abdominal pain      ezetimibe (ZETIA) 10 mg tablet Take 1 tablet (10  mg total) by mouth every evening.  Qty: 90 tablet, Refills: 3      famotidine (PEPCID) 40 MG tablet Take 1 tablet by mouth nightly  Qty: 30 tablet, Refills: 2    Associated Diagnoses: Gastroesophageal reflux disease, unspecified whether esophagitis present      fluticasone-umeclidin-vilanter (TRELEGY ELLIPTA) 200-62.5-25 mcg inhaler Inhale 1 puff into the lungs once daily.      gabapentin (NEURONTIN) 300 MG capsule Take 1 capsule (300 mg total) by mouth 3 (three) times daily.  Qty: 270 capsule, Refills: 3    Associated Diagnoses: Cervical radiculopathy; Neuropathic pain      hydroCHLOROthiazide (HYDRODIURIL) 12.5 MG Tab Take 1 tablet (12.5 mg total) by mouth once daily.  Qty: 30 tablet, Refills: 11    Comments: .  Associated Diagnoses: Primary hypertension      hydrOXYchloroQUINE (PLAQUENIL) 200 mg tablet Take 200 mg by mouth 2 (two) times daily.      hydrOXYzine HCL (ATARAX) 25 MG tablet Take 25 mg by mouth nightly as needed.      ibuprofen (ADVIL,MOTRIN) 800 MG tablet Take 800 mg by mouth 2 (two) times daily as needed.      levothyroxine (SYNTHROID) 50 MCG tablet TAKE 1 TABLET BY MOUTH ONCE DAILY BEFORE BREAKFAST  Qty: 90 tablet, Refills: 3      omalizumab (XOLAIR) 150 mg/mL injection Inject 150 mg into the skin every 28 days.      !! omeprazole (PRILOSEC) 20 MG capsule Take 2 capsules (40 mg total) by mouth once daily.  Qty: 180 capsule, Refills: 1      !! omeprazole (PRILOSEC) 20 MG capsule Take 2 capsules by mouth once daily  Qty: 180 capsule, Refills: 3      ondansetron (ZOFRAN) 8 MG tablet Take 1 tablet (8 mg total) by mouth every 8 (eight) hours as needed for Nausea.  Qty: 30 tablet, Refills: 3    Associated Diagnoses: Nausea      promethazine-dextromethorphan (PROMETHAZINE-DM) 6.25-15 mg/5 mL Syrp Take 5 mLs by mouth every 8 (eight) hours as needed (cough).  Qty: 180 mL, Refills: 0    Associated Diagnoses: Bronchitis with wheezing; COPD exacerbation      sertraline (ZOLOFT) 100 MG tablet Take 1 tablet by  mouth twice daily  Qty: 180 tablet, Refills: 3      !! blood sugar diagnostic (ACCU-CHEK TEJA PLUS TEST STRP) Strp 1 each by Other route before breakfast. For diagnosis code E11.65  Qty: 100 strip, Refills: 3      !! blood sugar diagnostic Strp To check BG one times daily, to use with insurance preferred meter  Qty: 100 strip, Refills: 3    Associated Diagnoses: Diabetes mellitus due to underlying condition with complication, without long-term current use of insulin; Hyperglycemia due to diabetes mellitus      blood-glucose meter kit To check BG one times daily, to use with insurance preferred meter  Qty: 1 each, Refills: 0    Associated Diagnoses: Diabetes mellitus due to underlying condition with complication, without long-term current use of insulin; Hyperglycemia due to diabetes mellitus      diclofenac sodium (VOLTAREN) 1 % Gel Apply 2 g topically 3 (three) times daily as needed (pain).  Qty: 350 g, Refills: 11      halobetasol (ULTRAVATE) 0.05 % cream Apply topically 2 (two) times daily.      ipratropium (ATROVENT HFA) 17 mcg/actuation inhaler INHALE 2 PUFFS BY MOUTH TWICE DAILY AS DIRECTED  Qty: 38.7 g, Refills: 6      LANCETS (ACCU-CHEK MULTICLIX LANCET MISC) 1 lancet by Misc.(Non-Drug; Combo Route) route once daily.      lancets Misc To check BG one times daily, to use with insurance preferred meter  Qty: 100 each, Refills: 3    Associated Diagnoses: Diabetes mellitus due to underlying condition with complication, without long-term current use of insulin; Hyperglycemia due to diabetes mellitus      mupirocin (BACTROBAN) 2 % ointment Apply topically 2 (two) times daily as needed.  Qty: 22 g, Refills: 1       !! - Potential duplicate medications found. Please discuss with provider.          Discharge Procedure Orders (must include Diet, Follow-up, Activity)    Follow Up:  Follow up with PCP as previously scheduled  Resume routine diet.  Activity as tolerated.    No driving day of procedure.

## 2022-11-09 NOTE — TRANSFER OF CARE
"Anesthesia Transfer of Care Note    Patient: Yraa Santos    Procedure(s) Performed: Procedure(s) (LRB):  EGD (ESOPHAGOGASTRODUODENOSCOPY) (N/A)    Patient location: PACU    Anesthesia Type: general    Transport from OR: Transported from OR on room air with adequate spontaneous ventilation    Post pain: adequate analgesia    Post assessment: no apparent anesthetic complications and tolerated procedure well    Post vital signs: stable    Level of consciousness: awake and alert    Nausea/Vomiting: no nausea/vomiting    Complications: none    Transfer of care protocol was followed      Last vitals:   Visit Vitals  /62   Pulse 84   Temp 36.2 °C (97.1 °F) (Skin)   Resp 16   Ht 5' 7" (1.702 m)   Wt 111.1 kg (245 lb)   SpO2 95%   Breastfeeding No   BMI 38.37 kg/m²     "

## 2022-11-09 NOTE — H&P
History & Physical - Short Stay  Gastroenterology      SUBJECTIVE:     Procedure: EGD    Chief Complaint/Indication for Procedure: Epigastric Pain    PTA Medications   Medication Sig    albuterol (VENTOLIN HFA) 90 mcg/actuation inhaler Inhale 2 puffs into the lungs every 4 (four) hours as needed for Wheezing. Rescue    albuterol-ipratropium (DUO-NEB) 2.5 mg-0.5 mg/3 mL nebulizer solution Take 3 mLs by nebulization every 6 (six) hours while awake. Rescue    azelastine (ASTELIN) 137 mcg (0.1 %) nasal spray 1 spray (137 mcg total) by Nasal route 2 (two) times daily as needed for Rhinitis.    cetirizine (ZYRTEC) 10 MG tablet Take 1 tablet (10 mg total) by mouth daily as needed.    cholecalciferol, vitamin D3, 5,000 unit Tab Take 5,000 Units by mouth once daily. VITAMIN D-3 5000 UNIT TABS    cyanocobalamin 1,000 mcg/mL injection Inject 1 mL (1,000 mcg total) into the muscle every 30 days.    cyclobenzaprine (FLEXERIL) 10 MG tablet Take 1 tablet (10 mg total) by mouth 3 (three) times daily as needed for Muscle spasms.    dicyclomine (BENTYL) 10 MG capsule TAKE 1 CAPSULE BY MOUTH 4 TIMES DAILY BEFORE MEAL(S) AND AT BEDTIME AS NEEDED    ezetimibe (ZETIA) 10 mg tablet Take 1 tablet (10 mg total) by mouth every evening.    famotidine (PEPCID) 40 MG tablet Take 1 tablet by mouth nightly    fluticasone-umeclidin-vilanter (TRELEGY ELLIPTA) 200-62.5-25 mcg inhaler Inhale 1 puff into the lungs once daily.    gabapentin (NEURONTIN) 300 MG capsule Take 1 capsule (300 mg total) by mouth 3 (three) times daily.    hydroCHLOROthiazide (HYDRODIURIL) 12.5 MG Tab Take 1 tablet (12.5 mg total) by mouth once daily.    hydrOXYchloroQUINE (PLAQUENIL) 200 mg tablet Take 200 mg by mouth 2 (two) times daily.    hydrOXYzine HCL (ATARAX) 25 MG tablet Take 25 mg by mouth nightly as needed.    ibuprofen (ADVIL,MOTRIN) 800 MG tablet Take 800 mg by mouth 2 (two) times daily as needed.    levothyroxine (SYNTHROID) 50 MCG tablet TAKE 1 TABLET BY MOUTH  ONCE DAILY BEFORE BREAKFAST (Patient taking differently: Take 50 mcg by mouth before breakfast.)    omalizumab (XOLAIR) 150 mg/mL injection Inject 150 mg into the skin every 28 days.    omeprazole (PRILOSEC) 20 MG capsule Take 2 capsules (40 mg total) by mouth once daily.    omeprazole (PRILOSEC) 20 MG capsule Take 2 capsules by mouth once daily    ondansetron (ZOFRAN) 8 MG tablet Take 1 tablet (8 mg total) by mouth every 8 (eight) hours as needed for Nausea.    promethazine-dextromethorphan (PROMETHAZINE-DM) 6.25-15 mg/5 mL Syrp Take 5 mLs by mouth every 8 (eight) hours as needed (cough).    sertraline (ZOLOFT) 100 MG tablet Take 1 tablet by mouth twice daily    blood sugar diagnostic (ACCU-CHEK TEJA PLUS TEST STRP) Strp 1 each by Other route before breakfast. For diagnosis code E11.65    blood sugar diagnostic Strp To check BG one times daily, to use with insurance preferred meter    blood-glucose meter kit To check BG one times daily, to use with insurance preferred meter    diclofenac sodium (VOLTAREN) 1 % Gel Apply 2 g topically 3 (three) times daily as needed (pain). (Patient not taking: Reported on 11/3/2022)    halobetasol (ULTRAVATE) 0.05 % cream Apply topically 2 (two) times daily.    ipratropium (ATROVENT HFA) 17 mcg/actuation inhaler INHALE 2 PUFFS BY MOUTH TWICE DAILY AS DIRECTED (Patient taking differently: Inhale 2 puffs into the lungs 2 (two) times daily. INHALE 2 PUFFS BY MOUTH TWICE DAILY AS DIRECTED)    LANCETS (ACCU-CHEK MULTICLIX LANCET MISC) 1 lancet by Misc.(Non-Drug; Combo Route) route once daily.    lancets Misc To check BG one times daily, to use with insurance preferred meter    mupirocin (BACTROBAN) 2 % ointment Apply topically 2 (two) times daily as needed.       Review of patient's allergies indicates:   Allergen Reactions    Iodine and iodide containing products Blisters    Asa [aspirin] Itching and Other (See Comments)     Skin problem      Atorvastatin      Worsened Lichen Planus     Contact metal agent      Other reaction(s): Other (See Comments)    Crestor [rosuvastatin]      Worsens her lichen planus    Lasix [furosemide]      rash    Lisinopril      Hives    Losartan      Confusion    Lovastatin      Worsened Lichen Planus    Salicylates     Sulfur      Other reaction(s): Other (See Comments)    Sulfur, elemental     Valsartan      breakouts    Latex Other (See Comments)     Skin problem          Past Medical History:   Diagnosis Date    a Premature Ventricular Contractions     Dr. Austin Collier    Allergic rhinosinusitis     Asthma     Bilateral Hip Osteoarthritis     Bilateral Knee Arthritis     Carpal tunnel syndrome     DDD (degenerative disc disease), lumbar     Depression And Anxiety     GERD     H/O Colon Polyps     Hypercholesterolemia     Hypertension     Hypothyroidism     Irritable Bowel Syndrome     Lumbar Spinal DDD     Nonalcoholic Steatohepatitis ([place]     Obesity     Osteopenia     OU Cataracts     Tarsal Tunnel Syndrome     Tobacco Use Disorder     Type 2 DM     Urinary tract infection     Vitamin B12 Deficiency     Vitamin D Deficiency      Past Surgical History:   Procedure Laterality Date     SECTION      x2    COLONOSCOPY   ?    polyps removed per pt report    EPIDURAL STEROID INJECTION INTO CERVICAL SPINE N/A 2020    Procedure: Injection-steroid-epidural-cervical to left;  Surgeon: John Vinson MD;  Location: [facility] OR;  Service: Pain Management;  Laterality: N/A;    EPIDURAL STEROID INJECTION INTO LUMBAR SPINE N/A 2020    Procedure: Injection-steroid-epidural-lumbar, l5/s1 to left;  Surgeon: John Vinson MD;  Location: [facility] OR;  Service: Pain Management;  Laterality: N/A;    EPIDURAL STEROID INJECTION INTO LUMBAR SPINE N/A 2021    Procedure: Injection-steroid-epidural-lumbar L5/S1;  Surgeon: John Vinson MD;  Location: [facility] OR;  Service: Pain Management;  Laterality: N/A;    EPIDURAL STEROID INJECTION INTO LUMBAR  SPINE N/A 6/21/2022    Procedure: Injection-steroid-epidural-lumbar L5/S1 spread to left;  Surgeon: John Vinson MD;  Location: [facility];  Service: Pain Management;  Laterality: N/A;    ESOPHAGOGASTRODUODENOSCOPY      INJECTION OF JOINT Left 9/20/2022    Procedure: INJECTION, JOINT- hip;  Surgeon: John Vinson MD;  Location: [facility];  Service: Pain Management;  Laterality: Left;    LAPAROSCOPIC CHOLECYSTECTOMY N/A 7/13/2022    Procedure: CHOLECYSTECTOMY, LAPAROSCOPIC;  Surgeon: Ra Santos MD;  Location: [facility];  Service: General;  Laterality: N/A;    TRANSFORAMINAL EPIDURAL INJECTION OF STEROID Left 8/16/2022    Procedure: Injection,steroid,epidural,transforaminal approach L3/4 and L4/5;  Surgeon: John Vinson MD;  Location: [facility];  Service: Pain Management;  Laterality: Left;    UPPER GASTROINTESTINAL ENDOSCOPY  2020 ?    unsure of results     Family History   Problem Relation Age of Onset    Arthritis Mother     Diabetes Mother     Hyperlipidemia Mother     Cancer Mother         uterine     Allergies Mother     Ovarian cancer Mother 58    Aneurysm Father     Hyperlipidemia Father     Breast cancer Maternal Aunt 65    Cancer Maternal Uncle         brain    Cancer Paternal Aunt         breast, bone and lung     Breast cancer Paternal Aunt 78    Breast cancer Maternal Cousin 43    Brain cancer Maternal Cousin     Cirrhosis Neg Hx      Social History     Tobacco Use    Smoking status: Former     Packs/day: 0.00     Types: Cigarettes     Quit date: 4/2/2019     Years since quitting: 3.6    Smokeless tobacco: Never   Substance Use Topics    Alcohol use: No    Drug use: No         OBJECTIVE:     Vital Signs (Most Recent)  Temp: 97.1 °F (36.2 °C) (11/09/22 1157)  Pulse: 84 (11/09/22 1157)  Resp: 16 (11/09/22 1157)  BP: 127/67 (11/09/22 1157)  SpO2: 95 % (11/09/22 1157)    Physical Exam:                                                       GENERAL:  Comfortable, in no acute distress.                                  HEENT EXAM:  Nonicteric.  No adenopathy.  Oropharynx is clear.               NECK:  Supple.                                                               LUNGS:  Clear.                                                               CARDIAC:  Regular rate and rhythm.  S1, S2.  No murmur.                      ABDOMEN:  Soft, positive bowel sounds, nontender.  No hepatosplenomegaly or masses.  No rebound or guarding.                                             EXTREMITIES:  No edema.     MENTAL STATUS:  Normal, alert and oriented.      ASSESSMENT/PLAN:     Assessment: Epigastric Pain    Plan: EGD    Anesthesia Plan: General    ASA Grade: ASA 2 - Patient with mild systemic disease with no functional limitations    MALLAMPATI SCORE:  I (soft palate, uvula, fauces, and tonsillar pillars visible)

## 2022-11-14 PROBLEM — K76.0 FATTY LIVER: Status: ACTIVE | Noted: 2022-11-14

## 2022-11-15 LAB
FINAL PATHOLOGIC DIAGNOSIS: NORMAL
GROSS: NORMAL
Lab: NORMAL

## 2022-11-28 ENCOUNTER — TELEPHONE (OUTPATIENT)
Dept: PULMONOLOGY | Facility: CLINIC | Age: 57
End: 2022-11-28
Payer: MEDICARE

## 2022-11-28 NOTE — TELEPHONE ENCOUNTER
----- Message from Freida Cancino sent at 11/28/2022  9:01 AM CST -----  Contact: pt at [phone]  Type: Needs Medical Advice  Who Called:  pt   Best Call Back Number: [phone]  Additional Information: PT need a call back to 2 appts.  Please call back to advise.

## 2022-11-28 NOTE — TELEPHONE ENCOUNTER
----- Message from Shana Chaney sent at 11/28/2022 12:05 PM CST -----  Contact: [phone]  Type:  Patient Returning Call    Who Called:  Pt   Who Left Message for Patient:  Le   Does the patient know what this is regarding?:  Yes   Best Call Back Number:  [phone]    Additional Information:  Pt stated she is not sure what kind of test she needs to schedule

## 2022-11-29 ENCOUNTER — ANESTHESIA EVENT (OUTPATIENT)
Dept: ENDOSCOPY | Facility: HOSPITAL | Age: 57
End: 2022-11-29
Payer: MEDICARE

## 2022-11-29 ENCOUNTER — HOSPITAL ENCOUNTER (OUTPATIENT)
Facility: HOSPITAL | Age: 57
Discharge: HOME | End: 2022-11-29
Attending: INTERNAL MEDICINE | Admitting: INTERNAL MEDICINE
Payer: MEDICARE

## 2022-11-29 ENCOUNTER — ANESTHESIA (OUTPATIENT)
Dept: ENDOSCOPY | Facility: HOSPITAL | Age: 57
End: 2022-11-29
Payer: MEDICARE

## 2022-11-29 DIAGNOSIS — R19.7 DIARRHEA: ICD-10-CM

## 2022-11-29 LAB — GLUCOSE SERPL-MCNC: 121 MG/DL (ref 70–110)

## 2022-11-29 PROCEDURE — D9220A PRA ANESTHESIA: ICD-10-PCS | Mod: PT,ANES,, | Performed by: ANESTHESIOLOGY

## 2022-11-29 PROCEDURE — D9220A PRA ANESTHESIA: Mod: PT,CRNA,, | Performed by: NURSE ANESTHETIST, CERTIFIED REGISTERED

## 2022-11-29 PROCEDURE — D9220A PRA ANESTHESIA: Mod: PT,ANES,, | Performed by: ANESTHESIOLOGY

## 2022-11-29 PROCEDURE — 45385 COLONOSCOPY W/LESION REMOVAL: CPT | Mod: PT,PO | Performed by: INTERNAL MEDICINE

## 2022-11-29 PROCEDURE — 63600175 PHARM REV CODE 636 W HCPCS: Mod: PO | Performed by: NURSE ANESTHETIST, CERTIFIED REGISTERED

## 2022-11-29 PROCEDURE — 45385 COLONOSCOPY W/LESION REMOVAL: CPT | Mod: PT,GZ,, | Performed by: INTERNAL MEDICINE

## 2022-11-29 PROCEDURE — 88305 TISSUE EXAM BY PATHOLOGIST: ICD-10-PCS | Mod: 26,,, | Performed by: PATHOLOGY

## 2022-11-29 PROCEDURE — 88305 TISSUE EXAM BY PATHOLOGIST: CPT | Mod: 26,,, | Performed by: PATHOLOGY

## 2022-11-29 PROCEDURE — 27201089 HC SNARE, DISP (ANY): Mod: PO | Performed by: INTERNAL MEDICINE

## 2022-11-29 PROCEDURE — D9220A PRA ANESTHESIA: ICD-10-PCS | Mod: PT,CRNA,, | Performed by: NURSE ANESTHETIST, CERTIFIED REGISTERED

## 2022-11-29 PROCEDURE — 45385 PR COLONOSCOPY,REMV LESN,SNARE: ICD-10-PCS | Mod: PT,GZ,, | Performed by: INTERNAL MEDICINE

## 2022-11-29 PROCEDURE — 37000009 HC ANESTHESIA EA ADD 15 MINS: Mod: PO | Performed by: INTERNAL MEDICINE

## 2022-11-29 PROCEDURE — 37000008 HC ANESTHESIA 1ST 15 MINUTES: Mod: PO | Performed by: INTERNAL MEDICINE

## 2022-11-29 PROCEDURE — 25000003 PHARM REV CODE 250: Mod: PO | Performed by: NURSE ANESTHETIST, CERTIFIED REGISTERED

## 2022-11-29 PROCEDURE — 88305 TISSUE EXAM BY PATHOLOGIST: CPT | Performed by: PATHOLOGY

## 2022-11-29 PROCEDURE — 63600175 PHARM REV CODE 636 W HCPCS: Mod: PO | Performed by: INTERNAL MEDICINE

## 2022-11-29 RX ORDER — SODIUM CHLORIDE, SODIUM LACTATE, POTASSIUM CHLORIDE, CALCIUM CHLORIDE 600; 310; 30; 20 MG/100ML; MG/100ML; MG/100ML; MG/100ML
INJECTION, SOLUTION INTRAVENOUS CONTINUOUS
Status: DISCONTINUED | OUTPATIENT
Start: 2022-11-29 | End: 2022-11-29 | Stop reason: HOSPADM

## 2022-11-29 RX ORDER — PROPOFOL 10 MG/ML
VIAL (ML) INTRAVENOUS
Status: DISCONTINUED | OUTPATIENT
Start: 2022-11-29 | End: 2022-11-29

## 2022-11-29 RX ORDER — SODIUM CHLORIDE 0.9 % (FLUSH) 0.9 %
10 SYRINGE (ML) INJECTION
Status: DISCONTINUED | OUTPATIENT
Start: 2022-11-29 | End: 2022-11-29 | Stop reason: HOSPADM

## 2022-11-29 RX ORDER — LIDOCAINE HCL/PF 100 MG/5ML
SYRINGE (ML) INTRAVENOUS
Status: DISCONTINUED | OUTPATIENT
Start: 2022-11-29 | End: 2022-11-29

## 2022-11-29 RX ADMIN — PROPOFOL 30 MG: 10 INJECTION, EMULSION INTRAVENOUS at 08:11

## 2022-11-29 RX ADMIN — PROPOFOL 90 MG: 10 INJECTION, EMULSION INTRAVENOUS at 08:11

## 2022-11-29 RX ADMIN — SODIUM CHLORIDE, SODIUM LACTATE, POTASSIUM CHLORIDE, AND CALCIUM CHLORIDE: .6; .31; .03; .02 INJECTION, SOLUTION INTRAVENOUS at 08:11

## 2022-11-29 RX ADMIN — LIDOCAINE HYDROCHLORIDE 100 MG: 20 INJECTION, SOLUTION INTRAVENOUS at 08:11

## 2022-11-29 NOTE — TRANSFER OF CARE
"Anesthesia Transfer of Care Note    Patient: Yara Santos    Procedure(s) Performed: Procedure(s) (LRB):  COLONOSCOPY (N/A)    Patient location: PACU    Anesthesia Type: general    Transport from OR: Transported from OR on room air with adequate spontaneous ventilation    Post pain: adequate analgesia    Post assessment: no apparent anesthetic complications and tolerated procedure well    Post vital signs: stable    Level of consciousness: awake and alert    Nausea/Vomiting: no nausea/vomiting    Complications: none    Transfer of care protocol was followed      Last vitals:   Visit Vitals  BP (!) 92/54   Pulse 87   Temp 36.7 °C (98 °F) (Skin)   Resp (!) 22   Ht 5' 7" (1.702 m)   Wt 111.1 kg (245 lb)   SpO2 95%   Breastfeeding No   BMI 38.37 kg/m²     "

## 2022-11-29 NOTE — H&P
History & Physical - Short Stay  Gastroenterology      SUBJECTIVE:     Procedure: Colonoscopy    Chief Complaint/Indication for Procedure: Change in Bowel Habits and Previous Polyps    PTA Medications   Medication Sig    albuterol-ipratropium (DUO-NEB) 2.5 mg-0.5 mg/3 mL nebulizer solution Take 3 mLs by nebulization every 6 (six) hours while awake. Rescue    cetirizine (ZYRTEC) 10 MG tablet Take 1 tablet (10 mg total) by mouth daily as needed.    cholecalciferol, vitamin D3, 5,000 unit Tab Take 5,000 Units by mouth once daily. VITAMIN D-3 5000 UNIT TABS    cyanocobalamin 1,000 mcg/mL injection Inject 1 mL (1,000 mcg total) into the muscle every 30 days.    cyclobenzaprine (FLEXERIL) 10 MG tablet Take 1 tablet (10 mg total) by mouth 3 (three) times daily as needed for Muscle spasms.    diclofenac sodium (VOLTAREN) 1 % Gel Apply 2 g topically 3 (three) times daily as needed (pain).    dicyclomine (BENTYL) 10 MG capsule TAKE 1 CAPSULE BY MOUTH 4 TIMES DAILY BEFORE MEAL(S) AND AT BEDTIME AS NEEDED    ezetimibe (ZETIA) 10 mg tablet Take 1 tablet (10 mg total) by mouth every evening.    famotidine (PEPCID) 40 MG tablet Take 1 tablet by mouth nightly    gabapentin (NEURONTIN) 300 MG capsule Take 1 capsule (300 mg total) by mouth 3 (three) times daily.    hydroCHLOROthiazide (HYDRODIURIL) 12.5 MG Tab Take 1 tablet (12.5 mg total) by mouth once daily.    hydrOXYzine HCL (ATARAX) 25 MG tablet Take 25 mg by mouth nightly as needed.    ibuprofen (ADVIL,MOTRIN) 800 MG tablet Take 800 mg by mouth 2 (two) times daily as needed.    levothyroxine (SYNTHROID) 50 MCG tablet TAKE 1 TABLET BY MOUTH ONCE DAILY BEFORE BREAKFAST (Patient taking differently: Take 50 mcg by mouth before breakfast.)    mupirocin (BACTROBAN) 2 % ointment Apply topically 2 (two) times daily as needed.    omeprazole (PRILOSEC) 20 MG capsule Take 2 capsules (40 mg total) by mouth once daily.    omeprazole (PRILOSEC) 20 MG capsule Take 2 capsules by mouth once  daily    promethazine-dextromethorphan (PROMETHAZINE-DM) 6.25-15 mg/5 mL Syrp Take 5 mLs by mouth every 8 (eight) hours as needed (cough).    sertraline (ZOLOFT) 100 MG tablet Take 1 tablet by mouth twice daily    albuterol (VENTOLIN HFA) 90 mcg/actuation inhaler Inhale 2 puffs into the lungs every 4 (four) hours as needed for Wheezing. Rescue    azelastine (ASTELIN) 137 mcg (0.1 %) nasal spray 1 spray (137 mcg total) by Nasal route 2 (two) times daily as needed for Rhinitis.    blood sugar diagnostic (ACCU-CHEK TEJA PLUS TEST STRP) Strp 1 each by Other route before breakfast. For diagnosis code E11.65    blood sugar diagnostic Strp To check BG one times daily, to use with insurance preferred meter    blood-glucose meter kit To check BG one times daily, to use with insurance preferred meter    fluticasone-umeclidin-vilanter (TRELEGY ELLIPTA) 200-62.5-25 mcg inhaler Inhale 1 puff into the lungs once daily.    halobetasol (ULTRAVATE) 0.05 % cream Apply topically 2 (two) times daily.    hydrOXYchloroQUINE (PLAQUENIL) 200 mg tablet Take 200 mg by mouth 2 (two) times daily.    ipratropium (ATROVENT HFA) 17 mcg/actuation inhaler INHALE 2 PUFFS BY MOUTH TWICE DAILY AS DIRECTED    LANCETS (ACCU-CHEK MULTICLIX LANCET MISC) 1 lancet by Misc.(Non-Drug; Combo Route) route once daily.    lancets Misc To check BG one times daily, to use with insurance preferred meter    omalizumab (XOLAIR) 150 mg/mL injection Inject 150 mg into the skin every 28 days.    ondansetron (ZOFRAN) 8 MG tablet Take 1 tablet (8 mg total) by mouth every 8 (eight) hours as needed.       Review of patient's allergies indicates:   Allergen Reactions    Iodine and iodide containing products Blisters    Asa [aspirin] Itching and Other (See Comments)     Skin problem      Atorvastatin      Worsened Lichen Planus    Contact metal agent      Other reaction(s): Other (See Comments)    Crestor [rosuvastatin]      Worsens her lichen planus    Lasix [furosemide]       rash    Lisinopril      Hives    Losartan      Confusion    Lovastatin      Worsened Lichen Planus    Salicylates     Sulfur      Other reaction(s): Other (See Comments)    Sulfur, elemental     Valsartan      breakouts    Latex Other (See Comments)     Skin problem          Past Medical History:   Diagnosis Date    a Premature Ventricular Contractions     Dr. Austin Collier    Allergic rhinosinusitis     Asthma     Bilateral Hip Osteoarthritis     Bilateral Knee Arthritis     Carpal tunnel syndrome     DDD (degenerative disc disease), lumbar     Depression And Anxiety     GERD     H/O Colon Polyps     Hypercholesterolemia     Hypertension     Hypothyroidism     Irritable Bowel Syndrome     Lumbar Spinal DDD     Nonalcoholic Steatohepatitis ([place]     Obesity     Osteopenia     OU Cataracts     Tarsal Tunnel Syndrome     Tobacco Use Disorder     Type 2 DM     Urinary tract infection     Vitamin B12 Deficiency     Vitamin D Deficiency      Past Surgical History:   Procedure Laterality Date     SECTION      x2    COLONOSCOPY   ?    polyps removed per pt report    EPIDURAL STEROID INJECTION INTO CERVICAL SPINE N/A 2020    Procedure: Injection-steroid-epidural-cervical to left;  Surgeon: John Vinson MD;  Location: [facility];  Service: Pain Management;  Laterality: N/A;    EPIDURAL STEROID INJECTION INTO LUMBAR SPINE N/A 2020    Procedure: Injection-steroid-epidural-lumbar, l5/s1 to left;  Surgeon: John Vinson MD;  Location: [facility];  Service: Pain Management;  Laterality: N/A;    EPIDURAL STEROID INJECTION INTO LUMBAR SPINE N/A 2021    Procedure: Injection-steroid-epidural-lumbar L5/S1;  Surgeon: John Vinson MD;  Location: [facility];  Service: Pain Management;  Laterality: N/A;    EPIDURAL STEROID INJECTION INTO LUMBAR SPINE N/A 2022    Procedure: Injection-steroid-epidural-lumbar L5/S1 spread to left;  Surgeon: John Vinson MD;  Location: [facility];   Service: Pain Management;  Laterality: N/A;    ESOPHAGOGASTRODUODENOSCOPY      ESOPHAGOGASTRODUODENOSCOPY N/A 11/9/2022    Procedure: EGD (ESOPHAGOGASTRODUODENOSCOPY);  Surgeon: Brett Jasso MD;  Location: [facility] ENDO;  Service: Endoscopy;  Laterality: N/A;    INJECTION OF JOINT Left 9/20/2022    Procedure: INJECTION, JOINT- hip;  Surgeon: John Vinson MD;  Location: [facility];  Service: Pain Management;  Laterality: Left;    LAPAROSCOPIC CHOLECYSTECTOMY N/A 7/13/2022    Procedure: CHOLECYSTECTOMY, LAPAROSCOPIC;  Surgeon: Ra Santos MD;  Location: [facility] OR;  Service: General;  Laterality: N/A;    TRANSFORAMINAL EPIDURAL INJECTION OF STEROID Left 8/16/2022    Procedure: Injection,steroid,epidural,transforaminal approach L3/4 and L4/5;  Surgeon: John Vinson MD;  Location: [facility];  Service: Pain Management;  Laterality: Left;    UPPER GASTROINTESTINAL ENDOSCOPY  2020 ?    unsure of results     Family History   Problem Relation Age of Onset    Arthritis Mother     Diabetes Mother     Hyperlipidemia Mother     Cancer Mother         uterine     Allergies Mother     Ovarian cancer Mother 58    Aneurysm Father     Hyperlipidemia Father     Breast cancer Maternal Aunt 65    Cancer Maternal Uncle         brain    Cancer Paternal Aunt         breast, bone and lung     Breast cancer Paternal Aunt 78    Breast cancer Maternal Cousin 43    Brain cancer Maternal Cousin     Cirrhosis Neg Hx      Social History     Tobacco Use    Smoking status: Former     Packs/day: 0.00     Types: Cigarettes     Quit date: 4/2/2019     Years since quitting: 3.6    Smokeless tobacco: Never   Substance Use Topics    Alcohol use: No    Drug use: No         OBJECTIVE:     Vital Signs (Most Recent)       Physical Exam:                                                       GENERAL:  Comfortable, in no acute distress.                                 HEENT EXAM:  Nonicteric.  No adenopathy.  Oropharynx is clear.                NECK:  Supple.                                                               LUNGS:  Clear.                                                               CARDIAC:  Regular rate and rhythm.  S1, S2.  No murmur.                      ABDOMEN:  Soft, positive bowel sounds, nontender.  No hepatosplenomegaly or masses.  No rebound or guarding.                                             EXTREMITIES:  No edema.     MENTAL STATUS:  Normal, alert and oriented.      ASSESSMENT/PLAN:     Assessment: Change in Bowel Habits and Previous Polyps    Plan: Colonoscopy    Anesthesia Plan: General    ASA Grade: ASA 2 - Patient with mild systemic disease with no functional limitations    MALLAMPATI SCORE:  I (soft palate, uvula, fauces, and tonsillar pillars visible)

## 2022-11-29 NOTE — ANESTHESIA POSTPROCEDURE EVALUATION
Anesthesia Post Evaluation    Patient: Yara Santos    Procedure(s) Performed: Procedure(s) (LRB):  COLONOSCOPY (N/A)    Final Anesthesia Type: general      Patient location during evaluation: PACU  Patient participation: Yes- Able to Participate  Level of consciousness: awake and alert  Post-procedure vital signs: reviewed and stable  Pain management: adequate  Airway patency: patent    PONV status at discharge: No PONV  Anesthetic complications: no      Cardiovascular status: blood pressure returned to baseline  Respiratory status: unassisted and room air  Hydration status: euvolemic  Follow-up not needed.          Vitals Value Taken Time   /69 11/29/22 0914   Temp 36.7 °C (98 °F) 11/29/22 0901   Pulse 80 11/29/22 0914   Resp 22 11/29/22 0914   SpO2 94 % 11/29/22 0914         Event Time   Out of Recovery 09:32:18         Pain/Mirza Score: Mirza Score: 10 (11/29/2022  9:15 AM)

## 2022-11-29 NOTE — ANESTHESIA PREPROCEDURE EVALUATION
11/29/2022  Yara Santos is a 57 y.o., female.      Pre-op Assessment    I have reviewed the Patient Summary Reports.     I have reviewed the Nursing Notes. I have reviewed the NPO Status.   I have reviewed the Medications.     Review of Systems  Anesthesia Hx:  No problems with previous Anesthesia    Social:  Former Smoker    Cardiovascular:   Hypertension, well controlled Dysrhythmias (PVCs) hyperlipidemia    Pulmonary:   COPD, mild Asthma mild Shortness of breath    Renal/:  Renal/ Normal     Hepatic/GI:   GERD Liver Disease, (HOUSE) Hepatitis IBS   Musculoskeletal:   Arthritis (DDD lumbar)     Neurological:   Neuromuscular Disease,    Endocrine:   Diabetes, well controlled, type 2 Hypothyroidism  Obesity / BMI > 30, Morbid Obesity / BMI > 40  Psych:   Psychiatric History anxiety depression          Physical Exam  General: Well nourished, Cooperative, Alert and Oriented    Airway:  Mallampati: II   Mouth Opening: Normal  TM Distance: Normal  Neck ROM: Normal ROM        Anesthesia Plan  Type of Anesthesia, risks & benefits discussed:    Anesthesia Type: Gen Natural Airway  Intra-op Monitoring Plan: Standard ASA Monitors  Induction:  IV  Informed Consent: Informed consent signed with the Patient and all parties understand the risks and agree with anesthesia plan.  All questions answered.   ASA Score: 3  Day of Surgery Review of History & Physical: H&P Update referred to the surgeon/provider.    Ready For Surgery From Anesthesia Perspective.     .

## 2022-11-29 NOTE — DISCHARGE SUMMARY
[facility] - Endoscopy  Discharge Note  Short Stay  Discharge Note  Short Stay      SUMMARY     Admit Date: 11/29/2022    Attending Physician: Brett Jasso MD     Discharge Physician: Brett Jasso MD    Discharge Date: 11/29/2022 9:03 AM    Final Diagnosis: Diarrhea, unspecified type [R19.7]  History of colon polyps [Z86.010]    Disposition: HOME OR SELF CARE    Patient Instructions:   Current Discharge Medication List        CONTINUE these medications which have NOT CHANGED    Details   albuterol-ipratropium (DUO-NEB) 2.5 mg-0.5 mg/3 mL nebulizer solution Take 3 mLs by nebulization every 6 (six) hours while awake. Rescue  Qty: 270 mL, Refills: 3    Associated Diagnoses: Bronchitis with wheezing; COPD exacerbation      cetirizine (ZYRTEC) 10 MG tablet Take 1 tablet (10 mg total) by mouth daily as needed.  Qty: 90 tablet, Refills: 3      cholecalciferol, vitamin D3, 5,000 unit Tab Take 5,000 Units by mouth once daily. VITAMIN D-3 5000 UNIT TABS      cyanocobalamin 1,000 mcg/mL injection Inject 1 mL (1,000 mcg total) into the muscle every 30 days.  Qty: 10 mL, Refills: 1      cyclobenzaprine (FLEXERIL) 10 MG tablet Take 1 tablet (10 mg total) by mouth 3 (three) times daily as needed for Muscle spasms.  Qty: 90 tablet, Refills: 3    Associated Diagnoses: Muscle spasm      diclofenac sodium (VOLTAREN) 1 % Gel Apply 2 g topically 3 (three) times daily as needed (pain).  Qty: 350 g, Refills: 11      dicyclomine (BENTYL) 10 MG capsule TAKE 1 CAPSULE BY MOUTH 4 TIMES DAILY BEFORE MEAL(S) AND AT BEDTIME AS NEEDED  Qty: 120 capsule, Refills: 0    Associated Diagnoses: Diarrhea, unspecified type; Upper abdominal pain      ezetimibe (ZETIA) 10 mg tablet Take 1 tablet (10 mg total) by mouth every evening.  Qty: 90 tablet, Refills: 3      famotidine (PEPCID) 40 MG tablet Take 1 tablet by mouth nightly  Qty: 30 tablet, Refills: 2    Associated Diagnoses: Gastroesophageal reflux disease, unspecified whether esophagitis  present      gabapentin (NEURONTIN) 300 MG capsule Take 1 capsule (300 mg total) by mouth 3 (three) times daily.  Qty: 270 capsule, Refills: 3    Associated Diagnoses: Cervical radiculopathy; Neuropathic pain      hydroCHLOROthiazide (HYDRODIURIL) 12.5 MG Tab Take 1 tablet (12.5 mg total) by mouth once daily.  Qty: 30 tablet, Refills: 11    Comments: .  Associated Diagnoses: Primary hypertension      hydrOXYzine HCL (ATARAX) 25 MG tablet Take 25 mg by mouth nightly as needed.      ibuprofen (ADVIL,MOTRIN) 800 MG tablet Take 800 mg by mouth 2 (two) times daily as needed.      levothyroxine (SYNTHROID) 50 MCG tablet TAKE 1 TABLET BY MOUTH ONCE DAILY BEFORE BREAKFAST  Qty: 90 tablet, Refills: 3      mupirocin (BACTROBAN) 2 % ointment Apply topically 2 (two) times daily as needed.  Qty: 22 g, Refills: 1      !! omeprazole (PRILOSEC) 20 MG capsule Take 2 capsules (40 mg total) by mouth once daily.  Qty: 180 capsule, Refills: 1      !! omeprazole (PRILOSEC) 20 MG capsule Take 2 capsules by mouth once daily  Qty: 180 capsule, Refills: 3      promethazine-dextromethorphan (PROMETHAZINE-DM) 6.25-15 mg/5 mL Syrp Take 5 mLs by mouth every 8 (eight) hours as needed (cough).  Qty: 180 mL, Refills: 0    Associated Diagnoses: Bronchitis with wheezing; COPD exacerbation      sertraline (ZOLOFT) 100 MG tablet Take 1 tablet by mouth twice daily  Qty: 180 tablet, Refills: 3      albuterol (VENTOLIN HFA) 90 mcg/actuation inhaler Inhale 2 puffs into the lungs every 4 (four) hours as needed for Wheezing. Rescue  Qty: 54 g, Refills: 1      azelastine (ASTELIN) 137 mcg (0.1 %) nasal spray 1 spray (137 mcg total) by Nasal route 2 (two) times daily as needed for Rhinitis.  Qty: 90 mL, Refills: 3      !! blood sugar diagnostic (ACCU-CHEK TEJA PLUS TEST STRP) Strp 1 each by Other route before breakfast. For diagnosis code E11.65  Qty: 100 strip, Refills: 3      !! blood sugar diagnostic Strp To check BG one times daily, to use with insurance  preferred meter  Qty: 100 strip, Refills: 3    Associated Diagnoses: Diabetes mellitus due to underlying condition with complication, without long-term current use of insulin; Hyperglycemia due to diabetes mellitus      blood-glucose meter kit To check BG one times daily, to use with insurance preferred meter  Qty: 1 each, Refills: 0    Associated Diagnoses: Diabetes mellitus due to underlying condition with complication, without long-term current use of insulin; Hyperglycemia due to diabetes mellitus      fluticasone-umeclidin-vilanter (TRELEGY ELLIPTA) 200-62.5-25 mcg inhaler Inhale 1 puff into the lungs once daily.      halobetasol (ULTRAVATE) 0.05 % cream Apply topically 2 (two) times daily.      hydrOXYchloroQUINE (PLAQUENIL) 200 mg tablet Take 200 mg by mouth 2 (two) times daily.      ipratropium (ATROVENT HFA) 17 mcg/actuation inhaler INHALE 2 PUFFS BY MOUTH TWICE DAILY AS DIRECTED  Qty: 38.7 g, Refills: 6      LANCETS (ACCU-CHEK MULTICLIX LANCET MISC) 1 lancet by Misc.(Non-Drug; Combo Route) route once daily.      lancets Misc To check BG one times daily, to use with insurance preferred meter  Qty: 100 each, Refills: 3    Associated Diagnoses: Diabetes mellitus due to underlying condition with complication, without long-term current use of insulin; Hyperglycemia due to diabetes mellitus      omalizumab (XOLAIR) 150 mg/mL injection Inject 150 mg into the skin every 28 days.      ondansetron (ZOFRAN) 8 MG tablet Take 1 tablet (8 mg total) by mouth every 8 (eight) hours as needed.  Qty: 90 tablet, Refills: 0    Associated Diagnoses: Nausea       !! - Potential duplicate medications found. Please discuss with provider.          Discharge Procedure Orders (must include Diet, Follow-up, Activity)    Follow Up:  Follow up with PCP as previously scheduled  Resume routine diet.  Activity as tolerated.    No driving day of procedure.

## 2022-11-29 NOTE — [employer] PATIENT INSTRUCTIONS
Discharge Summary/Instructions after an Endoscopic Procedure  Patient Name: Yara Santos  Patient MRN: [id number]  Patient YOB: 1965 Tuesday, November 29, 2022  Brett Jasso MD  Dear patient,  As a result of recent federal legislation (The Federal Cures Act), you may   receive lab or pathology results from your procedure in your [facility]   account before your physician is able to contact you. Your physician or   their representative will relay the results to you with their   recommendations at their soonest availability.  Thank you,  RESTRICTIONS:  During your procedure today, you received medications for sedation.  These   medications may affect your judgment, balance and coordination.  Therefore,   for 24 hours, you have the following restrictions:   - DO NOT drive a car, operate machinery, make legal/financial decisions,   sign important papers or drink alcohol.    ACTIVITY:  Today: no heavy lifting, straining or running due to procedural   sedation/anesthesia.  The following day: return to full activity including work.  DIET:  Eat and drink normally unless instructed otherwise.     TREATMENT FOR COMMON SIDE EFFECTS:  - Mild abdominal pain, nausea, belching, bloating or excessive gas:  rest,   eat lightly and use a heating pad.  - Sore Throat: treat with throat lozenges and/or gargle with warm salt   water.  - Because air was used during the procedure, expelling large amounts of air   from your rectum or belching is normal.  - If a bowel prep was taken, you may not have a bowel movement for 1-3 days.    This is normal.  SYMPTOMS TO WATCH FOR AND REPORT TO YOUR PHYSICIAN:  1. Abdominal pain or bloating, other than gas cramps.  2. Chest pain.  3. Back pain.  4. Signs of infection such as: chills or fever occurring within 24 hours   after the procedure.  5. Rectal bleeding, which would show as bright red, maroon, or black stools.   (A tablespoon of blood from the rectum is not serious, especially  if   hemorrhoids are present.)  6. Vomiting.  7. Weakness or dizziness.  GO DIRECTLY TO THE NEAREST EMERGENCY ROOM IF YOU HAVE ANY OF THE FOLLOWING:      Difficulty breathing              Chills and/or fever over 101 F   Persistent vomiting and/or vomiting blood   Severe abdominal pain   Severe chest pain   Black, tarry stools   Bleeding- more than one tablespoon   Any other symptom or condition that you feel may need urgent attention  Your doctor recommends these additional instructions:  If any biopsies were taken, your doctors clinic will contact you in 1 to 2   weeks with any results.  We are waiting for your pathology results.   Your physician has recommended a repeat colonoscopy in five years for   surveillance based on pathology results.   You are being discharged to home.  For questions, problems or results please call your physician - Brett Jasso MD at Work:  [phone].  EMERGENCY PHONE NUMBER: [phone], LAB RESULTS: [phone]  IF A COMPLICATION OR EMERGENCY SITUATION ARISES AND YOU ARE UNABLE TO REACH   YOUR PHYSICIAN - GO DIRECTLY TO THE EMERGENCY ROOM.  ___________________________________________  Nurse Signature  ___________________________________________  Patient/Designated Responsible Party Signature  Brett Jasso MD  11/29/2022 9:02:00 AM  This report has been verified and signed electronically.  Dear patient,  As a result of recent federal legislation (The Federal Cures Act), you may   receive lab or pathology results from your procedure in your [facility]   account before your physician is able to contact you. Your physician or   their representative will relay the results to you with their   recommendations at their soonest availability.  Thank you.  [employer]

## 2022-11-30 VITALS
TEMPERATURE: 98 F | DIASTOLIC BLOOD PRESSURE: 69 MMHG | RESPIRATION RATE: 22 BRPM | HEIGHT: 67 IN | OXYGEN SATURATION: 94 % | SYSTOLIC BLOOD PRESSURE: 110 MMHG | BODY MASS INDEX: 38.45 KG/M2 | WEIGHT: 245 LBS | HEART RATE: 80 BPM

## 2022-12-02 DIAGNOSIS — J84.9 ILD (INTERSTITIAL LUNG DISEASE): Primary | ICD-10-CM

## 2022-12-02 DIAGNOSIS — J96.11 CHRONIC HYPOXEMIC RESPIRATORY FAILURE: ICD-10-CM

## 2022-12-06 ENCOUNTER — TELEPHONE (OUTPATIENT)
Dept: PULMONOLOGY | Facility: CLINIC | Age: 57
End: 2022-12-06
Payer: MEDICARE

## 2022-12-06 NOTE — TELEPHONE ENCOUNTER
Pt was informed of results and v/u  states that she has O2 being delivered to her home tomorrow  she will call me back with name of company so I can add to her record    recent hosp stay and O2 may have been ordered while in hosp

## 2022-12-06 NOTE — TELEPHONE ENCOUNTER
----- Message from Norma Beth sent at 12/6/2022  1:44 PM CST -----  Contact: Self  Type:  Patient Returning Call    Who Called:  Patient  Who Left Message for Patient:  Le  Does the patient know what this is regarding?:  O2  Best Call Back Number:  [phone]  Additional Information:  Thank You

## 2022-12-06 NOTE — TELEPHONE ENCOUNTER
----- Message from Annette Alas MD sent at 12/5/2022  4:00 PM CST -----    ----- Message -----  From: Annette Alas MD  Sent: 12/2/2022   1:43 PM CST  To: Annette Alas MD    Ordering home O2 with exertion, goal sats >88%. Please notify pt to start wearing oxygen when being active.

## 2022-12-08 LAB
FINAL PATHOLOGIC DIAGNOSIS: NORMAL
GROSS: NORMAL
Lab: NORMAL

## 2022-12-19 ENCOUNTER — OFFICE VISIT (OUTPATIENT)
Dept: PULMONOLOGY | Facility: CLINIC | Age: 57
End: 2022-12-19
Payer: MEDICARE

## 2022-12-19 VITALS
WEIGHT: 250.69 LBS | DIASTOLIC BLOOD PRESSURE: 86 MMHG | OXYGEN SATURATION: 95 % | HEIGHT: 66 IN | SYSTOLIC BLOOD PRESSURE: 138 MMHG | HEART RATE: 93 BPM | BODY MASS INDEX: 40.29 KG/M2

## 2022-12-19 DIAGNOSIS — J96.11 CHRONIC HYPOXEMIC RESPIRATORY FAILURE: ICD-10-CM

## 2022-12-19 DIAGNOSIS — J44.9 CHRONIC OBSTRUCTIVE PULMONARY DISEASE, UNSPECIFIED COPD TYPE: ICD-10-CM

## 2022-12-19 DIAGNOSIS — J84.9 ILD (INTERSTITIAL LUNG DISEASE): Primary | ICD-10-CM

## 2022-12-19 DIAGNOSIS — U07.0 ACUTE LUNG INJURY ASSOCIATED WITH VAPING: ICD-10-CM

## 2022-12-19 DIAGNOSIS — J32.9 CHRONIC SINUSITIS, UNSPECIFIED LOCATION: ICD-10-CM

## 2022-12-19 DIAGNOSIS — F17.201 TOBACCO DEPENDENCE IN REMISSION: ICD-10-CM

## 2022-12-19 PROCEDURE — 99214 PR OFFICE/OUTPT VISIT, EST, LEVL IV, 30-39 MIN: ICD-10-PCS | Mod: S$PBB,,, | Performed by: INTERNAL MEDICINE

## 2022-12-19 PROCEDURE — 99999 PR PBB SHADOW E&M-EST. PATIENT-LVL IV: CPT | Mod: PBBFAC,,, | Performed by: INTERNAL MEDICINE

## 2022-12-19 PROCEDURE — 99999 PR PBB SHADOW E&M-EST. PATIENT-LVL IV: ICD-10-PCS | Mod: PBBFAC,,, | Performed by: INTERNAL MEDICINE

## 2022-12-19 PROCEDURE — 99214 OFFICE O/P EST MOD 30 MIN: CPT | Mod: PBBFAC,PO | Performed by: INTERNAL MEDICINE

## 2022-12-19 PROCEDURE — 99214 OFFICE O/P EST MOD 30 MIN: CPT | Mod: S$PBB,,, | Performed by: INTERNAL MEDICINE

## 2022-12-19 NOTE — PROGRESS NOTES
12/19/2022    Yara Santos  Follow up-     Chief Complaint   Patient presents with    Interstitial Lung Disease       HPI:   12/19/2022- she did qualify for home O2 2L, ordered and delivered. She is seeing Dr Garcia allergist, new blood work pending. She had allergy prick test on back which was negative. She feels dyspnea is stable. She continues to have bad cough, thick light yellow/white mucous- at baseline. It is worse at night when she lies down. She has sinus stuffiness and allergies.  She was given trelegy inhaler sample, hasn't started yet.   She has avoided smoking and vaping entirely.  She feels very sleepy, falls asleep easily. Her sister has MOLINA. Daughter states that she snores and puffs w/ pursed lips when sleeping.    10/31/2022  Pulmonary function tests  6 min walk test  Go to the lab and get blood work  May request new rheumatology eval in the future  Suspect vaping caused some of the inflammation of lung tissue- need to avoid smoking/vaping   Pt is a 58 yo female with asthma, HTN, arthritis, GERD, hypothyroidism, obesity referred for new evaluation due to ILD found on CT chest. Her daughter is present and assists w/ history.  Pt reports has had lung problems for years and years. Reports in past treated at [facility] for fungal pneumonia due to bird droppings (1998)  She has also been treated for COPD, frequent bronchitis. She recently has had exacerbation taking prednisone and z pack. Has tested neg for covid, has not checked for flu. Many sick contacts and just went on a trip. She went to altitude [place], at [facility] peak had SOB, felt fevers, dizzy, had to use oxygen and lie in bed.  Inhalers: trelegy 200 once/d- new rx. Uses rescue albuterol 2-3x/day and feels benefit.  Nebulizer with duo neb uses 2x/day  This is the first exacerbation requiring prednisone so far this year.  Quit smoking 3 yr ago, 1-2 ppd since age 8  Quit vaping 3 wks- used juul and many other e cigarettes 1/2 cartridge  daily- has entire drawer full.  She has been more short of breath gradual progressive for 1 year, symptoms vary.  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 Sister has RA with lung involvement  Has been told she has RA in past but Dr. Kaur said no RA, just OA. She sees derm with lichen planus and would use prednisone off and on, currently not on steroids.  No pets at home currently.  She lives in an older house in [place], mold visible ceilings of bathroom, earlier this year had mold in room- currently renovating.    The chief complaint problem is stable.    PFSH:  Past Medical History:   Diagnosis Date    a Premature Ventricular Contractions     Dr. Austin Collier    Allergic rhinosinusitis     Asthma     Bilateral Hip Osteoarthritis     Bilateral Knee Arthritis     Carpal tunnel syndrome     DDD (degenerative disc disease), lumbar     Depression And Anxiety     GERD     H/O Colon Polyps     Hypercholesterolemia     Hypertension     Hypothyroidism     Irritable Bowel Syndrome     Lumbar Spinal DDD     Nonalcoholic Steatohepatitis ([place]     Obesity     Osteopenia     OU Cataracts     Tarsal Tunnel Syndrome     Tobacco Use Disorder     Type 2 DM     Urinary tract infection     Vitamin B12 Deficiency     Vitamin D Deficiency          Past Surgical History:   Procedure Laterality Date     SECTION      x2    COLONOSCOPY   ?    polyps removed per pt report    COLONOSCOPY N/A 2022    Procedure: COLONOSCOPY;  Surgeon: Brett Jasso MD;  Location: [facility] ENDO;  Service: Endoscopy;  Laterality: N/A;    EPIDURAL STEROID INJECTION INTO CERVICAL SPINE N/A 2020    Procedure: Injection-steroid-epidural-cervical to left;  Surgeon: John Vinson MD;  Location: [facility] OR;  Service: Pain Management;  Laterality: N/A;    EPIDURAL STEROID INJECTION INTO LUMBAR SPINE N/A 2020    Procedure:  Injection-steroid-epidural-lumbar, l5/s1 to left;  Surgeon: John Vinson MD;  Location: [facility] OR;  Service: Pain Management;  Laterality: N/A;    EPIDURAL STEROID INJECTION INTO LUMBAR SPINE N/A 06/21/2021    Procedure: Injection-steroid-epidural-lumbar L5/S1;  Surgeon: John Vinson MD;  Location: [facility];  Service: Pain Management;  Laterality: N/A;    EPIDURAL STEROID INJECTION INTO LUMBAR SPINE N/A 6/21/2022    Procedure: Injection-steroid-epidural-lumbar L5/S1 spread to left;  Surgeon: John Vinson MD;  Location: [facility];  Service: Pain Management;  Laterality: N/A;    ESOPHAGOGASTRODUODENOSCOPY      ESOPHAGOGASTRODUODENOSCOPY N/A 11/9/2022    Procedure: EGD (ESOPHAGOGASTRODUODENOSCOPY);  Surgeon: Brett Jasso MD;  Location: [facility];  Service: Endoscopy;  Laterality: N/A;    INJECTION OF JOINT Left 9/20/2022    Procedure: INJECTION, JOINT- hip;  Surgeon: John Vinson MD;  Location: [facility];  Service: Pain Management;  Laterality: Left;    LAPAROSCOPIC CHOLECYSTECTOMY N/A 7/13/2022    Procedure: CHOLECYSTECTOMY, LAPAROSCOPIC;  Surgeon: Ra Santos MD;  Location: [facility];  Service: General;  Laterality: N/A;    TRANSFORAMINAL EPIDURAL INJECTION OF STEROID Left 8/16/2022    Procedure: Injection,steroid,epidural,transforaminal approach L3/4 and L4/5;  Surgeon: John Vinson MD;  Location: [facility];  Service: Pain Management;  Laterality: Left;    UPPER GASTROINTESTINAL ENDOSCOPY  2020 ?    unsure of results     Social History     Tobacco Use    Smoking status: Former     Packs/day: 0.00     Types: Cigarettes     Quit date: 4/2/2019     Years since quitting: 3.7    Smokeless tobacco: Never   Substance Use Topics    Alcohol use: No    Drug use: No     Family History   Problem Relation Age of Onset    Arthritis Mother     Diabetes Mother     Hyperlipidemia Mother     Cancer Mother         uterine     Allergies Mother     Ovarian cancer Mother 58    Aneurysm Father      "Hyperlipidemia Father     Breast cancer Maternal Aunt 65    Cancer Maternal Uncle         brain    Cancer Paternal Aunt         breast, bone and lung     Breast cancer Paternal Aunt 78    Breast cancer Maternal Cousin 43    Brain cancer Maternal Cousin     Cirrhosis Neg Hx      Review of patient's allergies indicates:   Allergen Reactions    Iodine and iodide containing products Blisters    Asa [aspirin] Itching and Other (See Comments)     Skin problem      Atorvastatin      Worsened Lichen Planus    Contact metal agent      Other reaction(s): Other (See Comments)    Crestor [rosuvastatin]      Worsens her lichen planus    Lasix [furosemide]      rash    Lisinopril      Hives    Losartan      Confusion    Lovastatin      Worsened Lichen Planus    Salicylates     Sulfur      Other reaction(s): Other (See Comments)    Sulfur, elemental     Valsartan      breakouts    Latex Other (See Comments)     Skin problem         Performance Status:The patient's activity level is functions out of house.      Review of Systems:  a review of eleven systems covering constitutional, Eye, HEENT, Psych, Respiratory, Cardiac, GI, , Musculoskeletal, Endocrine, Dermatologic was negative except for pertinent findings as listed ABOVE and below:  All negative with pertinent positives as above        Exam:Comprehensive exam done. /86 (BP Location: Right arm, Patient Position: Sitting, BP Method: Large (Automatic))   Pulse 93   Ht 5' 6" (1.676 m)   Wt 113.7 kg (250 lb 10.6 oz)   SpO2 95%   BMI 40.46 kg/m²   Exam included Vitals as listed, and patient's appearance and affect and alertness and mood, oral exam for yeast and hygiene and pharynx lesions and Mallapatti (M) score, neck with inspection for jvd and masses and thyroid abnormalities and lymph nodes (supraclavicular and infraclavicular nodes and axillary also examined and noted if abn), chest exam included symmetry and effort and fremitus and percussion and auscultation, " cardiac exam included rhythm and gallops and murmur and rubs and jvd and edema, abdominal exam for mass and hepatosplenomegaly and tenderness and hernias and bowel sounds, Musculoskeletal exam with muscle tone and posture and mobility/gait and  strength, and skin for rashes and cyanosis and pallor and turgor, extremity for clubbing.  Findings were normal except for pertinent findings listed below:  Oropharynx clear, M1  HR regular  Clear breath sounds bilat  No edema/clubbing/joint swelling    Radiographs (ct chest and cxr) reviewed: view by direct vision   CT chest 8/31/22- diffuse parenchymal lung disease upper lobe predominant w/ ground glass and micronodules. There is a larger calcified LLL nodule and another calcified granuloma RML. Upper lobes have some fibrotic/emphysematous changes    Labs reviewed     Latest Reference Range & Units 12/02/22 09:24   STEPHANIE Screen None Detected  None Detected   Smooth Muscle Ab <1:20  <1:20   IgG 650 - 1600 mg/dL 809   Hepatitis A Antibody IgG  See result image under hyperlink   Hep B S Ab IU/L <3.10   Hepatitis B Core Ab Total Negative  Negative   Hepatitis B Surface Ag  Negative      Latest Reference Range & Units 12/02/22 09:24   A-1 Antitrypsin 90 - 200 mg/dL 154      Latest Reference Range & Units 04/23/21 08:47 02/15/22 10:18 08/29/22 10:08   Eos # 0.0 - 0.5 K/uL 0.6 (H) 0.1 0.2      Latest Reference Range & Units 08/11/21 08:47 02/15/22 10:18 08/29/22 10:08   CO2 22 - 31 mmol/L 32 (H) 31 32 (H)      Latest Reference Range & Units 08/11/21 08:47 02/15/22 10:18 08/29/22 10:08   AST 14 - 36 U/L 48 (H) 62 (H) 71 (H)   ALT 0 - 35 U/L 47 (H) 73 (H) 69 (H)     PFT  reviewed  12/2/22- mild obstruction, no bd response, moderate reduced dlco      6mwt 12/2/22- 131.67 meters; sats 98%--> 87% with exertion, 92% on 2LPM    Plan:  Clinical impression is resonably certain and repeated evaluation prn +/- follow up will be needed as below. ILD on background of emphysema, suspect  vaping induced lung injury vs chronic HP due to mold exposure    Yara was seen today for interstitial lung disease.    Diagnoses and all orders for this visit:    ILD (interstitial lung disease)  -     CT Chest Without Contrast; Future    Acute lung injury associated with vaping    Chronic hypoxemic respiratory failure    Chronic obstructive pulmonary disease, unspecified COPD type    Tobacco dependence in remission    Chronic sinusitis, unspecified location  -     Ambulatory referral/consult to ENT; Future        Follow up in about 3 months (around 3/19/2023).    Discussed with patient above for education the following:      Patient Instructions   For lung disease- will plan to follow with repeat testing to evaluate for any progression  Due to repeat CT chest 2/2022    Ordering overnight sleep study at home to evaluate for sleep apnea  If positive will order CPAP machine, notify clinic when machine is delivered to home to set up 31 days compliance appointment. You must use the machine for a least 4 hours every night.     Agree with trelegy    Cough/mucous may be due to sinuses- referral to ENT placed for further evaluation.

## 2022-12-19 NOTE — PATIENT INSTRUCTIONS
For lung disease- will plan to follow with repeat testing to evaluate for any progression  Due to repeat CT chest 2/2022    Ordering overnight sleep study at home to evaluate for sleep apnea  If positive will order CPAP machine, notify clinic when machine is delivered to home to set up 31 days compliance appointment. You must use the machine for a least 4 hours every night.     Agree with trelegy    Cough/mucous may be due to sinuses- referral to ENT placed for further evaluation.

## 2022-12-28 ENCOUNTER — OFFICE VISIT (OUTPATIENT)
Dept: HEPATOLOGY | Facility: CLINIC | Age: 57
End: 2022-12-28
Payer: MEDICARE

## 2022-12-28 ENCOUNTER — PROCEDURE VISIT (OUTPATIENT)
Dept: HEPATOLOGY | Facility: CLINIC | Age: 57
End: 2022-12-28
Payer: MEDICARE

## 2022-12-28 VITALS
SYSTOLIC BLOOD PRESSURE: 145 MMHG | RESPIRATION RATE: 18 BRPM | BODY MASS INDEX: 40.46 KG/M2 | OXYGEN SATURATION: 96 % | HEART RATE: 84 BPM | DIASTOLIC BLOOD PRESSURE: 69 MMHG | HEIGHT: 66 IN | TEMPERATURE: 97 F

## 2022-12-28 DIAGNOSIS — K76.0 FATTY LIVER: ICD-10-CM

## 2022-12-28 DIAGNOSIS — R74.8 ELEVATED LIVER ENZYMES: ICD-10-CM

## 2022-12-28 DIAGNOSIS — K76.0 FATTY LIVER: Primary | ICD-10-CM

## 2022-12-28 DIAGNOSIS — E11.65 TYPE 2 DIABETES MELLITUS WITH HYPERGLYCEMIA, WITHOUT LONG-TERM CURRENT USE OF INSULIN: ICD-10-CM

## 2022-12-28 DIAGNOSIS — E66.01 CLASS 2 SEVERE OBESITY DUE TO EXCESS CALORIES WITH SERIOUS COMORBIDITY IN ADULT, UNSPECIFIED BMI: ICD-10-CM

## 2022-12-28 PROCEDURE — 99999 PR PBB SHADOW E&M-EST. PATIENT-LVL V: CPT | Mod: PBBFAC,,, | Performed by: NURSE PRACTITIONER

## 2022-12-28 PROCEDURE — 99999 PR PBB SHADOW E&M-EST. PATIENT-LVL V: ICD-10-PCS | Mod: PBBFAC,,, | Performed by: NURSE PRACTITIONER

## 2022-12-28 PROCEDURE — 91200 LIVER ELASTOGRAPHY: CPT | Mod: PBBFAC | Performed by: NURSE PRACTITIONER

## 2022-12-28 PROCEDURE — 99214 PR OFFICE/OUTPT VISIT, EST, LEVL IV, 30-39 MIN: ICD-10-PCS | Mod: S$PBB,,, | Performed by: NURSE PRACTITIONER

## 2022-12-28 PROCEDURE — 99214 OFFICE O/P EST MOD 30 MIN: CPT | Mod: S$PBB,,, | Performed by: NURSE PRACTITIONER

## 2022-12-28 PROCEDURE — 76981 USE PARENCHYMA: CPT | Mod: 26,S$PBB,, | Performed by: NURSE PRACTITIONER

## 2022-12-28 PROCEDURE — 99215 OFFICE O/P EST HI 40 MIN: CPT | Mod: PBBFAC | Performed by: NURSE PRACTITIONER

## 2022-12-28 PROCEDURE — 76981 FIBROSCAN NEW ORLEANS (VIBRATION CONTROLLED TRANSIENT ELASTOGRAPHY): ICD-10-PCS | Mod: 26,S$PBB,, | Performed by: NURSE PRACTITIONER

## 2022-12-28 NOTE — PROGRESS NOTES
[facility] - Established Patient    Last Clinic Visit: 11/3/22    Chief Complaint: Follow-up for fatty liver      HISTORY     This is a 57 y.o. female with PMH noted below, here for follow-up of fatty liver.    Fatty liver first noted on abd US in our system 5/18/22. Imaging with no findings to suggest advanced liver disease.    Her transaminases have been elevated since 5/2019, ALT>AST.    She has multiple family members with fatty liver.    Serologic workup has been negative for Sandro's, alpha-1 antitrypsin deficiency, hemochromatosis, autoimmune etiology, and viral hepatitis.    Fibrosis staging:   Fibroscan today = F4 (kPa 19.6), S3 ()     Health Maintenance:  - Most recent imaging: abd US 5/18/22 without focal hepatic lesion  - Hepatitis A & B vaccination: needs vaccines    Interval history:  Since our last visit, she is now using oxygen PRN (h/o COPD, ILD, and chronic hypoxemia). Has BRICEÑO.     Updates on risk factors for fatty liver:  Weight -- Body mass index is 40.46 kg/m².     Weight is up ~5 lb                     Dyslipidemia -- more elevated in August                              Insulin resistance / diabetes -- HgbA1c 6.8          Hypertension -- overall well controlled   Alcohol use -- h/o heavier alcohol use when she was a  for ~5 years though no longer drinks    Denies symptoms of hepatic decompensation including jaundice, ascites, cognitive problems to suggest hepatic encephalopathy, or GI bleeding.           Past medical history, surgical history, problem list, family history, social history, allergies: Reviewed and updated in the appropriate section of the electronic medical record.      Current Outpatient Medications   Medication Sig Dispense Refill    albuterol (VENTOLIN HFA) 90 mcg/actuation inhaler Inhale 2 puffs into the lungs every 4 (four) hours as needed for Wheezing. Rescue 54 g 1    albuterol-ipratropium (DUO-NEB) 2.5 mg-0.5 mg/3 mL nebulizer solution Take  3 mLs by nebulization every 6 (six) hours while awake. Rescue 270 mL 3    azelastine (ASTELIN) 137 mcg (0.1 %) nasal spray 1 spray (137 mcg total) by Nasal route 2 (two) times daily as needed for Rhinitis. 90 mL 3    blood sugar diagnostic (ACCU-CHEK TEJA PLUS TEST STRP) Strp 1 each by Other route before breakfast. For diagnosis code E11.65 100 strip 3    blood sugar diagnostic Strp To check BG one times daily, to use with insurance preferred meter 100 strip 3    blood-glucose meter kit To check BG one times daily, to use with insurance preferred meter 1 each 0    cholecalciferol, vitamin D3, 5,000 unit Tab Take 5,000 Units by mouth once daily. VITAMIN D-3 5000 UNIT TABS      cyanocobalamin 1,000 mcg/mL injection Inject 1 mL (1,000 mcg total) into the muscle every 30 days. 10 mL 1    cyclobenzaprine (FLEXERIL) 10 MG tablet Take 1 tablet (10 mg total) by mouth 3 (three) times daily as needed for Muscle spasms. 90 tablet 3    diclofenac sodium (VOLTAREN) 1 % Gel Apply 2 g topically 3 (three) times daily as needed (pain). 350 g 11    dicyclomine (BENTYL) 10 MG capsule TAKE 1 CAPSULE BY MOUTH 4 TIMES DAILY BEFORE MEAL(S) AND AT BEDTIME AS NEEDED 120 capsule 0    ezetimibe (ZETIA) 10 mg tablet Take 1 tablet (10 mg total) by mouth every evening. 90 tablet 3    famotidine (PEPCID) 40 MG tablet Take 1 tablet by mouth nightly 30 tablet 2    fluticasone-umeclidin-vilanter (TRELEGY ELLIPTA) 200-62.5-25 mcg inhaler Inhale 1 puff into the lungs once daily.      gabapentin (NEURONTIN) 300 MG capsule Take 1 capsule (300 mg total) by mouth 3 (three) times daily. 270 capsule 3    halobetasol (ULTRAVATE) 0.05 % cream Apply topically 2 (two) times daily.      hydroCHLOROthiazide (HYDRODIURIL) 12.5 MG Tab Take 1 tablet (12.5 mg total) by mouth once daily. 30 tablet 11    hydrOXYchloroQUINE (PLAQUENIL) 200 mg tablet Take 200 mg by mouth 2 (two) times daily.      hydrOXYzine HCL (ATARAX) 25 MG tablet Take 25 mg by mouth nightly as  needed.      ibuprofen (ADVIL,MOTRIN) 800 MG tablet Take 800 mg by mouth 2 (two) times daily as needed.      ipratropium (ATROVENT HFA) 17 mcg/actuation inhaler INHALE 2 PUFFS BY MOUTH TWICE DAILY AS DIRECTED 38.7 g 6    LANCETS (ACCU-CHEK MULTICLIX LANCET MISC) 1 lancet by Misc.(Non-Drug; Combo Route) route once daily.      lancets Misc To check BG one times daily, to use with insurance preferred meter 100 each 3    levothyroxine (SYNTHROID) 50 MCG tablet TAKE 1 TABLET BY MOUTH ONCE DAILY BEFORE BREAKFAST (Patient taking differently: Take 50 mcg by mouth before breakfast.) 90 tablet 3    mupirocin (BACTROBAN) 2 % ointment Apply topically 2 (two) times daily as needed. 22 g 1    omalizumab (XOLAIR) 150 mg/mL injection Inject 150 mg into the skin every 28 days.      omeprazole (PRILOSEC) 20 MG capsule Take 2 capsules (40 mg total) by mouth once daily. 180 capsule 1    omeprazole (PRILOSEC) 20 MG capsule Take 2 capsules by mouth once daily 180 capsule 3    promethazine-dextromethorphan (PROMETHAZINE-DM) 6.25-15 mg/5 mL Syrp Take 5 mLs by mouth every 8 (eight) hours as needed (cough). 180 mL 0    sertraline (ZOLOFT) 100 MG tablet Take 1 tablet by mouth twice daily 180 tablet 3    cetirizine (ZYRTEC) 10 MG tablet Take 1 tablet (10 mg total) by mouth daily as needed. 90 tablet 3    ondansetron (ZOFRAN) 8 MG tablet TAKE 1 TABLET BY MOUTH EVERY 8 HOURS AS NEEDED FOR NAUSEA 90 tablet 0     No current facility-administered medications for this visit.     Medication list reviewed and updated.      Review of Systems   As per HPI      Physical Exam   Constitutional: Well-nourished. No distress. Alert and oriented.  Eyes: No scleral icterus.   Pulmonary/Chest: Respiratory effort normal. No respiratory distress.   Abdominal: No distension, no ascites appreciated.   Extremities: No edema.   Neurological: No tremor or asterixis. Gait normal.  Skin: No jaundice. No spider telangiectasias or palmar erythema.  Psychiatric: Normal mood  "and affect. Speech, behavior, and thought content normal. No depression or anxiety noted.       Vitals reviewed.  BP (!) 145/69 (BP Location: Right arm, Patient Position: Sitting, BP Method: Medium (Automatic))   Pulse 84   Temp 97.4 °F (36.3 °C) (Oral)   Resp 18   Ht 5' 6" (1.676 m)   SpO2 96%   BMI 40.46 kg/m²       LABS & DIAGNOSTIC STUDIES     I have personally reviewed pertinent laboratory findings:    Lab Results   Component Value Date    ALT 66 (H) 12/02/2022    AST 86 (H) 12/02/2022    ALKPHOS 111 12/02/2022    BILITOT 0.8 12/02/2022    ALBUMIN 4.5 12/02/2022    INR 0.9 09/22/2017       Lab Results   Component Value Date    WBC 10.86 08/29/2022    HGB 14.5 08/29/2022    HCT 44.8 08/29/2022    MCV 87 08/29/2022     08/29/2022       Lab Results   Component Value Date     08/29/2022    K 4.6 08/29/2022    BUN 13 08/29/2022    CREATININE 0.78 08/29/2022    ESTGFRAFRICA >60 02/15/2022    EGFRNONAA >60 02/15/2022       Lab Results   Component Value Date    SMOOTHMUSCAB <1:20 12/02/2022    IGGSERUM 809 12/02/2022    ANASCREEN None Detected 12/02/2022    FERRITIN 12 12/02/2022    CERULOPLSM 28 12/02/2022    HEPBSAG Negative 12/02/2022    HEPCAB Negative 10/07/2019    ESA30YVEN Negative 07/01/2020       No results found for: AFP    I have personally reviewed the following result reports:  Abdominal US - 5/18/22  EGD - 11/9/22      ASSESSMENT & PLAN     57 y.o. female with:    1. Fatty liver with ?cirrhosis  -- Labs, imaging, and fibrosis staging reviewed. Fibroscan suggests cirrhosis, F4 though unclear if results are accurate. BMI 40 which may affect results. We discussed options to further assess including MRI elastography vs liver biopsy. She would like to re-stage with the MRI elasto  -- Discussed implications if determined to have advanced fibrosis/cirrhosis including need for liver cancer screening every 6 months  -- No varices or signs of portal HTN on EGD 11/2022  -- Recommend hep A/B " vaccines   -- Encouraged ongoing weight loss efforts and good control of metabolic risk factors for treatment of fatty liver  -- Consider HOUSE clinical trials pending MRI results    2. Body mass index is 40.46 kg/m²., HTN, HLD, DM  -- We reviewed her risk factors for fatty liver  -- Reviewed weight loss strategies including dietary changes and physical activity. Discussed low carbohydrate, low sugar diet. Recommend long-term weight loss goal of 10% (about 25 lb).   -- We discussed additional weight loss resources including dietician consult, [facility] program, and bariatric medicine consult.         Orders Placed This Encounter   Procedures    MR Elastography       *See AVS for patient education and instructions.      Return to clinic after MRI to review results.      Thank you for allowing me to participate in the care of [facility]        Duration of encounter: 30 min  This includes face to face time and non-face to face time preparing to see the patient (eg, review of tests), obtaining and/or reviewing separately obtained history, documenting clinical information in the electronic or other health record, independently interpreting results and communicating results to the patient/family/caregiver, or care coordination.

## 2022-12-28 NOTE — PROCEDURES
FibroScan [place] (Vibration Controlled Transient Elastography)    Date/Time: 12/28/2022 9:30 AM  Performed by: Magdalena Perales NP  Authorized by: Magdalena Perales NP     Diagnosis:  NAFLD    Probe:  XL    Universal Protocol: Patient's identity, procedure and site were verified, confirmatory pause was performed.  Discussed procedure including risks and potential complications.  Questions answered.  Patient verbalizes understanding and wishes to proceed with VCTE.     Procedure: After providing explanations of the procedure, patient was placed in the supine position with right arm in maximum abduction to allow optimal exposure of right lateral abdomen.  Patient was briefly assessed, Testing was performed in the mid-axillary location, 50Hz Shear Wave pulses were applied and the resulting Shear Wave and Propagation Speed detected with a 3.5 MHz ultrasonic signal, using the FibroScan probe, Skin to liver capsule distance and liver parenchyma were accessed during the entire examination with the FibroScan probe, Patient was instructed to breathe normally and to abstain from sudden movements during the procedure, allowing for random measurements of liver stiffness. At least 10 Shear Waves were produced, Individual measurements of each Shear Wave were calculated.  Patient tolerated the procedure well with no complications.  Meets discharge criteria as was dismissed.  Rates pain 0 out of 10.  Patient will follow up with ordering provider to review results.    Findings  Median liver stiffness score:  19.6  CAP Reading: dB/m:  342    IQR/med %:  12  Interpretation  Fibrosis interpretation is based on medial liver stiffness - Kilopascal (kPa).    Fibrosis Stage:  F4  Steatosis interpretation is based on controlled attenuation parameter - (dB/m).    Steatosis Grade:  S3

## 2022-12-28 NOTE — PATIENT INSTRUCTIONS
Plan for MRI elastography next year to further assess liver scarring/damage        FATTY LIVER EDUCATION:  There is no FDA approved therapy for non-alcoholic fatty liver disease (NAFLD); therefore, lifestyle changes are important:  1. Weight loss goal of 25 lbs    *Weight loss of 5% has been shown to reduce fat in the liver  *Weight loss of 7-10% has been shown to improve fatty liver, inflammation from fatty liver, and liver fibrosis (scarring/damage)    2. Decreasing daily calories is recommended for weight loss. Try to limit carbohydrate intake to LESS than 30-45 grams of carbs with a meal and LESS than 5-10 grams of carbs with a snack. Avoid drinking beverages with sugar/carbohydrates. Meeting with a dietician or using one of the weight loss apps below can be helpful to determine individualized calorie goals and add up carbs throughout the day. Look for snacks high in protein, low in carbohydrates/sugar.    3. Exercise as tolerated. Studies have shown that exercise improves fatty liver even without weight loss.     4. Recommend good control of cholesterol, blood pressure, blood sugar levels (as these are risk factors for fatty liver). Cholesterol lowering medications including statins are typically safe and beneficial (even if liver enzymes are elevated)    In some people, fatty liver can progress to steatohepatitis (inflamatory fatty liver). This can cause liver scarring or damage (fibrosis) and possibly to cirrhosis. Cirrhosis increases risk of liver cancer and liver failure. Lifestyle changes now can help to decrease this risk.     Ask about our fatty liver/HOUSE clinical trials if you have fibrosis / scar tissue related to fatty liver.        Additional Resources:    Websites with information about fatty liver and inflammation related to fatty liver (HOUSE): www.nashtruth.com and www.nashactually.com   [facility]: Non-Alcoholic Fatty Liver Disease (NAFLD)    Facebook support group with tips and recipes:    Non-Alcoholic Fatty Liver Disease (NAFLD) Diet & Nutrition Support     Can download SafedoX Pal or Lose It gagan to add up your carbohydrates throughout the day.      Try [email or web address] for recipes.    If interested in seeing a dietician to create a weight loss plan, contact the dietician team at [facility]: [email or web address].  You can also call to schedule a consult with a dietician: [phone]  *Virtual visits are available with one of our dieticians.     If you have diabetes or high blood pressure, you can meet with a Health  through [facility] [employer] program. Let us know if you are interested in a referral.     Let us know if you are interested in a referral to [facility].

## 2022-12-30 ENCOUNTER — PATIENT MESSAGE (OUTPATIENT)
Dept: PULMONOLOGY | Facility: CLINIC | Age: 57
End: 2022-12-30
Payer: MEDICARE

## 2023-01-24 ENCOUNTER — OFFICE VISIT (OUTPATIENT)
Dept: PAIN MEDICINE | Facility: CLINIC | Age: 58
End: 2023-01-24
Payer: MEDICARE

## 2023-01-24 VITALS
HEART RATE: 104 BPM | SYSTOLIC BLOOD PRESSURE: 144 MMHG | BODY MASS INDEX: 39.82 KG/M2 | HEIGHT: 66 IN | DIASTOLIC BLOOD PRESSURE: 65 MMHG | WEIGHT: 247.81 LBS

## 2023-01-24 DIAGNOSIS — M47.816 LUMBAR SPONDYLOSIS: ICD-10-CM

## 2023-01-24 DIAGNOSIS — M54.16 LUMBAR RADICULOPATHY: Primary | ICD-10-CM

## 2023-01-24 DIAGNOSIS — M25.552 LEFT HIP PAIN: ICD-10-CM

## 2023-01-24 DIAGNOSIS — M51.36 DDD (DEGENERATIVE DISC DISEASE), LUMBAR: ICD-10-CM

## 2023-01-24 PROCEDURE — 99999 PR PBB SHADOW E&M-EST. PATIENT-LVL V: CPT | Mod: PBBFAC,,,

## 2023-01-24 PROCEDURE — 99214 OFFICE O/P EST MOD 30 MIN: CPT | Mod: S$PBB,,,

## 2023-01-24 PROCEDURE — 99215 OFFICE O/P EST HI 40 MIN: CPT | Mod: PBBFAC,PN

## 2023-01-24 PROCEDURE — 99214 PR OFFICE/OUTPT VISIT, EST, LEVL IV, 30-39 MIN: ICD-10-PCS | Mod: S$PBB,,,

## 2023-01-24 PROCEDURE — 99999 PR PBB SHADOW E&M-EST. PATIENT-LVL V: ICD-10-PCS | Mod: PBBFAC,,,

## 2023-01-24 NOTE — PROGRESS NOTES
This note was completed with dictation software and grammatical errors may exist.    CC: neck pain, left arm pain, back pain, left leg pain    HPI:   The patient is a 57-year-old woman with a history of diabetes, rheumatoid arthritis with chronic back pain who presents in referral from Dr. Gibson for  Back pain radiating into the bilateral legs, neck pain radiating into the left arm.  She is status post left intra-articular hip injection on 09/20/2022 with 90% relief of her previous left hip pain.  Today she is reporting return of her left-sided low back pain with radiation into her left anterior thigh and down her left leg.  She reports associated numbness and tingling throughout her entire left leg.  She denies any pain radiating down her right leg or any other numbness, weakness or any changes with her bowel bladder function.  She reports chronic weakness with left hip flexion.  She denies any bladder incontinence but feels like she is having bowel incontinence recently.  She does report a current UTI infection and feels like it could be as result of that.        Pain intervention history: She had done epidural steroid injections for her back in the past, many years ago.   She is status post C7-T1 interlaminar epidural steroid injection on 09/24/2020 with 100% relief.   She is status post L5/S1 interlaminar epidural steroid injection to the left on 11/12/2020 with 50% relief of her back pain and 100% relief of her left leg pain.   She is status post L5/S1 interlaminar epidural steroid injection on 06/21/2021 with 80% relief.   She is status post L5/S1 interlaminar epidural steroid injection to the left on 06/21/2022 with 20% relief.  She is status post left L3/4 and L4/5 transforaminal epidural steroid injection on 08/16/2022 with 80% relief.  She is status post left intra-articular hip injection on 09/20/2022 with 90% relief of her previous left hip pain.     Spine surgeries:    Antineuropathics: gabapentin  400 milligrams twice daily with some relief of her upper back and low back pain  NSAIDs: cannot tolerate NSAIDs  Physical therapy: had done multiple rounds of physical therapy for her back in the past with increased pain  Antidepressants:  Muscle relaxers:  Opioids: She had received a prescription for Percocet 10/325 on 07/10/2020 which she takes sparingly, she still has some left over, does not like taking these  Antiplatelets/Anticoagulants:    She smokes marijuana on a regular basis, reports that this seems to help her pain in the neck and back and much of her arthritic pain, denies any major side effects from this.  She has used oils and edibles as well, generally has been using a vaporizer as well.    ROS:  She reports weight gain, easy bruising, diabetes, joint stiffness, joint swelling, back pain, memory loss, dizziness, difficulty sleeping, anxiety, depression and loss of balance.  Balance of review of systems is negative.    Lab Results   Component Value Date    HGBA1C 6.8 (H) 2022       Lab Results   Component Value Date    WBC 10.86 2022    HGB 14.5 2022    HCT 44.8 2022    MCV 87 2022     2022             Past Medical History:   Diagnosis Date    a Premature Ventricular Contractions     Dr. Austin Collier    Allergic rhinosinusitis     Asthma     Bilateral Hip Osteoarthritis     Bilateral Knee Arthritis     Carpal tunnel syndrome     DDD (degenerative disc disease), lumbar     Depression And Anxiety     GERD     H/O Colon Polyps     Hypercholesterolemia     Hypertension     Hypothyroidism     Irritable Bowel Syndrome     Lumbar Spinal DDD     Nonalcoholic Steatohepatitis ([place]     Obesity     Osteopenia     OU Cataracts     Tarsal Tunnel Syndrome     Tobacco Use Disorder     Type 2 DM     Urinary tract infection     Vitamin B12 Deficiency     Vitamin D Deficiency        Past Surgical History:   Procedure Laterality Date     SECTION      x2     COLONOSCOPY  2020 ?    polyps removed per pt report    COLONOSCOPY N/A 11/29/2022    Procedure: COLONOSCOPY;  Surgeon: Brett Jasso MD;  Location: [facility];  Service: Endoscopy;  Laterality: N/A;    EPIDURAL STEROID INJECTION INTO CERVICAL SPINE N/A 09/24/2020    Procedure: Injection-steroid-epidural-cervical to left;  Surgeon: John Vinson MD;  Location: [facility];  Service: Pain Management;  Laterality: N/A;    EPIDURAL STEROID INJECTION INTO LUMBAR SPINE N/A 11/12/2020    Procedure: Injection-steroid-epidural-lumbar, l5/s1 to left;  Surgeon: John Vinson MD;  Location: [facility];  Service: Pain Management;  Laterality: N/A;    EPIDURAL STEROID INJECTION INTO LUMBAR SPINE N/A 06/21/2021    Procedure: Injection-steroid-epidural-lumbar L5/S1;  Surgeon: John Vinson MD;  Location: [facility];  Service: Pain Management;  Laterality: N/A;    EPIDURAL STEROID INJECTION INTO LUMBAR SPINE N/A 6/21/2022    Procedure: Injection-steroid-epidural-lumbar L5/S1 spread to left;  Surgeon: John Vinson MD;  Location: [facility];  Service: Pain Management;  Laterality: N/A;    ESOPHAGOGASTRODUODENOSCOPY      ESOPHAGOGASTRODUODENOSCOPY N/A 11/9/2022    Procedure: EGD (ESOPHAGOGASTRODUODENOSCOPY);  Surgeon: Brett Jasso MD;  Location: [facility];  Service: Endoscopy;  Laterality: N/A;    INJECTION OF JOINT Left 9/20/2022    Procedure: INJECTION, JOINT- hip;  Surgeon: John Vinson MD;  Location: [facility];  Service: Pain Management;  Laterality: Left;    LAPAROSCOPIC CHOLECYSTECTOMY N/A 7/13/2022    Procedure: CHOLECYSTECTOMY, LAPAROSCOPIC;  Surgeon: Ra Santos MD;  Location: [facility];  Service: General;  Laterality: N/A;    TRANSFORAMINAL EPIDURAL INJECTION OF STEROID Left 8/16/2022    Procedure: Injection,steroid,epidural,transforaminal approach L3/4 and L4/5;  Surgeon: John Vinson MD;  Location: [facility];  Service: Pain Management;  Laterality: Left;    UPPER GASTROINTESTINAL  "ENDOSCOPY  2020 ?    unsure of results       Social History     Socioeconomic History    Marital status:    Tobacco Use    Smoking status: Former     Packs/day: 0.00     Types: Cigarettes     Quit date: 4/2/2019     Years since quitting: 3.8    Smokeless tobacco: Never   Substance and Sexual Activity    Alcohol use: No    Drug use: No     Social Determinants of Health     Financial Resource Strain: High Risk    Difficulty of Paying Living Expenses: Very hard   Food Insecurity: Food Insecurity Present    Worried About Running Out of Food in the Last Year: Sometimes true    Ran Out of Food in the Last Year: Sometimes true   Transportation Needs: Unmet Transportation Needs    Lack of Transportation (Medical): Yes    Lack of Transportation (Non-Medical): Yes   Physical Activity: Inactive    Days of Exercise per Week: 0 days    Minutes of Exercise per Session: 0 min   Stress: Stress Concern Present    Feeling of Stress : Very much   Social Connections: Unknown    Frequency of Communication with Friends and Family: Never    Frequency of Social Gatherings with Friends and Family: Never    Active Member of Clubs or Organizations: No    Attends Club or Organization Meetings: Never    Marital Status:    Housing Stability: Unknown    Unable to Pay for Housing in the Last Year: Patient refused    Number of Places Lived in the Last Year: 1    Unstable Housing in the Last Year: No         Medications/Allergies: See med card    Vitals:    01/24/23 1522   BP: (!) 144/65   Pulse: 104   Weight: 112.4 kg (247 lb 12.8 oz)   Height: 5' 6" (1.676 m)   PainSc:   7   PainLoc: Back         Physical exam:  Gen: A and O x3, pleasant, well-groomed  Skin: No rashes or obvious lesions  HEENT: PERRLA, no obvious deformities on ears or in canals.Trachea midline.  CVS: Regular rate and rhythm, normal palpable pulses.  Resp: Clear to auscultation bilaterally, no wheezes or rales.  Abdomen: Soft, NT/ND.  Musculoskeletal: Able to heel " walk, toe walk. No antalgic gait.     Neuro:  Lower extremities: 5/5 strength bilaterally  Reflexes:  Patellar 1+, Achilles 1+ bilaterally.  Sensory: Intact and symmetrical to light touch and pinprick in C2-T1 dermatomes bilaterally , except for decreased sensation to light touch throughout the 1st through 3rd digits and left lateral forearm.  Intact and symmetrical to light touch and pinprick in L2-S1 dermatomes bilaterally.        Lumbar spine:  Lumbar spine:   Range of motion is moderately decreased with flexion with mild increased low back pain during each maneuver.  Rony's test causes no increased pain on either side.    Supine straight leg raise is negative bilaterally.  Internal and external rotation of the hip causes severe groin pain bilaterally.  Myofascial exam:  There is tenderness to palpation over the midline lumbar spine and lumbar paraspinous musculature.      Imagin20 MRI C-spine:  ALIGNMENT: Normal.  Lateral masses of C1 and C2 are congruent.  Slight reversal of the normal lordotic curvature.   BONES: Vertebral body heights are maintained.  Multilevel endplate changes with mild type 1 endplate changes at C4-5.  No aggressive bone marrow signal.   PARASPINAL AREA: Normal.   CERVICAL DISC LEVELS:  C2-C3: No disc herniation or significant posterior osseous ridging. No significant spinal canal or foraminal stenosis.   C3-C4: Mild disc osteophyte complex with prominent central component.  Moderate left facet hypertrophy.  Slight ventral cord flattening with preserved ventral and dorsal CSF.  Mild left foraminal stenosis.   C4-C5: Mild disc osteophyte complex.  Mild-moderate left facet hypertrophy.  Mild ventral cord flattening within sliver preserved ventral and preserved dorsal CSF.  Moderate right and mild-moderate left foraminal stenosis.   C5-C6: Mild-moderate disc osteophyte complex with prominent bilateral uncovertebral spurring.  Mild ventral cord flattening within sliver preserved  ventral and preserved dorsal CSF.  Moderate-severe bilateral foraminal stenosis.   C6-C7: Mild-moderate disc osteophyte complex.  Slight ventral cord flattening with preserved ventral and dorsal CSF.  Moderate left and mild right foraminal stenosis.   C7-T1: No disc herniation or significant posterior osseous ridging.  Mild left facet hypertrophy.  No significant spinal canal or foraminal stenosis.     6/4/18 MRI C-spine:  There is mild lumbar dextrocurvature.  The vertebral body alignment and vertebral body heights are maintained.  The paravertebral soft tissues appear within normal limits.  No marrow replacement process or fracture.  The visualized distal spinal cord and conus medullaris are within normal limits.  The conus terminates at L1.   L1-2: Normal disc signal and disc space height.  Minimal disc bulge seen.  No central canal foraminal stenosis   L2-3: There is disc desiccation and mild disc space narrowing.  There is mild moderate diffuse disc bulge asymmetric right results in mild right lateral recess narrowing with mild abutment of the descending right L3 nerve root.  There is mild moderate right-sided foraminal stenosis.  No central canal stenosis   L3-4: There is disc desiccation.  There is mild moderate diffuse disc bulge.  There is mild bilateral facet arthrosis.  There is mild moderate left neural foraminal stenosis.  There is no central canal stenosis.   L4-5 there is disc desiccation and mild disc space narrowing.  There is mild moderate bilateral facet arthrosis.  There is moderate diffuse disc bulge asymmetric left resulting in mild moderate left-sided foraminal stenosis with mild abutment of the exited left L4 nerve root.  No central canal stenosis.   L5-S1: There is mild diffuse disc bulge.  There is moderate to severe right and mild moderate left facet arthrosis.  There is no central canal stenosis or significant neural foraminal narrowing.      X-ray right and left hip 08/28/2018:  Normal  right and left hips.  No significant osteoarthritis.    DEXA 7/29/19:  Spine bone mineral density is 1.047 g/cm 2 with T-score -1.1 and Z-score -1.4.  This compares to previous bone mineral density measurement of 1.075 and T-score of -0.9.  Femoral total mean bone mineral density measurement is 0.958 g/cm 2 with T-score -0.4 and Z-score -0.5.  This compares to previous bone mineral density measurement of 1.028 and T-score of 0.2.  FRAX measurement is provided of 10 year major osteoporotic fracture 10.9 % and hip fracture 0.8 %.    Assessment:  The patient is a 57-year-old woman with a history of diabetes, rheumatoid arthritis with chronic back pain who presents in referral from Dr. Gibson for  Back pain radiating into the bilateral legs, neck pain radiating into the left arm.    1. Lumbar radiculopathy        2. Left hip pain  Ambulatory referral/consult to Orthopedics      3. DDD (degenerative disc disease), lumbar        4. Lumbar spondylosis                Plan:  For her left hip pain, I would like to refer her to orthopedics for further evaluation.  She previously had excellent relief from left intra-articular hip injection.  I believe her left-sided radicular pain has returned.  I would like to schedule her for a repeat left L3/4 and L4/5 transforaminal CORBY.  She is done well with these in the past providing her greater than 80% relief lasting over 4 months.  Prior to scheduling her for this, I would like her to follow-up with her primary care for her current UTI infection.  She will call our office back when she is completed her antibiotics and is no longer having any infections.  I do not feel like her bowel incontinence is related to her lumbar spine as she does not have any significant narrowing that would likely be contributing to this.

## 2023-01-24 NOTE — H&P (VIEW-ONLY)
This note was completed with dictation software and grammatical errors may exist.    CC: neck pain, left arm pain, back pain, left leg pain    HPI:   The patient is a 57-year-old woman with a history of diabetes, rheumatoid arthritis with chronic back pain who presents in referral from Dr. Gibson for  Back pain radiating into the bilateral legs, neck pain radiating into the left arm.  She is status post left intra-articular hip injection on 09/20/2022 with 90% relief of her previous left hip pain.  Today she is reporting return of her left-sided low back pain with radiation into her left anterior thigh and down her left leg.  She reports associated numbness and tingling throughout her entire left leg.  She denies any pain radiating down her right leg or any other numbness, weakness or any changes with her bowel bladder function.  She reports chronic weakness with left hip flexion.  She denies any bladder incontinence but feels like she is having bowel incontinence recently.  She does report a current UTI infection and feels like it could be as result of that.        Pain intervention history: She had done epidural steroid injections for her back in the past, many years ago.   She is status post C7-T1 interlaminar epidural steroid injection on 09/24/2020 with 100% relief.   She is status post L5/S1 interlaminar epidural steroid injection to the left on 11/12/2020 with 50% relief of her back pain and 100% relief of her left leg pain.   She is status post L5/S1 interlaminar epidural steroid injection on 06/21/2021 with 80% relief.   She is status post L5/S1 interlaminar epidural steroid injection to the left on 06/21/2022 with 20% relief.  She is status post left L3/4 and L4/5 transforaminal epidural steroid injection on 08/16/2022 with 80% relief.  She is status post left intra-articular hip injection on 09/20/2022 with 90% relief of her previous left hip pain.     Spine surgeries:    Antineuropathics: gabapentin  400 milligrams twice daily with some relief of her upper back and low back pain  NSAIDs: cannot tolerate NSAIDs  Physical therapy: had done multiple rounds of physical therapy for her back in the past with increased pain  Antidepressants:  Muscle relaxers:  Opioids: She had received a prescription for Percocet 10/325 on 07/10/2020 which she takes sparingly, she still has some left over, does not like taking these  Antiplatelets/Anticoagulants:    She smokes marijuana on a regular basis, reports that this seems to help her pain in the neck and back and much of her arthritic pain, denies any major side effects from this.  She has used oils and edibles as well, generally has been using a vaporizer as well.    ROS:  She reports weight gain, easy bruising, diabetes, joint stiffness, joint swelling, back pain, memory loss, dizziness, difficulty sleeping, anxiety, depression and loss of balance.  Balance of review of systems is negative.    Lab Results   Component Value Date    HGBA1C 6.8 (H) 2022       Lab Results   Component Value Date    WBC 10.86 2022    HGB 14.5 2022    HCT 44.8 2022    MCV 87 2022     2022             Past Medical History:   Diagnosis Date    a Premature Ventricular Contractions     Dr. Austin Collier    Allergic rhinosinusitis     Asthma     Bilateral Hip Osteoarthritis     Bilateral Knee Arthritis     Carpal tunnel syndrome     DDD (degenerative disc disease), lumbar     Depression And Anxiety     GERD     H/O Colon Polyps     Hypercholesterolemia     Hypertension     Hypothyroidism     Irritable Bowel Syndrome     Lumbar Spinal DDD     Nonalcoholic Steatohepatitis ([place]     Obesity     Osteopenia     OU Cataracts     Tarsal Tunnel Syndrome     Tobacco Use Disorder     Type 2 DM     Urinary tract infection     Vitamin B12 Deficiency     Vitamin D Deficiency        Past Surgical History:   Procedure Laterality Date     SECTION      x2     COLONOSCOPY  2020 ?    polyps removed per pt report    COLONOSCOPY N/A 11/29/2022    Procedure: COLONOSCOPY;  Surgeon: Brett Jasso MD;  Location: [facility];  Service: Endoscopy;  Laterality: N/A;    EPIDURAL STEROID INJECTION INTO CERVICAL SPINE N/A 09/24/2020    Procedure: Injection-steroid-epidural-cervical to left;  Surgeon: John Vinson MD;  Location: [facility];  Service: Pain Management;  Laterality: N/A;    EPIDURAL STEROID INJECTION INTO LUMBAR SPINE N/A 11/12/2020    Procedure: Injection-steroid-epidural-lumbar, l5/s1 to left;  Surgeon: John Vinson MD;  Location: [facility];  Service: Pain Management;  Laterality: N/A;    EPIDURAL STEROID INJECTION INTO LUMBAR SPINE N/A 06/21/2021    Procedure: Injection-steroid-epidural-lumbar L5/S1;  Surgeon: John Vinson MD;  Location: [facility];  Service: Pain Management;  Laterality: N/A;    EPIDURAL STEROID INJECTION INTO LUMBAR SPINE N/A 6/21/2022    Procedure: Injection-steroid-epidural-lumbar L5/S1 spread to left;  Surgeon: John Vinson MD;  Location: [facility];  Service: Pain Management;  Laterality: N/A;    ESOPHAGOGASTRODUODENOSCOPY      ESOPHAGOGASTRODUODENOSCOPY N/A 11/9/2022    Procedure: EGD (ESOPHAGOGASTRODUODENOSCOPY);  Surgeon: Brett Jasso MD;  Location: [facility];  Service: Endoscopy;  Laterality: N/A;    INJECTION OF JOINT Left 9/20/2022    Procedure: INJECTION, JOINT- hip;  Surgeon: John Vinson MD;  Location: [facility];  Service: Pain Management;  Laterality: Left;    LAPAROSCOPIC CHOLECYSTECTOMY N/A 7/13/2022    Procedure: CHOLECYSTECTOMY, LAPAROSCOPIC;  Surgeon: Ra Santos MD;  Location: [facility];  Service: General;  Laterality: N/A;    TRANSFORAMINAL EPIDURAL INJECTION OF STEROID Left 8/16/2022    Procedure: Injection,steroid,epidural,transforaminal approach L3/4 and L4/5;  Surgeon: John Vinson MD;  Location: [facility];  Service: Pain Management;  Laterality: Left;    UPPER GASTROINTESTINAL  "ENDOSCOPY  2020 ?    unsure of results       Social History     Socioeconomic History    Marital status:    Tobacco Use    Smoking status: Former     Packs/day: 0.00     Types: Cigarettes     Quit date: 4/2/2019     Years since quitting: 3.8    Smokeless tobacco: Never   Substance and Sexual Activity    Alcohol use: No    Drug use: No     Social Determinants of Health     Financial Resource Strain: High Risk    Difficulty of Paying Living Expenses: Very hard   Food Insecurity: Food Insecurity Present    Worried About Running Out of Food in the Last Year: Sometimes true    Ran Out of Food in the Last Year: Sometimes true   Transportation Needs: Unmet Transportation Needs    Lack of Transportation (Medical): Yes    Lack of Transportation (Non-Medical): Yes   Physical Activity: Inactive    Days of Exercise per Week: 0 days    Minutes of Exercise per Session: 0 min   Stress: Stress Concern Present    Feeling of Stress : Very much   Social Connections: Unknown    Frequency of Communication with Friends and Family: Never    Frequency of Social Gatherings with Friends and Family: Never    Active Member of Clubs or Organizations: No    Attends Club or Organization Meetings: Never    Marital Status:    Housing Stability: Unknown    Unable to Pay for Housing in the Last Year: Patient refused    Number of Places Lived in the Last Year: 1    Unstable Housing in the Last Year: No         Medications/Allergies: See med card    Vitals:    01/24/23 1522   BP: (!) 144/65   Pulse: 104   Weight: 112.4 kg (247 lb 12.8 oz)   Height: 5' 6" (1.676 m)   PainSc:   7   PainLoc: Back         Physical exam:  Gen: A and O x3, pleasant, well-groomed  Skin: No rashes or obvious lesions  HEENT: PERRLA, no obvious deformities on ears or in canals.Trachea midline.  CVS: Regular rate and rhythm, normal palpable pulses.  Resp: Clear to auscultation bilaterally, no wheezes or rales.  Abdomen: Soft, NT/ND.  Musculoskeletal: Able to heel " walk, toe walk. No antalgic gait.     Neuro:  Lower extremities: 5/5 strength bilaterally  Reflexes:  Patellar 1+, Achilles 1+ bilaterally.  Sensory: Intact and symmetrical to light touch and pinprick in C2-T1 dermatomes bilaterally , except for decreased sensation to light touch throughout the 1st through 3rd digits and left lateral forearm.  Intact and symmetrical to light touch and pinprick in L2-S1 dermatomes bilaterally.        Lumbar spine:  Lumbar spine:   Range of motion is moderately decreased with flexion with mild increased low back pain during each maneuver.  Rony's test causes no increased pain on either side.    Supine straight leg raise is negative bilaterally.  Internal and external rotation of the hip causes severe groin pain bilaterally.  Myofascial exam:  There is tenderness to palpation over the midline lumbar spine and lumbar paraspinous musculature.      Imagin20 MRI C-spine:  ALIGNMENT: Normal.  Lateral masses of C1 and C2 are congruent.  Slight reversal of the normal lordotic curvature.   BONES: Vertebral body heights are maintained.  Multilevel endplate changes with mild type 1 endplate changes at C4-5.  No aggressive bone marrow signal.   PARASPINAL AREA: Normal.   CERVICAL DISC LEVELS:  C2-C3: No disc herniation or significant posterior osseous ridging. No significant spinal canal or foraminal stenosis.   C3-C4: Mild disc osteophyte complex with prominent central component.  Moderate left facet hypertrophy.  Slight ventral cord flattening with preserved ventral and dorsal CSF.  Mild left foraminal stenosis.   C4-C5: Mild disc osteophyte complex.  Mild-moderate left facet hypertrophy.  Mild ventral cord flattening within sliver preserved ventral and preserved dorsal CSF.  Moderate right and mild-moderate left foraminal stenosis.   C5-C6: Mild-moderate disc osteophyte complex with prominent bilateral uncovertebral spurring.  Mild ventral cord flattening within sliver preserved  ventral and preserved dorsal CSF.  Moderate-severe bilateral foraminal stenosis.   C6-C7: Mild-moderate disc osteophyte complex.  Slight ventral cord flattening with preserved ventral and dorsal CSF.  Moderate left and mild right foraminal stenosis.   C7-T1: No disc herniation or significant posterior osseous ridging.  Mild left facet hypertrophy.  No significant spinal canal or foraminal stenosis.     6/4/18 MRI C-spine:  There is mild lumbar dextrocurvature.  The vertebral body alignment and vertebral body heights are maintained.  The paravertebral soft tissues appear within normal limits.  No marrow replacement process or fracture.  The visualized distal spinal cord and conus medullaris are within normal limits.  The conus terminates at L1.   L1-2: Normal disc signal and disc space height.  Minimal disc bulge seen.  No central canal foraminal stenosis   L2-3: There is disc desiccation and mild disc space narrowing.  There is mild moderate diffuse disc bulge asymmetric right results in mild right lateral recess narrowing with mild abutment of the descending right L3 nerve root.  There is mild moderate right-sided foraminal stenosis.  No central canal stenosis   L3-4: There is disc desiccation.  There is mild moderate diffuse disc bulge.  There is mild bilateral facet arthrosis.  There is mild moderate left neural foraminal stenosis.  There is no central canal stenosis.   L4-5 there is disc desiccation and mild disc space narrowing.  There is mild moderate bilateral facet arthrosis.  There is moderate diffuse disc bulge asymmetric left resulting in mild moderate left-sided foraminal stenosis with mild abutment of the exited left L4 nerve root.  No central canal stenosis.   L5-S1: There is mild diffuse disc bulge.  There is moderate to severe right and mild moderate left facet arthrosis.  There is no central canal stenosis or significant neural foraminal narrowing.      X-ray right and left hip 08/28/2018:  Normal  right and left hips.  No significant osteoarthritis.    DEXA 7/29/19:  Spine bone mineral density is 1.047 g/cm 2 with T-score -1.1 and Z-score -1.4.  This compares to previous bone mineral density measurement of 1.075 and T-score of -0.9.  Femoral total mean bone mineral density measurement is 0.958 g/cm 2 with T-score -0.4 and Z-score -0.5.  This compares to previous bone mineral density measurement of 1.028 and T-score of 0.2.  FRAX measurement is provided of 10 year major osteoporotic fracture 10.9 % and hip fracture 0.8 %.    Assessment:  The patient is a 57-year-old woman with a history of diabetes, rheumatoid arthritis with chronic back pain who presents in referral from Dr. Gibson for  Back pain radiating into the bilateral legs, neck pain radiating into the left arm.    1. Lumbar radiculopathy        2. Left hip pain  Ambulatory referral/consult to Orthopedics      3. DDD (degenerative disc disease), lumbar        4. Lumbar spondylosis                Plan:  For her left hip pain, I would like to refer her to orthopedics for further evaluation.  She previously had excellent relief from left intra-articular hip injection.  I believe her left-sided radicular pain has returned.  I would like to schedule her for a repeat left L3/4 and L4/5 transforaminal CORBY.  She is done well with these in the past providing her greater than 80% relief lasting over 4 months.  Prior to scheduling her for this, I would like her to follow-up with her primary care for her current UTI infection.  She will call our office back when she is completed her antibiotics and is no longer having any infections.  I do not feel like her bowel incontinence is related to her lumbar spine as she does not have any significant narrowing that would likely be contributing to this.

## 2023-01-27 ENCOUNTER — TELEPHONE (OUTPATIENT)
Dept: PULMONOLOGY | Facility: CLINIC | Age: 58
End: 2023-01-27
Payer: MEDICARE

## 2023-01-27 DIAGNOSIS — G47.33 OSA (OBSTRUCTIVE SLEEP APNEA): Primary | ICD-10-CM

## 2023-01-27 NOTE — TELEPHONE ENCOUNTER
----- Message from Annette Alas MD sent at 1/27/2023  1:49 PM CST -----  Please let pt know sleep study does show moderate sleep apnea. I am ordering cpap machine. Follow up 30 days after starting cpap with compliance report assessment.

## 2023-01-27 NOTE — TELEPHONE ENCOUNTER
Pt was informed of results and recommendations  she has an appt on 3/20 so explained to contact the office once equip has been recvd and then we can decide if appt needs to be moved  pt v/u

## 2023-02-03 ENCOUNTER — TELEPHONE (OUTPATIENT)
Dept: PAIN MEDICINE | Facility: CLINIC | Age: 58
End: 2023-02-03
Payer: MEDICARE

## 2023-02-03 DIAGNOSIS — M54.16 LUMBAR RADICULOPATHY: Primary | ICD-10-CM

## 2023-02-03 RX ORDER — ALPRAZOLAM 0.5 MG/1
1 TABLET, ORALLY DISINTEGRATING ORAL ONCE AS NEEDED
Status: CANCELLED | OUTPATIENT
Start: 2023-02-03 | End: 2034-07-02

## 2023-02-03 NOTE — TELEPHONE ENCOUNTER
OK to schedule one week after completes antibiotics for UTI.     Physician - Dr Vinson    Type of Procedure/Injection - Lumbar Transforaminal Epidural  L3/4 and L4/5           Laterality - Left      Type of Sedation - Local      Need to hold medication - yes      NSAIDs for 2 days      Clearance needed - no      Follow up - 4 week

## 2023-02-03 NOTE — TELEPHONE ENCOUNTER
believe her left-sided radicular pain has returned.  I would like to schedule her for a repeat left L3/4 and L4/5 transforaminal CORBY.       Ok to schedule this injection?      Last seen 01/24 Harrison is not in office until 02/07 please advise. Thank you

## 2023-02-03 NOTE — TELEPHONE ENCOUNTER
----- Message from Yony Canas sent at 2/3/2023  1:43 PM CST -----  Type:  Sooner Appointment Request    Caller is requesting a sooner appointment.  Caller declined first available appointment listed below.  Caller will not accept being placed on the waitlist and is requesting a message be sent to doctor.    Name of Caller:  Patient  When is the first available appointment?    Symptoms:  Procedure-- Epidural   Best Call Back Number:  [phone]  Additional Information:

## 2023-02-08 PROBLEM — L50.1 IDIOPATHIC URTICARIA: Status: ACTIVE | Noted: 2023-02-08

## 2023-02-15 ENCOUNTER — OFFICE VISIT (OUTPATIENT)
Dept: GASTROENTEROLOGY | Facility: CLINIC | Age: 58
End: 2023-02-15
Payer: MEDICARE

## 2023-02-15 ENCOUNTER — HOSPITAL ENCOUNTER (OUTPATIENT)
Dept: RADIOLOGY | Facility: HOSPITAL | Age: 58
Discharge: HOME | End: 2023-02-15
Attending: INTERNAL MEDICINE
Payer: MEDICARE

## 2023-02-15 VITALS — HEIGHT: 66 IN | WEIGHT: 249.31 LBS | BODY MASS INDEX: 40.07 KG/M2

## 2023-02-15 DIAGNOSIS — R11.2 NAUSEA AND VOMITING, UNSPECIFIED VOMITING TYPE: ICD-10-CM

## 2023-02-15 DIAGNOSIS — K21.9 GASTROESOPHAGEAL REFLUX DISEASE WITHOUT ESOPHAGITIS: ICD-10-CM

## 2023-02-15 DIAGNOSIS — K29.70 GASTRITIS WITHOUT BLEEDING, UNSPECIFIED CHRONICITY, UNSPECIFIED GASTRITIS TYPE: ICD-10-CM

## 2023-02-15 DIAGNOSIS — Z90.49 S/P CHOLECYSTECTOMY: ICD-10-CM

## 2023-02-15 DIAGNOSIS — J84.9 ILD (INTERSTITIAL LUNG DISEASE): ICD-10-CM

## 2023-02-15 DIAGNOSIS — K58.0 IRRITABLE BOWEL SYNDROME WITH DIARRHEA: ICD-10-CM

## 2023-02-15 DIAGNOSIS — Z98.890 HISTORY OF COLONOSCOPY: ICD-10-CM

## 2023-02-15 DIAGNOSIS — Z86.010 HISTORY OF COLON POLYPS: ICD-10-CM

## 2023-02-15 DIAGNOSIS — Z98.890 HISTORY OF ESOPHAGOGASTRODUODENOSCOPY (EGD): Primary | ICD-10-CM

## 2023-02-15 DIAGNOSIS — R10.13 EPIGASTRIC PAIN: ICD-10-CM

## 2023-02-15 PROCEDURE — 71250 CT THORAX DX C-: CPT | Mod: 26,,, | Performed by: RADIOLOGY

## 2023-02-15 PROCEDURE — 99999 PR PBB SHADOW E&M-EST. PATIENT-LVL V: ICD-10-PCS | Mod: PBBFAC,,, | Performed by: NURSE PRACTITIONER

## 2023-02-15 PROCEDURE — 99214 PR OFFICE/OUTPT VISIT, EST, LEVL IV, 30-39 MIN: ICD-10-PCS | Mod: S$PBB,,, | Performed by: NURSE PRACTITIONER

## 2023-02-15 PROCEDURE — 99214 OFFICE O/P EST MOD 30 MIN: CPT | Mod: S$PBB,,, | Performed by: NURSE PRACTITIONER

## 2023-02-15 PROCEDURE — 71250 CT CHEST WITHOUT CONTRAST: ICD-10-PCS | Mod: 26,,, | Performed by: RADIOLOGY

## 2023-02-15 PROCEDURE — 99999 PR PBB SHADOW E&M-EST. PATIENT-LVL V: CPT | Mod: PBBFAC,,, | Performed by: NURSE PRACTITIONER

## 2023-02-15 PROCEDURE — 99215 OFFICE O/P EST HI 40 MIN: CPT | Mod: PBBFAC,25,PO | Performed by: NURSE PRACTITIONER

## 2023-02-15 PROCEDURE — 71250 CT THORAX DX C-: CPT | Mod: TC

## 2023-02-15 RX ORDER — FLUTICASONE PROPIONATE AND SALMETEROL 50; 500 UG/1; UG/1
POWDER RESPIRATORY (INHALATION) 2 TIMES DAILY
COMMUNITY
Start: 2023-02-03 | End: 2023-08-30 | Stop reason: SDUPTHER

## 2023-02-15 RX ORDER — SUCRALFATE 1 G/1
1 TABLET ORAL 4 TIMES DAILY
Qty: 120 TABLET | Refills: 0 | Status: SHIPPED | OUTPATIENT
Start: 2023-02-15 | End: 2023-03-10 | Stop reason: SDUPTHER

## 2023-02-15 NOTE — PROGRESS NOTES
Other Documents/obs done Subjective:       Patient ID: Yara Santos is a 57 y.o. female, Body mass index is 40.24 kg/m².    Chief Complaint: Follow-up      Established patient of Dr. Jasso & myself.     Here with daughter, whom assisted with interview.     Abdominal Pain  This is a chronic problem. The current episode started more than 1 month ago. The onset quality is sudden. The problem occurs intermittently. The problem has been unchanged. The pain is located in the epigastric region and periumbilical region. The quality of the pain is aching, sharp and cramping. The abdominal pain does not radiate. Associated symptoms include diarrhea (chronic problem; hx of IBS; well controlled taking Bentyl 10 mg QID PRN) and nausea (Zofran helps). Pertinent negatives include no anorexia, belching, constipation, dysuria, fever, flatus, frequency, hematochezia, melena, vomiting or weight loss. The pain is aggravated by certain positions, palpation and movement (bending forward). The pain is relieved by Nothing. She has tried proton pump inhibitors and H2 blockers (Currently: Omeprazole 20 mg 2 capsules once daily and Famotidine 40 mg nightly- helps heartburn) for the symptoms. Prior diagnostic workup includes GI consult, ultrasound, lower endoscopy and upper endoscopy. Her past medical history is significant for abdominal surgery, gallstones (Hx of cholecystectomy), GERD and irritable bowel syndrome. There is no history of colon cancer, Crohn's disease, pancreatitis, PUD or ulcerative colitis.   Review of Systems   Constitutional:  Negative for appetite change, fever, unexpected weight change and weight loss.   HENT:  Negative for trouble swallowing.    Respiratory:  Positive for shortness of breath (on exertion; requires oxygen use). Negative for cough.    Cardiovascular:  Negative for chest pain.   Gastrointestinal:  Positive for abdominal pain, diarrhea (chronic problem; hx of IBS; well controlled taking Bentyl 10 mg QID PRN) and nausea  (Zofran helps). Negative for abdominal distention, anal bleeding, anorexia, blood in stool, constipation, flatus, hematochezia, melena, rectal pain and vomiting.   Genitourinary:  Negative for difficulty urinating, dysuria and frequency.   Musculoskeletal:  Positive for gait problem.   Skin:  Negative for rash.   Neurological:  Negative for speech difficulty.   Psychiatric/Behavioral:  Negative for confusion.      Past Medical History:   Diagnosis Date    a Premature Ventricular Contractions     Dr. Austin Collier    Allergic rhinosinusitis     Asthma     Bilateral Hip Osteoarthritis     Bilateral Knee Arthritis     Carpal tunnel syndrome     DDD (degenerative disc disease), lumbar     Depression And Anxiety     GERD     H/O Colon Polyps     Hypercholesterolemia     Hypertension     Hypothyroidism     Irritable Bowel Syndrome     Lumbar Spinal DDD     Nonalcoholic Steatohepatitis ([place]     Obesity     Osteopenia     OU Cataracts     Tarsal Tunnel Syndrome     Tobacco Use Disorder     Type 2 DM     Urinary tract infection     Vitamin B12 Deficiency     Vitamin D Deficiency       Past Surgical History:   Procedure Laterality Date     SECTION      x2    COLONOSCOPY   ?    polyps removed per pt report    COLONOSCOPY N/A 2022    Procedure: COLONOSCOPY;  Surgeon: Brett Jasso MD;  Location: [facility] ENDO;  Service: Endoscopy;  Laterality: N/A;    EPIDURAL STEROID INJECTION INTO CERVICAL SPINE N/A 2020    Procedure: Injection-steroid-epidural-cervical to left;  Surgeon: John Vinson MD;  Location: [facility] OR;  Service: Pain Management;  Laterality: N/A;    EPIDURAL STEROID INJECTION INTO LUMBAR SPINE N/A 2020    Procedure: Injection-steroid-epidural-lumbar, l5/s1 to left;  Surgeon: John Vinson MD;  Location: [facility] OR;  Service: Pain Management;  Laterality: N/A;    EPIDURAL STEROID INJECTION INTO LUMBAR SPINE N/A 2021    Procedure: Injection-steroid-epidural-lumbar  L5/S1;  Surgeon: John Vinson MD;  Location: [facility] OR;  Service: Pain Management;  Laterality: N/A;    EPIDURAL STEROID INJECTION INTO LUMBAR SPINE N/A 6/21/2022    Procedure: Injection-steroid-epidural-lumbar L5/S1 spread to left;  Surgeon: John Vinson MD;  Location: [facility];  Service: Pain Management;  Laterality: N/A;    ESOPHAGOGASTRODUODENOSCOPY      ESOPHAGOGASTRODUODENOSCOPY N/A 11/9/2022    Procedure: EGD (ESOPHAGOGASTRODUODENOSCOPY);  Surgeon: Brett Jasso MD;  Location: [facility];  Service: Endoscopy;  Laterality: N/A;    INJECTION OF JOINT Left 9/20/2022    Procedure: INJECTION, JOINT- hip;  Surgeon: John Vinson MD;  Location: [facility];  Service: Pain Management;  Laterality: Left;    LAPAROSCOPIC CHOLECYSTECTOMY N/A 7/13/2022    Procedure: CHOLECYSTECTOMY, LAPAROSCOPIC;  Surgeon: Ra Santos MD;  Location: [facility] OR;  Service: General;  Laterality: N/A;    TRANSFORAMINAL EPIDURAL INJECTION OF STEROID Left 8/16/2022    Procedure: Injection,steroid,epidural,transforaminal approach L3/4 and L4/5;  Surgeon: John Vinson MD;  Location: [facility];  Service: Pain Management;  Laterality: Left;    UPPER GASTROINTESTINAL ENDOSCOPY  2020 ?    unsure of results      Family History   Problem Relation Age of Onset    Arthritis Mother     Diabetes Mother     Hyperlipidemia Mother     Cancer Mother         uterine     Allergies Mother     Ovarian cancer Mother 58    Aneurysm Father     Hyperlipidemia Father     Breast cancer Maternal Aunt 65    Cancer Maternal Uncle         brain    Cancer Paternal Aunt         breast, bone and lung     Breast cancer Paternal Aunt 78    Breast cancer Maternal Cousin 43    Brain cancer Maternal Cousin     Cirrhosis Neg Hx       Wt Readings from Last 10 Encounters:   02/15/23 113.1 kg (249 lb 5.4 oz)   01/24/23 112.4 kg (247 lb 12.8 oz)   12/19/22 113.7 kg (250 lb 10.6 oz)   12/02/22 113.9 kg (251 lb)   11/28/22 111.1 kg (245 lb)   11/09/22 111.1 kg  (245 lb)   11/03/22 111.4 kg (245 lb 9.5 oz)   10/31/22 111.3 kg (245 lb 4.2 oz)   09/20/22 113.7 kg (250 lb 10.6 oz)   09/13/22 112.7 kg (248 lb 7.3 oz)     Lab Results   Component Value Date    WBC 10.86 08/29/2022    HGB 14.5 08/29/2022    HCT 44.8 08/29/2022    MCV 87 08/29/2022     08/29/2022     CMP  Sodium   Date Value Ref Range Status   08/29/2022 142 136 - 145 mmol/L Final     Potassium   Date Value Ref Range Status   08/29/2022 4.6 3.5 - 5.1 mmol/L Final     Chloride   Date Value Ref Range Status   08/29/2022 100 95 - 110 mmol/L Final     CO2   Date Value Ref Range Status   08/29/2022 32 (H) 22 - 31 mmol/L Final     Glucose   Date Value Ref Range Status   08/29/2022 129 (H) 70 - 110 mg/dL Final     Comment:     The ADA recommends the following guidelines for fasting glucose:    Normal:       less than 100 mg/dL    Prediabetes:  100 mg/dL to 125 mg/dL    Diabetes:     126 mg/dL or higher       BUN   Date Value Ref Range Status   08/29/2022 13 7 - 18 mg/dL Final     Creatinine   Date Value Ref Range Status   08/29/2022 0.78 0.50 - 1.40 mg/dL Final     Calcium   Date Value Ref Range Status   08/29/2022 9.6 8.4 - 10.2 mg/dL Final     Total Protein   Date Value Ref Range Status   12/02/2022 7.5 6.0 - 8.4 g/dL Final     Albumin   Date Value Ref Range Status   12/02/2022 4.5 3.5 - 5.2 g/dL Final     Total Bilirubin   Date Value Ref Range Status   12/02/2022 0.8 0.2 - 1.3 mg/dL Final     Alkaline Phosphatase   Date Value Ref Range Status   12/02/2022 111 38 - 145 U/L Final     AST ([place])   Date Value Ref Range Status   02/08/2016 25 14 - 36 U/L Final     AST   Date Value Ref Range Status   12/02/2022 86 (H) 14 - 36 U/L Final     ALT   Date Value Ref Range Status   12/02/2022 66 (H) 0 - 35 U/L Final     Anion Gap   Date Value Ref Range Status   08/29/2022 10 8 - 16 mmol/L Final     eGFR if    Date Value Ref Range Status   02/15/2022 >60 >60 mL/min/1.73 m^2 Final     eGFR if non     Date Value Ref Range Status   02/15/2022 >60 >60 mL/min/1.73 m^2 Final     Comment:     Calculation used to obtain the estimated glomerular filtration  rate (eGFR) is the CKD-EPI equation.                 Reviewed prior medical records including radiology report of US of abdomen 5/18/22 & endoscopy history (see surgical history).     Objective:      Physical Exam  Constitutional:       General: She is not in acute distress.     Appearance: She is well-developed.   HENT:      Head: Normocephalic.      Right Ear: Hearing normal.      Left Ear: Hearing normal.      Nose: Nose normal.      Mouth/Throat:      Mouth: No oral lesions.      Pharynx: Uvula midline.   Eyes:      General: Lids are normal.      Conjunctiva/sclera: Conjunctivae normal.      Pupils: Pupils are equal, round, and reactive to light.   Neck:      Trachea: Trachea normal.   Cardiovascular:      Rate and Rhythm: Normal rate and regular rhythm.      Heart sounds: Normal heart sounds. No murmur heard.  Pulmonary:      Effort: Pulmonary effort is normal. No respiratory distress.      Breath sounds: Normal breath sounds. No stridor. No wheezing.      Comments: Oxygen via nasal cannula  Abdominal:      General: Bowel sounds are normal. There is no distension.      Palpations: Abdomen is soft. There is no mass.      Tenderness: There is abdominal tenderness in the right upper quadrant, epigastric area and left upper quadrant. There is no guarding or rebound.   Musculoskeletal:         General: Normal range of motion.      Cervical back: Normal range of motion.      Comments: Ambulates with cane   Skin:     General: Skin is warm and dry.      Findings: No rash.      Comments: Non jaundiced   Neurological:      Mental Status: She is alert and oriented to person, place, and time.   Psychiatric:         Speech: Speech normal.         Behavior: Behavior normal. Behavior is cooperative.         Assessment:       1. History of  esophagogastroduodenoscopy (EGD)    2. History of colonoscopy    3. Epigastric pain    4. Nausea and vomiting, unspecified vomiting type    5. Gastroesophageal reflux disease without esophagitis    6. Gastritis without bleeding, unspecified chronicity, unspecified gastritis type    7. Irritable bowel syndrome with diarrhea    8. History of colon polyps    9. S/P cholecystectomy           Plan:   All diagnoses and orders for this visit:    History of esophagogastroduodenoscopy (EGD) & History of colonoscopy   - Discussed results of EGD and colonoscopy, patient verbalized understanding    Epigastric pain, Nausea and vomiting, unspecified vomiting type, Gastroesophageal reflux disease without esophagitis & Gastritis without bleeding, unspecified chronicity, unspecified gastritis type  - CT Abdomen Pelvis  Without Contrast; Future; Expected date: 02/15/2023  - Start Carafate 1 gm QID (Rx sent to pharmacy)  - Continue Omeprazole 20 mg 2 capsules once daily  - Continue Famotidine 40 mg nightly  - Take PPI in the morning 30 minutes before breakfast  - Recommend to avoid large meals, avoid eating within 3 hours of bedtime, elevate head of bed if nocturnal symptoms are present, smoking cessation (if current smoker), & weight loss (if overweight).   - Recommend minimize/avoid high-fat foods, chocolate, caffeine, citrus, alcohol, & tomato products.  - Advised to avoid/limit use of NSAID's, since they can cause GI upset, bleeding, and/or ulcers. If needed, take with food.       Irritable bowel syndrome with diarrhea   - Recommended increase fiber in diet, especially soluble fiber since this can help bulk up the stool consistency and may help to slow down how fast the stool goes through the colon and can prevent diarrhea    - Continue Bentyl 10 mg QID PRN    History of colon polyps   - Next surveillance colonoscopy due 11/2027    S/P cholecystectomy    If no improvement in symptoms or symptoms worsen, call/follow-up at clinic or  go to ER

## 2023-02-16 ENCOUNTER — OFFICE VISIT (OUTPATIENT)
Dept: OTOLARYNGOLOGY | Facility: CLINIC | Age: 58
End: 2023-02-16
Payer: MEDICARE

## 2023-02-16 VITALS — WEIGHT: 246.94 LBS | TEMPERATURE: 99 F | HEIGHT: 66 IN | BODY MASS INDEX: 39.69 KG/M2

## 2023-02-16 DIAGNOSIS — J32.8 OTHER CHRONIC SINUSITIS: Primary | ICD-10-CM

## 2023-02-16 DIAGNOSIS — R05.8 COUGH PRODUCTIVE OF YELLOW SPUTUM: ICD-10-CM

## 2023-02-16 PROBLEM — E11.65 TYPE 2 DIABETES MELLITUS WITH HYPERGLYCEMIA, WITHOUT LONG-TERM CURRENT USE OF INSULIN: Status: ACTIVE | Noted: 2023-02-16

## 2023-02-16 PROBLEM — E66.01 MORBID OBESITY WITH BMI OF 40.0-44.9, ADULT: Status: ACTIVE | Noted: 2023-02-16

## 2023-02-16 PROCEDURE — 99999 PR PBB SHADOW E&M-EST. PATIENT-LVL V: ICD-10-PCS | Mod: PBBFAC,,, | Performed by: NURSE PRACTITIONER

## 2023-02-16 PROCEDURE — 99213 OFFICE O/P EST LOW 20 MIN: CPT | Mod: 25,S$PBB,, | Performed by: NURSE PRACTITIONER

## 2023-02-16 PROCEDURE — 99999 PR PBB SHADOW E&M-EST. PATIENT-LVL V: CPT | Mod: PBBFAC,,, | Performed by: NURSE PRACTITIONER

## 2023-02-16 PROCEDURE — 99215 OFFICE O/P EST HI 40 MIN: CPT | Mod: PBBFAC,PO,25 | Performed by: NURSE PRACTITIONER

## 2023-02-16 PROCEDURE — 31575 DIAGNOSTIC LARYNGOSCOPY: CPT | Mod: PBBFAC,PO | Performed by: NURSE PRACTITIONER

## 2023-02-16 PROCEDURE — 31575 PR LARYNGOSCOPY, FLEXIBLE; DIAGNOSTIC: ICD-10-PCS | Mod: S$PBB,,, | Performed by: NURSE PRACTITIONER

## 2023-02-16 PROCEDURE — 99213 PR OFFICE/OUTPT VISIT, EST, LEVL III, 20-29 MIN: ICD-10-PCS | Mod: 25,S$PBB,, | Performed by: NURSE PRACTITIONER

## 2023-02-16 PROCEDURE — 31575 DIAGNOSTIC LARYNGOSCOPY: CPT | Mod: S$PBB,,, | Performed by: NURSE PRACTITIONER

## 2023-02-16 NOTE — PROGRESS NOTES
Subjective:       Patient ID: Yara Santos is a 57 y.o. female.    Chief Complaint: No chief complaint on file.    HPI  Patient is new to ENT, referred by Dr. Alas for consultation for chronic sinusitis. Patient has been coughing up phlegm since October. Productive of thick discolored mucus. Her pulmonologist recommended she see ENT to rule out sinus involvement.   SINUS: Recent sinus imaging: none available. Recent treatments reviewed: none. Treated once for bronchitis/wheezing with a Zpak on 10/26/2022 (other antibiotics have been given for UTI).  Patient reports sinus headaches around the eyes, sore throat, productive cough, and nasal congestion.  ALLERGY: Sees Dr. Juan Underwood for allergies and asthma. Patient takes Astelin, Zyrtec, Atarax for allergies. Patient reports itchy red watery eyes, itchy watery nose, and nasal congestion.  ACID REFLUX:  Recent GI notes or EGD report reviewed. EGD 3 months ago showed gastritis, reactive gastropathy. Takes omeprazole, famotidine, carafate.  Patient reports dysphonia, chronic throat clearing, globus sensation, dry cough, dysphagia, and frequent throat irritation.    Review of Systems   Constitutional: Negative.    HENT:  Positive for nasal congestion, postnasal drip, rhinorrhea, sinus pressure/congestion, sore throat, trouble swallowing and voice change. Negative for dental problem, facial swelling and sneezing.    Eyes:  Positive for discharge, redness and itching.   Respiratory:  Positive for cough. Negative for choking.    Cardiovascular: Negative.    Gastrointestinal: Negative.    Musculoskeletal: Negative.    Integumentary:  Negative.   Neurological:  Positive for headaches.   Hematological: Negative.    Psychiatric/Behavioral: Negative.         Objective:      Physical Exam  Vitals and nursing note reviewed.   Constitutional:       General: She is not in acute distress.     Appearance: She is well-developed. She is not ill-appearing or diaphoretic.   HENT:       Head: Normocephalic and atraumatic.      Right Ear: Hearing, tympanic membrane, ear canal and external ear normal. No middle ear effusion. Tympanic membrane is not erythematous.      Left Ear: Hearing, tympanic membrane, ear canal and external ear normal.  No middle ear effusion. Tympanic membrane is not erythematous.      Nose: No mucosal edema, congestion or rhinorrhea.      Right Sinus: Maxillary sinus tenderness and frontal sinus tenderness present.      Left Sinus: Maxillary sinus tenderness and frontal sinus tenderness present.      Mouth/Throat:      Mouth: Mucous membranes are not pale, not dry and not cyanotic. No oral lesions.      Tongue: No lesions.      Palate: No lesions.      Pharynx: Uvula midline. No oropharyngeal exudate or posterior oropharyngeal erythema.   Eyes:      General: Lids are normal. No scleral icterus.        Right eye: No discharge.         Left eye: No discharge.   Neck:      Thyroid: No thyroid mass or thyromegaly.      Trachea: Trachea normal. No tracheal deviation.   Cardiovascular:      Rate and Rhythm: Normal rate.   Pulmonary:      Effort: Pulmonary effort is normal. No respiratory distress.      Breath sounds: Normal air entry.   Musculoskeletal:         General: Normal range of motion.      Cervical back: Normal range of motion and neck supple.   Lymphadenopathy:      Head:      Right side of head: No submental, submandibular, tonsillar, preauricular or posterior auricular adenopathy.      Left side of head: No submental, submandibular, tonsillar, preauricular or posterior auricular adenopathy.      Cervical: No cervical adenopathy.      Right cervical: No superficial or posterior cervical adenopathy.     Left cervical: No superficial or posterior cervical adenopathy.   Skin:     General: Skin is warm and dry.      Coloration: Skin is not pale.      Findings: No lesion or rash.   Neurological:      Mental Status: She is alert and oriented to person, place, and time.       Motor: Motor function is intact.      Coordination: Coordination is intact.      Gait: Gait normal.   Psychiatric:         Attention and Perception: Attention normal.         Mood and Affect: Mood normal.         Speech: Speech normal.         Behavior: Behavior normal. Behavior is cooperative.       Procedure: Flexible laryngoscopy    In order to fully examine the upper aerodigestive tract, including the larynx, in a patient with a hyperactive gag reflex, and suboptimal visualization with indirect mirror exam,  flexible endoscopy is required.   After explaining the procedure and obtaining verbal consent, a timeout was performed with the patient's participation according to the universal protocol. Both nasal cavities were anesthetized with 4% Xylocaine spray mixed with Stephen-Synephrine. The flexible laryngoscope  was inserted into the nasal cavity and advanced to visualize the nasal cavity, nasopharynx, the posterior oropharynx, hypopharynx, and the endolarynx with the  findings noted. The scope was removed and the procedure terminated. The patient tolerated this procedure well without apparent complication.     OVERALL FINDINGS  Nasopharynx - the torus is clear. There are no lesions of the posterior wall.   Oropharynx - no lesions of the tongue base. There is no obvious fullness or asymmetry.  Hypopharynx - there are no lesions of the pyriform sinuses or postcricoid region   Larynx - there are no lesions of the supraglottic or glottic larynx.  Vocal fold mobility is normal.     SPECIFIC FINDINGS  Adenoid tissue - normal   Nasopharynx & eustachian tube orifices - normal   Posterior pharyngeal wall - normal   Base of tongue - normal   Epiglottis - normal   Valleculae - normal   Pyriform sinuses - normal   False vocal cords - normal   True vocal cords - normal  Arytenoids - normal   Interarytenoid space - edema, erythema  Right maxillary sinus ostia, no mucopus, no polyps    Right Eustachian tube orifice and  nasopharynx    Left maxillary sinus ostia, no mucopus, no polyps    Left Eustachian tube orifice and nasopharynx    Right Base of Tongue and Pharynx    Left Base of Tongue and Pharynx    Larynx w/inspiration    Larynx with phonation    Assessment:       Problem List Items Addressed This Visit    None  Visit Diagnoses       Other chronic sinusitis    -  Primary    Relevant Orders    CT Sinuses without Contrast    Cough productive of yellow sputum                Plan:     CT sinus -- will notify pt of results as soon as available. Nothing seen during nasoendoscopy for culture.     Recommend ensuring optimized anti-reflux regimen of 40 mg PPI QAM AC and 40 mg H2RA QHS.   Her allergist Dr. Underwood is managing her allergies and asthma.     Patient encouraged to return to clinic if symptoms worsen/persist and as needed for further ENT symptoms or concerns.

## 2023-02-22 ENCOUNTER — HOSPITAL ENCOUNTER (OUTPATIENT)
Dept: RADIOLOGY | Facility: HOSPITAL | Age: 58
Discharge: HOME | End: 2023-02-22
Attending: ANESTHESIOLOGY | Admitting: ANESTHESIOLOGY
Payer: MEDICARE

## 2023-02-22 ENCOUNTER — HOSPITAL ENCOUNTER (OUTPATIENT)
Facility: HOSPITAL | Age: 58
Discharge: HOME | End: 2023-02-22
Attending: ANESTHESIOLOGY | Admitting: ANESTHESIOLOGY
Payer: MEDICARE

## 2023-02-22 VITALS
OXYGEN SATURATION: 96 % | SYSTOLIC BLOOD PRESSURE: 110 MMHG | BODY MASS INDEX: 41.78 KG/M2 | DIASTOLIC BLOOD PRESSURE: 62 MMHG | WEIGHT: 260 LBS | RESPIRATION RATE: 19 BRPM | TEMPERATURE: 98 F | HEART RATE: 80 BPM | HEIGHT: 66 IN

## 2023-02-22 DIAGNOSIS — M54.16 LUMBAR RADICULOPATHY: ICD-10-CM

## 2023-02-22 DIAGNOSIS — M54.50 LOWER BACK PAIN: ICD-10-CM

## 2023-02-22 LAB — GLUCOSE SERPL-MCNC: 118 MG/DL (ref 70–110)

## 2023-02-22 PROCEDURE — 64484 NJX AA&/STRD TFRM EPI L/S EA: CPT | Mod: LT,,, | Performed by: ANESTHESIOLOGY

## 2023-02-22 PROCEDURE — 25000003 PHARM REV CODE 250: Mod: PO | Performed by: ANESTHESIOLOGY

## 2023-02-22 PROCEDURE — 25500020 PHARM REV CODE 255: Mod: PO | Performed by: ANESTHESIOLOGY

## 2023-02-22 PROCEDURE — 63600175 PHARM REV CODE 636 W HCPCS: Mod: PO | Performed by: ANESTHESIOLOGY

## 2023-02-22 PROCEDURE — 64484 NJX AA&/STRD TFRM EPI L/S EA: CPT | Mod: PO,LT | Performed by: ANESTHESIOLOGY

## 2023-02-22 PROCEDURE — 64483 PR EPIDURAL INJ, ANES/STEROID, TRANSFORAMINAL, LUMB/SACR, SNGL LEVL: ICD-10-PCS | Mod: LT,,, | Performed by: ANESTHESIOLOGY

## 2023-02-22 PROCEDURE — 82962 GLUCOSE BLOOD TEST: CPT | Mod: PO | Performed by: ANESTHESIOLOGY

## 2023-02-22 PROCEDURE — 76000 FLUOROSCOPY <1 HR PHYS/QHP: CPT | Mod: TC,PO

## 2023-02-22 PROCEDURE — 64484 PRA INJECT ANES/STEROID FORAMEN LUMBAR/SACRAL W IMG GUIDE ,EA ADD LEVEL: ICD-10-PCS | Mod: LT,,, | Performed by: ANESTHESIOLOGY

## 2023-02-22 PROCEDURE — 64483 NJX AA&/STRD TFRM EPI L/S 1: CPT | Mod: PO | Performed by: ANESTHESIOLOGY

## 2023-02-22 PROCEDURE — 64483 NJX AA&/STRD TFRM EPI L/S 1: CPT | Mod: LT,,, | Performed by: ANESTHESIOLOGY

## 2023-02-22 RX ORDER — METHYLPREDNISOLONE ACETATE 80 MG/ML
INJECTION, SUSPENSION INTRA-ARTICULAR; INTRALESIONAL; INTRAMUSCULAR; SOFT TISSUE
Status: DISCONTINUED | OUTPATIENT
Start: 2023-02-22 | End: 2023-02-22 | Stop reason: HOSPADM

## 2023-02-22 RX ORDER — BUPIVACAINE HYDROCHLORIDE 2.5 MG/ML
INJECTION, SOLUTION EPIDURAL; INFILTRATION; INTRACAUDAL
Status: DISCONTINUED | OUTPATIENT
Start: 2023-02-22 | End: 2023-02-22 | Stop reason: HOSPADM

## 2023-02-22 RX ORDER — LIDOCAINE HYDROCHLORIDE 10 MG/ML
INJECTION, SOLUTION EPIDURAL; INFILTRATION; INTRACAUDAL; PERINEURAL
Status: DISCONTINUED | OUTPATIENT
Start: 2023-02-22 | End: 2023-02-22 | Stop reason: HOSPADM

## 2023-02-22 RX ORDER — ALPRAZOLAM 0.5 MG/1
1 TABLET, ORALLY DISINTEGRATING ORAL ONCE AS NEEDED
Status: COMPLETED | OUTPATIENT
Start: 2023-02-22 | End: 2023-02-22

## 2023-02-22 RX ADMIN — ALPRAZOLAM 1 MG: 0.5 TABLET, ORALLY DISINTEGRATING ORAL at 02:02

## 2023-02-22 NOTE — OP NOTE
PROCEDURE DATE: 2/22/2023    PROCEDURE: Left L3/4 and L4/5 transforaminal epidural steroid injection under fluoroscopy    DIAGNOSIS: Lumbar  Radiculopathy    Post op diagnosis: Same    PHYSICIAN: John Vinson MD    MEDICATIONS INJECTED:  Methylprednisolone 40mg (1ml) and 1ml 0.25% bupivicaine at each nerve root.     LOCAL ANESTHETIC INJECTED:  Lidocaine 1%. 4 ml per site.    SEDATION MEDICATIONS: none    ESTIMATED BLOOD LOSS:  none    COMPLICATIONS:  none    TECHNIQUE:   A time-out was taken to identify patient and procedure side prior to starting the procedure. The patient was placed in a prone position, prepped and draped in the usual sterile fashion using ChloraPrep and sterile towels.  The area to be injected was determined under fluoroscopic guidance in AP and oblique view.  Local anesthetic was given by raising a wheal and going down to the hub of a 25-gauge 1.5 inch needle.  In oblique view, a 5 inch 22-gauge bent-tip spinal needle was introduced towards 6 oclock position of the pedicle of each above named nerve root level.  The needle was walked medially then hinged into the neural foramen and position was confirmed in AP and lateral views.  Omnipaque contrast dye was injected to confirm appropriate placement and that there was no vascular uptake.  After negative aspiration for blood or CSF, the medication was then injected. This was performed at the left L3/4 and L4/5 level(s). The patient tolerated the procedure well.    The patient was monitored after the procedure.  Patient was given post procedure and discharge instructions to follow at home. The patient was discharged in a stable condition.

## 2023-02-22 NOTE — DISCHARGE SUMMARY
[facility] - Surgery  Discharge Note  Short Stay    Procedure(s) (LRB):  Injection,steroid,epidural,transforaminal approach L3/4 and L4/5-Left (Left)      OUTCOME: Patient tolerated treatment/procedure well without complication and is now ready for discharge.    DISPOSITION: Home or Self Care    FINAL DIAGNOSIS:  Lumbar radiculopathy    FOLLOWUP: In clinic    DISCHARGE INSTRUCTIONS:    Discharge Procedure Orders   Diet Adult Regular     No dressing needed     Notify your health care provider if you experience any of the following:  temperature >100.4     Activity as tolerated

## 2023-02-22 NOTE — INTERVAL H&P NOTE
The patient has been examined and the H&P has been reviewed:    I concur with the findings and no changes have occurred since H&P was written.    Anesthesia/Surgery risks, benefits and alternative options discussed and understood by patient/family.      ASA 2, mallampati 2      There are no hospital problems to display for this patient.

## 2023-02-28 ENCOUNTER — OFFICE VISIT (OUTPATIENT)
Dept: PULMONOLOGY | Facility: CLINIC | Age: 58
End: 2023-02-28
Payer: MEDICARE

## 2023-02-28 VITALS
HEIGHT: 66 IN | DIASTOLIC BLOOD PRESSURE: 77 MMHG | SYSTOLIC BLOOD PRESSURE: 140 MMHG | HEART RATE: 89 BPM | WEIGHT: 252.56 LBS | BODY MASS INDEX: 40.59 KG/M2 | OXYGEN SATURATION: 94 %

## 2023-02-28 DIAGNOSIS — G47.33 OSA (OBSTRUCTIVE SLEEP APNEA): ICD-10-CM

## 2023-02-28 DIAGNOSIS — F17.201 TOBACCO DEPENDENCE IN REMISSION: ICD-10-CM

## 2023-02-28 DIAGNOSIS — J96.11 CHRONIC HYPOXEMIC RESPIRATORY FAILURE: ICD-10-CM

## 2023-02-28 DIAGNOSIS — J84.9 ILD (INTERSTITIAL LUNG DISEASE): Primary | ICD-10-CM

## 2023-02-28 PROBLEM — U07.0 ACUTE LUNG INJURY ASSOCIATED WITH VAPING: Status: RESOLVED | Noted: 2022-10-31 | Resolved: 2023-02-28

## 2023-02-28 PROCEDURE — 99999 PR PBB SHADOW E&M-EST. PATIENT-LVL III: ICD-10-PCS | Mod: PBBFAC,,, | Performed by: INTERNAL MEDICINE

## 2023-02-28 PROCEDURE — 99214 PR OFFICE/OUTPT VISIT, EST, LEVL IV, 30-39 MIN: ICD-10-PCS | Mod: S$PBB,,, | Performed by: INTERNAL MEDICINE

## 2023-02-28 PROCEDURE — 99214 OFFICE O/P EST MOD 30 MIN: CPT | Mod: S$PBB,,, | Performed by: INTERNAL MEDICINE

## 2023-02-28 PROCEDURE — 99213 OFFICE O/P EST LOW 20 MIN: CPT | Mod: PBBFAC,PO | Performed by: INTERNAL MEDICINE

## 2023-02-28 PROCEDURE — 99999 PR PBB SHADOW E&M-EST. PATIENT-LVL III: CPT | Mod: PBBFAC,,, | Performed by: INTERNAL MEDICINE

## 2023-02-28 RX ORDER — PREDNISONE 20 MG/1
40 TABLET ORAL DAILY
Qty: 60 TABLET | Refills: 1 | Status: SHIPPED | OUTPATIENT
Start: 2023-02-28 | End: 2023-03-30

## 2023-02-28 NOTE — PATIENT INSTRUCTIONS
Need backup oxygen tank- contact DME company  Contact DME company to follow up on CPAP machine  Use home oxygen continuously and at night now  Get blood work  See advanced lung disease specialist  May need open lung biopsy- will get opinion of advanced specialist  Need repeat pulmonary function tests  Start prednisone 40mg daily for now for flare of lung disease

## 2023-02-28 NOTE — PROGRESS NOTES
2023    Yara Santos  Follow up-     Chief Complaint   Patient presents with    Interstitial Lung Disease       HPI:   2023- pt feels her breathing is getting worse, she is going downhill. Daughter assists w/ history, takes notes. When bending over she can't breathe. She gets muscle spasms in the abdomen. She has a lot of cough and phlegm, clear/milky. Her sister  with end stage pulm fibrosis (they think due to RA) just this past Saturday, was on hospice. She wears O2 all day, requires 2L continuously now but not wearing w/ sleep. With walking she has to turn O2 up to 3L. She has noticed shaking of fingers and beginning to get clubbing of the fingertips. They have not received CPAP machine for home- [facility] and was told insurance approval pending.  She is not smoking or vaping.  There has been a lot of construction going on at the house, adding on to the laundry room w/ new walls. She sleeps on the other side of the house. Denies mold. There was a small area of water damage. They keep the area w/ construction sealed off behind a closed door.  She has had rheumatology eval in the past, Dr. Kaur, note reviewed, and was told she has osteoarthritis and was told to f/u with PCP.  Has been off prednisone for several mos but had been on 5mg daily for lichen planus.    2022-   For lung disease- will plan to follow with repeat testing to evaluate for any progression  Due to repeat CT chest 2023  Ordering overnight sleep study at home to evaluate for sleep apnea  If positive will order CPAP machine, notify clinic when machine is delivered to home to set up 31 days compliance appointment. You must use the machine for a least 4 hours every night.   Agree with trelegy  Cough/mucous may be due to sinuses- referral to ENT placed for further evaluation.  she did qualify for home O2 2L, ordered and delivered. She is seeing Dr Garcia allergist, new blood work pending. She had allergy prick  test on back which was negative. She feels dyspnea is stable. She continues to have bad cough, thick light yellow/white mucous- at baseline. It is worse at night when she lies down. She has sinus stuffiness and allergies.  She was given trelegy inhaler sample, hasn't started yet.   She has avoided smoking and vaping entirely.  She feels very sleepy, falls asleep easily. Her sister has MOLINA. Daughter states that she snores and puffs w/ pursed lips when sleeping.    10/31/2022  Pulmonary function tests  6 min walk test  Go to the lab and get blood work  May request new rheumatology eval in the future  Suspect vaping caused some of the inflammation of lung tissue- need to avoid smoking/vaping   Pt is a 58 yo female with asthma, HTN, arthritis, GERD, hypothyroidism, obesity referred for new evaluation due to ILD found on CT chest. Her daughter is present and assists w/ history.  Pt reports has had lung problems for years and years. Reports in past treated at [facility] for fungal pneumonia due to bird droppings ()  She has also been treated for COPD, frequent bronchitis. She recently has had exacerbation taking prednisone and z pack. Has tested neg for covid, has not checked for flu. Many sick contacts and just went on a trip. She went to altitude [place], at [facility] peak had SOB, felt fevers, dizzy, had to use oxygen and lie in bed.  Inhalers: trelegy 200 once/d- new rx. Uses rescue albuterol 2-3x/day and feels benefit.  Nebulizer with duo neb uses 2x/day  This is the first exacerbation requiring prednisone so far this year.  Quit smoking 3 yr ago, 1-2 ppd since age 8  Quit vaping 3 wks- used juul and many other e cigarettes 1/2 cartridge daily- has entire drawer full.  She has been more short of breath gradual progressive for 1 year, symptoms vary.  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 Sister has RA  with lung involvement  Has been told she has RA in past but Dr. Kaur said no RA, just OA. She sees derm with lichen planus and would use prednisone off and on, currently not on steroids.  No pets at home currently.  She lives in an older house in [place], mold visible ceilings of bathroom, earlier this year had mold in room- currently renovating.    The chief complaint problem is stable.    PFSH:  Past Medical History:   Diagnosis Date    a Premature Ventricular Contractions     Dr. Austin Collier    b Hypertension     c Hypercholesterolemia     d Type 2 DM     e Hypothyroidism     f Morbid Obesity     f Osteopenia     i Asthma     i Tobacco Use Disorder     j GERD     j H/O Colon Polyps     j Irritable Bowel Syndrome     j Nonalcoholic Steatohepatitis ([place]     l Bilateral Hip Osteoarthritis     l Bilateral Knee Arthritis     l Carpal tunnel syndrome     l DDD (degenerative disc disease), lumbar     l Lumbar Spinal DDD     l Tarsal Tunnel Syndrome     m Vitamin B12 Deficiency     n Depression And Anxiety     o Allergic rhinosinusitis     Oxygen dependent     3l nasal cannula    p OU Cataracts     q Vitamin D Deficiency     Sleep apnea     Wellness Visit 2023          Past Surgical History:   Procedure Laterality Date     SECTION      x2    COLONOSCOPY   ?    polyps removed per pt report    COLONOSCOPY N/A 2022    Procedure: COLONOSCOPY;  Surgeon: Brett Jasso MD;  Location: [facility] ENDO;  Service: Endoscopy;  Laterality: N/A;    EPIDURAL STEROID INJECTION INTO CERVICAL SPINE N/A 2020    Procedure: Injection-steroid-epidural-cervical to left;  Surgeon: John Vinson MD;  Location: [facility] OR;  Service: Pain Management;  Laterality: N/A;    EPIDURAL STEROID INJECTION INTO LUMBAR SPINE N/A 2020    Procedure: Injection-steroid-epidural-lumbar, l5/s1 to left;  Surgeon: John Vinson MD;  Location: [facility] OR;  Service: Pain Management;  Laterality: N/A;    EPIDURAL  STEROID INJECTION INTO LUMBAR SPINE N/A 06/21/2021    Procedure: Injection-steroid-epidural-lumbar L5/S1;  Surgeon: John Vinson MD;  Location: [facility] OR;  Service: Pain Management;  Laterality: N/A;    EPIDURAL STEROID INJECTION INTO LUMBAR SPINE N/A 6/21/2022    Procedure: Injection-steroid-epidural-lumbar L5/S1 spread to left;  Surgeon: John Vinson MD;  Location: [facility];  Service: Pain Management;  Laterality: N/A;    ESOPHAGOGASTRODUODENOSCOPY      ESOPHAGOGASTRODUODENOSCOPY N/A 11/9/2022    Procedure: EGD (ESOPHAGOGASTRODUODENOSCOPY);  Surgeon: Brett Jasso MD;  Location: [facility];  Service: Endoscopy;  Laterality: N/A;    INJECTION OF JOINT Left 9/20/2022    Procedure: INJECTION, JOINT- hip;  Surgeon: John Vinson MD;  Location: [facility];  Service: Pain Management;  Laterality: Left;    LAPAROSCOPIC CHOLECYSTECTOMY N/A 7/13/2022    Procedure: CHOLECYSTECTOMY, LAPAROSCOPIC;  Surgeon: Ra Santos MD;  Location: [facility] OR;  Service: General;  Laterality: N/A;    TRANSFORAMINAL EPIDURAL INJECTION OF STEROID Left 8/16/2022    Procedure: Injection,steroid,epidural,transforaminal approach L3/4 and L4/5;  Surgeon: John Vinson MD;  Location: [facility];  Service: Pain Management;  Laterality: Left;    TRANSFORAMINAL EPIDURAL INJECTION OF STEROID Left 2/22/2023    Procedure: Injection,steroid,epidural,transforaminal approach L3/4 and L4/5-Left;  Surgeon: John Vinson MD;  Location: [facility];  Service: Pain Management;  Laterality: Left;  l3/4 and l4/5    UPPER GASTROINTESTINAL ENDOSCOPY  2020 ?    unsure of results     Social History     Tobacco Use    Smoking status: Former     Packs/day: 0.00     Types: Cigarettes     Quit date: 4/2/2019     Years since quitting: 3.9    Smokeless tobacco: Never   Substance Use Topics    Alcohol use: No    Drug use: No     Family History   Problem Relation Age of Onset    Arthritis Mother     Diabetes Mother     Hyperlipidemia Mother      "Cancer Mother         uterine     Allergies Mother     Ovarian cancer Mother 58    Aneurysm Father     Hyperlipidemia Father     Breast cancer Maternal Aunt 65    Cancer Maternal Uncle         brain    Cancer Paternal Aunt         breast, bone and lung     Breast cancer Paternal Aunt 78    Breast cancer Maternal Cousin 43    Brain cancer Maternal Cousin     Cirrhosis Neg Hx      Review of patient's allergies indicates:   Allergen Reactions    Iodine and iodide containing products Blisters    Asa [aspirin] Itching and Other (See Comments)     Skin problem      Atorvastatin      Worsened Lichen Planus    Contact metal agent      Other reaction(s): Other (See Comments)    Crestor [rosuvastatin]      Worsens her lichen planus    Lasix [furosemide]      rash    Lisinopril      Hives    Losartan      Confusion    Lovastatin      Worsened Lichen Planus    Salicylates     Sulfur      Other reaction(s): Other (See Comments)    Sulfur, elemental     Valsartan      breakouts    Latex Other (See Comments)     Skin problem         Performance Status:The patient's activity level is functions out of house.      Review of Systems:  a review of eleven systems covering constitutional, Eye, HEENT, Psych, Respiratory, Cardiac, GI, , Musculoskeletal, Endocrine, Dermatologic was negative except for pertinent findings as listed ABOVE and below:  Dry mouth, dry eyes  Rashes of butt and elbows  Joints hurt  Weight stable  Chills, sweats  Muscles ache and hurt  Muscle weakness  Wobbly when she walks    Exam:Comprehensive exam done. BP (!) 140/77 (BP Location: Left arm, Patient Position: Sitting, BP Method: Large (Automatic))   Pulse 89   Ht 5' 6" (1.676 m)   Wt 114.5 kg (252 lb 8.6 oz)   SpO2 (!) 94% Comment: 2L  BMI 40.76 kg/m²   Exam included Vitals as listed, and patient's appearance and affect and alertness and mood, oral exam for yeast and hygiene and pharynx lesions and Mallapatti (M) score, neck with inspection for jvd and " masses and thyroid abnormalities and lymph nodes (supraclavicular and infraclavicular nodes and axillary also examined and noted if abn), chest exam included symmetry and effort and fremitus and percussion and auscultation, cardiac exam included rhythm and gallops and murmur and rubs and jvd and edema, abdominal exam for mass and hepatosplenomegaly and tenderness and hernias and bowel sounds, Musculoskeletal exam with muscle tone and posture and mobility/gait and  strength, and skin for rashes and cyanosis and pallor and turgor, extremity for clubbing.  Findings were normal except for pertinent findings listed below:  Oropharynx clear, M1  HR regular  Clear breath sounds bilat  No edema/clubbing/joint swelling    Radiographs (ct chest and cxr) reviewed: view by direct vision   CT chest 2/15/23- differences in technique compared to last scan but fibrotic findings may be slightly worse, also more diffuse ground glass  CT chest 8/31/22- diffuse parenchymal lung disease upper lobe predominant w/ ground glass and micronodules. There is a larger calcified LLL nodule and another calcified granuloma RML. Upper lobes have some fibrotic/emphysematous changes    Labs reviewed     Latest Reference Range & Units 12/02/22 09:24   STEPHANIE Screen None Detected  None Detected   Smooth Muscle Ab <1:20  <1:20   IgG 650 - 1600 mg/dL 809   Hepatitis A Antibody IgG  See result image under hyperlink   Hep B S Ab IU/L <3.10   Hepatitis B Core Ab Total Negative  Negative   Hepatitis B Surface Ag  Negative      Latest Reference Range & Units 12/02/22 09:24   A-1 Antitrypsin 90 - 200 mg/dL 154      Latest Reference Range & Units 04/23/21 08:47 02/15/22 10:18 08/29/22 10:08   Eos # 0.0 - 0.5 K/uL 0.6 (H) 0.1 0.2      Latest Reference Range & Units 08/11/21 08:47 02/15/22 10:18 08/29/22 10:08   CO2 22 - 31 mmol/L 32 (H) 31 32 (H)      Latest Reference Range & Units 08/11/21 08:47 02/15/22 10:18 08/29/22 10:08   AST 14 - 36 U/L 48 (H) 62 (H) 71  (H)   ALT 0 - 35 U/L 47 (H) 73 (H) 69 (H)     PFT  reviewed  12/2/22- mild obstruction, no bd response, moderate reduced dlco      6mwt 12/2/22- 131.67 meters; sats 98%--> 87% with exertion, 92% on 2LPM    Plan:  Clinical impression is resonably certain and repeated evaluation prn +/- follow up will be needed as below. ILD unclear etiology, O2 reqt and progressing. May be familial, autoimmune, chronic HP?. Will try empiric steroids and obtain further workup. She may require open lung biopsy for dx    Yara was seen today for interstitial lung disease.    Diagnoses and all orders for this visit:    ILD (interstitial lung disease)  -     Complete PFT with bronchodilator; Future  -     CK; Future  -     ALDOLASE; Future  -     predniSONE (DELTASONE) 20 MG tablet; Take 2 tablets (40 mg total) by mouth once daily.  -     X-Ray Chest PA And Lateral; Future  -     MyoMarker Panel 3; Future  -     Ambulatory referral/consult to Advanced Lung Disease; Future    Chronic hypoxemic respiratory failure    MOLINA (obstructive sleep apnea)    Tobacco dependence in remission          Follow up in about 2 weeks (around 3/14/2023).    Discussed with patient above for education the following:      Patient Instructions   Need backup oxygen tank- contact DME company  Contact DME company to follow up on CPAP machine  Use home oxygen continuously and at night now  Get blood work  See advanced lung disease specialist  May need open lung biopsy- will get opinion of advanced specialist  Need repeat pulmonary function tests  Start prednisone 40mg daily for now for flare of lung disease

## 2023-03-03 DIAGNOSIS — J84.9 ILD (INTERSTITIAL LUNG DISEASE): Primary | ICD-10-CM

## 2023-03-06 PROBLEM — J96.11 CHRONIC HYPOXEMIC RESPIRATORY FAILURE: Status: RESOLVED | Noted: 2022-12-02 | Resolved: 2023-03-06

## 2023-03-07 ENCOUNTER — TELEPHONE (OUTPATIENT)
Dept: TRANSPLANT | Facility: CLINIC | Age: 58
End: 2023-03-07
Payer: MEDICARE

## 2023-03-07 ENCOUNTER — TELEPHONE (OUTPATIENT)
Dept: PULMONOLOGY | Facility: CLINIC | Age: 58
End: 2023-03-07
Payer: MEDICARE

## 2023-03-07 ENCOUNTER — OUTPATIENT CASE MANAGEMENT (OUTPATIENT)
Dept: ADMINISTRATIVE | Facility: OTHER | Age: 58
End: 2023-03-07
Payer: MEDICARE

## 2023-03-07 DIAGNOSIS — J84.9 ILD (INTERSTITIAL LUNG DISEASE): Primary | ICD-10-CM

## 2023-03-07 NOTE — TELEPHONE ENCOUNTER
ALD referral received from Dr. Annette Alas.  Message and records sent to Dr. Comer for review.  Awaiting recommendations.

## 2023-03-07 NOTE — PROGRESS NOTES
Outpatient Care Management  Initial Patient Assessment    Patient: Yara Santos  MRN: [id number]  Date of Service: 03/07/2023  Completed by: Lexie Peralta RN  Referral Date: 02/16/2023  Program: High Risk  Status: Ongoing  Effective Dates: 3/7/2023 - present  Responsible Staff: Lexie Peralta RN        Reason for Visit   Patient presents with    OPCM Chart Review     03/07/23    OPCM Enrollment Call     03/07/23    Initial Assessment     03/07/23    Nursing Assessment     03/07/23    Plan Of Care     03/07/23    OPCM RN First Assessment Attempt     03/07/23       Brief Summary:  Yara Santos was referred by Dr. Gibson for need for pneumococcal vaccine, acute lung injury associated with vaping, chronic hypoxemic respiratory failure, COPD, ILD, MOLINA, chronic fatigue, osteopenia, arthritis of knee, bilateral primary osteoarthritis of hip, IBS, GERD, fatty liver, elevated liver enzymes biliary colic, chronic constipation, edema of both legs, bilateral carpal tunnel syndrome, DDD, lumbar radiculopathy, loss of memory, MS, chronic major depression, lichen planus, Grade 1 diastolic dysfunction, hypercholesterolemia, PCV, primary HTN, [place] syndrome with diagnosis of HTN.  Patient qualifies for program based on high risk score of 70.5%.   Ms. Santos lives with her 2 daughters, son in law, and a grandchild.  Her hobby includes lying in bed and watching Youtube on her phone as she gets to fatigued to do things she wants to do.  Her sleep schedule is poor and doesn't follow up a diabetic diet due to not being able to cook on her own.  Active problem list, medical, surgical and social history reviewed. Active comorbidities include Interstitial lung disease, MOLINA, COPD, chronic fatigue. Areas of need identified by patient include reduce shortness of breath, be able to increase her activity to doing a load of laundry, manage pain, and be able to cook for her family.   Complex care plan created with  patient/caregiver input. By next encounter, patient agrees to review education sent via portal, agrees to OPCM RN follow up call, will follow through with respiratory appointments as discussed.   Next steps:   Ms. Santos requested contact in less than 2 weeks, on or around 3/16/23 - likes afternoon contact.  Send welcome letter, education for COPD exacerbation and How to for CPAP via portal.  Contact [facility] for status of CPAP approval. [facility]23 [facility] if [facility] report was received; pulmonary noted they faxed it. 03/09/23 - With chart review, sleep study results sent to [facility]; no return message from Rut; OPCM RN contacted [facility] for update and she confirmed sleep study results were received and once reviewed, patient will be contacted - likely in the next couple of days.  OPCM RN informed Ms. Santos and she appreciated the contact.  Status of obtaining 2nd Shingles vaccine.  Review of radiological and respiratory testing.  Review signs and symptoms of respiratory infection.

## 2023-03-07 NOTE — TELEPHONE ENCOUNTER
----- Message from Lexie Peralta RN sent at 3/7/2023  2:34 PM CST -----  Good afternoon,    I recently spoke with Ms. Santos and she was requesting an update on the CPAP that was ordered.  I contacted [facility] and spoke with Rut Fulton who reported the only thing that they are waiting is Ms. Santos sleep study report.  She reported if you upload it to the EMR and message her, she will address it.      Thank you for your assistance.    Kind regards,     Lexie Peralta, GERARDON, RN, CCM [facility]  [phone]

## 2023-03-07 NOTE — LETTER
March 8, 2023    Yara Santos  [address] [address] [id number]             [facility] [address] [place] [address] Dear Ms. Santos,    Welcome to [facility]s Complex Care Management Program.  It was a pleasure talking with you today.  My name is MARIVEL Woodall, RN, [facility]. I look forward to working with you as your Care Manager.  My goal is to help you function at the healthiest and highest level possible.  You can contact me directly at [phone].    As an [facility] patient, some of the services we may be able to provide include:      Development of an individualized care plan with a Registered Nurse    Connection with a    Connection with available resources and services     Coordinate communication among your care team members    Provide coaching and education    Help you understand your doctors treatment plan   Help you obtain information about your insurance coverage.     All services provided by [facility]s Complex Care Managers and other care team members are coordinated with and communicated to your primary care team.    As part of your enrollment, you will be receiving education materials and more information about these services in your [facility] account, by phone or through the mail.  If you do not wish to participate or receive information, please contact our office at [phone].      Sincerely,  MARIVEL Woodall, RN, CCM [facility]  [phone]  [facility]

## 2023-03-08 ENCOUNTER — PATIENT MESSAGE (OUTPATIENT)
Dept: ADMINISTRATIVE | Facility: OTHER | Age: 58
End: 2023-03-08
Payer: MEDICARE

## 2023-03-09 ENCOUNTER — PATIENT MESSAGE (OUTPATIENT)
Dept: TRANSPLANT | Facility: CLINIC | Age: 58
End: 2023-03-09
Payer: MEDICARE

## 2023-03-10 ENCOUNTER — TELEPHONE (OUTPATIENT)
Dept: TRANSPLANT | Facility: CLINIC | Age: 58
End: 2023-03-10
Payer: MEDICARE

## 2023-03-10 ENCOUNTER — TELEPHONE (OUTPATIENT)
Dept: PULMONOLOGY | Facility: CLINIC | Age: 58
End: 2023-03-10
Payer: MEDICARE

## 2023-03-10 NOTE — TELEPHONE ENCOUNTER
"Received written order from Dr. Comer.  Spoke to patient to discuss the referral.  Patient is aware of the referral and agreed to schedule a visit with Dr. Comer along with a 6 MWT and ABG's.  Patient had PFT's done yesterday which is still pending.  Will have  contact pt to schedule upcoming appts.  Pt did not have any questions at this time.       ----- Message from Abiola Comer MD sent at 3/7/2023 11:55 AM CST -----  Regarding: RE: ALD Referral  ALD routine  PFTs, 6MWT with continuous/interval SPO2 monitoring, ABG    ----- Message -----  From: Lola QUEVEDO Do, RN  Sent: 3/7/2023  11:17 AM CST  To: Abiola Comer MD  Subject: ALD Referral                                     Advanced Lung Disease (ALD) Clinic Referral Note    Referral from: Pulmonologist Annette Alas    Lung diagnosis: Insterstitial Lung Disease, Unknown etiology    Age: 57 y.o.    Height/Weight/BMI:  Ht: 5'6" / Wt: 114.5 kg /Body mass index is 40.76 kg/m².    Smoking history: Social History    Tobacco Use      Smoking status: Former        Packs/day: 0.00        Types: Cigarettes        Quit date: 4/2/2019        Years since quitting: 3.9      Smokeless tobacco: Never    PFT date: 12/02/2022  FVC  3.32 / 102  FEV1  2.27 / 86  FEV1/FVC  68 / 83  TLC  4.55 / 86  DLCO  12.6 / 45    6MWT: (external) 12/2/22- 131.67 meters; sats 98%--> 87% with exertion, 92% on 2LPM     CXR date: 08/29/2022  Impression: There is some chronic interstitial fibrotic type scarring appearance overall bilaterally as well as granulomatous appearing related sequela.  No lobar consolidation or cardiac decompensation is appreciated.  Consider CT chest.       Chest CT date: 02/15/2023  Impression:  Miliary calcified granulomas in the lung fields bilaterally.  Emphysema.  Increased interstitial fibrosis at the lung bases.    Echo date: 10/22/2020    Impression:  · The left ventricle is normal in size with normal systolic function. The estimated ejection fraction " is 60%.  · Grade I diastolic dysfunction.  · Normal right ventricular systolic function.      Other pertinent medical history:  Saw her pulonologist Dr. Alas on 2/28/23    Plan:  Clinical impression is resonably certain and repeated evaluation prn +/- follow up will be needed as below. ILD unclear etiology, O2 reqt and progressing. May be familial, autoimmune, chronic HP?. Will try empiric steroids and obtain further workup.     See advanced lung disease specialist  May need open lung biopsy- will get opinion of advanced specialist.    Isaac send recs.

## 2023-03-10 NOTE — TELEPHONE ENCOUNTER
Pt just wanted the dr to know that on the way to her sister's  her O2 dropped to 53, having chest pains and some anxiety after a few minutes it shot back up to 100  pt has not had any issues with her O2 stats since then  advised pt that I would let dr know but if she has any chest pains or O2 drops and remains low to go the the ER  pt v/u

## 2023-03-10 NOTE — TELEPHONE ENCOUNTER
Spoke with patient about scheduling upcoming appointments on 3/23, pt did not want it. Pt wanted 3-30 doctor is not available. Pt's next available day was 4/13. Pt is scheduled

## 2023-03-14 NOTE — TELEPHONE ENCOUNTER
Called pt  advised of drs recommendations  pt states no other episodes thinks it was more anxiety than anything  advised to contact the office if needed  pt v/u

## 2023-03-16 ENCOUNTER — OFFICE VISIT (OUTPATIENT)
Dept: PULMONOLOGY | Facility: CLINIC | Age: 58
End: 2023-03-16
Payer: MEDICARE

## 2023-03-16 ENCOUNTER — OUTPATIENT CASE MANAGEMENT (OUTPATIENT)
Dept: ADMINISTRATIVE | Facility: OTHER | Age: 58
End: 2023-03-16
Payer: MEDICARE

## 2023-03-16 ENCOUNTER — HOSPITAL ENCOUNTER (OUTPATIENT)
Dept: RADIOLOGY | Facility: HOSPITAL | Age: 58
Discharge: HOME | End: 2023-03-16
Attending: NURSE PRACTITIONER
Payer: MEDICARE

## 2023-03-16 VITALS
WEIGHT: 252.88 LBS | SYSTOLIC BLOOD PRESSURE: 132 MMHG | DIASTOLIC BLOOD PRESSURE: 74 MMHG | HEART RATE: 90 BPM | BODY MASS INDEX: 40.64 KG/M2 | HEIGHT: 66 IN | OXYGEN SATURATION: 97 %

## 2023-03-16 DIAGNOSIS — F17.201 TOBACCO DEPENDENCE IN REMISSION: ICD-10-CM

## 2023-03-16 DIAGNOSIS — J96.11 CHRONIC HYPOXEMIC RESPIRATORY FAILURE: ICD-10-CM

## 2023-03-16 DIAGNOSIS — J32.8 OTHER CHRONIC SINUSITIS: ICD-10-CM

## 2023-03-16 DIAGNOSIS — G47.33 OSA (OBSTRUCTIVE SLEEP APNEA): Primary | ICD-10-CM

## 2023-03-16 DIAGNOSIS — J84.9 ILD (INTERSTITIAL LUNG DISEASE): ICD-10-CM

## 2023-03-16 DIAGNOSIS — B37.0 THRUSH, ORAL: ICD-10-CM

## 2023-03-16 PROCEDURE — 70486 CT SINUSES WITHOUT CONTRAST: ICD-10-PCS | Mod: 26,NTX,, | Performed by: RADIOLOGY

## 2023-03-16 PROCEDURE — 99999 PR PBB SHADOW E&M-EST. PATIENT-LVL III: ICD-10-PCS | Mod: PBBFAC,TXP,, | Performed by: INTERNAL MEDICINE

## 2023-03-16 PROCEDURE — 99214 OFFICE O/P EST MOD 30 MIN: CPT | Mod: S$PBB,NTX,, | Performed by: INTERNAL MEDICINE

## 2023-03-16 PROCEDURE — 70486 CT MAXILLOFACIAL W/O DYE: CPT | Mod: 26,NTX,, | Performed by: RADIOLOGY

## 2023-03-16 PROCEDURE — 99214 PR OFFICE/OUTPT VISIT, EST, LEVL IV, 30-39 MIN: ICD-10-PCS | Mod: S$PBB,NTX,, | Performed by: INTERNAL MEDICINE

## 2023-03-16 PROCEDURE — 99999 PR PBB SHADOW E&M-EST. PATIENT-LVL III: CPT | Mod: PBBFAC,TXP,, | Performed by: INTERNAL MEDICINE

## 2023-03-16 PROCEDURE — 99213 OFFICE O/P EST LOW 20 MIN: CPT | Mod: PBBFAC,25,PO,TXP | Performed by: INTERNAL MEDICINE

## 2023-03-16 PROCEDURE — 70486 CT MAXILLOFACIAL W/O DYE: CPT | Mod: TC,PO,TXP

## 2023-03-16 RX ORDER — NYSTATIN 100000 [USP'U]/ML
4 SUSPENSION ORAL 4 TIMES DAILY
Qty: 160 ML | Refills: 0 | Status: SHIPPED | OUTPATIENT
Start: 2023-03-16 | End: 2023-03-30

## 2023-03-16 NOTE — PROGRESS NOTES
3/16/2023    Yara Santos  Follow up-     Chief Complaint   Patient presents with    Interstitial Lung Disease       HPI:   2023- pt feels breathing is improved on prednisone. She is still on 3L continuously now but reports at times able to take O2 off and sat remains 94-98 while at rest.  She just got CPAP machine this am and will start using.   She bruises easy, hands are red and dry and mouth is dry. Has white spots in mouth, lump in throat and trouble swallowing.  Pt was exposed (in same room) to nephew with HIV and TB 1 week ago, requests testing.      2023-   Need backup oxygen tank- contact DME company  Contact DME company to follow up on CPAP machine  Use home oxygen continuously and at night now  Get blood work  See advanced lung disease specialist  May need open lung biopsy- will get opinion of advanced specialist  Need repeat pulmonary function tests  Start prednisone 40mg daily for now for flare of lung disease  pt feels her breathing is getting worse, she is going downhill. Daughter assists w/ history, takes notes. When bending over she can't breathe. She gets muscle spasms in the abdomen. She has a lot of cough and phlegm, clear/milky. Her sister  with end stage pulm fibrosis (they think due to RA) just this past Saturday, was on hospice. She wears O2 all day, requires 2L continuously now but not wearing w/ sleep. With walking she has to turn O2 up to 3L. She has noticed shaking of fingers and beginning to get clubbing of the fingertips. They have not received CPAP machine for home- heard from [facility] and was told insurance approval pending.  She is not smoking or vaping.  There has been a lot of construction going on at the house, adding on to the laundry room w/ new walls. She sleeps on the other side of the house. Denies mold. There was a small area of water damage. They keep the area w/ construction sealed off behind a closed door.  She has had rheumatology eval in the past,  Dr. Kaur, note reviewed, and was told she has osteoarthritis and was told to f/u with PCP.  Has been off prednisone for several mos but had been on 5mg daily for lichen planus.    12/19/2022-   For lung disease- will plan to follow with repeat testing to evaluate for any progression  Due to repeat CT chest 2/2023  Ordering overnight sleep study at home to evaluate for sleep apnea  If positive will order CPAP machine, notify clinic when machine is delivered to home to set up 31 days compliance appointment. You must use the machine for a least 4 hours every night.   Agree with trelegy  Cough/mucous may be due to sinuses- referral to ENT placed for further evaluation.  she did qualify for home O2 2L, ordered and delivered. She is seeing Dr Garcia allergist, new blood work pending. She had allergy prick test on back which was negative. She feels dyspnea is stable. She continues to have bad cough, thick light yellow/white mucous- at baseline. It is worse at night when she lies down. She has sinus stuffiness and allergies.  She was given trelegy inhaler sample, hasn't started yet.   She has avoided smoking and vaping entirely.  She feels very sleepy, falls asleep easily. Her sister has MOLINA. Daughter states that she snores and puffs w/ pursed lips when sleeping.    10/31/2022  Pulmonary function tests  6 min walk test  Go to the lab and get blood work  May request new rheumatology eval in the future  Suspect vaping caused some of the inflammation of lung tissue- need to avoid smoking/vaping   Pt is a 56 yo female with asthma, HTN, arthritis, GERD, hypothyroidism, obesity referred for new evaluation due to ILD found on CT chest. Her daughter is present and assists w/ history.  Pt reports has had lung problems for years and years. Reports in past treated at [facility] for fungal pneumonia due to bird droppings (1998)  She has also been treated for COPD, frequent bronchitis. She recently has had exacerbation taking  prednisone and z pack. Has tested neg for covid, has not checked for flu. Many sick contacts and just went on a trip. She went to altitude [place], at [facility] [place] had SOB, felt fevers, dizzy, had to use oxygen and lie in bed.  Inhalers: trelegy 200 once/d- new rx. Uses rescue albuterol 2-3x/day and feels benefit.  Nebulizer with duo neb uses 2x/day  This is the first exacerbation requiring prednisone so far this year.  Quit smoking 3 yr ago, 1-2 ppd since age 8  Quit vaping 3 wks- used juul and many other e cigarettes 1/2 cartridge daily- has entire drawer full.  She has been more short of breath gradual progressive for 1 year, symptoms vary.  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 Sister has RA with lung involvement  Has been told she has RA in past but Dr. Kaur said no RA, just OA. She sees derm with lichen planus and would use prednisone off and on, currently not on steroids.  No pets at home currently.  She lives in an older house in [place], mold visible ceilings of bathroom, earlier this year had mold in room- currently renovating.    The chief complaint problem is varies w/ instability at times.    PFSH:  Past Medical History:   Diagnosis Date    a Premature Ventricular Contractions     Dr. Austin Collier    b Hypertension     c Hypercholesterolemia     d Type 2 DM     e Hypothyroidism     f Morbid Obesity     f Osteopenia     i Asthma     i Tobacco Use Disorder     j GERD     j H/O Colon Polyps     j Irritable Bowel Syndrome     j Nonalcoholic Steatohepatitis ([place]     l Bilateral Hip Osteoarthritis     l Bilateral Knee Arthritis     l Carpal tunnel syndrome     l DDD (degenerative disc disease), lumbar     l Lumbar Spinal DDD     l Tarsal Tunnel Syndrome     m Vitamin B12 Deficiency     n Depression And Anxiety     o Allergic rhinosinusitis     Oxygen dependent     3l nasal cannula    p OU  Cataracts     q Vitamin D Deficiency     Sleep apnea     Wellness Visit 2023          Past Surgical History:   Procedure Laterality Date     SECTION      x2    COLONOSCOPY   ?    polyps removed per pt report    COLONOSCOPY N/A 2022    Procedure: COLONOSCOPY;  Surgeon: Brett Jasso MD;  Location: [facility];  Service: Endoscopy;  Laterality: N/A;    EPIDURAL STEROID INJECTION INTO CERVICAL SPINE N/A 2020    Procedure: Injection-steroid-epidural-cervical to left;  Surgeon: John Vinson MD;  Location: [facility];  Service: Pain Management;  Laterality: N/A;    EPIDURAL STEROID INJECTION INTO LUMBAR SPINE N/A 2020    Procedure: Injection-steroid-epidural-lumbar, l5/s1 to left;  Surgeon: John Vinson MD;  Location: [facility];  Service: Pain Management;  Laterality: N/A;    EPIDURAL STEROID INJECTION INTO LUMBAR SPINE N/A 2021    Procedure: Injection-steroid-epidural-lumbar L5/S1;  Surgeon: John Vinson MD;  Location: [facility];  Service: Pain Management;  Laterality: N/A;    EPIDURAL STEROID INJECTION INTO LUMBAR SPINE N/A 2022    Procedure: Injection-steroid-epidural-lumbar L5/S1 spread to left;  Surgeon: John Vinson MD;  Location: [facility];  Service: Pain Management;  Laterality: N/A;    ESOPHAGOGASTRODUODENOSCOPY      ESOPHAGOGASTRODUODENOSCOPY N/A 2022    Procedure: EGD (ESOPHAGOGASTRODUODENOSCOPY);  Surgeon: Brett Jasso MD;  Location: [facility];  Service: Endoscopy;  Laterality: N/A;    INJECTION OF JOINT Left 2022    Procedure: INJECTION, JOINT- hip;  Surgeon: John Vinson MD;  Location: [facility];  Service: Pain Management;  Laterality: Left;    LAPAROSCOPIC CHOLECYSTECTOMY N/A 2022    Procedure: CHOLECYSTECTOMY, LAPAROSCOPIC;  Surgeon: Ra Santos MD;  Location: [facility];  Service: General;  Laterality: N/A;    TRANSFORAMINAL EPIDURAL INJECTION OF STEROID Left 2022    Procedure:  Injection,steroid,epidural,transforaminal approach L3/4 and L4/5;  Surgeon: John Vinson MD;  Location: [facility];  Service: Pain Management;  Laterality: Left;    TRANSFORAMINAL EPIDURAL INJECTION OF STEROID Left 2/22/2023    Procedure: Injection,steroid,epidural,transforaminal approach L3/4 and L4/5-Left;  Surgeon: John Vinson MD;  Location: [facility];  Service: Pain Management;  Laterality: Left;  l3/4 and l4/5    UPPER GASTROINTESTINAL ENDOSCOPY  2020 ?    unsure of results     Social History     Tobacco Use    Smoking status: Former     Packs/day: 0.00     Types: Cigarettes     Quit date: 4/2/2019     Years since quitting: 3.9    Smokeless tobacco: Never   Substance Use Topics    Alcohol use: No    Drug use: No     Family History   Problem Relation Age of Onset    Arthritis Mother     Diabetes Mother     Hyperlipidemia Mother     Cancer Mother         uterine     Allergies Mother     Ovarian cancer Mother 58    Aneurysm Father     Hyperlipidemia Father     Breast cancer Maternal Aunt 65    Cancer Maternal Uncle         brain    Cancer Paternal Aunt         breast, bone and lung     Breast cancer Paternal Aunt 78    Breast cancer Maternal Cousin 43    Brain cancer Maternal Cousin     Cirrhosis Neg Hx      Review of patient's allergies indicates:   Allergen Reactions    Iodine and iodide containing products Blisters    Asa [aspirin] Itching and Other (See Comments)     Skin problem      Atorvastatin      Worsened Lichen Planus    Contact metal agent      Other reaction(s): Other (See Comments)    Crestor [rosuvastatin]      Worsens her lichen planus    Lasix [furosemide]      rash    Lisinopril      Hives    Losartan      Confusion    Lovastatin      Worsened Lichen Planus    Salicylates     Sulfur      Other reaction(s): Other (See Comments)    Sulfur, elemental     Valsartan      breakouts    Latex Other (See Comments)     Skin problem         Performance Status:The patient's activity level is  "functions out of house.      Review of Systems:  a review of eleven systems covering constitutional, Eye, HEENT, Psych, Respiratory, Cardiac, GI, , Musculoskeletal, Endocrine, Dermatologic was negative except for pertinent findings as listed ABOVE and below:  Dry mouth, dry eyes  Spots on arms  Weight stable  Stomach is bloating  Craving ice cream  Ankles swelling      Exam:Comprehensive exam done. /74 (BP Location: Left arm, Patient Position: Sitting, BP Method: Large (Automatic))   Pulse 90   Ht 5' 6" (1.676 m)   Wt 114.7 kg (252 lb 13.9 oz)   SpO2 97%   BMI 40.81 kg/m²   Exam included Vitals as listed, and patient's appearance and affect and alertness and mood, oral exam for yeast and hygiene and pharynx lesions and Mallapatti (M) score, neck with inspection for jvd and masses and thyroid abnormalities and lymph nodes (supraclavicular and infraclavicular nodes and axillary also examined and noted if abn), chest exam included symmetry and effort and fremitus and percussion and auscultation, cardiac exam included rhythm and gallops and murmur and rubs and jvd and edema, abdominal exam for mass and hepatosplenomegaly and tenderness and hernias and bowel sounds, Musculoskeletal exam with muscle tone and posture and mobility/gait and  strength, and skin for rashes and cyanosis and pallor and turgor, extremity for clubbing.  Findings were normal except for pertinent findings listed below:  Oropharynx mild thrush posterior tongue, M1  HR regular  Clear breath sounds bilat  No edema/joint swelling  Early clubbing fingertips  Very dry fingers w/ fissuring fingertips    Radiographs (ct chest and cxr) reviewed: view by direct vision   CT chest 2/15/23- differences in technique compared to last scan but fibrotic findings may be slightly worse, also more diffuse ground glass  CT chest 8/31/22- diffuse parenchymal lung disease upper lobe predominant w/ ground glass and micronodules. There is a larger calcified " LLL nodule and another calcified granuloma RML. Upper lobes have some fibrotic/emphysematous changes    Labs reviewed     Latest Reference Range & Units 12/02/22 09:24   STEPHANIE Screen None Detected  None Detected   Smooth Muscle Ab <1:20  <1:20   IgG 650 - 1600 mg/dL 809   Hepatitis A Antibody IgG  See result image under hyperlink   Hep B S Ab IU/L <3.10   Hepatitis B Core Ab Total Negative  Negative   Hepatitis B Surface Ag  Negative      Latest Reference Range & Units 12/02/22 09:24   A-1 Antitrypsin 90 - 200 mg/dL 154      Latest Reference Range & Units 04/23/21 08:47 02/15/22 10:18 08/29/22 10:08   Eos # 0.0 - 0.5 K/uL 0.6 (H) 0.1 0.2      Latest Reference Range & Units 08/11/21 08:47 02/15/22 10:18 08/29/22 10:08   CO2 22 - 31 mmol/L 32 (H) 31 32 (H)      Latest Reference Range & Units 08/11/21 08:47 02/15/22 10:18 08/29/22 10:08   AST 14 - 36 U/L 48 (H) 62 (H) 71 (H)   ALT 0 - 35 U/L 47 (H) 73 (H) 69 (H)     PFT  reviewed  12/2/22- mild obstruction, no bd response, moderate reduced dlco      6mwt 12/2/22- 131.67 meters; sats 98%--> 87% with exertion, 92% on 2LPM    Plan:  Clinical impression is resonably certain and repeated evaluation prn +/- follow up will be needed as below. ILD unclear etiology, O2 reqt and progressing. May be familial, autoimmune, chronic HP? Background hx of fungal pna w/ calcified granulomas. Seems to respond somewhat to prednisone. She may require open lung biopsy for dx-- seeing advanced lung disease soon, will await their opinion    Yara was seen today for interstitial lung disease.    Diagnoses and all orders for this visit:    MOLINA (obstructive sleep apnea)    Tobacco dependence in remission    ILD (interstitial lung disease)  -     HIV 1/2 Ag/Ab (4th Gen); Future  -     QUANTIFERON GOLD TB; Future    Chronic hypoxemic respiratory failure  -     Echo; Future    Thrush, oral  -     nystatin (MYCOSTATIN) 100,000 unit/mL suspension; Take 4 mLs (400,000 Units total) by mouth 4 (four)  times daily. for 10 days            Follow up in about 4 weeks (around 4/13/2023).    Discussed with patient above for education the following:      Patient Instructions   Contact [employer] company for possible backup romelia- [facility] at [phone].  Taper prednisone to 30mg daily   Continue oxygen to keep sats >88%  Start using CPAP with bleed in oxygen 2L   Echo to check pulmonary pressures  Go to the lab for blood tests  Nystatin mouth wash for thrush- use 4 times daily

## 2023-03-16 NOTE — PATIENT INSTRUCTIONS
Contact [email or web address] for possible backup tank- [facility] at [phone].  Taper prednisone to 30mg daily   Continue oxygen to keep sats >88%  Start using CPAP with bleed in oxygen 2L   Echo to check pulmonary pressures  Go to the lab for blood tests  Nystatin mouth wash for thrush- use 4 times daily

## 2023-03-21 ENCOUNTER — OUTPATIENT CASE MANAGEMENT (OUTPATIENT)
Dept: ADMINISTRATIVE | Facility: OTHER | Age: 58
End: 2023-03-21
Payer: MEDICARE

## 2023-03-21 NOTE — PROGRESS NOTES
[facility] RN received contact from Otto, Ms. Santos daughter, who  reported Ms. Santos is c/o nausea and vomiting, today is going on Day 3, has been able to keep down water until today - now can't keep down liquids, unable to take Zofran, last solid food intake was Sal 3/19/23 which was left over cabbage and rice in which she had eaten the day before as well, increased sleepiness, not a great output since she has not been able to eat/drink, c/o chest pains again, somewhat confused cognitively, low grade fever as of last evening 99.3 F, and c/o upper abdominal pain - facial grimacing - likely a 7 or 8/10 pain scale last evening.  Plans are for Otto or sister to bring Ms. Santos to the emergency room for evaluation and treatment. Otto will call with update tomorrow.  Dr. Gibson and Dr. Alas (per Otto's request) sent in basket message regarding above.

## 2023-03-30 ENCOUNTER — PATIENT MESSAGE (OUTPATIENT)
Dept: ADMINISTRATIVE | Facility: OTHER | Age: 58
End: 2023-03-30
Payer: MEDICARE

## 2023-03-30 ENCOUNTER — OFFICE VISIT (OUTPATIENT)
Dept: PAIN MEDICINE | Facility: CLINIC | Age: 58
End: 2023-03-30
Payer: MEDICARE

## 2023-03-30 ENCOUNTER — TELEPHONE (OUTPATIENT)
Dept: GASTROENTEROLOGY | Facility: CLINIC | Age: 58
End: 2023-03-30
Payer: MEDICARE

## 2023-03-30 ENCOUNTER — OUTPATIENT CASE MANAGEMENT (OUTPATIENT)
Dept: ADMINISTRATIVE | Facility: OTHER | Age: 58
End: 2023-03-30
Payer: MEDICARE

## 2023-03-30 VITALS
HEIGHT: 66 IN | HEART RATE: 85 BPM | BODY MASS INDEX: 39.6 KG/M2 | WEIGHT: 246.38 LBS | DIASTOLIC BLOOD PRESSURE: 65 MMHG | SYSTOLIC BLOOD PRESSURE: 136 MMHG

## 2023-03-30 DIAGNOSIS — M47.816 LUMBAR SPONDYLOSIS: ICD-10-CM

## 2023-03-30 DIAGNOSIS — M51.36 DDD (DEGENERATIVE DISC DISEASE), LUMBAR: ICD-10-CM

## 2023-03-30 DIAGNOSIS — M25.552 LEFT HIP PAIN: ICD-10-CM

## 2023-03-30 DIAGNOSIS — M54.16 LUMBAR RADICULOPATHY: Primary | ICD-10-CM

## 2023-03-30 PROCEDURE — 99999 PR PBB SHADOW E&M-EST. PATIENT-LVL V: ICD-10-PCS | Mod: PBBFAC,,,

## 2023-03-30 PROCEDURE — 99213 OFFICE O/P EST LOW 20 MIN: CPT | Mod: S$PBB,,,

## 2023-03-30 PROCEDURE — 99999 PR PBB SHADOW E&M-EST. PATIENT-LVL V: CPT | Mod: PBBFAC,,,

## 2023-03-30 PROCEDURE — 99215 OFFICE O/P EST HI 40 MIN: CPT | Mod: PBBFAC,PN

## 2023-03-30 PROCEDURE — 99213 PR OFFICE/OUTPT VISIT, EST, LEVL III, 20-29 MIN: ICD-10-PCS | Mod: S$PBB,,,

## 2023-03-30 NOTE — TELEPHONE ENCOUNTER
There is nothing sooner at this time, but I did add her to the wait list, she should receive notifications if a sooner appointment becomes available.

## 2023-03-30 NOTE — PROGRESS NOTES
This note was completed with dictation software and grammatical errors may exist.    CC: neck pain, left arm pain, back pain, left leg pain    HPI:   The patient is a 57-year-old woman with a history of diabetes, rheumatoid arthritis with chronic back pain who presents in referral from Dr. Gibson for  Back pain radiating into the bilateral legs, neck pain radiating into the left arm. She is status post left L3/4 and L4/5 transforaminal CORBY on 02/22/2022 with 80% relief of her previous left-sided pain.  Overall she is satisfied with the relief of her left-sided pain..  Today she is reporting worsening right-sided low back pain with radiation down right leg, 7/10, constant.  She denies any associated numbness or whitley weakness but does feel like her legs can give out on her when she has severe pain.  She denies any new changes to her bowel or bladder function.  She recently had a worsening upper respiratory infection and has been provided with oral steroids with some relief.  As result of the steroids her blood sugars have significantly elevated and she is started metformin which she did not tolerate and now insulin.      Pain intervention history: She had done epidural steroid injections for her back in the past, many years ago.   She is status post C7-T1 interlaminar epidural steroid injection on 09/24/2020 with 100% relief.   She is status post L5/S1 interlaminar epidural steroid injection to the left on 11/12/2020 with 50% relief of her back pain and 100% relief of her left leg pain.   She is status post L5/S1 interlaminar epidural steroid injection on 06/21/2021 with 80% relief.   She is status post L5/S1 interlaminar epidural steroid injection to the left on 06/21/2022 with 20% relief.  She is status post left L3/4 and L4/5 transforaminal epidural steroid injection on 08/16/2022 with 80% relief.  She is status post left intra-articular hip injection on 09/20/2022 with 90% relief of her previous left hip pain.  She is status post left L3/4 and L4/5 transforaminal CORBY on 02/22/2022 with 80% relief of her previous left-sided pain.     Spine surgeries:    Antineuropathics: gabapentin 400 milligrams twice daily with some relief of her upper back and low back pain  NSAIDs: cannot tolerate NSAIDs  Physical therapy: had done multiple rounds of physical therapy for her back in the past with increased pain  Antidepressants:  Muscle relaxers:  Opioids: She had received a prescription for Percocet 10/325 on 07/10/2020 which she takes sparingly, she still has some left over, does not like taking these  Antiplatelets/Anticoagulants:    She smokes marijuana on a regular basis, reports that this seems to help her pain in the neck and back and much of her arthritic pain, denies any major side effects from this.  She has used oils and edibles as well, generally has been using a vaporizer as well.    ROS:  She reports weight gain, easy bruising, diabetes, joint stiffness, joint swelling, back pain, memory loss, dizziness, difficulty sleeping, anxiety, depression and loss of balance.  Balance of review of systems is negative.    Lab Results   Component Value Date    HGBA1C 7.7 (H) 02/16/2023       Lab Results   Component Value Date    WBC 13.93 (H) 03/21/2023    HGB 15.7 03/21/2023    HCT 47.9 03/21/2023    MCV 79 (L) 03/21/2023     03/21/2023             Past Medical History:   Diagnosis Date    a Premature Ventricular Contractions     Dr. Austin Collier    b Hypertension     c Hypercholesterolemia     d Type 2 DM     e Hypothyroidism     f Morbid Obesity     f Osteopenia     i Asthma     i Tobacco Use Disorder     j GERD     j H/O Colon Polyps     j Irritable Bowel Syndrome     j Nonalcoholic Steatohepatitis ([facility]     l Bilateral Hip Osteoarthritis     l Bilateral Knee Arthritis     l Carpal tunnel syndrome     l DDD (degenerative disc disease), lumbar     l Lumbar Spinal DDD     l Tarsal Tunnel Syndrome     m Vitamin B12  Deficiency     n Depression And Anxiety     o Allergic rhinosinusitis     Oxygen dependent     3l nasal cannula    p OU Cataracts     q Vitamin D Deficiency     Sleep apnea     Wellness Visit 2023        Past Surgical History:   Procedure Laterality Date     SECTION      x2    COLONOSCOPY   ?    polyps removed per pt report    COLONOSCOPY N/A 2022    Procedure: COLONOSCOPY;  Surgeon: Brett Jasso MD;  Location: [facility];  Service: Endoscopy;  Laterality: N/A;    EPIDURAL STEROID INJECTION INTO CERVICAL SPINE N/A 2020    Procedure: Injection-steroid-epidural-cervical to left;  Surgeon: John Vinson MD;  Location: [facility];  Service: Pain Management;  Laterality: N/A;    EPIDURAL STEROID INJECTION INTO LUMBAR SPINE N/A 2020    Procedure: Injection-steroid-epidural-lumbar, l5/s1 to left;  Surgeon: John Vinson MD;  Location: [facility];  Service: Pain Management;  Laterality: N/A;    EPIDURAL STEROID INJECTION INTO LUMBAR SPINE N/A 2021    Procedure: Injection-steroid-epidural-lumbar L5/S1;  Surgeon: John Vinson MD;  Location: [facility];  Service: Pain Management;  Laterality: N/A;    EPIDURAL STEROID INJECTION INTO LUMBAR SPINE N/A 2022    Procedure: Injection-steroid-epidural-lumbar L5/S1 spread to left;  Surgeon: John Vinson MD;  Location: [facility];  Service: Pain Management;  Laterality: N/A;    ESOPHAGOGASTRODUODENOSCOPY      ESOPHAGOGASTRODUODENOSCOPY N/A 2022    Procedure: EGD (ESOPHAGOGASTRODUODENOSCOPY);  Surgeon: Brett Jasso MD;  Location: [facility];  Service: Endoscopy;  Laterality: N/A;    INJECTION OF JOINT Left 2022    Procedure: INJECTION, JOINT- hip;  Surgeon: John Vinson MD;  Location: [facility];  Service: Pain Management;  Laterality: Left;    LAPAROSCOPIC CHOLECYSTECTOMY N/A 2022    Procedure: CHOLECYSTECTOMY, LAPAROSCOPIC;  Surgeon: Ra Santos MD;  Location: [facility];  Service: General;   Laterality: N/A;    TRANSFORAMINAL EPIDURAL INJECTION OF STEROID Left 8/16/2022    Procedure: Injection,steroid,epidural,transforaminal approach L3/4 and L4/5;  Surgeon: John Vinson MD;  Location: [facility];  Service: Pain Management;  Laterality: Left;    TRANSFORAMINAL EPIDURAL INJECTION OF STEROID Left 2/22/2023    Procedure: Injection,steroid,epidural,transforaminal approach L3/4 and L4/5-Left;  Surgeon: John Vinson MD;  Location: [facility];  Service: Pain Management;  Laterality: Left;  l3/4 and l4/5    UPPER GASTROINTESTINAL ENDOSCOPY  2020 ?    unsure of results       Social History     Socioeconomic History    Marital status:    Tobacco Use    Smoking status: Former     Packs/day: 0.00     Types: Cigarettes     Quit date: 4/2/2019     Years since quitting: 3.9     Passive exposure: Past    Smokeless tobacco: Never   Substance and Sexual Activity    Alcohol use: No    Drug use: No     Social Determinants of Health     Financial Resource Strain: High Risk    Difficulty of Paying Living Expenses: Very hard   Food Insecurity: Food Insecurity Present    Worried About Running Out of Food in the Last Year: Sometimes true    Ran Out of Food in the Last Year: Sometimes true   Transportation Needs: Unmet Transportation Needs    Lack of Transportation (Medical): Yes    Lack of Transportation (Non-Medical): Yes   Physical Activity: Inactive    Days of Exercise per Week: 0 days    Minutes of Exercise per Session: 0 min   Stress: Stress Concern Present    Feeling of Stress : Very much   Social Connections: Unknown    Frequency of Communication with Friends and Family: Once a week    Frequency of Social Gatherings with Friends and Family: Never    Active Member of Clubs or Organizations: No    Attends Club or Organization Meetings: Never    Marital Status:    Housing Stability: Low Risk     Unable to Pay for Housing in the Last Year: No    Number of Places Lived in the Last Year: 1    Unstable  "Housing in the Last Year: No         Medications/Allergies: See med card    Vitals:    23 1414   BP: 136/65   Pulse: 85   Weight: 111.7 kg (246 lb 5.8 oz)   Height: 5' 6" (1.676 m)   PainSc:   8   PainLoc: Back         Physical exam:  Gen: A and O x3, pleasant, well-groomed  Skin: No rashes or obvious lesions  HEENT: PERRLA, no obvious deformities on ears or in canals.Trachea midline.  CVS: Regular rate and rhythm, normal palpable pulses.  Abdomen: Soft, NT/ND.  Musculoskeletal: antalgic gait, ambulates with cane.     Neuro:  Lower extremities: 5/5 strength bilaterally  Reflexes:  Patellar 1+, Achilles 1+ bilaterally.  Sensory: Intact and symmetrical to light touch and pinprick in L2-S1 dermatomes bilaterally.        Lumbar spine:  Lumbar spine:   Range of motion is moderately decreased with flexion with mild increased low back pain during each maneuver.  Rony's test causes no increased pain on either side.    Supine straight leg raise is negative bilaterally.  Internal and external rotation of the hip causes severe groin pain bilaterally.  Myofascial exam:  There is tenderness to palpation over the midline lumbar spine and lumbar paraspinous musculature.      Imagin20 MRI C-spine:  ALIGNMENT: Normal.  Lateral masses of C1 and C2 are congruent.  Slight reversal of the normal lordotic curvature.   BONES: Vertebral body heights are maintained.  Multilevel endplate changes with mild type 1 endplate changes at C4-5.  No aggressive bone marrow signal.   PARASPINAL AREA: Normal.   CERVICAL DISC LEVELS:  C2-C3: No disc herniation or significant posterior osseous ridging. No significant spinal canal or foraminal stenosis.   C3-C4: Mild disc osteophyte complex with prominent central component.  Moderate left facet hypertrophy.  Slight ventral cord flattening with preserved ventral and dorsal CSF.  Mild left foraminal stenosis.   C4-C5: Mild disc osteophyte complex.  Mild-moderate left facet hypertrophy.  " Mild ventral cord flattening within sliver preserved ventral and preserved dorsal CSF.  Moderate right and mild-moderate left foraminal stenosis.   C5-C6: Mild-moderate disc osteophyte complex with prominent bilateral uncovertebral spurring.  Mild ventral cord flattening within sliver preserved ventral and preserved dorsal CSF.  Moderate-severe bilateral foraminal stenosis.   C6-C7: Mild-moderate disc osteophyte complex.  Slight ventral cord flattening with preserved ventral and dorsal CSF.  Moderate left and mild right foraminal stenosis.   C7-T1: No disc herniation or significant posterior osseous ridging.  Mild left facet hypertrophy.  No significant spinal canal or foraminal stenosis.     6/4/18 MRI C-spine:  There is mild lumbar dextrocurvature.  The vertebral body alignment and vertebral body heights are maintained.  The paravertebral soft tissues appear within normal limits.  No marrow replacement process or fracture.  The visualized distal spinal cord and conus medullaris are within normal limits.  The conus terminates at L1.   L1-2: Normal disc signal and disc space height.  Minimal disc bulge seen.  No central canal foraminal stenosis   L2-3: There is disc desiccation and mild disc space narrowing.  There is mild moderate diffuse disc bulge asymmetric right results in mild right lateral recess narrowing with mild abutment of the descending right L3 nerve root.  There is mild moderate right-sided foraminal stenosis.  No central canal stenosis   L3-4: There is disc desiccation.  There is mild moderate diffuse disc bulge.  There is mild bilateral facet arthrosis.  There is mild moderate left neural foraminal stenosis.  There is no central canal stenosis.   L4-5 there is disc desiccation and mild disc space narrowing.  There is mild moderate bilateral facet arthrosis.  There is moderate diffuse disc bulge asymmetric left resulting in mild moderate left-sided foraminal stenosis with mild abutment of the exited  left L4 nerve root.  No central canal stenosis.   L5-S1: There is mild diffuse disc bulge.  There is moderate to severe right and mild moderate left facet arthrosis.  There is no central canal stenosis or significant neural foraminal narrowing.      X-ray right and left hip 08/28/2018:  Normal right and left hips.  No significant osteoarthritis.    DEXA 7/29/19:  Spine bone mineral density is 1.047 g/cm 2 with T-score -1.1 and Z-score -1.4.  This compares to previous bone mineral density measurement of 1.075 and T-score of -0.9.  Femoral total mean bone mineral density measurement is 0.958 g/cm 2 with T-score -0.4 and Z-score -0.5.  This compares to previous bone mineral density measurement of 1.028 and T-score of 0.2.  FRAX measurement is provided of 10 year major osteoporotic fracture 10.9 % and hip fracture 0.8 %.    Assessment:  The patient is a 57-year-old woman with a history of diabetes, rheumatoid arthritis with chronic back pain who presents in referral from Dr. Gibson for  Back pain radiating into the bilateral legs, neck pain radiating into the left arm.    1. Lumbar radiculopathy        2. Left hip pain        3. DDD (degenerative disc disease), lumbar        4. Lumbar spondylosis                  Plan:  I believe she responded appropriately to the left-sided transforaminal CORBY.  She reports her current right-sided pain his tolerable.  At this time I think it is best to maximize conservative therapy with rest, medications and at-home PT directed exercises.  I provided her with a pamphlet today.  Follow up as needed.  Discussed I would like to hold off on any further interventional procedures until her blood sugars are better controlled.  They are agreeable to this.  In the future can consider a right-sided transforaminal CORBY for her right-sided pain.

## 2023-03-30 NOTE — PROGRESS NOTES
Outpatient Care Management  Plan of Care Follow Up Visit    Patient: Yara Santos  MRN: [id number]  Date of Service: 03/30/2023  Completed by: Lexie Peralta RN  Referral Date: 02/16/2023    Reason for Visit   Patient presents with    OPCM Chart Review     03/30/23    Update Plan Of Care     03/30/23       Brief Summary: OPCM RN followed up post Ms. Santos ER visit on 3/21/23 where she was administered IV fluids, insulin, and referred to GI for c/o chest pain, liver function test abnormality, epigastric pain, and hyperglycemia.  Otto confirmed Ms. Santos began Metformin and is having a lot of GI upset.  Reviewed Metformin side effects and to take it with food.  Otto reported she is on it as well and she has been explaining to Ms. Santos to hang in there with it because it took her a few weeks to get use to it.  OPCM agreed and provided encouragement too.  Further discussion of follow up with GI, Ms. Santos is followed by Dr. Benjamin and appointment is not until 4/18/23.  Discussed keeping all appointments.  Ms. Santos has follow up with pain management, gets her Xolair injection, has follow up with PCP office post ER visit, and to allergist.  Next Steps:   Otto agreed for OPCM follow up on or around 4/20/23.  Follow up regarding increase wear time of CPAP and results.  Discuss nystatin results for oral thrush and prednisone taper.  Message Dr. Santos office for sooner appointment if available.  Send Metformin education via portal.  Review with next call.

## 2023-03-30 NOTE — TELEPHONE ENCOUNTER
----- Message from Lexie Peralta, RN sent at 3/30/2023  9:09 AM CDT -----  Good morning,    I recently spoke with Otto, Ms. Santos daughter.  She had a recent visit to the ER for epigastric type pain.  Dr. Ann, the ER physician, referred her to GI and her appointment is on 4/18/23.  Ms. Santos would benefit from a sooner appointment if possible.  Please contact Otto at [phone] to inform her if this is a possibility.    Thank you for your assistance.    Kind regards,    Lexie Peralta, BSN, RN, CCM [facility]  [phone]

## 2023-04-03 ENCOUNTER — OFFICE VISIT (OUTPATIENT)
Dept: GASTROENTEROLOGY | Facility: CLINIC | Age: 58
End: 2023-04-03
Payer: MEDICARE

## 2023-04-03 VITALS — WEIGHT: 252.19 LBS | HEIGHT: 66 IN | BODY MASS INDEX: 40.53 KG/M2

## 2023-04-03 DIAGNOSIS — K76.0 FATTY LIVER: ICD-10-CM

## 2023-04-03 DIAGNOSIS — K21.9 GASTROESOPHAGEAL REFLUX DISEASE WITHOUT ESOPHAGITIS: ICD-10-CM

## 2023-04-03 DIAGNOSIS — K58.0 IRRITABLE BOWEL SYNDROME WITH DIARRHEA: ICD-10-CM

## 2023-04-03 DIAGNOSIS — Z86.010 HISTORY OF COLON POLYPS: ICD-10-CM

## 2023-04-03 DIAGNOSIS — Z90.49 S/P CHOLECYSTECTOMY: ICD-10-CM

## 2023-04-03 DIAGNOSIS — R11.0 NAUSEA: ICD-10-CM

## 2023-04-03 DIAGNOSIS — R10.13 EPIGASTRIC PAIN: Primary | ICD-10-CM

## 2023-04-03 PROCEDURE — 99999 PR PBB SHADOW E&M-EST. PATIENT-LVL III: CPT | Mod: PBBFAC,TXP,, | Performed by: NURSE PRACTITIONER

## 2023-04-03 PROCEDURE — 99213 OFFICE O/P EST LOW 20 MIN: CPT | Mod: PBBFAC,PO,NTX | Performed by: NURSE PRACTITIONER

## 2023-04-03 PROCEDURE — 99214 PR OFFICE/OUTPT VISIT, EST, LEVL IV, 30-39 MIN: ICD-10-PCS | Mod: S$PBB,NTX,, | Performed by: NURSE PRACTITIONER

## 2023-04-03 PROCEDURE — 99999 PR PBB SHADOW E&M-EST. PATIENT-LVL III: ICD-10-PCS | Mod: PBBFAC,TXP,, | Performed by: NURSE PRACTITIONER

## 2023-04-03 PROCEDURE — 99214 OFFICE O/P EST MOD 30 MIN: CPT | Mod: S$PBB,NTX,, | Performed by: NURSE PRACTITIONER

## 2023-04-03 RX ORDER — LEVALBUTEROL TARTRATE 45 UG/1
2 AEROSOL, METERED ORAL EVERY 6 HOURS PRN
COMMUNITY
Start: 2023-03-31

## 2023-04-03 RX ORDER — OMEPRAZOLE 40 MG/1
40 CAPSULE, DELAYED RELEASE ORAL 2 TIMES DAILY
Qty: 180 CAPSULE | Refills: 2 | Status: SHIPPED | OUTPATIENT
Start: 2023-04-03 | End: 2023-09-17 | Stop reason: SDUPTHER

## 2023-04-03 RX ORDER — FLUTICASONE PROPIONATE 220 UG/1
2 AEROSOL, METERED RESPIRATORY (INHALATION) 2 TIMES DAILY
COMMUNITY
Start: 2023-04-02 | End: 2023-08-30

## 2023-04-03 NOTE — PROGRESS NOTES
Subjective:       Patient ID: Yara Santos is a 57 y.o. female, Body mass index is 40.71 kg/m².    Chief Complaint: Follow-up      Established patient of Dr. Jasso & myself.     Here with daughter, whom assisted with interview.     Abdominal Pain  This is a chronic problem. The current episode started more than 1 month ago. The onset quality is sudden. The problem occurs intermittently. The problem has been gradually worsening. The pain is located in the epigastric region. The quality of the pain is aching, sharp and a sensation of fullness. The abdominal pain does not radiate. Associated symptoms include constipation (occ.), diarrhea (chronic problem; hx of IBS; well controlled taking Bentyl 10 mg QID PRN) and nausea (Zofran helps). Pertinent negatives include no anorexia, belching, dysuria, fever, flatus, frequency, hematochezia, melena, vomiting or weight loss. The pain is aggravated by eating, palpation and movement (especially eggs). The pain is relieved by Nothing. She has tried proton pump inhibitors and H2 blockers (Past: Carafate- no improvement; Currently: Cipro and Flagyl for suspected diverticulitis- no improvement; Omeprazole 40 mg once daily and Famotidine 40 mg nightly- somewhat helps) for the symptoms. Prior diagnostic workup includes CT scan, GI consult, lower endoscopy, upper endoscopy and ultrasound. Her past medical history is significant for abdominal surgery, gallstones (Hx of cholecystectomy), GERD (Hx of GERD and gastritis; reports frequent dyspepsia) and irritable bowel syndrome. There is no history of colon cancer, Crohn's disease, pancreatitis, PUD or ulcerative colitis.   Review of Systems   Constitutional:  Negative for appetite change, fever, unexpected weight change and weight loss.   HENT:  Negative for trouble swallowing.    Respiratory:  Positive for shortness of breath (patient on continuous oxygen). Negative for cough.    Cardiovascular:  Negative for chest pain.    Gastrointestinal:  Positive for abdominal pain, constipation (occ.), diarrhea (chronic problem; hx of IBS; well controlled taking Bentyl 10 mg QID PRN) and nausea (Zofran helps). Negative for abdominal distention, anal bleeding, anorexia, blood in stool, flatus, hematochezia, melena, rectal pain and vomiting.   Genitourinary:  Negative for difficulty urinating, dysuria and frequency.   Musculoskeletal:  Negative for gait problem.   Skin:  Negative for rash.   Neurological:  Negative for speech difficulty.   Psychiatric/Behavioral:  Negative for confusion.      Past Medical History:   Diagnosis Date    a Premature Ventricular Contractions     Dr. Austin ernst Hypertension     c Hypercholesterolemia     d Type 2 DM     e Hypothyroidism     f Morbid Obesity     f Osteopenia     i Asthma     i Tobacco Use Disorder     j GERD     j H/O Colon Polyps     j Irritable Bowel Syndrome     j Nonalcoholic Steatohepatitis ([place]     l Bilateral Hip Osteoarthritis     l Bilateral Knee Arthritis     l Carpal tunnel syndrome     l DDD (degenerative disc disease), lumbar     l Lumbar Spinal DDD     l Tarsal Tunnel Syndrome     m Vitamin B12 Deficiency     n Depression And Anxiety     o Allergic rhinosinusitis     Oxygen dependent     3l nasal cannula    p OU Cataracts     q Vitamin D Deficiency     Sleep apnea     Wellness Visit 2023       Past Surgical History:   Procedure Laterality Date     SECTION      x2    COLONOSCOPY   ?    polyps removed per pt report    COLONOSCOPY N/A 2022    Procedure: COLONOSCOPY;  Surgeon: Brett Jasso MD;  Location: [facility] ENDO;  Service: Endoscopy;  Laterality: N/A;    EPIDURAL STEROID INJECTION INTO CERVICAL SPINE N/A 2020    Procedure: Injection-steroid-epidural-cervical to left;  Surgeon: John Vinson MD;  Location: [facility] OR;  Service: Pain Management;  Laterality: N/A;    EPIDURAL STEROID INJECTION INTO LUMBAR SPINE N/A 2020    Procedure:  Injection-steroid-epidural-lumbar, l5/s1 to left;  Surgeon: John Vinson MD;  Location: [facility] OR;  Service: Pain Management;  Laterality: N/A;    EPIDURAL STEROID INJECTION INTO LUMBAR SPINE N/A 06/21/2021    Procedure: Injection-steroid-epidural-lumbar L5/S1;  Surgeon: John Vinson MD;  Location: [facility] OR;  Service: Pain Management;  Laterality: N/A;    EPIDURAL STEROID INJECTION INTO LUMBAR SPINE N/A 6/21/2022    Procedure: Injection-steroid-epidural-lumbar L5/S1 spread to left;  Surgeon: John Vinson MD;  Location: [facility];  Service: Pain Management;  Laterality: N/A;    ESOPHAGOGASTRODUODENOSCOPY      ESOPHAGOGASTRODUODENOSCOPY N/A 11/9/2022    Procedure: EGD (ESOPHAGOGASTRODUODENOSCOPY);  Surgeon: Brett Jasso MD;  Location: [facility];  Service: Endoscopy;  Laterality: N/A;    INJECTION OF JOINT Left 9/20/2022    Procedure: INJECTION, JOINT- hip;  Surgeon: John Vinson MD;  Location: [facility];  Service: Pain Management;  Laterality: Left;    LAPAROSCOPIC CHOLECYSTECTOMY N/A 7/13/2022    Procedure: CHOLECYSTECTOMY, LAPAROSCOPIC;  Surgeon: Ra Santos MD;  Location: [facility];  Service: General;  Laterality: N/A;    TRANSFORAMINAL EPIDURAL INJECTION OF STEROID Left 8/16/2022    Procedure: Injection,steroid,epidural,transforaminal approach L3/4 and L4/5;  Surgeon: John Vinson MD;  Location: [facility];  Service: Pain Management;  Laterality: Left;    TRANSFORAMINAL EPIDURAL INJECTION OF STEROID Left 2/22/2023    Procedure: Injection,steroid,epidural,transforaminal approach L3/4 and L4/5-Left;  Surgeon: John Vinson MD;  Location: [facility];  Service: Pain Management;  Laterality: Left;  l3/4 and l4/5    UPPER GASTROINTESTINAL ENDOSCOPY  2020 ?    unsure of results      Family History   Problem Relation Age of Onset    Arthritis Mother     Diabetes Mother     Hyperlipidemia Mother     Cancer Mother         uterine     Allergies Mother     Ovarian cancer Mother 58     Aneurysm Father     Hyperlipidemia Father     Breast cancer Maternal Aunt 65    Cancer Maternal Uncle         brain    Cancer Paternal Aunt         breast, bone and lung     Breast cancer Paternal Aunt 78    Breast cancer Maternal Cousin 43    Brain cancer Maternal Cousin     Cirrhosis Neg Hx       Wt Readings from Last 10 Encounters:   04/03/23 114.4 kg (252 lb 3.3 oz)   04/03/23 115.5 kg (254 lb 11.2 oz)   03/30/23 111.7 kg (246 lb 5.8 oz)   03/30/23 113.4 kg (250 lb)   03/21/23 114 kg (251 lb 5.2 oz)   03/16/23 114.7 kg (252 lb 13.9 oz)   02/28/23 114.5 kg (252 lb 8.6 oz)   02/20/23 117.9 kg (260 lb)   02/16/23 112 kg (246 lb 14.6 oz)   02/16/23 112.5 kg (248 lb)     Lab Results   Component Value Date    WBC 13.93 (H) 03/21/2023    HGB 15.7 03/21/2023    HCT 47.9 03/21/2023    MCV 79 (L) 03/21/2023     03/21/2023     CMP  Sodium   Date Value Ref Range Status   03/30/2023 135 (L) 136 - 145 mmol/L Final     Potassium   Date Value Ref Range Status   03/30/2023 4.4 3.5 - 5.1 mmol/L Final     Chloride   Date Value Ref Range Status   03/30/2023 94 (L) 95 - 110 mmol/L Final     CO2   Date Value Ref Range Status   03/30/2023 32 (H) 22 - 31 mmol/L Final     Glucose   Date Value Ref Range Status   03/30/2023 330 (H) 70 - 110 mg/dL Final     Comment:     The ADA recommends the following guidelines for fasting glucose:    Normal:       less than 100 mg/dL    Prediabetes:  100 mg/dL to 125 mg/dL    Diabetes:     126 mg/dL or higher       BUN   Date Value Ref Range Status   03/30/2023 11 7 - 18 mg/dL Final     Creatinine   Date Value Ref Range Status   03/30/2023 0.76 0.50 - 1.40 mg/dL Final     Calcium   Date Value Ref Range Status   03/30/2023 9.2 8.4 - 10.2 mg/dL Final     Total Protein   Date Value Ref Range Status   03/21/2023 7.9 6.0 - 8.4 g/dL Final     Albumin   Date Value Ref Range Status   03/21/2023 4.7 3.5 - 5.2 g/dL Final     Total Bilirubin   Date Value Ref Range Status   03/30/2023 0.5 0.2 - 1.3 mg/dL  Final     Alkaline Phosphatase   Date Value Ref Range Status   03/21/2023 157 (H) 38 - 145 U/L Final     AST ([place])   Date Value Ref Range Status   02/08/2016 25 14 - 36 U/L Final     AST   Date Value Ref Range Status   03/21/2023 87 (H) 14 - 36 U/L Final     ALT   Date Value Ref Range Status   03/21/2023 128 (H) 0 - 35 U/L Final     Anion Gap   Date Value Ref Range Status   03/30/2023 9 8 - 16 mmol/L Final     eGFR if    Date Value Ref Range Status   02/15/2022 >60 >60 mL/min/1.73 m^2 Final     eGFR if non    Date Value Ref Range Status   02/15/2022 >60 >60 mL/min/1.73 m^2 Final     Comment:     Calculation used to obtain the estimated glomerular filtration  rate (eGFR) is the CKD-EPI equation.          Lab Results   Component Value Date    LIPASERES 53 03/21/2023             Reviewed prior medical records including radiology report of CT of abdomen and pelvis 3/21/23 and US of abdomen 3/21/23 & endoscopy history (see surgical history).     Objective:      Physical Exam  Constitutional:       General: She is not in acute distress.     Appearance: She is well-developed.   HENT:      Head: Normocephalic.      Right Ear: Hearing normal.      Left Ear: Hearing normal.      Nose: Nose normal.      Mouth/Throat:      Mouth: No oral lesions.      Pharynx: Uvula midline.   Eyes:      General: Lids are normal.      Conjunctiva/sclera: Conjunctivae normal.      Pupils: Pupils are equal, round, and reactive to light.   Neck:      Trachea: Trachea normal.   Cardiovascular:      Rate and Rhythm: Normal rate and regular rhythm.      Heart sounds: Normal heart sounds. No murmur heard.  Pulmonary:      Effort: Pulmonary effort is normal. No respiratory distress.      Breath sounds: Normal breath sounds. No stridor. No wheezing.      Comments: Oxygen via nasal cannula  Abdominal:      General: Bowel sounds are normal. There is no distension.      Palpations: Abdomen is soft. There is no  mass.      Tenderness: There is abdominal tenderness in the right upper quadrant, epigastric area, periumbilical area and left upper quadrant. There is no guarding or rebound.   Musculoskeletal:         General: Normal range of motion.      Cervical back: Normal range of motion.   Skin:     General: Skin is warm and dry.      Findings: No rash.      Comments: Non jaundiced   Neurological:      Mental Status: She is alert and oriented to person, place, and time.   Psychiatric:         Speech: Speech normal.         Behavior: Behavior normal. Behavior is cooperative.         Assessment:       1. Epigastric pain    2. Nausea    3. Gastroesophageal reflux disease without esophagitis    4. Irritable bowel syndrome with diarrhea    5. Fatty liver    6. S/P cholecystectomy    7. History of colon polyps           Plan:   All diagnoses and orders for this visit:    Epigastric pain & Nausea  - NM Gastric Emptying; Future; Expected date: 04/03/2023  - Increase Omeprazole 40 mg to BID: omeprazole (PRILOSEC) 40 MG capsule; Take 1 capsule (40 mg total) by mouth 2 (two) times a day.  Dispense: 180 capsule; Refill: 2  - Discontinue Famotidine  - Discontinue Carafate  - Continue Zofran 8 mg every 8 hours PRN  - Take PPI in the morning 30 minutes before breakfast and dinner  - Recommend to avoid large meals, avoid eating within 3 hours of bedtime, elevate head of bed if nocturnal symptoms are present, smoking cessation (if current smoker), & weight loss (if overweight).   - Recommend minimize/avoid high-fat foods, chocolate, caffeine, citrus, alcohol, & tomato products.  - Advised to avoid/limit use of NSAID's, since they can cause GI upset, bleeding, and/or ulcers. If needed, take with food.      Gastroesophageal reflux disease without esophagitis  - Increase Omeprazole 40 mg to BID: omeprazole (PRILOSEC) 40 MG capsule; Take 1 capsule (40 mg total) by mouth 2 (two) times a day.  Dispense: 180 capsule; Refill: 2  - Discontinue  Famotidine  - Discontinue Carafate  - Take PPI in the morning 30 minutes before breakfast and dinner  - Recommend to avoid large meals, avoid eating within 3 hours of bedtime, elevate head of bed if nocturnal symptoms are present, smoking cessation (if current smoker), & weight loss (if overweight).   - Recommend minimize/avoid high-fat foods, chocolate, caffeine, citrus, alcohol, & tomato products.  - Advised to avoid/limit use of NSAID's, since they can cause GI upset, bleeding, and/or ulcers. If needed, take with food.     Irritable bowel syndrome with diarrhea   - Recommended increase fiber in diet, especially soluble fiber since this can help bulk up the stool consistency and may help to slow down how fast the stool goes through the colon and can prevent diarrhea    - Continue Bentyl 10 mg QID PRN    Fatty liver   - For fatty liver recommend: low fat, low cholesterol diet, maintain good control of blood sugars and cholesterol levels, exercise, weight loss (if overweight), minimize/avoid alcohol and tylenol products, & follow-up with hepatology for continued evaluation and management    S/P cholecystectomy    History of colon polyps   - Next surveillance colonoscopy due 11/2027    Recommend follow-up with Dr. Jasso in 4-6 weeks for continued evaluation and management.  If no improvement in symptoms or symptoms worsen, call/follow-up at clinic or go to ER.

## 2023-04-13 ENCOUNTER — HOSPITAL ENCOUNTER (OUTPATIENT)
Dept: PULMONOLOGY | Facility: CLINIC | Age: 58
Discharge: HOME | End: 2023-04-13
Payer: MEDICARE

## 2023-04-13 ENCOUNTER — OFFICE VISIT (OUTPATIENT)
Dept: TRANSPLANT | Facility: CLINIC | Age: 58
End: 2023-04-13
Payer: MEDICARE

## 2023-04-13 VITALS — BODY MASS INDEX: 39.37 KG/M2 | WEIGHT: 245 LBS | HEIGHT: 66 IN

## 2023-04-13 VITALS
HEART RATE: 85 BPM | DIASTOLIC BLOOD PRESSURE: 67 MMHG | HEIGHT: 66 IN | OXYGEN SATURATION: 94 % | SYSTOLIC BLOOD PRESSURE: 132 MMHG | BODY MASS INDEX: 39.37 KG/M2 | WEIGHT: 245 LBS

## 2023-04-13 DIAGNOSIS — J96.11 CHRONIC RESPIRATORY FAILURE WITH HYPOXIA: ICD-10-CM

## 2023-04-13 DIAGNOSIS — F17.200 SMOKING: ICD-10-CM

## 2023-04-13 DIAGNOSIS — G47.33 OSA (OBSTRUCTIVE SLEEP APNEA): ICD-10-CM

## 2023-04-13 DIAGNOSIS — J84.9 ILD (INTERSTITIAL LUNG DISEASE): ICD-10-CM

## 2023-04-13 DIAGNOSIS — E66.01 CLASS 2 SEVERE OBESITY DUE TO EXCESS CALORIES WITH SERIOUS COMORBIDITY AND BODY MASS INDEX (BMI) OF 39.0 TO 39.9 IN ADULT: ICD-10-CM

## 2023-04-13 DIAGNOSIS — J84.9 ILD (INTERSTITIAL LUNG DISEASE): Primary | ICD-10-CM

## 2023-04-13 PROBLEM — Z99.81 OXYGEN DEPENDENT: Status: ACTIVE | Noted: 2023-04-13

## 2023-04-13 LAB
ALLENS TEST: ABNORMAL
DELSYS: ABNORMAL
FIO2: 0.3
FLOW: 3
HCO3 UR-SCNC: 28.3 MMOL/L (ref 24–28)
MODE: ABNORMAL
PCO2 BLDA: 42.9 MMHG (ref 35–45)
PH SMN: 7.43 [PH] (ref 7.35–7.45)
PO2 BLDA: 70 MMHG (ref 80–100)
POC BE: 4 MMOL/L
POC SATURATED O2: 94 % (ref 95–100)
POC TCO2: 30 MMOL/L (ref 23–27)
SAMPLE: ABNORMAL
SITE: ABNORMAL

## 2023-04-13 PROCEDURE — 94618 PULMONARY STRESS TESTING: CPT | Mod: PBBFAC,NTX | Performed by: INTERNAL MEDICINE

## 2023-04-13 PROCEDURE — 99214 PR OFFICE/OUTPT VISIT, EST, LEVL IV, 30-39 MIN: ICD-10-PCS | Mod: 25,S$PBB,NTX, | Performed by: INTERNAL MEDICINE

## 2023-04-13 PROCEDURE — 36600 PR WITHDRAWAL OF ARTERIAL BLOOD: ICD-10-PCS | Mod: S$PBB,NTX,, | Performed by: INTERNAL MEDICINE

## 2023-04-13 PROCEDURE — 36600 WITHDRAWAL OF ARTERIAL BLOOD: CPT | Mod: S$PBB,NTX,, | Performed by: INTERNAL MEDICINE

## 2023-04-13 PROCEDURE — 99214 OFFICE O/P EST MOD 30 MIN: CPT | Mod: 25,S$PBB,NTX, | Performed by: INTERNAL MEDICINE

## 2023-04-13 PROCEDURE — 99999 PR PBB SHADOW E&M-EST. PATIENT-LVL III: ICD-10-PCS | Mod: PBBFAC,TXP,, | Performed by: INTERNAL MEDICINE

## 2023-04-13 PROCEDURE — 82803 BLOOD GASES ANY COMBINATION: CPT | Mod: PBBFAC,NTX | Performed by: INTERNAL MEDICINE

## 2023-04-13 PROCEDURE — 94618 PULMONARY STRESS TESTING: ICD-10-PCS | Mod: 26,S$PBB,NTX, | Performed by: INTERNAL MEDICINE

## 2023-04-13 PROCEDURE — 99213 OFFICE O/P EST LOW 20 MIN: CPT | Mod: PBBFAC,NTX | Performed by: INTERNAL MEDICINE

## 2023-04-13 PROCEDURE — 94618 PULMONARY STRESS TESTING: CPT | Mod: 26,S$PBB,NTX, | Performed by: INTERNAL MEDICINE

## 2023-04-13 PROCEDURE — 36600 WITHDRAWAL OF ARTERIAL BLOOD: CPT | Mod: PBBFAC,NTX | Performed by: INTERNAL MEDICINE

## 2023-04-13 PROCEDURE — 99999 PR PBB SHADOW E&M-EST. PATIENT-LVL III: CPT | Mod: PBBFAC,TXP,, | Performed by: INTERNAL MEDICINE

## 2023-04-13 NOTE — PROGRESS NOTES
ADVANCED LUNG DISEASE INITIAL EVALUATION                                                                                                                                           Reason for Visit:  Evaluation for ILD    Referring Physician: Abiola Comer MD    History of Present Illness: Yara Santos is a 57 y.o. female who is on 3L of oxygen.  She is on CPAP.  Her New York Heart Association Class is II and a Karnofsky score of 70% - Cares for self: Unable to carry on normal activity or active work. She is diabetic insulin dependent    Pt is a 58 yo female with asthma, HTN, arthritis, GERD, hypothyroidism, obesity referred for new evaluation due to ILD found on CT chest. Her daughter is present and assists w/ history. Patient states that she had lung problems for years. She has COPD and frequent bronchitis. She has had exacerbations and was taking prednisone 40 mg daily, and is not taking prednisone 20 mg daily.  Feels breathing is improved when on prednisone. Remains on 3L continuously.  Uses CPAP machine, but reports not liking the mask.  Admits to not being active and can't breathe when bending over.  She gets muscle spasms and deals with chronic pain.  Does not attend PT due to transportation issues.  Able to perform her ADLs, but requires rest.  Has mostly dry cough, but occasional is productive of clear phlegm.  Previously smoked 1-2 packs per day for over 40 years but stopped in 2020 when she began vaping.  Stopped vaping in October 2022, but now occasionally smokes marijuana.  She has had rheumatology eval in the past by Dr. Kaur, and was told she has osteoarthritis and was told to f/u with PCP.  Walked 800 feet on 3L NC with oxygen saturations dropping to 92% at the beginning, but maintained 94-95% throughout.  Follows with cardiology, has Echo scheduled for next week.  Sees pain specialist for chronic pain.    Past Medical History:   Diagnosis Date    a Premature Ventricular Contractions       [place] ernst Hypertension     c Hypercholesterolemia     d Type 2 DM     e Hypothyroidism     f Morbid Obesity     f Osteopenia     i Asthma     i Tobacco Use Disorder     j GERD     j H/O Colon Polyps     j Irritable Bowel Syndrome     j Nonalcoholic Steatohepatitis ([facility]     l Bilateral Hip Osteoarthritis     l Bilateral Knee Arthritis     l Carpal tunnel syndrome     l DDD (degenerative disc disease), lumbar     l Lumbar Spinal DDD     l Tarsal Tunnel Syndrome     m Vitamin B12 Deficiency     n Depression And Anxiety     o Allergic rhinosinusitis     Oxygen dependent     3l nasal cannula    p OU Cataracts     q Vitamin D Deficiency     Sleep apnea     Wellness Visit 2023        Past Surgical History:   Procedure Laterality Date     SECTION      x2    COLONOSCOPY   ?    polyps removed per pt report    COLONOSCOPY N/A 2022    Procedure: COLONOSCOPY;  Surgeon: Brett Jasso MD;  Location: [facility];  Service: Endoscopy;  Laterality: N/A;    EPIDURAL STEROID INJECTION INTO CERVICAL SPINE N/A 2020    Procedure: Injection-steroid-epidural-cervical to left;  Surgeon: John Vinson MD;  Location: [facility];  Service: Pain Management;  Laterality: N/A;    EPIDURAL STEROID INJECTION INTO LUMBAR SPINE N/A 2020    Procedure: Injection-steroid-epidural-lumbar, l5/s1 to left;  Surgeon: John Vinson MD;  Location: [facility] OR;  Service: Pain Management;  Laterality: N/A;    EPIDURAL STEROID INJECTION INTO LUMBAR SPINE N/A 2021    Procedure: Injection-steroid-epidural-lumbar L5/S1;  Surgeon: John Vinson MD;  Location: [facility] OR;  Service: Pain Management;  Laterality: N/A;    EPIDURAL STEROID INJECTION INTO LUMBAR SPINE N/A 2022    Procedure: Injection-steroid-epidural-lumbar L5/S1 spread to left;  Surgeon: John Vinson MD;  [facility];  Service: Pain Management;  Laterality: N/A;    ESOPHAGOGASTRODUODENOSCOPY       ESOPHAGOGASTRODUODENOSCOPY N/A 11/9/2022    Procedure: EGD (ESOPHAGOGASTRODUODENOSCOPY);  Surgeon: Brett Jasso MD;  Location: [facility] ENDO;  Service: Endoscopy;  Laterality: N/A;    INJECTION OF JOINT Left 9/20/2022    Procedure: INJECTION, JOINT- hip;  Surgeon: John Vinson MD;  Location: [facility];  Service: Pain Management;  Laterality: Left;    LAPAROSCOPIC CHOLECYSTECTOMY N/A 7/13/2022    Procedure: CHOLECYSTECTOMY, LAPAROSCOPIC;  Surgeon: Ra Santos MD;  Location: [facility] OR;  Service: General;  Laterality: N/A;    TRANSFORAMINAL EPIDURAL INJECTION OF STEROID Left 8/16/2022    Procedure: Injection,steroid,epidural,transforaminal approach L3/4 and L4/5;  Surgeon: John Vinson MD;  Location: [facility];  Service: Pain Management;  Laterality: Left;    TRANSFORAMINAL EPIDURAL INJECTION OF STEROID Left 2/22/2023    Procedure: Injection,steroid,epidural,transforaminal approach L3/4 and L4/5-Left;  Surgeon: John Vinson MD;  Location: [facility];  Service: Pain Management;  Laterality: Left;  l3/4 and l4/5    UPPER GASTROINTESTINAL ENDOSCOPY  2020 ?    unsure of results       Allergies: Iodine and iodide containing products; Asa [aspirin]; Atorvastatin; Contact metal agent; Crestor [rosuvastatin]; Lasix [furosemide]; Lisinopril; Losartan; Lovastatin; Metformin; Salicylates; Sulfur; Sulfur, elemental; Valsartan; and Latex    Current Outpatient Medications   Medication Sig    ADVAIR DISKUS 500-50 mcg/dose DsDv diskus inhaler Inhale into the lungs 2 (two) times daily.    albuterol-ipratropium (DUO-NEB) 2.5 mg-0.5 mg/3 mL nebulizer solution Take 3 mLs by nebulization every 6 (six) hours while awake. Rescue    azelastine (ASTELIN) 137 mcg (0.1 %) nasal spray 1 spray (137 mcg total) by Nasal route 2 (two) times daily as needed for Rhinitis.    blood sugar diagnostic (ACCU-CHEK TEJA PLUS TEST STRP) Strp Test blood sugar THREE TIMES daily; For diagnosis code E11.65    blood sugar diagnostic Strp To  check BG one times daily, to use with insurance preferred meter    blood-glucose meter kit To check BG one times daily, to use with insurance preferred meter    cholecalciferol, vitamin D3, 5,000 unit Tab Take 5,000 Units by mouth once daily. VITAMIN D-3 5000 UNIT TABS    cyanocobalamin 1,000 mcg/mL injection Inject 1 mL (1,000 mcg total) into the muscle every 30 days.    cyclobenzaprine (FLEXERIL) 10 MG tablet Take 1 tablet by mouth three times daily as needed for muscle spasm    dicyclomine (BENTYL) 10 MG capsule TAKE 1 CAPSULE BY MOUTH 4 TIMES DAILY BEFORE MEAL(S) AND AT BEDTIME AS NEEDED    ezetimibe (ZETIA) 10 mg tablet Take 1 tablet (10 mg total) by mouth every evening.    FLOVENT  mcg/actuation inhaler Inhale 2 puffs into the lungs 2 (two) times daily.    gabapentin (NEURONTIN) 300 MG capsule TAKE 1 CAPSULE BY MOUTH THREE TIMES DAILY    hydroCHLOROthiazide (HYDRODIURIL) 12.5 MG Tab Take 1 tablet by mouth once daily    ibuprofen (ADVIL,MOTRIN) 800 MG tablet Take 800 mg by mouth 2 (two) times daily as needed.    insulin detemir U-100, Levemir, (LEVEMIR FLEXTOUCH U-100 INSULN) 100 unit/mL (3 mL) InPn pen Inject 15 Units into the skin once daily.    ipratropium (ATROVENT HFA) 17 mcg/actuation inhaler INHALE 2 PUFFS BY MOUTH TWICE DAILY AS DIRECTED    lancets Misc To check BG TWO times daily, to use with insurance preferred meter    lancing device with lancets (ACCU-CHEK MULTICLIX LANCET) Kit Testing needed THREE TIMES DAILY    levalbuterol (XOPENEX HFA) 45 mcg/actuation inhaler Inhale 2 puffs into the lungs every 6 (six) hours as needed.    levothyroxine (SYNTHROID) 50 MCG tablet TAKE 1 TABLET BY MOUTH ONCE DAILY BEFORE BREAKFAST (Patient taking differently: Take 50 mcg by mouth before breakfast.)    mupirocin (BACTROBAN) 2 % ointment Apply topically 2 (two) times daily as needed.    omalizumab (XOLAIR) 150 mg/mL injection Inject 150 mg into the skin every 14 (fourteen) days.    omeprazole (PRILOSEC) 40 MG  "capsule Take 1 capsule (40 mg total) by mouth 2 (two) times a day.    ondansetron (ZOFRAN) 8 MG tablet TAKE 1 TABLET BY MOUTH EVERY 8 HOURS AS NEEDED FOR NAUSEA    pen needle, diabetic 32 gauge x 5/32" Ndle 1 each by Misc.(Non-Drug; Combo Route) route once daily.    sertraline (ZOLOFT) 100 MG tablet Take 1 tablet by mouth twice daily    albuterol (VENTOLIN HFA) 90 mcg/actuation inhaler Inhale 2 puffs into the lungs every 4 (four) hours as needed for Wheezing. Rescue    busPIRone (BUSPAR) 10 MG tablet Take 1 tablet (10 mg total) by mouth 3 (three) times daily as needed (anxiety). (Patient not taking: Reported on 4/13/2023)    cetirizine (ZYRTEC) 10 MG tablet Take 1 tablet (10 mg total) by mouth daily as needed. (Patient taking differently: Take 10 mg by mouth every evening.)     No current facility-administered medications for this visit.       Immunization History   Administered Date(s) Administered    COVID-19, MRNA, LN-S, PF (MODERNA FULL 0.5 ML DOSE) 03/16/2021, 04/15/2021, 01/13/2022    Influenza 11/18/2021    Influenza (FLUBLOK) - Quadrivalent - Recombinant - PF *Preferred* (egg allergy) 11/24/2020    Influenza - Quadrivalent 10/20/2015    Influenza - Quadrivalent - MDCK - PF 12/24/2019    Influenza A (H1N1) 2009 Monovalent - IM 11/20/2009    Pneumococcal Conjugate - 13 Valent 07/10/2017    Pneumococcal Conjugate - 20 Valent 02/16/2023    Tdap 10/24/2017    Zoster Recombinant 07/11/2021     Family History:    Family History   Problem Relation Age of Onset    Arthritis Mother     Diabetes Mother     Hyperlipidemia Mother     Cancer Mother         uterine     Allergies Mother     Ovarian cancer Mother 58    Aneurysm Father     Hyperlipidemia Father     Breast cancer Maternal Aunt 65    Cancer Maternal Uncle         brain    Cancer Paternal Aunt         breast, bone and lung     Breast cancer Paternal Aunt 78    Breast cancer Maternal Cousin 43    Brain cancer Maternal Cousin     Cirrhosis Neg Hx      Social " History     Substance and Sexual Activity   Alcohol Use No      Social History     Substance and Sexual Activity   Drug Use Yes    Frequency: 20.0 times per week    Types: Marijuana    Comment: daily smoke and edibles      Social History     Socioeconomic History    Marital status:    Tobacco Use    Smoking status: Former     Packs/day: 1.00     Types: Cigarettes, Vaping with nicotine     Start date:      Quit date: 10/1/2022     Years since quittin.5     Passive exposure: Current    Smokeless tobacco: Never   Substance and Sexual Activity    Alcohol use: No    Drug use: Yes     Frequency: 20.0 times per week     Types: Marijuana     Comment: daily smoke and edibles     Social Determinants of Health     Financial Resource Strain: High Risk    Difficulty of Paying Living Expenses: Very hard   Food Insecurity: Food Insecurity Present    Worried About Running Out of Food in the Last Year: Sometimes true    Ran Out of Food in the Last Year: Sometimes true   Transportation Needs: Unmet Transportation Needs    Lack of Transportation (Medical): Yes    Lack of Transportation (Non-Medical): Yes   Physical Activity: Inactive    Days of Exercise per Week: 0 days    Minutes of Exercise per Session: 0 min   Stress: Stress Concern Present    Feeling of Stress : Very much   Social Connections: Unknown    Frequency of Communication with Friends and Family: Never    Frequency of Social Gatherings with Friends and Family: Never    Active Member of Clubs or Organizations: No    Attends Club or Organization Meetings: Never    Marital Status:    Housing Stability: Low Risk     Unable to Pay for Housing in the Last Year: No    Number of Places Lived in the Last Year: 1    Unstable Housing in the Last Year: No     Review of Systems   Constitutional:  Positive for malaise/fatigue.   HENT:  Negative for congestion.    Respiratory:  Positive for cough and shortness of breath. Negative for hemoptysis, sputum production  "and wheezing.    Cardiovascular:  Positive for orthopnea. Negative for chest pain, palpitations, claudication, leg swelling and PND.   Musculoskeletal:  Positive for back pain (chronic), joint pain (chronic) and myalgias (chronic).   Vitals  /67 (BP Location: Left arm, Patient Position: Sitting, BP Method: Large (Automatic))   Pulse 85   Ht 5' 6" (1.676 m)   Wt 111.1 kg (245 lb)   SpO2 (!) 94% Comment: 3 liters  BMI 39.54 kg/m²   Physical Exam  Constitutional:       General: She is not in acute distress.     Appearance: She is morbidly obese.      Interventions: Nasal cannula in place.   Eyes:      Extraocular Movements: Extraocular movements intact.      Conjunctiva/sclera: Conjunctivae normal.   Cardiovascular:      Rate and Rhythm: Normal rate and regular rhythm.   Pulmonary:      Effort: No tachypnea, accessory muscle usage or respiratory distress.      Breath sounds: Examination of the right-lower field reveals decreased breath sounds. Examination of the left-lower field reveals decreased breath sounds. Decreased breath sounds present. No wheezing, rhonchi or rales.   Abdominal:      Palpations: Abdomen is soft.   Musculoskeletal:      Right lower leg: No edema.      Left lower leg: No edema.   Skin:     General: Skin is warm and dry.   Neurological:      Mental Status: She is oriented to person, place, and time.   Psychiatric:         Mood and Affect: Mood normal.         Behavior: Behavior is cooperative.       Labs:  Hospital Outpatient Visit on 04/13/2023   Component Date Value    POC PH 04/13/2023 7.426     POC PCO2 04/13/2023 42.9     POC PO2 04/13/2023 70 (L)     POC HCO3 04/13/2023 28.3 (H)     POC BE 04/13/2023 4     POC SATURATED O2 04/13/2023 94 (L)     POC TCO2 04/13/2023 30 (H)     Sample 04/13/2023 ARTERIAL     Site 04/13/2023 RR     Allens Test 04/13/2023 Pass     DelSys 04/13/2023 Nasal Can     Mode 04/13/2023 SPONT     Flow 04/13/2023 3     FiO2 04/13/2023 0.30        Pulmonary " Function Tests 12/2/2022   FVC 3.34   FEV1 2.27   TLC (liters) 4.55   DLCO (ml/mmHg sec) 12.6   FVC% 102   FEV1% 86   FEF 25-75 1.23   FEF 25-75% 44   TLC% 86   RV 1.21   RV% 61   DLCO% 45     6MW 4/13/2023 12/2/2022   6MWT Status completed without stopping completed with stops   Patient Reported Dyspnea Dyspnea;Dizziness   Was O2 used? Yes Yes   Delivery Method Cannula;Pull Tank;Continuous Flow -   6MW Distance walked (feet) 800 432   Distance walked (meters) 243.84 131.67   Did patient stop? No Yes   Oxygen Saturation 96 98   Supplemental Oxygen 3 L/M Room Air   Heart Rate 85 77   Blood Pressure 126/77 100/62   Leo Dyspnea Rating  moderate -   Oxygen Saturation 94 92   Supplemental Oxygen 3 L/M 2 L/M   Heart Rate 104 98   Blood Pressure 163/87 138/80   Leo Dyspnea Rating  somewhat heavy -   Recovery Time (seconds) 391 -   Oxygen Saturation 98 94   Supplemental Oxygen 3 L/M Room Air   Heart Rate 88 84       Imaging:  Results for orders placed during the hospital encounter of 03/21/23    X-Ray Chest PA And Lateral    Narrative  EXAMINATION:  XR CHEST PA AND LATERAL    CLINICAL HISTORY:  Chest Pain;    TECHNIQUE:  PA and lateral views of the chest were performed.    COMPARISON:  03/08/2023    FINDINGS:  The heart size is normal.  The lungs are expanded with bilateral interstitial infiltrates, similar in appearance to the previous exam.  Pulmonary fibrosis would also be in the differential.  Calcified granuloma in the right midlung zone and left lung base.  No pneumothorax.  No detrimental change from the previous exam.    Impression  Stable appearance of bilateral pulmonary interstitial infiltrates/fibrotic change.      Electronically signed by: Arnaldo Davis MD  Date:    03/21/2023  Time:    18:28    Results for orders placed during the hospital encounter of 05/03/22    DXA Bone Density Spine And Hip    Narrative  EXAMINATION:  DEXA BONE DENSITY SPINE HIP    CLINICAL HISTORY:  Other specified disorders of  bone density and structure, unspecified site    TECHNIQUE:  DXA scanning was performed over the left hip and lumbar spine.  Review of the images confirms satisfactory positioning and technique.    COMPARISON:  07/29/2019    FINDINGS:  The L1 to L4 vertebral bone mineral density is equal to 0.923 g/cm squared with a T score of -1.1.  There has been mild decrease relative to the prior study.    The left femoral neck bone mineral density is equal to 0.655 g/cm squared with a T score of -1.7.  There has been  mild decrease relative to the prior study.    There is a 9.4% risk of a major osteoporotic fracture and a 0.9% risk of hip fracture in the next 10 years (FRAX).    Impression  Osteopenia    Consider FDA approved medical therapies in postmenopausal women and men aged 50 years and older, based on the following:    *A hip or vertebral (clinical or morphometric) fracture  *T score less than or equal to -2.5 at the femoral neck or spine after appropriate evaluation to exclude secondary causes.  *Low bone mass -- also known as osteopenia (T score between -1.0 and -2.5 at the femoral neck or spine) and a 10 year probability of hip fracture greater than or equal to 3% or a 10 year probability of major osteoporosis-related fracture greater than or equal to 20% based on the [place]-adapted WHO algorithm.  *Clinicians judgment and/or patient preference may indicate treatment for people with 10 year fracture probabilities is above or below these levels.      Electronically signed by: Arnaldo Flores MD  Date:    05/03/2022  Time:    16:17    No valid procedures specified.  Results for orders placed during the hospital encounter of 05/18/22    US Abdomen Complete    Narrative  EXAMINATION:  US ABDOMEN COMPLETE    CLINICAL HISTORY:  Upper abdominal pain, unspecified    TECHNIQUE:  Complete abdominal ultrasound (including pancreas, aorta, liver, gallbladder, common bile duct, IVC, kidneys, and spleen) was  performed.    COMPARISON:  None    FINDINGS:  Pancreas: The visualized portions of pancreas appear normal.    Aorta: No aneurysm.    Liver: 17 cm, enlarged there is hepatic steatosis.  No focal lesions.    Gallbladder: There are gallstones.  The gallbladder wall thickness is within normal limits at 1.3 mm in the sonographer reports a negative Jimenez's sign.    Biliary system: 4.6 mm common bile duct.  No intrahepatic ductal dilatation.    Inferior vena cava: Normal in appearance.    Right kidney: 10.3 cm. No hydronephrosis.    Left kidney: 10.1 cm. No hydronephrosis.    Spleen: 9.9 cm.  Normal in size with homogeneous echotexture.    Miscellaneous: No ascites.    Impression  There is hepatomegaly and hepatic steatosis.  There is cholelithiasis.      Electronically signed by: Aby Mar MD  Date:    05/18/2022  Time:    14:55    No results found for this or any previous visit.    Results for orders placed during the hospital encounter of 03/21/23    CT Abdomen Pelvis With Contrast    Narrative  EXAMINATION:  CT ABDOMEN PELVIS WITH CONTRAST    CLINICAL HISTORY:  Epigastric pain;    TECHNIQUE:  Low dose axial images, sagittal and coronal reformations were obtained from the lung bases to the pubic symphysis following the IV administration of 80 mL of Omnipaque 350    COMPARISON:  CT scan of the abdomen and pelvis 02/28/2023    RADIATION DOSE:  873 DLP.    Automated exposure control.    Iterative reconstruction was utilized.    FINDINGS:  The lung bases demonstrate posterior dependent atelectatic change with pleural thickening.  There is a calcified granuloma at the extreme left lung base, stable in appearance from the previous exam.  The heart size is normal.  The liver is enlarged and diffusely low in attenuation consistent with fatty infiltration.    The gallbladder has been removed.    The stomach is partially decompressed.  The spleen is homogeneous in enhancement.    The pancreas is fatty replaced.    The  kidneys are normal in size, shape, and position and concentrate and excrete contrast normally.    The small bowel loops are normal in caliber.    The appendix is visualized in the right lower quadrant and is unremarkable.    Several small bowel loops are fluid-filled.    Scattered colonic diverticulosis, uncomplicated.    The uterus is present in the pelvis and appears atrophic.  Urinary bladder is collapsed.  There is no pelvic free fluid.    The osseous structures demonstrate diffuse multilevel degenerative change.    Impression  1. No acute abnormality.  Mild hepatomegaly, stable from the previous exam.      Electronically signed by: Arnaldo Davis MD  Date:    03/21/2023  Time:    20:05    Results for orders placed or performed during the hospital encounter of 03/21/23 (from the past 2160 hour(s))   CT Abdomen Pelvis With Contrast    Narrative    EXAMINATION:  CT ABDOMEN PELVIS WITH CONTRAST    CLINICAL HISTORY:  Epigastric pain;    TECHNIQUE:  Low dose axial images, sagittal and coronal reformations were obtained from the lung bases to the pubic symphysis following the IV administration of 80 mL of Omnipaque 350    COMPARISON:  CT scan of the abdomen and pelvis 02/28/2023    RADIATION DOSE:  873 DLP.    Automated exposure control.    Iterative reconstruction was utilized.    FINDINGS:  The lung bases demonstrate posterior dependent atelectatic change with pleural thickening.  There is a calcified granuloma at the extreme left lung base, stable in appearance from the previous exam.  The heart size is normal.  The liver is enlarged and diffusely low in attenuation consistent with fatty infiltration.    The gallbladder has been removed.    The stomach is partially decompressed.  The spleen is homogeneous in enhancement.    The pancreas is fatty replaced.    The kidneys are normal in size, shape, and position and concentrate and excrete contrast normally.    The small bowel loops are normal in caliber.    The  appendix is visualized in the right lower quadrant and is unremarkable.    Several small bowel loops are fluid-filled.    Scattered colonic diverticulosis, uncomplicated.    The uterus is present in the pelvis and appears atrophic.  Urinary bladder is collapsed.  There is no pelvic free fluid.    The osseous structures demonstrate diffuse multilevel degenerative change.      Impression    1. No acute abnormality.  Mild hepatomegaly, stable from the previous exam.      Electronically signed by: Arnaldo Davis MD  Date:    03/21/2023  Time:    20:05   Results for orders placed or performed during the hospital encounter of 03/16/23 (from the past 2160 hour(s))   CT Sinuses without Contrast    Narrative    EXAMINATION:  CT SINUSES WITHOUT CONTRAST    CLINICAL HISTORY:  Sinusitis, chronic or recurrent;productive cough with labored breathing since October, pulmonologist suggested ENT to rule out sinus;  Other chronic sinusitis    TECHNIQUE:  Axial low-dose images, coronal and sagittal reformations were performed through the paranasal sinuses.  Contrast was not administered.    COMPARISON:  CT head 10/07/2019.    FINDINGS:  Aeration: Developmentally hypoplastic frontal sinuses.  Minimal scattered mucosal thickening within the ethmoid sinuses.  The paranasal sinuses and major drainage pathways are otherwise clear throughout.  The nasal cavity is clear.    Nasal Septum: Mild leftward nasal septal deviation on the order of 3 mm.  No septal perforation.    Cribiform Plate: Olfactory recesses are slightly asymmetric with the right oriented just caudal to the left..  Keros classification type both II.  No dehiscence.    Lamina Papyracea: Unremarkable.    Onodi Cells: Not present.    Sphenoid Pneumatization: Sellar.    Anterior Clinoid pneumatization: Not pneumatized.    Anterior Ethmoid artery: Not exposed.    Soft Tissues The visualized contents of the orbits, intracranial space, and cervical soft tissues are within  normal limits.      Impression    1. No significant sinonasal opacification.  2. Mild leftward nasal septal deviation      Electronically signed by: Manuel Valencia  Date:    03/16/2023  Time:    11:32   Results for orders placed or performed during the hospital encounter of 02/28/23 (from the past 2160 hour(s))   CT Abdomen Pelvis  Without Contrast    Narrative    EXAMINATION:  CT ABDOMEN PELVIS WITHOUT CONTRAST    CLINICAL HISTORY:  Nausea/vomiting;Epigastric pain; Epigastric pain    TECHNIQUE:  Low dose axial images, sagittal and coronal reformations were obtained from the lung bases to the pubic symphysis.  30 mL of oral Omnipaque 350 was administered.    Automatic exposure control (AEC) was utilized for dose reduction.    Dose: 984 mGycm    COMPARISON:  None    FINDINGS:  There is increased density in both lung bases most consistent with atelectasis.  There is mild irregularity of the contour of the liver and I cannot rule out cirrhosis.  Spleen appears normal.  Pancreas appears normal.  Patient has had a previous cholecystectomy.  The adrenals are not enlarged.  Kidneys appear normal.  Aorta shows calcification without an aneurysm present.  The appendix appears normal.  Uterus appears normal.      Impression    No acute abnormalities are seen.    Changes suspicious for cirrhosis of the liver.      Electronically signed by: Jonathan Mares MD  Date:    02/28/2023  Time:    12:41   Results for orders placed or performed during the hospital encounter of 02/15/23 (from the past 2160 hour(s))   CT Chest Without Contrast    Narrative    EXAMINATION:  CT CHEST WITHOUT CONTRAST    CLINICAL HISTORY:  follow up ILD; Interstitial pulmonary disease, unspecified    TECHNIQUE:  Low dose axial images, sagittal and coronal reformations were obtained from the thoracic inlet to the lung bases. Contrast was not administered.    COMPARISON:  CT chest of August 31, 2022.    FINDINGS:  The heart and great vessels are of normal size and  contour.  Enlargement or aneurysm is not seen.  Adenopathy or soft tissue masses in mediastinum are not seen    There are biliary calcified granulomas throughout the lung fields bilaterally.  Centrilobular emphysema is noted at the lung apices.  Interstitial fibrosis is identified in both lower lung fields.  This appears to be increased in severity since the prior study of August 31, 2022.  A soft tissue mass is not identified.      Impression    Miliary calcified granulomas in the lung fields bilaterally.  Emphysema.  Increased interstitial fibrosis at the lung bases.      Electronically signed by: Dick Pabon MD  Date:    02/15/2023  Time:    11:50     *Note: Due to a large number of results and/or encounters for the requested time period, some results have not been displayed. A complete set of results can be found in Results Review.           Cardiodiagnostics:  Results for orders placed in visit on 09/08/20    Echo Color Flow Doppler? Yes    Interpretation Summary  · The left ventricle is normal in size with normal systolic function. The estimated ejection fraction is 60%.  · Grade I diastolic dysfunction.  · Normal right ventricular systolic function.        Assessment:  1. ILD (interstitial lung disease)    2. Chronic respiratory failure with hypoxia    3. MOLINA (obstructive sleep apnea)    4. Class 2 severe obesity due to excess calories with serious comorbidity and body mass index (BMI) of 39.0 to 39.9 in adult    5. Smoking      Plan: Chest CT with bilateral granulomas throughout the lung fields. Centrilobular emphysema. Interstitial fibrosis with GGO noted bilaterally.  Patient with smoking related ILD and emphysema; probable UIP.  Patient reports no longer vaping or smoking cigarettes, but does occasionally smoke marijuana.  Inactivity, body habitus, and actively smoking likely contributing to patient's symptoms of shortness of breath, as well as underlying lung disease.     --Stop all smoking,  including marijuana  --Continue supplemental oxygen, currently 3L NC.  Continue current inhaler therapy  --Continue CPAP, patient to contact [employer] company regarding mask options  --Encouraged weight loss through proper diet and exercise  --Consider antifibrotic therapy, such as Ofev  --Encouraged to increase activity as possible.    --Continue follow up with cardiology.  Echo scheduled for next week per patient.     RTC in 3 months    Danny Mendoza NP  Lung Transplant/Advanced Lung Disease

## 2023-04-13 NOTE — LETTER
April 13, 2023        Annette Alas  [address]  [address]  [facility] [address]  Phone: [phone]  Fax: [phone]             [facility] - Transplant 1st Fl  [address]  [facility] [address]  Phone: [phone]   Patient: Yara Santos   MR Number: [id number]   YOB: 1965   Date of Visit: 4/13/2023       Dear Dr. Annette Alas    Thank you for referring Yara Santos to me for evaluation. Attached you will find relevant portions of my assessment and plan of care.    If you have questions, please do not hesitate to call me. I look forward to following Yara Santos along with you.    Sincerely,    Abiola Comer MD    Enclosure    If you would like to receive this communication electronically, please contact [email or web address] or [phone] to request [employer] access.    [employer] is a tool which provides read-only access to select patient information with whom you have a relationship. Its easy to use and provides real time access to review your patients record including encounter summaries, notes, results, and demographic information.    If you feel you have received this communication in error or would no longer like to receive these types of communications, please e-mail [email or web address]

## 2023-04-14 NOTE — PROCEDURES
Yara Santos is a 57 y.o.  female patient, who presents for a 6 minute walk test ordered by MD Nahomi.  The diagnosis is Interstitial Lung Disease; Oxygen Qualification/Titration.  The patient's BMI is 39.6 kg/m2. Predicted distance (lower limit of normal) is 286.93 meters.    Test Results:    The test was completed without stopping.  The total time walked was 360 seconds.  During walking, the patient reported:  Dyspnea; Dizziness.  The patient used supplemental oxygen and a cane for assistance during testing.     04/13/2023---------Distance: 243.84 meters (800 feet)     Time O2 Sat % Supplemental Oxygen Heart Rate Blood Pressure Leo Scale   Pre-exercise  (Resting) 0 84 % Room Air 101 bpm 163/76 mmHg 5-6   Pre-exercise  (Resting) 0 96 % 3 L/M 85 bpm 126/77 mmHg 3   During Exercise 1 min 92 % 3 L/M 100 bpm     During Exercise 2 min 95 % 3 L/M 103 bpm     During Exercise 3 min 95 % 3 L/M 102 bpm     During Exercise 4 min 94 % 3 L/M 104 bpm     During Exercise 5 min 95 % 3 L/M 107 bpm     During Exercise 6 min 94 % 3 L/M 104 bpm 163/87 mmHg 4   Post-exercise  (Recovery)  98 % 3 L/M  88 bpm 141/70 mmHg       Recovery Time: 391 seconds    Oxygen Qualification:     O2 Sat % Supplemental Oxygen Heart Rate Blood Pressure Leo Scale   Pre-exercise  (Resting) 84 % Room Air  101 bpm  163/76  5-6      Performing nurse/tech: KENNETH Rubio      PREVIOUS STUDY:   The patient has not had a previous study.      CLINICAL INTERPRETATION:  Six minute walk distance is 243.84 meters (800 feet) with somewhat heavy dyspnea.  At rest, there was significant desaturation while breathing room air.  During exercise, there was desaturation while breathing supplemental oxygen.  Blood pressure increased significantly and Heart rate remained stable with walking.  The patient reported non-pulmonary symptoms during exercise.  Significant exercise impairment is likely due to desaturation and subjective symptoms.  The patient did complete  the study, walking 360 seconds of the 360 second test.  The patient may benefit from using supplemental oxygen.  No previous study performed.  Based upon age and body mass index, exercise capacity is less than predicted.   Oxygen saturation did improve while breathing supplemental oxygen.

## 2023-04-16 ENCOUNTER — PATIENT MESSAGE (OUTPATIENT)
Dept: TRANSPLANT | Facility: CLINIC | Age: 58
End: 2023-04-16
Payer: MEDICARE

## 2023-04-18 ENCOUNTER — HOSPITAL ENCOUNTER (OUTPATIENT)
Dept: RADIOLOGY | Facility: HOSPITAL | Age: 58
Discharge: HOME | End: 2023-04-18
Attending: NURSE PRACTITIONER
Payer: MEDICARE

## 2023-04-18 DIAGNOSIS — E11.65 TYPE 2 DIABETES MELLITUS WITH HYPERGLYCEMIA, WITHOUT LONG-TERM CURRENT USE OF INSULIN: ICD-10-CM

## 2023-04-18 DIAGNOSIS — K76.0 FATTY LIVER: ICD-10-CM

## 2023-04-18 PROCEDURE — 76391 MR ELASTOGRAPHY: CPT | Mod: 26,NTX,, | Performed by: RADIOLOGY

## 2023-04-18 PROCEDURE — 76391 MR ELASTOGRAPHY: CPT | Mod: TC,NTX

## 2023-04-18 PROCEDURE — 76391 MR ELASTOGRAPHY: ICD-10-PCS | Mod: 26,NTX,, | Performed by: RADIOLOGY

## 2023-04-19 ENCOUNTER — HOSPITAL ENCOUNTER (OUTPATIENT)
Dept: CARDIOLOGY | Facility: HOSPITAL | Age: 58
Discharge: HOME | End: 2023-04-19
Attending: INTERNAL MEDICINE
Payer: MEDICARE

## 2023-04-19 VITALS — WEIGHT: 247 LBS | BODY MASS INDEX: 39.7 KG/M2 | HEIGHT: 66 IN

## 2023-04-19 DIAGNOSIS — J96.11 CHRONIC HYPOXEMIC RESPIRATORY FAILURE: ICD-10-CM

## 2023-04-19 PROCEDURE — 93306 TTE W/DOPPLER COMPLETE: CPT | Mod: 26,,, | Performed by: SPECIALIST

## 2023-04-19 PROCEDURE — 93306 ECHO (CUPID ONLY): ICD-10-PCS | Mod: 26,,, | Performed by: SPECIALIST

## 2023-04-19 PROCEDURE — 93306 TTE W/DOPPLER COMPLETE: CPT

## 2023-04-20 LAB
AORTIC ROOT ANNULUS: 2.8 CM
AORTIC VALVE CUSP SEPERATION: 1.8 CM
AV INDEX (PROSTH): 0.88
AV MEAN GRADIENT: 4 MMHG
AV PEAK GRADIENT: 8 MMHG
AV VALVE AREA: 2.76 CM2
AV VELOCITY RATIO: 0.86
BSA FOR ECHO PROCEDURE: 2.28 M2
CV ECHO LV RWT: 0.54 CM
DOP CALC AO PEAK VEL: 1.43 M/S
DOP CALC AO VTI: 25.8 CM
DOP CALC LVOT AREA: 3.1 CM2
DOP CALC LVOT DIAMETER: 2 CM
DOP CALC LVOT PEAK VEL: 1.23 M/S
DOP CALC LVOT STROKE VOLUME: 71.28 CM3
DOP CALCLVOT PEAK VEL VTI: 22.7 CM
E WAVE DECELERATION TIME: 206 MSEC
E/A RATIO: 0.79
E/E' RATIO: 8.86 M/S
ECHO LV POSTERIOR WALL: 1.29 CM (ref 0.6–1.1)
EJECTION FRACTION: 66 %
FRACTIONAL SHORTENING: 36 % (ref 28–44)
INTERVENTRICULAR SEPTUM: 1.63 CM (ref 0.6–1.1)
IVRT: 106 MSEC
LEFT ATRIUM SIZE: 4.6 CM
LEFT ATRIUM VOLUME INDEX MOD: 20.9 ML/M2
LEFT ATRIUM VOLUME MOD: 45.8 CM3
LEFT INTERNAL DIMENSION IN SYSTOLE: 3.05 CM (ref 2.1–4)
LEFT VENTRICLE DIASTOLIC VOLUME INDEX: 48.86 ML/M2
LEFT VENTRICLE DIASTOLIC VOLUME: 107 ML
LEFT VENTRICLE MASS INDEX: 133 G/M2
LEFT VENTRICLE SYSTOLIC VOLUME INDEX: 16.6 ML/M2
LEFT VENTRICLE SYSTOLIC VOLUME: 36.4 ML
LEFT VENTRICULAR INTERNAL DIMENSION IN DIASTOLE: 4.79 CM (ref 3.5–6)
LEFT VENTRICULAR MASS: 290.48 G
LV LATERAL E/E' RATIO: 6.89 M/S
LV SEPTAL E/E' RATIO: 12.4 M/S
LVOT MG: 3 MMHG
LVOT MV: 0.77 CM/S
MV PEAK A VEL: 0.78 M/S
MV PEAK E VEL: 0.62 M/S
MV STENOSIS PRESSURE HALF TIME: 51 MS
MV VALVE AREA P 1/2 METHOD: 4.31 CM2
PV MV: 0.64 M/S
PV PEAK VELOCITY: 0.91 CM/S
RA PRESSURE: 3 MMHG
RIGHT VENTRICULAR END-DIASTOLIC DIMENSION: 2.41 CM
RV TISSUE DOPPLER FREE WALL SYSTOLIC VELOCITY 1 (APICAL 4 CHAMBER VIEW): 0.01 CM/S
TDI LATERAL: 0.09 M/S
TDI SEPTAL: 0.05 M/S
TDI: 0.07 M/S

## 2023-04-21 ENCOUNTER — OUTPATIENT CASE MANAGEMENT (OUTPATIENT)
Dept: ADMINISTRATIVE | Facility: OTHER | Age: 58
End: 2023-04-21
Payer: MEDICARE

## 2023-04-21 ENCOUNTER — PATIENT MESSAGE (OUTPATIENT)
Dept: ADMINISTRATIVE | Facility: OTHER | Age: 58
End: 2023-04-21
Payer: MEDICARE

## 2023-04-27 ENCOUNTER — PATIENT MESSAGE (OUTPATIENT)
Dept: TRANSPLANT | Facility: CLINIC | Age: 58
End: 2023-04-27
Payer: MEDICARE

## 2023-04-27 ENCOUNTER — OFFICE VISIT (OUTPATIENT)
Dept: PULMONOLOGY | Facility: CLINIC | Age: 58
End: 2023-04-27
Payer: MEDICARE

## 2023-04-27 VITALS
WEIGHT: 248.88 LBS | OXYGEN SATURATION: 96 % | BODY MASS INDEX: 40 KG/M2 | HEART RATE: 108 BPM | DIASTOLIC BLOOD PRESSURE: 84 MMHG | HEIGHT: 66 IN | SYSTOLIC BLOOD PRESSURE: 143 MMHG

## 2023-04-27 DIAGNOSIS — J96.11 CHRONIC HYPOXEMIC RESPIRATORY FAILURE: ICD-10-CM

## 2023-04-27 DIAGNOSIS — J84.89 PROGRESSIVE FIBROSING INTERSTITIAL LUNG DISEASE: Primary | ICD-10-CM

## 2023-04-27 DIAGNOSIS — K21.9 GASTROESOPHAGEAL REFLUX DISEASE WITHOUT ESOPHAGITIS: ICD-10-CM

## 2023-04-27 DIAGNOSIS — J84.9 ILD (INTERSTITIAL LUNG DISEASE): ICD-10-CM

## 2023-04-27 DIAGNOSIS — R74.8 ELEVATED LIVER ENZYMES: ICD-10-CM

## 2023-04-27 PROCEDURE — 99999 PR PBB SHADOW E&M-EST. PATIENT-LVL III: ICD-10-PCS | Mod: PBBFAC,TXP,, | Performed by: INTERNAL MEDICINE

## 2023-04-27 PROCEDURE — 99999 PR PBB SHADOW E&M-EST. PATIENT-LVL III: CPT | Mod: PBBFAC,TXP,, | Performed by: INTERNAL MEDICINE

## 2023-04-27 PROCEDURE — 99214 PR OFFICE/OUTPT VISIT, EST, LEVL IV, 30-39 MIN: ICD-10-PCS | Mod: S$PBB,NTX,, | Performed by: INTERNAL MEDICINE

## 2023-04-27 PROCEDURE — 99214 OFFICE O/P EST MOD 30 MIN: CPT | Mod: S$PBB,NTX,, | Performed by: INTERNAL MEDICINE

## 2023-04-27 PROCEDURE — 99213 OFFICE O/P EST LOW 20 MIN: CPT | Mod: PBBFAC,PO,NTX | Performed by: INTERNAL MEDICINE

## 2023-04-27 RX ORDER — NINTEDANIB 150 MG/1
150 CAPSULE ORAL 2 TIMES DAILY
Qty: 60 CAPSULE | Refills: 11 | Status: ACTIVE | OUTPATIENT
Start: 2023-04-27

## 2023-04-27 NOTE — PATIENT INSTRUCTIONS
Continue oxygen  Continue prednisone 20mg daily 4 more weeks then will try to taper  Follow up with endocrinologist for blood sugars to get better controlled  Continue to avoid any smoking including cigarettes, vape, marijuana  Starting new medicine called Ofev (nintedanib) which is an antifibrotic to slow down scarring of the lung tissue. This medicine may come with the side effect of diarrhea, nausea, vomiting which can be better with using imodium as needed. While on this medicine I would like to monitor your liver function tests every month for 3 mos then switch to every 3 month blood draws. Please go to the lab for blood work now prior to starting the new medicine.

## 2023-04-27 NOTE — PROGRESS NOTES
4/27/2023    Yara Santos  Follow up-     Chief Complaint   Patient presents with    Interstitial Lung Disease       HPI:   04/27/2023- pt saw Dr. Comer and I spoke with her after visit. Pt feels very tired. Sats drop quickly with movement. She uses 3L oxygen continuously. The plan was to start her on Ofev however there is concern about her chronic GI symptoms of nausea/emesis so Dr Comer told her not to start it. She usually stays constipated. Currently her heartburn and vomiting are improved on 2x/day 40mg omeprazole.   Cough productive white/clear phlegm, very thick.  Has not been using CPAP machine at night. Has tried but pulls it off in her sleep.  She continues on prednisone 20mg daily. Tried to decrease to 10mg but was unable to tolerate. Has been on prednisone 2 mos now. She has a sulfa allergy- breaks out in rash.  She is currently being treated for skin infection of her buttocks- open wounds, not healing, chronic- getting augmentin now.  Admitted to smoking marijuana- joints, has smoked off and on since age 8, last time Sunday and states she quit for good.     03/16/2023-   [facility] [employer] [phone]  Taper prednisone to 30mg daily   Continue oxygen to keep sats >88%  Start using CPAP with bleed in oxygen 2L   Echo to check pulmonary pressures  Go to the lab for blood tests  Nystatin mouth wash for thrush- use 4 times daily  pt feels breathing is improved on prednisone. She is still on 3L continuously now but reports at times able to take O2 off and sat remains 94-98 while at rest.  She just got CPAP machine this am and will start using.   She bruises easy, hands are red and dry and mouth is dry. Has white spots in mouth, lump in throat and trouble swallowing.  Pt was exposed (in same room) to nephew with HIV and TB 1 week ago, requests testing.      02/28/2023-   Need backup oxygen tank- contact [employer] company  Contact [employer] company to follow up on CPAP  machine  Use home oxygen continuously and at night now  Get blood work  See advanced lung disease specialist  May need open lung biopsy- will get opinion of advanced specialist  Need repeat pulmonary function tests  Start prednisone 40mg daily for now for flare of lung disease  pt feels her breathing is getting worse, she is going downhill. Daughter assists w/ history, takes notes. When bending over she can't breathe. She gets muscle spasms in the abdomen. She has a lot of cough and phlegm, clear/milky. Her sister  with end stage pulm fibrosis (they think due to RA) just this past Saturday, was on hospice. She wears O2 all day, requires 2L continuously now but not wearing w/ sleep. With walking she has to turn O2 up to 3L. She has noticed shaking of fingers and beginning to get clubbing of the fingertips. They have not received CPAP machine for home- heard from [facility] and was told insurance approval pending.  She is not smoking or vaping.  There has been a lot of construction going on at the house, adding on to the laundry room w/ new walls. She sleeps on the other side of the house. Denies mold. There was a small area of water damage. They keep the area w/ construction sealed off behind a closed door.  She has had rheumatology eval in the past, Dr. Kaur, note reviewed, and was told she has osteoarthritis and was told to f/u with PCP.  Has been off prednisone for several mos but had been on 5mg daily for lichen planus.    2022-   For lung disease- will plan to follow with repeat testing to evaluate for any progression  Due to repeat CT chest 2023  Ordering overnight sleep study at home to evaluate for sleep apnea  If positive will order CPAP machine, notify clinic when machine is delivered to home to set up 31 days compliance appointment. You must use the machine for a least 4 hours every night.   Agree with trelegy  Cough/mucous may be due to sinuses- referral to ENT placed for further  evaluation.  she did qualify for home O2 2L, ordered and delivered. She is seeing Dr Garcia allergist, new blood work pending. She had allergy prick test on back which was negative. She feels dyspnea is stable. She continues to have bad cough, thick light yellow/white mucous- at baseline. It is worse at night when she lies down. She has sinus stuffiness and allergies.  She was given trelegy inhaler sample, hasn't started yet.   She has avoided smoking and vaping entirely.  She feels very sleepy, falls asleep easily. Her sister has MOLINA. Daughter states that she snores and puffs w/ pursed lips when sleeping.    10/31/2022  Pulmonary function tests  6 min walk test  Go to the lab and get blood work  May request new rheumatology eval in the future  Suspect vaping caused some of the inflammation of lung tissue- need to avoid smoking/vaping   Pt is a 56 yo female with asthma, HTN, arthritis, GERD, hypothyroidism, obesity referred for new evaluation due to ILD found on CT chest. Her daughter is present and assists w/ history.  Pt reports has had lung problems for years and years. Reports in past treated at [facility] for fungal pneumonia due to bird droppings (1998)  She has also been treated for COPD, frequent bronchitis. She recently has had exacerbation taking prednisone and z pack. Has tested neg for covid, has not checked for flu. Many sick contacts and just went on a trip. She went to [facility], at [facility] had SOB, felt fevers, dizzy, had to use oxygen and lie in bed.  Inhalers: trelegy 200 once/d- new rx. Uses rescue albuterol 2-3x/day and feels benefit.  Nebulizer with duo neb uses 2x/day  This is the first exacerbation requiring prednisone so far this year.  Quit smoking 3 yr ago, 1-2 ppd since age 8  Quit vaping 3 wks- used juul and many other e cigarettes 1/2 cartridge daily- has entire drawer full.  She has been more short of breath gradual progressive for 1 year, symptoms vary.  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 Sister has RA with lung involvement  Has been told she has RA in past but Dr. Kaur said no RA, just OA. She sees derm with lichen planus and would use prednisone off and on, currently not on steroids.  No pets at home currently.  She lives in an older house in [place], mold visible ceilings of bathroom, earlier this year had mold in room- currently renovating.    The chief complaint problem is varies w/ instability at times.    PFSH:  Past Medical History:   Diagnosis Date    a Premature Ventricular Contractions     Dr. Austin Collier    b Hypertension     c Hypercholesterolemia     d Type 2 DM     e Hypothyroidism     f Morbid Obesity     f Osteopenia     i Asthma     i Tobacco Use Disorder     j GERD     j H/O Colon Polyps     j Irritable Bowel Syndrome     j Nonalcoholic Steatohepatitis ([place]     l Bilateral Hip Osteoarthritis     l Bilateral Knee Arthritis     l Carpal tunnel syndrome     l DDD (degenerative disc disease), lumbar     l Lumbar Spinal DDD     l Tarsal Tunnel Syndrome     m Vitamin B12 Deficiency     n Depression And Anxiety     o Allergic rhinosinusitis     Oxygen dependent     3l nasal cannula    Oxygen dependent 2023    p OU Cataracts     q Vitamin D Deficiency     Sleep apnea     Wellness Visit 2023          Past Surgical History:   Procedure Laterality Date     SECTION      x2    COLONOSCOPY   ?    polyps removed per pt report    COLONOSCOPY N/A 2022    Procedure: COLONOSCOPY;  Surgeon: Brett Jasso MD;  Location: [facility] ENDO;  Service: Endoscopy;  Laterality: N/A;    EPIDURAL STEROID INJECTION INTO CERVICAL SPINE N/A 2020    Procedure: Injection-steroid-epidural-cervical to left;  Surgeon: John Vinson MD;  Location: [facility] OR;  Service: Pain Management;  Laterality: N/A;    EPIDURAL STEROID INJECTION INTO  LUMBAR SPINE N/A 11/12/2020    Procedure: Injection-steroid-epidural-lumbar, l5/s1 to left;  Surgeon: John Vinson MD;  Location: [facility];  Service: Pain Management;  Laterality: N/A;    EPIDURAL STEROID INJECTION INTO LUMBAR SPINE N/A 06/21/2021    Procedure: Injection-steroid-epidural-lumbar L5/S1;  Surgeon: John Vinson MD;  Location: [facility];  Service: Pain Management;  Laterality: N/A;    EPIDURAL STEROID INJECTION INTO LUMBAR SPINE N/A 6/21/2022    Procedure: Injection-steroid-epidural-lumbar L5/S1 spread to left;  Surgeon: John Vinson MD;  Location: [facility];  Service: Pain Management;  Laterality: N/A;    ESOPHAGOGASTRODUODENOSCOPY      ESOPHAGOGASTRODUODENOSCOPY N/A 11/9/2022    Procedure: EGD (ESOPHAGOGASTRODUODENOSCOPY);  Surgeon: Brett Jasso MD;  Location: [facility];  Service: Endoscopy;  Laterality: N/A;    INJECTION OF JOINT Left 9/20/2022    Procedure: INJECTION, JOINT- hip;  Surgeon: John Vinson MD;  Location: [facility];  Service: Pain Management;  Laterality: Left;    LAPAROSCOPIC CHOLECYSTECTOMY N/A 7/13/2022    Procedure: CHOLECYSTECTOMY, LAPAROSCOPIC;  Surgeon: Ra Santos MD;  Location: [facility];  Service: General;  Laterality: N/A;    TRANSFORAMINAL EPIDURAL INJECTION OF STEROID Left 8/16/2022    Procedure: Injection,steroid,epidural,transforaminal approach L3/4 and L4/5;  Surgeon: John Vinson MD;  Location: [facility];  Service: Pain Management;  Laterality: Left;    TRANSFORAMINAL EPIDURAL INJECTION OF STEROID Left 2/22/2023    Procedure: Injection,steroid,epidural,transforaminal approach L3/4 and L4/5-Left;  Surgeon: John Vinson MD;  Location: [facility];  Service: Pain Management;  Laterality: Left;  l3/4 and l4/5    UPPER GASTROINTESTINAL ENDOSCOPY  2020 ?    unsure of results     Social History     Tobacco Use    Smoking status: Former     Packs/day: 1.00     Types: Cigarettes, Vaping with nicotine     Start date: 1968     Quit date:  10/1/2022     Years since quittin.5     Passive exposure: Current    Smokeless tobacco: Never   Substance Use Topics    Alcohol use: No    Drug use: Yes     Frequency: 20.0 times per week     Types: Marijuana     Comment: daily smoke and edibles     Family History   Problem Relation Age of Onset    Arthritis Mother     Diabetes Mother     Hyperlipidemia Mother     Cancer Mother         uterine     Allergies Mother     Ovarian cancer Mother 58    Aneurysm Father     Hyperlipidemia Father     Breast cancer Maternal Aunt 65    Cancer Maternal Uncle         brain    Cancer Paternal Aunt         breast, bone and lung     Breast cancer Paternal Aunt 78    Breast cancer Maternal Cousin 43    Brain cancer Maternal Cousin     Cirrhosis Neg Hx      Review of patient's allergies indicates:   Allergen Reactions    Iodine and iodide containing products Blisters     Reports it caused lichen planus flare up    Asa [aspirin] Itching and Other (See Comments)     Skin problem      Atorvastatin      Worsened Lichen Planus    Contact metal agent      Other reaction(s): Other (See Comments)    Crestor [rosuvastatin]      Worsens her lichen planus    Lasix [furosemide]      rash    Lisinopril      Hives    Losartan      Confusion    Lovastatin      Worsened Lichen Planus    Metformin Other (See Comments)     Stomach cramps     Salicylates     Sulfur      Other reaction(s): Other (See Comments)    Sulfur, elemental     Valsartan      breakouts    Latex Other (See Comments)     Skin problem         Performance Status:The patient's activity level is functions out of house.      Review of Systems:  a review of eleven systems covering constitutional, Eye, HEENT, Psych, Respiratory, Cardiac, GI, , Musculoskeletal, Endocrine, Dermatologic was negative except for pertinent findings as listed ABOVE and below:  Shaking, diaphoretic  Blood sugar 200-300s      Exam:Comprehensive exam done. BP (!) 143/84 (BP Location: Right arm, Patient  "Position: Sitting, BP Method: Large (Automatic))   Pulse 108   Ht 5' 6" (1.676 m)   Wt 112.9 kg (248 lb 14.4 oz)   SpO2 96% Comment: 3L  BMI 40.17 kg/m²   Exam included Vitals as listed, and patient's appearance and affect and alertness and mood, oral exam for yeast and hygiene and pharynx lesions and Mallapatti (M) score, neck with inspection for jvd and masses and thyroid abnormalities and lymph nodes (supraclavicular and infraclavicular nodes and axillary also examined and noted if abn), chest exam included symmetry and effort and fremitus and percussion and auscultation, cardiac exam included rhythm and gallops and murmur and rubs and jvd and edema, abdominal exam for mass and hepatosplenomegaly and tenderness and hernias and bowel sounds, Musculoskeletal exam with muscle tone and posture and mobility/gait and  strength, and skin for rashes and cyanosis and pallor and turgor, extremity for clubbing.  Findings were normal except for pertinent findings listed below:  Oropharynx very mild thrush patches over hard palate, M1  HR regular  Clear breath sounds bilat  No edema/joint swelling  Early clubbing fingertips      Radiographs (ct chest and cxr) reviewed: view by direct vision   CT chest 2/15/23- differences in technique compared to last scan but fibrotic findings may be slightly worse, also more diffuse ground glass  CT chest 8/31/22- diffuse parenchymal lung disease upper lobe predominant w/ ground glass and micronodules. There is a larger calcified LLL nodule and another calcified granuloma RML. Upper lobes have some fibrotic/emphysematous changes    Labs reviewed     Latest Reference Range & Units 12/02/22 09:24   STEPHANIE Screen None Detected  None Detected   Smooth Muscle Ab <1:20  <1:20   IgG 650 - 1600 mg/dL 809   Hepatitis A Antibody IgG  See result image under hyperlink   Hep B S Ab IU/L <3.10   Hepatitis B Core Ab Total Negative  Negative   Hepatitis B Surface Ag  Negative      Latest Reference " Range & Units 12/02/22 09:24   A-1 Antitrypsin 90 - 200 mg/dL 154      Latest Reference Range & Units 04/23/21 08:47 02/15/22 10:18 08/29/22 10:08   Eos # 0.0 - 0.5 K/uL 0.6 (H) 0.1 0.2     Lab Results   Component Value Date    WBC 13.93 (H) 03/21/2023    HGB 15.7 03/21/2023    HCT 47.9 03/21/2023    MCV 79 (L) 03/21/2023     03/21/2023       CMP  Sodium   Date Value Ref Range Status   03/30/2023 135 (L) 136 - 145 mmol/L Final     Potassium   Date Value Ref Range Status   03/30/2023 4.4 3.5 - 5.1 mmol/L Final     Chloride   Date Value Ref Range Status   03/30/2023 94 (L) 95 - 110 mmol/L Final     CO2   Date Value Ref Range Status   03/30/2023 32 (H) 22 - 31 mmol/L Final     Glucose   Date Value Ref Range Status   03/30/2023 330 (H) 70 - 110 mg/dL Final     Comment:     The ADA recommends the following guidelines for fasting glucose:    Normal:       less than 100 mg/dL    Prediabetes:  100 mg/dL to 125 mg/dL    Diabetes:     126 mg/dL or higher       BUN   Date Value Ref Range Status   03/30/2023 11 7 - 18 mg/dL Final     Creatinine   Date Value Ref Range Status   03/30/2023 0.76 0.50 - 1.40 mg/dL Final     Calcium   Date Value Ref Range Status   03/30/2023 9.2 8.4 - 10.2 mg/dL Final     Total Protein   Date Value Ref Range Status   03/21/2023 7.9 6.0 - 8.4 g/dL Final     Albumin   Date Value Ref Range Status   03/21/2023 4.7 3.5 - 5.2 g/dL Final     Total Bilirubin   Date Value Ref Range Status   03/30/2023 0.5 0.2 - 1.3 mg/dL Final     Alkaline Phosphatase   Date Value Ref Range Status   03/21/2023 157 (H) 38 - 145 U/L Final     AST ([place])   Date Value Ref Range Status   02/08/2016 25 14 - 36 U/L Final     AST   Date Value Ref Range Status   03/21/2023 87 (H) 14 - 36 U/L Final     ALT   Date Value Ref Range Status   03/21/2023 128 (H) 0 - 35 U/L Final     Anion Gap   Date Value Ref Range Status   03/30/2023 9 8 - 16 mmol/L Final     eGFR   Date Value Ref Range Status   03/30/2023 >60 >60  mL/min/1.73 m^2 Final         PFT  reviewed  12/2/22- mild obstruction, no bd response, moderate reduced dlco      6mwt 12/2/22- 131.67 meters; sats 98%--> 87% with exertion, 92% on 2LPM    Plan:  Clinical impression is resonably certain and repeated evaluation prn +/- follow up will be needed as below. ILD, progressive fibrotic, O2 dependent- suspecting smoking related ILD. Background hx of fungal pna w/ calcified granulomas. Seems to respond somewhat to prednisone. Starting Ofev and counseled pt extensively on avoidance of exposures (smoke)    Yara was seen today for interstitial lung disease.    Diagnoses and all orders for this visit:    Progressive fibrosing interstitial lung disease  -     nintedanib (OFEV) 150 mg Cap; Take 1 capsule (150 mg total) by mouth 2 (two) times a day.    Chronic hypoxemic respiratory failure    ILD (interstitial lung disease)  -     Comprehensive Metabolic Panel; Standing    Chronic Mildly Elevated Hepatic Transaminases    Gastroesophageal reflux disease without esophagitis              Follow up in about 4 weeks (around 5/25/2023).    Discussed with patient above for education the following:      Patient Instructions   Continue oxygen  Continue prednisone 20mg daily 4 more weeks then will try to taper  Follow up with endocrinologist for blood sugars to get better controlled  Continue to avoid any smoking including cigarettes, vape, marijuana  Starting new medicine called Ofev (nintedanib) which is an antifibrotic to slow down scarring of the lung tissue. This medicine may come with the side effect of diarrhea, nausea, vomiting which can be better with using imodium as needed. While on this medicine I would like to monitor your liver function tests every month for 3 mos then switch to every 3 month blood draws. Please go to the lab for blood work now prior to starting the new medicine.            D/w Dr. Comer again today via [employer]

## 2023-04-28 ENCOUNTER — TELEPHONE (OUTPATIENT)
Dept: TRANSPLANT | Facility: CLINIC | Age: 58
End: 2023-04-28
Payer: MEDICARE

## 2023-04-28 NOTE — TELEPHONE ENCOUNTER
----- Message from Abiola Comer MD sent at 4/27/2023  1:31 PM CDT -----  Regarding: RE: OFEV  Meets criteria for trial of ofev but given her extensive GI hx, will hold off of starting this at this point.    ----- Message -----  From: Lola QUEVEDO Do, RN  Sent: 4/27/2023  11:50 AM CDT  To: Abiola Comer MD  Subject: OFEV                                             This patient asked about starting Ofev last week while you were out. There wasn't anything mentioned in the clinic note to start her.  Let me know if this is the plan and diagnosis that will be used.     Ada

## 2023-05-02 ENCOUNTER — HOSPITAL ENCOUNTER (OUTPATIENT)
Dept: RADIOLOGY | Facility: HOSPITAL | Age: 58
Discharge: HOME | End: 2023-05-02
Attending: NURSE PRACTITIONER
Payer: MEDICARE

## 2023-05-02 DIAGNOSIS — R11.0 NAUSEA: ICD-10-CM

## 2023-05-02 DIAGNOSIS — R10.13 EPIGASTRIC PAIN: ICD-10-CM

## 2023-05-02 PROCEDURE — 78264 GASTRIC EMPTYING IMG STUDY: CPT | Mod: 26,NTX,, | Performed by: RADIOLOGY

## 2023-05-02 PROCEDURE — 78264 NM GASTRIC EMPTYING: ICD-10-PCS | Mod: 26,NTX,, | Performed by: RADIOLOGY

## 2023-05-02 PROCEDURE — 78264 GASTRIC EMPTYING IMG STUDY: CPT | Mod: TC,PO,TXP

## 2023-05-03 ENCOUNTER — TELEPHONE (OUTPATIENT)
Dept: PHARMACY | Facility: CLINIC | Age: 58
End: 2023-05-03
Payer: MEDICARE

## 2023-05-03 NOTE — TELEPHONE ENCOUNTER
Bc, this is Jeancarlos Galvez, clinical pharmacist with [facility] that is part of your care team.  We have begun working on your prescription that your doctor has sent us. Our next steps include:     Working with your insurance company to obtain approval for your medication  Working with you to ensure your medication is affordable     We will be calling you along the way with updates on your medication but if you have any concerns or receive information that you would like to discuss please reach us at [phone].    Welcome call outcome: Patient/caregiver reached    Per patient she is unclear what the decision was to start OFEV or not - I will reach out to provider office to inquire.

## 2023-05-04 ENCOUNTER — HOSPITAL ENCOUNTER (OUTPATIENT)
Dept: RADIOLOGY | Facility: HOSPITAL | Age: 58
Discharge: HOME | End: 2023-05-04
Attending: NURSE PRACTITIONER
Payer: MEDICARE

## 2023-05-04 DIAGNOSIS — R93.5 ABNORMAL MRI OF ABDOMEN: ICD-10-CM

## 2023-05-04 DIAGNOSIS — N18.30 STAGE 3 CHRONIC KIDNEY DISEASE, UNSPECIFIED WHETHER STAGE 3A OR 3B CKD: ICD-10-CM

## 2023-05-04 PROCEDURE — 76770 US EXAM ABDO BACK WALL COMP: CPT | Mod: 26,NTX,, | Performed by: RADIOLOGY

## 2023-05-04 PROCEDURE — 76770 US EXAM ABDO BACK WALL COMP: CPT | Mod: TC,PO,NTX

## 2023-05-04 PROCEDURE — 76770 US KIDNEY: ICD-10-PCS | Mod: 26,NTX,, | Performed by: RADIOLOGY

## 2023-05-04 NOTE — PROGRESS NOTES
Renal ultra sound is stable from prior findings, no further work up needed. Please keep appt with Dr Gibson as scheduled for close follow up.

## 2023-05-08 ENCOUNTER — OUTPATIENT CASE MANAGEMENT (OUTPATIENT)
Dept: ADMINISTRATIVE | Facility: OTHER | Age: 58
End: 2023-05-08
Payer: MEDICARE

## 2023-05-08 NOTE — PROGRESS NOTES
Outpatient Care Management  Plan of Care Follow Up Visit    Patient: Yara Santos  MRN: [id number]  Date of Service: 05/08/2023  Completed by: Lexie Peralta RN  Referral Date: 02/16/2023    Reason for Visit   Patient presents with    OPCM RN Follow Up Call       Brief Summary: OPCM RN followed up with Ms. Livingston.  She discussed her skin infection to her buttocks and feels it likely due to Lichen planus and is doing better.  Next Steps:   Ms. Livingston agreed to OPCM RN follow up on or around 5/25/23.  Message PCP regarding Ms. Livingston's report of Buspar helping but feels she may need a little more assistance.  Follow up on her contact for options of CPAP masks.

## 2023-05-09 ENCOUNTER — SPECIALTY PHARMACY (OUTPATIENT)
Dept: PHARMACY | Facility: CLINIC | Age: 58
End: 2023-05-09
Payer: MEDICARE

## 2023-05-09 NOTE — TELEPHONE ENCOUNTER
PA approved $0 copay - OFEV is limited drug distribution- Forwarding order to [facility].     Informed patient of closing enrollment and No[facility] of Rx to [facility]

## 2023-05-11 ENCOUNTER — OFFICE VISIT (OUTPATIENT)
Dept: OBSTETRICS AND GYNECOLOGY | Facility: CLINIC | Age: 58
End: 2023-05-11
Payer: MEDICARE

## 2023-05-11 ENCOUNTER — OFFICE VISIT (OUTPATIENT)
Dept: HEPATOLOGY | Facility: CLINIC | Age: 58
End: 2023-05-11
Payer: MEDICARE

## 2023-05-11 VITALS
WEIGHT: 248.69 LBS | SYSTOLIC BLOOD PRESSURE: 152 MMHG | HEIGHT: 66 IN | DIASTOLIC BLOOD PRESSURE: 90 MMHG | BODY MASS INDEX: 39.97 KG/M2

## 2023-05-11 DIAGNOSIS — Z01.419 ENCOUNTER FOR GYNECOLOGICAL EXAMINATION (GENERAL) (ROUTINE) WITHOUT ABNORMAL FINDINGS: Primary | ICD-10-CM

## 2023-05-11 DIAGNOSIS — Z12.31 ENCOUNTER FOR SCREENING MAMMOGRAM FOR MALIGNANT NEOPLASM OF BREAST: ICD-10-CM

## 2023-05-11 DIAGNOSIS — E66.01 MORBID OBESITY WITH BMI OF 40.0-44.9, ADULT: ICD-10-CM

## 2023-05-11 DIAGNOSIS — Z80.49 FAMILY HISTORY OF UTERINE CANCER: ICD-10-CM

## 2023-05-11 DIAGNOSIS — M85.80 OSTEOPENIA AFTER MENOPAUSE: ICD-10-CM

## 2023-05-11 DIAGNOSIS — K76.0 FATTY LIVER: Primary | ICD-10-CM

## 2023-05-11 DIAGNOSIS — E11.65 TYPE 2 DIABETES MELLITUS WITH HYPERGLYCEMIA, WITHOUT LONG-TERM CURRENT USE OF INSULIN: ICD-10-CM

## 2023-05-11 DIAGNOSIS — K74.60 CIRRHOSIS OF LIVER WITHOUT ASCITES, UNSPECIFIED HEPATIC CIRRHOSIS TYPE: ICD-10-CM

## 2023-05-11 DIAGNOSIS — Z78.0 OSTEOPENIA AFTER MENOPAUSE: ICD-10-CM

## 2023-05-11 DIAGNOSIS — E78.00 HYPERCHOLESTEROLEMIA: ICD-10-CM

## 2023-05-11 PROCEDURE — 99999 PR PBB SHADOW E&M-EST. PATIENT-LVL V: CPT | Mod: PBBFAC,,, | Performed by: OBSTETRICS & GYNECOLOGY

## 2023-05-11 PROCEDURE — 99215 PR OFFICE/OUTPT VISIT, EST, LEVL V, 40-54 MIN: ICD-10-PCS | Mod: 95,NTX,, | Performed by: NURSE PRACTITIONER

## 2023-05-11 PROCEDURE — G0101 PR CA SCREEN;PELVIC/BREAST EXAM: ICD-10-PCS | Mod: GZ,S$PBB,, | Performed by: OBSTETRICS & GYNECOLOGY

## 2023-05-11 PROCEDURE — 99215 OFFICE O/P EST HI 40 MIN: CPT | Mod: 95,NTX,, | Performed by: NURSE PRACTITIONER

## 2023-05-11 PROCEDURE — G0101 CA SCREEN;PELVIC/BREAST EXAM: HCPCS | Mod: GZ,S$PBB,, | Performed by: OBSTETRICS & GYNECOLOGY

## 2023-05-11 PROCEDURE — G0101 CA SCREEN;PELVIC/BREAST EXAM: HCPCS | Mod: PBBFAC,PN | Performed by: OBSTETRICS & GYNECOLOGY

## 2023-05-11 PROCEDURE — 99999 PR PBB SHADOW E&M-EST. PATIENT-LVL V: ICD-10-PCS | Mod: PBBFAC,,, | Performed by: OBSTETRICS & GYNECOLOGY

## 2023-05-11 PROCEDURE — 99215 OFFICE O/P EST HI 40 MIN: CPT | Mod: PBBFAC,PN,25 | Performed by: OBSTETRICS & GYNECOLOGY

## 2023-05-11 NOTE — PROGRESS NOTES
Chief Complaint   Patient presents with    Well Woman     Last pap 04/2022 never abnormal, no issues       History and Physical:  Yara Santos is a 57 y.o. F who presents today for her routine annual GYN exam. The patient has no Gynecology complaints.      Annual:   No LMP recorded. Patient is postmenopausal.  Last Pap: 5/2022 NILM/HPV neg, next 5 years, 2027  History of abnormal pap: never  Last Mammogram: 4/2022 BIRADS 1; Tyrer-Cuzick risk 5%  Colonosocpy: 11/2022, repeat 5 yr  DEXA: 5/2022 Osteopenia  PCP: Remy Gibson MD 8/2022  Immunization status: stated as current, but no records available. Sp Zoster & Pneumococcal  Body mass index is 40.14 kg/m².     Admits to family h/o uterine cancer. She denies bleeding    Allergies:   Review of patient's allergies indicates:   Allergen Reactions    Iodine and iodide containing products Blisters     Reports it caused lichen planus flare up    Asa [aspirin] Itching and Other (See Comments)     Skin problem      Atorvastatin      Worsened Lichen Planus    Contact metal agent      Other reaction(s): Other (See Comments)    Crestor [rosuvastatin]      Worsens her lichen planus    Lasix [furosemide]      rash    Lisinopril      Hives    Losartan      Confusion    Lovastatin      Worsened Lichen Planus    Metformin Other (See Comments)     Stomach cramps     Salicylates     Sulfur      Other reaction(s): Other (See Comments)    Sulfur, elemental     Valsartan      breakouts    Latex Other (See Comments)     Skin problem       Past Medical History:   Diagnosis Date    a Premature Ventricular Contractions     Dr. Austin ernst Hypertension     c Hypercholesterolemia     d Type 2 DM     e Hypothyroidism     f Morbid Obesity     f Osteopenia     i Asthma     i Tobacco Use Disorder     j GERD     j H/O Colon Polyps     j Irritable Bowel Syndrome     j Nonalcoholic Steatohepatitis ([place]     l Bilateral Hip Osteoarthritis     l Bilateral Knee Arthritis     l Carpal  tunnel syndrome     l DDD (degenerative disc disease), lumbar     l Lumbar Spinal DDD     l Tarsal Tunnel Syndrome     m Vitamin B12 Deficiency     n Depression And Anxiety     o Allergic rhinosinusitis     Oxygen dependent     3l nasal cannula    Oxygen dependent 2023    p OU Cataracts     q Vitamin D Deficiency     Sleep apnea     Wellness Visit 2023      Past Surgical History:   Procedure Laterality Date     SECTION      x2    CHOLECYSTECTOMY      COLONOSCOPY   ?    polyps removed per pt report    COLONOSCOPY N/A 2022    Procedure: COLONOSCOPY;  Surgeon: Brett Jasso MD;  Location: [facility] ENDO;  Service: Endoscopy;  Laterality: N/A;    EPIDURAL STEROID INJECTION INTO CERVICAL SPINE N/A 2020    Procedure: Injection-steroid-epidural-cervical to left;  Surgeon: John Vinson MD;  Location: [facility];  Service: Pain Management;  Laterality: N/A;    EPIDURAL STEROID INJECTION INTO LUMBAR SPINE N/A 2020    Procedure: Injection-steroid-epidural-lumbar, l5/s1 to left;  Surgeon: John Vinson MD;  Location: [facility] OR;  Service: Pain Management;  Laterality: N/A;    EPIDURAL STEROID INJECTION INTO LUMBAR SPINE N/A 2021    Procedure: Injection-steroid-epidural-lumbar L5/S1;  Surgeon: John Vinson MD;  Location: [facility] OR;  Service: Pain Management;  Laterality: N/A;    EPIDURAL STEROID INJECTION INTO LUMBAR SPINE N/A 2022    Procedure: Injection-steroid-epidural-lumbar L5/S1 spread to left;  Surgeon: John Vinson MD;  Location: [facility];  Service: Pain Management;  Laterality: N/A;    ESOPHAGOGASTRODUODENOSCOPY      ESOPHAGOGASTRODUODENOSCOPY N/A 2022    Procedure: EGD (ESOPHAGOGASTRODUODENOSCOPY);  Surgeon: Brett Jasso MD;  Location: [facility];  Service: Endoscopy;  Laterality: N/A;    INJECTION OF JOINT Left 2022    Procedure: INJECTION, JOINT- hip;  Surgeon: John Vinson MD;  Location: [facility];  Service: Pain  Management;  Laterality: Left;    LAPAROSCOPIC CHOLECYSTECTOMY N/A 07/13/2022    Procedure: CHOLECYSTECTOMY, LAPAROSCOPIC;  Surgeon: Ra Santos MD;  Location: [facility];  Service: General;  Laterality: N/A;    TRANSFORAMINAL EPIDURAL INJECTION OF STEROID Left 08/16/2022    Procedure: Injection,steroid,epidural,transforaminal approach L3/4 and L4/5;  Surgeon: John Vinson MD;  [facility];  Service: Pain Management;  Laterality: Left;    TRANSFORAMINAL EPIDURAL INJECTION OF STEROID Left 02/22/2023    Procedure: Injection,steroid,epidural,transforaminal approach L3/4 and L4/5-Left;  Surgeon: John Vinson MD;  Location: [facility];  Service: Pain Management;  Laterality: Left;  l3/4 and l4/5    UPPER GASTROINTESTINAL ENDOSCOPY  2020 ?    unsure of results     MEDS:   Current Outpatient Medications on File Prior to Visit   Medication Sig Dispense Refill    ADVAIR DISKUS 500-50 mcg/dose DsDv diskus inhaler Inhale into the lungs 2 (two) times daily.      albuterol-ipratropium (DUO-NEB) 2.5 mg-0.5 mg/3 mL nebulizer solution Take 3 mLs by nebulization every 6 (six) hours while awake. Rescue 270 mL 3    azelastine (ASTELIN) 137 mcg (0.1 %) nasal spray 1 spray (137 mcg total) by Nasal route 2 (two) times daily as needed for Rhinitis. 90 mL 3    blood sugar diagnostic (ACCU-CHEK TEJA PLUS TEST STRP) Strp Test blood sugar THREE TIMES daily; For diagnosis code E11.65 200 strip 3    blood sugar diagnostic Strp To check BG one times daily, to use with insurance preferred meter 100 strip 3    blood-glucose meter kit To check BG one times daily, to use with insurance preferred meter 1 each 0    busPIRone (BUSPAR) 30 MG Tab Take 1 tablet (30 mg total) by mouth 2 (two) times daily. 60 tablet 11    cetirizine (ZYRTEC) 10 MG tablet Take 1 tablet (10 mg total) by mouth daily as needed. (Patient taking differently: Take 10 mg by mouth every evening.) 90 tablet 3    cholecalciferol, vitamin D3, 5,000 unit Tab Take  5,000 Units by mouth once daily. VITAMIN D-3 5000 UNIT TABS      cyanocobalamin 1,000 mcg/mL injection Inject 1 mL (1,000 mcg total) into the muscle every 30 days. 10 mL 1    cyclobenzaprine (FLEXERIL) 10 MG tablet Take 1 tablet by mouth three times daily as needed for muscle spasm 90 tablet 0    ezetimibe (ZETIA) 10 mg tablet Take 1 tablet (10 mg total) by mouth every evening. 90 tablet 3    FLOVENT  mcg/actuation inhaler Inhale 2 puffs into the lungs 2 (two) times daily.      gabapentin (NEURONTIN) 300 MG capsule TAKE 1 CAPSULE BY MOUTH THREE TIMES DAILY 270 capsule 0    hydroCHLOROthiazide (HYDRODIURIL) 12.5 MG Tab Take 1 tablet by mouth once daily 90 tablet 0    ibuprofen (ADVIL,MOTRIN) 800 MG tablet Take 1 tablet by mouth twice daily as needed for pain 180 tablet 0    ipratropium (ATROVENT HFA) 17 mcg/actuation inhaler INHALE 2 PUFFS BY MOUTH TWICE DAILY AS DIRECTED 38.7 g 6    lancets Misc To check BG TWO times daily, to use with insurance preferred meter 100 each 3    lancing device with lancets (ACCU-CHEK MULTICLIX LANCET) Kit Testing needed THREE TIMES DAILY 200 each 1    levalbuterol (XOPENEX HFA) 45 mcg/actuation inhaler Inhale 2 puffs into the lungs every 6 (six) hours as needed.      levothyroxine (SYNTHROID) 50 MCG tablet TAKE 1 TABLET BY MOUTH ONCE DAILY BEFORE BREAKFAST (Patient taking differently: Take 50 mcg by mouth before breakfast. Alternates 1/2 tab with 1 tab every other day) 90 tablet 3    mupirocin (BACTROBAN) 2 % ointment Apply topically 2 (two) times daily as needed. 22 g 1    nintedanib (OFEV) 150 mg Cap Take 1 capsule (150 mg total) by mouth 2 (two) times a day. 60 capsule 11    omalizumab (XOLAIR) 150 mg/mL injection Inject 150 mg into the skin every 14 (fourteen) days.      omeprazole (PRILOSEC) 40 MG capsule Take 1 capsule (40 mg total) by mouth 2 (two) times a day. 180 capsule 2    ondansetron (ZOFRAN) 8 MG tablet TAKE 1 TABLET BY MOUTH EVERY 8 HOURS AS NEEDED FOR NAUSEA 90  "tablet 0    pen needle, diabetic 32 gauge x " Ndle 1 each by Misc.(Non-Drug; Combo Route) route once daily. 30 each 11    sertraline (ZOLOFT) 100 MG tablet Take 1 tablet by mouth twice daily 180 tablet 3    insulin detemir U-100, Levemir, (LEVEMIR FLEXTOUCH U-100 INSULN) 100 unit/mL (3 mL) InPn pen Inject 15 Units QAM and 15 Units QHS 6 mL 3    silver sulfADIAZINE 1% (SILVADENE) 1 % cream Apply topically 2 (two) times daily. To affected area (Patient not taking: Reported on 2023) 50 g 1     No current facility-administered medications on file prior to visit.     OB History          2    Para   2    Term   2            AB        Living   2         SAB        IAB        Ectopic        Multiple        Live Births                   Social History     Socioeconomic History    Marital status:    Tobacco Use    Smoking status: Former     Packs/day: 1.00     Types: Cigarettes, Vaping with nicotine     Start date:      Quit date: 10/1/2022     Years since quittin.6     Passive exposure: Current    Smokeless tobacco: Never   Substance and Sexual Activity    Alcohol use: No    Drug use: Not Currently     Frequency: 20.0 times per week     Types: Marijuana     Comment: daily smoke and edibles    Sexual activity: Not Currently     Social Determinants of Health     Financial Resource Strain: Unknown    Difficulty of Paying Living Expenses: Patient refused   Food Insecurity: Unknown    Worried About Running Out of Food in the Last Year: Patient refused    Ran Out of Food in the Last Year: Patient refused   Transportation Needs: Unknown    Lack of Transportation (Medical): Patient refused    Lack of Transportation (Non-Medical): Patient refused   Physical Activity: Inactive    Days of Exercise per Week: 0 days    Minutes of Exercise per Session: 0 min   Stress: Stress Concern Present    Feeling of Stress : Very much   Social Connections: Unknown    Frequency of Communication with Friends and " "Family: Patient refused    Frequency of Social Gatherings with Friends and Family: Patient refused    Active Member of Clubs or Organizations: Patient refused    Attends Club or Organization Meetings: Patient refused    Marital Status:    Housing Stability: Unknown    Unable to Pay for Housing in the Last Year: Patient refused    Number of Places Lived in the Last Year: 1    Unstable Housing in the Last Year: Patient refused     Family History   Problem Relation Age of Onset    Arthritis Mother     Diabetes Mother     Hyperlipidemia Mother     Cancer Mother         uterine     Allergies Mother     Ovarian cancer Mother 58    Aneurysm Father     Hyperlipidemia Father     Breast cancer Maternal Aunt 65    Cancer Maternal Uncle         brain    Cancer Paternal Aunt         breast, bone and lung     Breast cancer Paternal Aunt 78    Breast cancer Maternal Cousin 43    Brain cancer Maternal Cousin     Cirrhosis Neg Hx        Past medical and surgical history reviewed.   I have reviewed the patient's medical history in detail and updated the computerized patient record.    Review of System:   General: no chills, fever, night sweats, weight gain or weight loss  Breasts: no new or changing breast lumps, nipple discharge or masses.  Gastrointestinal: no abdominal pain, change in bowel habits, or black or bloody stools  Genito-Urinary: no incontinence, urinary frequency/urgency or vulvar/vaginal symptoms, pelvic pain    Physical Exam:   BP (!) 152/90   Ht 5' 6" (1.676 m)   Wt 112.8 kg (248 lb 10.9 oz)   BMI 40.14 kg/m²   Constitutional: She appears alert and responsive. She appears well-developed, well-groomed, and well-nourished. No distress.  Breasts: are symmetrical.  Right breast exhibits no inverted nipple, no mass, no nipple discharge, no skin change and no tenderness.  Left breast exhibits no inverted nipple, no mass, no nipple discharge, no skin change and no tenderness.  Abdominal: Soft. She exhibits no " distension, hernias or masses. There is no tenderness. No enlargement of liver edge or spleen.  There is no rebound and no guarding.   Genitourinary:   Deferred. Pap not indicated and no GYN complaints. Offered patient exam, patient declined exam.       Assessment/Plan:   Encounter for gynecological examination (general) (routine) without abnormal findings    Encounter for screening mammogram for malignant neoplasm of breast  -     Mammo Digital Screening Bilat w/ Iftikhar; Future; Expected date: 05/11/2023    Osteopenia after menopause    Family history of uterine cancer      Follow up in 1 year.

## 2023-05-11 NOTE — PATIENT INSTRUCTIONS
Lab and ultrasound monitoring every 6 months- next due in October  Complete hepatitis A/B vaccines         CIRRHOSIS EDUCATION:  This is a helpful web site about cirrhosis: https://cirrhosiscare.ca/     Because you have cirrhosis, it is extremely important to obtain blood tests and an ultrasound every 6 months to screen for liver cancer (you are at risk for developing liver cancer due to increased scar tissue in the liver).     Signs and symptoms of worsening liver disease include jaundice (yellow skin/eyes), fluid in the abdomen (ascites), and confusion/disorientation/slowed thought processes due to hepatic encephalopathy (toxins building up when the liver is not working properly). You should seek medical attention if any of these things occur. Also, possible bleeding from esophageal varices (blood vessels in the stomach and foodpipe that can burst and cause bleeding). If you have symptoms of vomiting blood, blood in your stool, maroon or black stools, or coffee ground vomit, you should go to the emergency room immediately.     Cirrhosis can increase the risk of impaired liver function or liver failure; however, we will watch your liver function score (MELD score) closely with each clinic visit. A normal MELD score is 6, highest is 40. The MELD score helps to determine when we may need to consider evaluation for liver transplant.     Counseling  -- Strict abstinence from alcohol (includes beer, wine, and/or liquor)  -- Avoid non-steroidal anti-inflammatory drugs (NSAIDs) such as ibuprofen (Motrin, Advil), naproxen (Naprosyn, Aleve), meloxicam (Mobic)   -- Tylenol/acetaminophen is OKAY and should be used as needed for pain, no more than 2000 mg per day  -- Avoid raw shellfish due to the risk of Vibrio vulnificus infection  -- Low salt/sodium diet, less than 2000 mg per day   -- High protein diet to prevent muscle mass loss. Can drink protein shakes (Premier Protein is a great option because it is very high protein  and low sugar). A bedtime snack with protein can also be helpful. Example: peanut butter sandwich/crackers  -- Periodic upper endoscopy (EGD) to screen for varices (enlarged blood vessels) in the esophagus and stomach which can increase risk of bleeding  -- Increased risk of liver cancer associated with cirrhosis; therefore, continued screening with ultrasound (or CT / MRI) and AFP tumor marker every 6 months is recommended.

## 2023-05-11 NOTE — PROGRESS NOTES
The patient location is: [place], [place]  The chief complaint leading to consultation is: F/u for fatty liver    Visit type: audiovisual    Face to Face time with patient: 21 min  40 minutes of total time spent on the encounter, which includes face to face time and non-face to face time preparing to see the patient (eg, review of tests), Obtaining and/or reviewing separately obtained history, Documenting clinical information in the electronic or other health record, Independently interpreting results (not separately reported) and communicating results to the patient/family/caregiver, or Care coordination (not separately reported).     Each patient to whom he or she provides medical services by telemedicine is:  (1) informed of the relationship between the physician and patient and the respective role of any other health care provider with respect to management of the patient; and (2) notified that he or she may decline to receive medical services by telemedicine and may withdraw from such care at any time.      [facility] - Established Patient    Last Clinic Visit: 12/28/22    Chief Complaint: Follow-up for fatty liver        HISTORY     This is a 57 y.o. female with PMH noted below, here for follow-up of fatty liver.    Known history of fatty liver for many years.   Her risk factors include- BMI ~40, HTN, HLD, DM. H/o heavier alcohol use for ~5 years though now abstinent.     Fatty liver first noted on abd US in our system 5/18/22.  Liver mildly enlarged; spleen WNL. No findings to suggest advanced liver disease.       Her transaminases have been elevated since 5/2019, ALT>AST, mainly <100.    Serologic workup has been negative for Sandro's, alpha-1 antitrypsin deficiency, hemochromatosis, autoimmune etiology, and viral hepatitis.    Fibrosis staging:   Fibroscan 12/28/22 = F4 (kPa 19.6), S3  MRI elastography 4/18/23 = F3-4    Interval history:  Feels well overall, no new concerns.   Daughter present  for visit.     Updates on risk factors for fatty liver:  Weight -- BMI 40  Weight is up a few lb                 Dyslipidemia -- mildly elevated                              Insulin resistance / diabetes -- HgbA1c 7.7         Hypertension -- well controlled  Alcohol use -- none     Denies symptoms of hepatic decompensation including jaundice, ascites, cognitive problems to suggest hepatic encephalopathy, or GI bleeding.     Health Maintenance:  -- HCC screening: MRI 4/18/23 without focal hepatic lesion  -- Variceal screening: EGD 11/9/22 without varices   -- Hepatitis A & B vaccination: needs vaccines          Past medical history, surgical history, problem list, family history, social history, allergies: Reviewed and updated in the appropriate section of the electronic medical record.      Current Outpatient Medications   Medication Sig Dispense Refill    ADVAIR DISKUS 500-50 mcg/dose DsDv diskus inhaler Inhale into the lungs 2 (two) times daily.      albuterol-ipratropium (DUO-NEB) 2.5 mg-0.5 mg/3 mL nebulizer solution Take 3 mLs by nebulization every 6 (six) hours while awake. Rescue 270 mL 3    azelastine (ASTELIN) 137 mcg (0.1 %) nasal spray 1 spray (137 mcg total) by Nasal route 2 (two) times daily as needed for Rhinitis. 90 mL 3    blood sugar diagnostic (ACCU-CHEK TEJA PLUS TEST STRP) Strp Test blood sugar THREE TIMES daily; For diagnosis code E11.65 200 strip 3    blood sugar diagnostic Strp To check BG one times daily, to use with insurance preferred meter 100 strip 3    blood-glucose meter kit To check BG one times daily, to use with insurance preferred meter 1 each 0    busPIRone (BUSPAR) 30 MG Tab Take 1 tablet (30 mg total) by mouth 2 (two) times daily. 60 tablet 11    cetirizine (ZYRTEC) 10 MG tablet Take 1 tablet (10 mg total) by mouth daily as needed. (Patient taking differently: Take 10 mg by mouth every evening.) 90 tablet 3    cholecalciferol, vitamin D3, 5,000 unit Tab Take 5,000 Units by mouth  once daily. VITAMIN D-3 5000 UNIT TABS      cyanocobalamin 1,000 mcg/mL injection Inject 1 mL (1,000 mcg total) into the muscle every 30 days. 10 mL 1    cyclobenzaprine (FLEXERIL) 10 MG tablet Take 1 tablet by mouth three times daily as needed for muscle spasm 90 tablet 0    ezetimibe (ZETIA) 10 mg tablet Take 1 tablet (10 mg total) by mouth every evening. 90 tablet 3    FLOVENT  mcg/actuation inhaler Inhale 2 puffs into the lungs 2 (two) times daily.      fluconazole (DIFLUCAN) 150 MG Tab Take 1 tablet tonight 1 tablet 0    gabapentin (NEURONTIN) 300 MG capsule TAKE 1 CAPSULE BY MOUTH THREE TIMES DAILY 270 capsule 0    hydroCHLOROthiazide (HYDRODIURIL) 12.5 MG Tab Take 1 tablet by mouth once daily 90 tablet 0    ibuprofen (ADVIL,MOTRIN) 800 MG tablet Take 1 tablet by mouth twice daily as needed for pain 180 tablet 0    insulin detemir U-100, Levemir, (LEVEMIR FLEXTOUCH U-100 INSULN) 100 unit/mL (3 mL) InPn pen Inject 15 Units QAM and 15 Units QHS 6 mL 3    ipratropium (ATROVENT HFA) 17 mcg/actuation inhaler INHALE 2 PUFFS BY MOUTH TWICE DAILY AS DIRECTED 38.7 g 6    lancets Misc To check BG TWO times daily, to use with insurance preferred meter 100 each 3    lancing device with lancets (ACCU-CHEK MULTICLIX LANCET) Kit Testing needed THREE TIMES DAILY 200 each 1    levalbuterol (XOPENEX HFA) 45 mcg/actuation inhaler Inhale 2 puffs into the lungs every 6 (six) hours as needed.      levothyroxine (SYNTHROID) 50 MCG tablet TAKE 1 TABLET BY MOUTH ONCE DAILY BEFORE BREAKFAST (Patient taking differently: Take 50 mcg by mouth before breakfast. Alternates 1/2 tab with 1 tab every other day) 90 tablet 3    mupirocin (BACTROBAN) 2 % ointment Apply topically 2 (two) times daily as needed. 22 g 1    nintedanib (OFEV) 150 mg Cap Take 1 capsule (150 mg total) by mouth 2 (two) times a day. 60 capsule 11    omalizumab (XOLAIR) 150 mg/mL injection Inject 150 mg into the skin every 14 (fourteen) days.      omeprazole  "(PRILOSEC) 40 MG capsule Take 1 capsule (40 mg total) by mouth 2 (two) times a day. 180 capsule 2    ondansetron (ZOFRAN) 8 MG tablet TAKE 1 TABLET BY MOUTH EVERY 8 HOURS AS NEEDED FOR NAUSEA 90 tablet 0    pen needle, diabetic 32 gauge x 5/32" Ndle 1 each by Misc.(Non-Drug; Combo Route) route once daily. 30 each 11    QUEtiapine (SEROQUEL) 25 MG Tab Take 1 tablet (25 mg total) by mouth every evening. 30 tablet 0    sertraline (ZOLOFT) 100 MG tablet Take 1 tablet by mouth twice daily 180 tablet 3    silver sulfADIAZINE 1% (SILVADENE) 1 % cream Apply topically 2 (two) times daily. To affected area 50 g 1     No current facility-administered medications for this visit.     Medication list reviewed and updated.      Review of Systems - as per HPI  Constitutional: Negative for fatigue or unexpected weight change.   Respiratory: Negative for shortness of breath.    Cardiovascular: Negative for leg swelling.  Gastrointestinal: Negative for abdominal distention or abdominal pain. Negative for melena or hematemesis.  Musculoskeletal: Negative for myalgias.    Skin: Negative for itching.  Neurological: Negative for confusion or slowed mentation. Negative for tremors.   Hematological: Does not bruise/bleed easily.   Psychiatric: Negative for sleep disturbance.      Physical Exam   Constitutional: No distress. Alert and oriented to person, place, and time.  Pulmonary/Chest: No respiratory distress.   Skin: No jaundice.   Psychiatric: Normal mood and affect. Speech, behavior, and thought content normal. No depression or anxiety noted.         LABS & DIAGNOSTIC STUDIES     I have personally reviewed pertinent laboratory findings:    Lab Results   Component Value Date    ALT 98 (H) 04/27/2023    AST 73 (H) 04/27/2023    ALKPHOS 135 04/27/2023    BILITOT 0.7 04/27/2023    ALBUMIN 4.8 04/27/2023    INR 0.9 09/22/2017       Lab Results   Component Value Date    WBC 13.93 (H) 03/21/2023    HGB 15.7 03/21/2023    HCT 47.9 03/21/2023 "    MCV 79 (L) 03/21/2023     03/21/2023       Lab Results   Component Value Date     04/27/2023    K 5.0 04/27/2023    BUN 19 (H) 04/27/2023    CREATININE 0.97 04/27/2023    ESTGFRAFRICA >60 02/15/2022    EGFRNONAA >60 02/15/2022       Lab Results   Component Value Date    SMOOTHMUSCAB <1:20 12/02/2022    IGGSERUM 809 12/02/2022    ANASCREEN None Detected 03/08/2023    FERRITIN 12 12/02/2022    CERULOPLSM 28 12/02/2022    HEPBSAG Negative 12/02/2022    HEPCAB Negative 10/07/2019    [id number] Non-reactive 04/27/2023       No results found for: AFP    I have personally reviewed the following result reports:  Abdominal US - 3/21/23  CT - 3/21/23  MRI - 4/18/23  EGD - 11/9/22      ASSESSMENT & PLAN     57 y.o. female with:    1. Cirrhosis, presumed due to fatty liver/HOUSE, well compensated  -- Reviewed MRI elasto results (F3-4) which are consistent with Fibroscan. Do not feel that liver biopsy is needed for confirmation; will monitor her liver for presumed advanced fibrosis.   -- Child-Palomo score: A  -- Liver function normal, no indication for liver transplant evaluation at this time. Calculate MELD with next labs and monitor every 6 months   -- Continue HCC screening every 6 months with ultrasound and AFP, next due 10/2023  -- EGD 11/2022 without varices; consider repeat in 3 years  -- Complete vaccines for hepatitis A and B    2. Fatty liver, BMI 40, HTN, HLD, DM  -- Reminded that the only treatment for fatty liver is weight loss, maintaining good control of metabolic risk factors (blood pressure, cholesterol, and blood sugar). May benefit from GLP-1s and she will discuss with Endocrine   -- Continue to remain abstinent      Orders Placed This Encounter   Procedures    US Abdomen Limited    CBC Without Differential    Comprehensive Metabolic Panel    AFP Tumor Marker    Protime-INR       *See AVS for patient education and instructions.      Return to clinic in October with labs/US prior.      Thank you  for allowing me to participate in the care of Yara Perales, FNP-C  Hepatology

## 2023-05-12 ENCOUNTER — TELEPHONE (OUTPATIENT)
Dept: HEPATOLOGY | Facility: CLINIC | Age: 58
End: 2023-05-12
Payer: MEDICARE

## 2023-05-12 PROBLEM — K74.60 CIRRHOSIS OF LIVER WITHOUT ASCITES: Status: ACTIVE | Noted: 2023-05-12

## 2023-05-12 NOTE — TELEPHONE ENCOUNTER
----- Message from Magdalena Perales NP sent at 5/12/2023  9:53 AM CDT -----  Please schedule labs, US, and f/u visit in October- [facility]. Thanks!

## 2023-05-12 NOTE — TELEPHONE ENCOUNTER
Called patient to schedule appointment.  Patient confirmed and agreed with the schedule.  Reminder letter mailed.

## 2023-05-16 ENCOUNTER — PATIENT MESSAGE (OUTPATIENT)
Dept: OBSTETRICS AND GYNECOLOGY | Facility: CLINIC | Age: 58
End: 2023-05-16
Payer: MEDICARE

## 2023-05-25 ENCOUNTER — OFFICE VISIT (OUTPATIENT)
Dept: PULMONOLOGY | Facility: CLINIC | Age: 58
End: 2023-05-25
Payer: MEDICARE

## 2023-05-25 VITALS
HEART RATE: 89 BPM | BODY MASS INDEX: 40.97 KG/M2 | OXYGEN SATURATION: 97 % | SYSTOLIC BLOOD PRESSURE: 151 MMHG | HEIGHT: 66 IN | WEIGHT: 254.94 LBS | DIASTOLIC BLOOD PRESSURE: 91 MMHG

## 2023-05-25 DIAGNOSIS — G47.33 OSA (OBSTRUCTIVE SLEEP APNEA): ICD-10-CM

## 2023-05-25 DIAGNOSIS — L73.9 FOLLICULITIS OF PERINEUM: ICD-10-CM

## 2023-05-25 DIAGNOSIS — E66.01 MORBID OBESITY WITH BMI OF 40.0-44.9, ADULT: ICD-10-CM

## 2023-05-25 DIAGNOSIS — J84.9 ILD (INTERSTITIAL LUNG DISEASE): Primary | ICD-10-CM

## 2023-05-25 DIAGNOSIS — J96.11 CHRONIC HYPOXEMIC RESPIRATORY FAILURE: ICD-10-CM

## 2023-05-25 DIAGNOSIS — R74.01 TRANSAMINITIS: ICD-10-CM

## 2023-05-25 PROBLEM — Z99.81 OXYGEN DEPENDENT: Status: RESOLVED | Noted: 2023-04-13 | Resolved: 2023-05-25

## 2023-05-25 PROCEDURE — 99214 OFFICE O/P EST MOD 30 MIN: CPT | Mod: S$PBB,NTX,, | Performed by: INTERNAL MEDICINE

## 2023-05-25 PROCEDURE — 99999 PR PBB SHADOW E&M-EST. PATIENT-LVL III: CPT | Mod: PBBFAC,TXP,, | Performed by: INTERNAL MEDICINE

## 2023-05-25 PROCEDURE — 99213 OFFICE O/P EST LOW 20 MIN: CPT | Mod: PBBFAC,PO,TXP | Performed by: INTERNAL MEDICINE

## 2023-05-25 PROCEDURE — 99214 PR OFFICE/OUTPT VISIT, EST, LEVL IV, 30-39 MIN: ICD-10-PCS | Mod: S$PBB,NTX,, | Performed by: INTERNAL MEDICINE

## 2023-05-25 PROCEDURE — 99999 PR PBB SHADOW E&M-EST. PATIENT-LVL III: ICD-10-PCS | Mod: PBBFAC,TXP,, | Performed by: INTERNAL MEDICINE

## 2023-05-25 RX ORDER — CLINDAMYCIN PHOSPHATE 10 MG/G
GEL TOPICAL 2 TIMES DAILY
Qty: 60 G | Refills: 1 | Status: SHIPPED | OUTPATIENT
Start: 2023-05-25

## 2023-05-25 RX ORDER — PREDNISONE 5 MG/1
TABLET ORAL
Qty: 60 TABLET | Refills: 1 | Status: SHIPPED | OUTPATIENT
Start: 2023-05-25 | End: 2023-06-24

## 2023-05-25 RX ORDER — HUMAN INSULIN 100 [IU]/ML
INJECTION, SOLUTION SUBCUTANEOUS
COMMUNITY
Start: 2023-05-19 | End: 2023-06-05

## 2023-05-25 NOTE — PROGRESS NOTES
5/25/2023    Yara Santos  Follow up-     Chief Complaint   Patient presents with    Interstitial Lung Disease       HPI:   05/25/2023- she is tolerating Ofev ok as long as she takes w/ food. Continues on O2 3L. Prednisone 20mg daily. She notes sharp intermittent pain R shoulder blade occasionally. She has cough, congestion in chest, not spitting up mucous. She has yellow/green nasal drainage. She has buttocks rash w/ sores that she picks at. She had augmentin 2 wks ago per PCP- this helped a little but now it is worse again. She has severe anxiety, unable to stop picking skin. Has avoided marijuana >1 month.  Takes buspar, sertraline, and seroquel per PCP. Has seen psychiatrist 20 yr ago.  She takes advair and flovent inhalers per Dr. Fleming  She has been unable to tolerate CPAP due to moving too much at night, back pains  Blood glucose has been varying between 200s-400s    04/27/2023-   Continue oxygen  Continue prednisone 20mg daily 4 more weeks then will try to taper  Follow up with endocrinologist for blood sugars to get better controlled  Continue to avoid any smoking including cigarettes, vape, marijuana  Starting new medicine called Ofev (nintedanib) which is an antifibrotic to slow down scarring of the lung tissue. This medicine may come with the side effect of diarrhea, nausea, vomiting which can be better with using imodium as needed. While on this medicine I would like to monitor your liver function tests every month for 3 mos then switch to every 3 month blood draws. Please go to the lab for blood work now prior to starting the new medicine.  pt saw Dr. Comer and I spoke with her after visit. Pt feels very tired. Sats drop quickly with movement. She uses 3L oxygen continuously. The plan was to start her on Ofev however there is concern about her chronic GI symptoms of nausea/emesis so Dr Comer told her not to start it. She usually stays constipated. Currently her heartburn and vomiting are  improved on 2x/day 40mg omeprazole.   Cough productive white/clear phlegm, very thick.  Has not been using CPAP machine at night. Has tried but pulls it off in her sleep.  She continues on prednisone 20mg daily. Tried to decrease to 10mg but was unable to tolerate. Has been on prednisone 2 mos now. She has a sulfa allergy- breaks out in rash.  She is currently being treated for skin infection of her buttocks- open wounds, not healing, chronic- getting augmentin now.  Admitted to smoking marijuana- joints, has smoked off and on since age 8, last time Sunday and states she quit for good.     03/16/2023-   Contact [employer] for possible backup tank- [facility]. at [phone].  Taper prednisone to 30mg daily   Continue oxygen to keep sats >88%  Start using CPAP with bleed in oxygen 2L   Echo to check pulmonary pressures  Go to the lab for blood tests  Nystatin mouth wash for thrush- use 4 times daily  pt feels breathing is improved on prednisone. She is still on 3L continuously now but reports at times able to take O2 off and sat remains 94-98 while at rest.  She just got CPAP machine this am and will start using.   She bruises easy, hands are red and dry and mouth is dry. Has white spots in mouth, lump in throat and trouble swallowing.  Pt was exposed (in same room) to nephew with HIV and TB 1 week ago, requests testing.      02/28/2023-   Need backup oxygen tank- contact DME company  Contact DME company to follow up on CPAP machine  Use home oxygen continuously and at night now  Get blood work  See advanced lung disease specialist  May need open lung biopsy- will get opinion of advanced specialist  Need repeat pulmonary function tests  Start prednisone 40mg daily for now for flare of lung disease  pt feels her breathing is getting worse, she is going downhill. Daughter assists w/ history, takes notes. When bending over she can't breathe. She gets muscle spasms in the abdomen. She has a lot of cough and phlegm,  clear/milky. Her sister  with end stage pulm fibrosis (they think due to RA) just this past Saturday, was on hospice. She wears O2 all day, requires 2L continuously now but not wearing w/ sleep. With walking she has to turn O2 up to 3L. She has noticed shaking of fingers and beginning to get clubbing of the fingertips. They have not received CPAP machine for home- heard from [facility] and was told insurance approval pending.  She is not smoking or vaping.  There has been a lot of construction going on at the house, adding on to the laundry room w/ new walls. She sleeps on the other side of the house. Denies mold. There was a small area of water damage. They keep the area w/ construction sealed off behind a closed door.  She has had rheumatology eval in the past, Dr. Kaur, note reviewed, and was told she has osteoarthritis and was told to f/u with PCP.  Has been off prednisone for several mos but had been on 5mg daily for lichen planus.    2022-   For lung disease- will plan to follow with repeat testing to evaluate for any progression  Due to repeat CT chest 2023  Ordering overnight sleep study at home to evaluate for sleep apnea  If positive will order CPAP machine, notify clinic when machine is delivered to home to set up 31 days compliance appointment. You must use the machine for a least 4 hours every night.   Agree with trelegy  Cough/mucous may be due to sinuses- referral to ENT placed for further evaluation.  she did qualify for home O2 2L, ordered and delivered. She is seeing Dr Garcia allergist, new blood work pending. She had allergy prick test on back which was negative. She feels dyspnea is stable. She continues to have bad cough, thick light yellow/white mucous- at baseline. It is worse at night when she lies down. She has sinus stuffiness and allergies.  She was given trelegy inhaler sample, hasn't started yet.   She has avoided smoking and vaping entirely.  She feels very sleepy,  falls asleep easily. Her sister has MOLINA. Daughter states that she snores and puffs w/ pursed lips when sleeping.    10/31/2022  Pulmonary function tests  6 min walk test  Go to the lab and get blood work  May request new rheumatology eval in the future  Suspect vaping caused some of the inflammation of lung tissue- need to avoid smoking/vaping   Pt is a 58 yo female with asthma, HTN, arthritis, GERD, hypothyroidism, obesity referred for new evaluation due to ILD found on CT chest. Her daughter is present and assists w/ history.  Pt reports has had lung problems for years and years. Reports in past treated at [facility] for fungal pneumonia due to bird droppings ()  She has also been treated for COPD, frequent bronchitis. She recently has had exacerbation taking prednisone and z pack. Has tested neg for covid, has not checked for flu. Many sick contacts and just went on a trip. She went to [facility], at [facility] had SOB, felt fevers, dizzy, had to use oxygen and lie in bed.  Inhalers: trelegy 200 once/d- new rx. Uses rescue albuterol 2-3x/day and feels benefit.  Nebulizer with duo neb uses 2x/day  This is the first exacerbation requiring prednisone so far this year.  Quit smoking 3 yr ago, 1-2 ppd since age 8  Quit vaping 3 wks- used juul and many other e cigarettes 1/2 cartridge daily- has entire drawer full.  She has been more short of breath gradual progressive for 1 year, symptoms vary.  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 Sister has RA with lung involvement  Has been told she has RA in past but Dr. Kaur said no RA, just OA. She sees derm with lichen planus and would use prednisone off and on, currently not on steroids.  No pets at home currently.  She lives in an older house in [place], mold visible ceilings of bathroom, earlier this year had mold in room- currently  renovating.    The chief complaint problem is varies w/ instability at times.    PFSH:  Past Medical History:   Diagnosis Date    a Premature Ventricular Contractions     Dr. Austin Collier    b Hypertension     c Hypercholesterolemia     d Type 2 DM     e Hypothyroidism     f Morbid Obesity     f Osteopenia     i Asthma     i Tobacco Use Disorder     j GERD     j H/O Colon Polyps     j Irritable Bowel Syndrome     j Nonalcoholic Steatohepatitis ([place]     l Bilateral Hip Osteoarthritis     l Bilateral Knee Arthritis     l Carpal tunnel syndrome     l DDD (degenerative disc disease), lumbar     l Lumbar Spinal DDD     l Tarsal Tunnel Syndrome     m Vitamin B12 Deficiency     n Depression And Anxiety     o Allergic rhinosinusitis     Oxygen dependent     3l nasal cannula    Oxygen dependent 2023    p OU Cataracts     q Vitamin D Deficiency     Sleep apnea     Wellness Visit 2023          Past Surgical History:   Procedure Laterality Date     SECTION      x2    CHOLECYSTECTOMY      COLONOSCOPY   ?    polyps removed per pt report    COLONOSCOPY N/A 2022    Procedure: COLONOSCOPY;  Surgeon: Brett Jasso MD;  Location: [facility] ENDO;  Service: Endoscopy;  Laterality: N/A;    EPIDURAL STEROID INJECTION INTO CERVICAL SPINE N/A 2020    Procedure: Injection-steroid-epidural-cervical to left;  Surgeon: John Vinson MD;  Location: [facility] OR;  Service: Pain Management;  Laterality: N/A;    EPIDURAL STEROID INJECTION INTO LUMBAR SPINE N/A 2020    Procedure: Injection-steroid-epidural-lumbar, l5/s1 to left;  Surgeon: John Vinson MD;  Location: [facility] OR;  Service: Pain Management;  Laterality: N/A;    EPIDURAL STEROID INJECTION INTO LUMBAR SPINE N/A 2021    Procedure: Injection-steroid-epidural-lumbar L5/S1;  Surgeon: John Vinson MD;  Location: [facility] OR;  Service: Pain Management;  Laterality: N/A;    EPIDURAL STEROID INJECTION INTO LUMBAR SPINE N/A 2022     Procedure: Injection-steroid-epidural-lumbar L5/S1 spread to left;  Surgeon: John Vinson MD;  Location: [facility];  Service: Pain Management;  Laterality: N/A;    ESOPHAGOGASTRODUODENOSCOPY      ESOPHAGOGASTRODUODENOSCOPY N/A 2022    Procedure: EGD (ESOPHAGOGASTRODUODENOSCOPY);  Surgeon: Brett Jasso MD;  Location: [facility];  Service: Endoscopy;  Laterality: N/A;    INJECTION OF JOINT Left 2022    Procedure: INJECTION, JOINT- hip;  Surgeon: John Vinson MD;  Location: [facility];  Service: Pain Management;  Laterality: Left;    LAPAROSCOPIC CHOLECYSTECTOMY N/A 2022    Procedure: CHOLECYSTECTOMY, LAPAROSCOPIC;  Surgeon: Ra Santos MD;  Location: [facility];  Service: General;  Laterality: N/A;    TRANSFORAMINAL EPIDURAL INJECTION OF STEROID Left 2022    Procedure: Injection,steroid,epidural,transforaminal approach L3/4 and L4/5;  Surgeon: John Vinson MD;  Location: [facility];  Service: Pain Management;  Laterality: Left;    TRANSFORAMINAL EPIDURAL INJECTION OF STEROID Left 2023    Procedure: Injection,steroid,epidural,transforaminal approach L3/4 and L4/5-Left;  Surgeon: John Vinson MD;  Location: [facility];  Service: Pain Management;  Laterality: Left;  l3/4 and l4/5    UPPER GASTROINTESTINAL ENDOSCOPY   ?    unsure of results     Social History     Tobacco Use    Smoking status: Former     Packs/day: 1.00     Types: Cigarettes, Vaping with nicotine     Start date:      Quit date: 10/1/2022     Years since quittin.6     Passive exposure: Current    Smokeless tobacco: Never   Substance Use Topics    Alcohol use: No    Drug use: Not Currently     Frequency: 20.0 times per week     Types: Marijuana     Comment: daily smoke and edibles     Family History   Problem Relation Age of Onset    Arthritis Mother     Diabetes Mother     Hyperlipidemia Mother     Cancer Mother         uterine     Allergies Mother     Ovarian cancer Mother 58     "Aneurysm Father     Hyperlipidemia Father     Breast cancer Maternal Aunt 65    Cancer Maternal Uncle         brain    Cancer Paternal Aunt         breast, bone and lung     Breast cancer Paternal Aunt 78    Breast cancer Maternal Cousin 43    Brain cancer Maternal Cousin     Cirrhosis Neg Hx      Review of patient's allergies indicates:   Allergen Reactions    Iodine and iodide containing products Blisters     Reports it caused lichen planus flare up    Asa [aspirin] Itching and Other (See Comments)     Skin problem      Atorvastatin      Worsened Lichen Planus    Contact metal agent      Other reaction(s): Other (See Comments)    Crestor [rosuvastatin]      Worsens her lichen planus    Lasix [furosemide]      rash    Lisinopril      Hives    Losartan      Confusion    Lovastatin      Worsened Lichen Planus    Metformin Other (See Comments)     Stomach cramps     Salicylates     Sulfur      Other reaction(s): Other (See Comments)    Sulfur, elemental     Valsartan      breakouts    Latex Other (See Comments)     Skin problem         Performance Status:The patient's activity level is functions out of house.      Review of Systems:  a review of eleven systems covering constitutional, Eye, HEENT, Psych, Respiratory, Cardiac, GI, , Musculoskeletal, Endocrine, Dermatologic was negative except for pertinent findings as listed ABOVE and below:  diaphoretic  Blood sugar 200-400s      Exam:Comprehensive exam done. BP (!) 151/91 (BP Location: Left arm, Patient Position: Sitting, BP Method: Large (Automatic))   Pulse 89   Ht 5' 6" (1.676 m)   Wt 115.6 kg (254 lb 15.4 oz)   SpO2 97% Comment: 3L  BMI 41.15 kg/m²   Exam included Vitals as listed, and patient's appearance and affect and alertness and mood, oral exam for yeast and hygiene and pharynx lesions and Mallapatti (M) score, neck with inspection for jvd and masses and thyroid abnormalities and lymph nodes (supraclavicular and infraclavicular nodes and axillary also " examined and noted if abn), chest exam included symmetry and effort and fremitus and percussion and auscultation, cardiac exam included rhythm and gallops and murmur and rubs and jvd and edema, abdominal exam for mass and hepatosplenomegaly and tenderness and hernias and bowel sounds, Musculoskeletal exam with muscle tone and posture and mobility/gait and  strength, and skin for rashes and cyanosis and pallor and turgor, extremity for clubbing.  Findings were normal except for pertinent findings listed below:  Oropharynx clear, M1  HR regular  Clear breath sounds bilat  No edema/joint swelling  Early clubbing fingertips  Buttocks with excoriated lesions, no purulence, slight erythema around lesions      Radiographs (ct chest and cxr) reviewed: view by direct vision   CT chest 2/15/23- differences in technique compared to last scan but fibrotic findings may be slightly worse, also more diffuse ground glass  CT chest 8/31/22- diffuse parenchymal lung disease upper lobe predominant w/ ground glass and micronodules. There is a larger calcified LLL nodule and another calcified granuloma RML. Upper lobes have some fibrotic/emphysematous changes    Labs reviewed    Lab Results   Component Value Date    WBC 13.93 (H) 03/21/2023    HGB 15.7 03/21/2023    HCT 47.9 03/21/2023    MCV 79 (L) 03/21/2023     03/21/2023       CMP  Sodium   Date Value Ref Range Status   04/27/2023 137 136 - 145 mmol/L Final     Potassium   Date Value Ref Range Status   04/27/2023 5.0 3.5 - 5.1 mmol/L Final     Chloride   Date Value Ref Range Status   04/27/2023 98 95 - 110 mmol/L Final     CO2   Date Value Ref Range Status   04/27/2023 27 22 - 31 mmol/L Final     Glucose   Date Value Ref Range Status   04/27/2023 284 (H) 70 - 110 mg/dL Final     Comment:     The ADA recommends the following guidelines for fasting glucose:    Normal:       less than 100 mg/dL    Prediabetes:  100 mg/dL to 125 mg/dL    Diabetes:     126 mg/dL or higher        BUN   Date Value Ref Range Status   04/27/2023 19 (H) 7 - 18 mg/dL Final     Creatinine   Date Value Ref Range Status   04/27/2023 0.97 0.50 - 1.40 mg/dL Final     Calcium   Date Value Ref Range Status   04/27/2023 9.7 8.4 - 10.2 mg/dL Final     Total Protein   Date Value Ref Range Status   04/27/2023 7.6 6.0 - 8.4 g/dL Final     Albumin   Date Value Ref Range Status   04/27/2023 4.8 3.5 - 5.2 g/dL Final     Total Bilirubin   Date Value Ref Range Status   04/27/2023 0.7 0.2 - 1.3 mg/dL Final     Alkaline Phosphatase   Date Value Ref Range Status   04/27/2023 135 38 - 145 U/L Final     AST ([place])   Date Value Ref Range Status   02/08/2016 25 14 - 36 U/L Final     AST   Date Value Ref Range Status   04/27/2023 73 (H) 14 - 36 U/L Final     ALT   Date Value Ref Range Status   04/27/2023 98 (H) 0 - 35 U/L Final     Anion Gap   Date Value Ref Range Status   04/27/2023 12 8 - 16 mmol/L Final     eGFR   Date Value Ref Range Status   04/27/2023 >60 >60 mL/min/1.73 m^2 Final        Latest Reference Range & Units 12/02/22 09:24   STEPHANIE Screen None Detected  None Detected   Smooth Muscle Ab <1:20  <1:20   IgG 650 - 1600 mg/dL 809   Hepatitis A Antibody IgG  See result image under hyperlink   Hep B S Ab IU/L <3.10   Hepatitis B Core Ab Total Negative  Negative   Hepatitis B Surface Ag  Negative      Latest Reference Range & Units 12/02/22 09:24   A-1 Antitrypsin 90 - 200 mg/dL 154      Latest Reference Range & Units 04/23/21 08:47 02/15/22 10:18 08/29/22 10:08   Eos # 0.0 - 0.5 K/uL 0.6 (H) 0.1 0.2     PFT  reviewed  12/2/22- mild obstruction, no bd response, moderate reduced dlco      6mwt 12/2/22- 131.67 meters; sats 98%--> 87% with exertion, 92% on 2LPM    Plan:  Clinical impression is resonably certain and repeated evaluation prn +/- follow up will be needed as below. ILD, progressive fibrotic, O2 dependent- suspecting smoking related ILD. Background hx of fungal pna w/ calcified granulomas.  Tolerating Ofev and  counseled pt extensively on avoidance of exposures (smoke). Having severe blood sugar and folliculitis of buttocks- prednisone is not helping these- tapering off    Yara was seen today for interstitial lung disease.    Diagnoses and all orders for this visit:    ILD (interstitial lung disease)  -     predniSONE (DELTASONE) 5 MG tablet; Take 3 tablets (15 mg total) by mouth once daily for 7 days, THEN 2 tablets (10 mg total) once daily for 7 days, THEN 1 tablet (5 mg total) once daily for 16 days.    Chronic hypoxemic respiratory failure    MOLINA (obstructive sleep apnea)    Morbid obesity with BMI of 40.0-44.9, adult    Transaminitis    Folliculitis of perineum  -     clindamycin phosphate 1% (CLINDAGEL) 1 % gel; Apply topically 2 (two) times daily.                Follow up in about 2 months (around 7/25/2023).    Discussed with patient above for education the following:      Patient Instructions   Taper prednisone to 15mg daily 1 week then down to 10mg daily 1 week then down to 5mg daily and keep at 5mg until next appointment  Watch blood sugars closely while tapering prednisone- may need to decrease insulin if having low sugars  Follow up with endocrinology and let them know we are tapering blood sugar  Continue Ofev  Continue inhalers- advair and flovent for now. In the future may take you off flovent  Consider seeing a psychiatrist to help anxiety and skin picking issues  Oxygen continue, goal sats >88%  Continue to avoid smoking  Clindagel twice daily to skin lesions of buttocks

## 2023-05-25 NOTE — PATIENT INSTRUCTIONS
Taper prednisone to 15mg daily 1 week then down to 10mg daily 1 week then down to 5mg daily and keep at 5mg until next appointment  Watch blood sugars closely while tapering prednisone- may need to decrease insulin if having low sugars  Follow up with endocrinology and let them know we are tapering blood sugar  Continue Ofev  Continue inhalers- advair and flovent for now. In the future may take you off flovent  Consider seeing a psychiatrist to help anxiety and skin picking issues  Oxygen continue, goal sats >88%  Continue to avoid smoking  Clindagel twice daily to skin lesions of buttocks

## 2023-05-29 ENCOUNTER — OUTPATIENT CASE MANAGEMENT (OUTPATIENT)
Dept: ADMINISTRATIVE | Facility: OTHER | Age: 58
End: 2023-05-29
Payer: MEDICARE

## 2023-05-29 ENCOUNTER — PATIENT MESSAGE (OUTPATIENT)
Dept: ADMINISTRATIVE | Facility: OTHER | Age: 58
End: 2023-05-29
Payer: MEDICARE

## 2023-06-05 ENCOUNTER — OFFICE VISIT (OUTPATIENT)
Dept: ENDOCRINOLOGY | Facility: CLINIC | Age: 58
End: 2023-06-05
Payer: MEDICARE

## 2023-06-05 VITALS
HEART RATE: 85 BPM | SYSTOLIC BLOOD PRESSURE: 124 MMHG | BODY MASS INDEX: 41.45 KG/M2 | DIASTOLIC BLOOD PRESSURE: 70 MMHG | HEIGHT: 66 IN | WEIGHT: 257.94 LBS | OXYGEN SATURATION: 96 %

## 2023-06-05 DIAGNOSIS — E78.00 HYPERCHOLESTEROLEMIA: ICD-10-CM

## 2023-06-05 DIAGNOSIS — E11.65 UNCONTROLLED TYPE 2 DIABETES MELLITUS WITH HYPERGLYCEMIA: Primary | ICD-10-CM

## 2023-06-05 DIAGNOSIS — E03.9 ACQUIRED HYPOTHYROIDISM: ICD-10-CM

## 2023-06-05 DIAGNOSIS — E66.01 MORBID OBESITY WITH BMI OF 40.0-44.9, ADULT: ICD-10-CM

## 2023-06-05 DIAGNOSIS — I10 PRIMARY HYPERTENSION: ICD-10-CM

## 2023-06-05 PROCEDURE — 99999 PR PBB SHADOW E&M-EST. PATIENT-LVL V: ICD-10-PCS | Mod: PBBFAC,TXP,, | Performed by: NURSE PRACTITIONER

## 2023-06-05 PROCEDURE — 99215 OFFICE O/P EST HI 40 MIN: CPT | Mod: PBBFAC,PO,TXP | Performed by: NURSE PRACTITIONER

## 2023-06-05 PROCEDURE — 99214 OFFICE O/P EST MOD 30 MIN: CPT | Mod: S$PBB,NTX,, | Performed by: NURSE PRACTITIONER

## 2023-06-05 PROCEDURE — 99214 PR OFFICE/OUTPT VISIT, EST, LEVL IV, 30-39 MIN: ICD-10-PCS | Mod: S$PBB,NTX,, | Performed by: NURSE PRACTITIONER

## 2023-06-05 PROCEDURE — 99999 PR PBB SHADOW E&M-EST. PATIENT-LVL V: CPT | Mod: PBBFAC,TXP,, | Performed by: NURSE PRACTITIONER

## 2023-06-05 RX ORDER — INSULIN ASPART 100 [IU]/ML
INJECTION, SOLUTION INTRAVENOUS; SUBCUTANEOUS
Qty: 30 ML | Refills: 4 | Status: SHIPPED | OUTPATIENT
Start: 2023-06-05 | End: 2023-10-11

## 2023-06-05 RX ORDER — INSULIN DETEMIR 100 [IU]/ML
INJECTION, SOLUTION SUBCUTANEOUS
Qty: 30 ML | Refills: 12 | Status: SHIPPED | OUTPATIENT
Start: 2023-06-05 | End: 2024-02-08

## 2023-06-05 NOTE — PROGRESS NOTES
CC: This 57 y.o. female presents for management of diabetes and chronic conditions pending review including HTN, HLP, hypothyroidism and morbid obesity    HPI: She was diagnosed with T2DM in 2014. Has been hospitalized r/t DM- hyperglycemia  Family hx of DM: brother and sisters, mom and dad, 2 daughters    Arrives new to endocrine today  On home oxygen 3 L  hypoglycemia at home - none  Has been on prednisone for quite some time related to pulmonary issues, bg have been in 300-400s range  Her daughter is also present for this visit- works as a pharm tech- bought OTC regular insulin to use as sliding scale  monitoring BG at home: checking her bg 3 times a day          Diet: Eats 3 Meals a day, snacks fruit- grapes, strawberries, watermelon, cantaloupe   Exercise: none- on O2, also w back issues  CURRENT DM MEDS: Levemir 30 u bid, Regular Insulin /25  Vial/pen:  Uses vial and pen  Glucometer type:  Accu Check Guide Me    Standards of Care:  Eye exam: 1/5/2023 Dr Pena     Regarding hypothyroidism  Dx'ed: 2014  Fmh: brother  Takes Lt4 after breakfast w her other meds  No hoarseness, voice changes, dysphagia, compressive symptoms, or head/neck exposure to XRT.   No personal or FH of thyroid cancer or MEN syndrome.   Not taking biotin.   + tremors of the hands  No intolerance to the heat or cold  + hair loss      ROS:   Gen: Appetite good   Eyes: Denies visual disturbances  Resp: +SOB & +BRICEÑO, + cough  Cardiac: No palpitations, chest pain, trace BLE edema   GI: No nausea or vomiting, diarrhea, constipation  /GYN: 2-3+ nocturia, no burning or pain.   MS/Neuro:  + numbness/ tingling in BLE;  walks w a cane , speech clear  Psych: Denies drug/ETOH abuse, + depression and anxiety  Other systems: negative.    PE:  GENERAL: Well developed, well nourished.  PSYCH: AAOx3, appropriate mood and affect, pleasant expression, conversant, appears relaxed, well groomed.   EYES: Conjunctiva, corneas clear  NECK: Supple, trachea  "midline,   ABDOMEN: Soft, non-tender, non-distended   VASCULAR: DP pulses +2/4 bilaterally, trace BLE edema.  NEURO: Gait steady  SKIN:  no acanthosis nigracans.      Personally reviewed Past Medical, Surgical, Social History.    /70 (BP Location: Right arm, Patient Position: Sitting, BP Method: Medium (Manual))   Pulse 85   Ht 5' 6" (1.676 m)   Wt 117 kg (257 lb 15 oz)   SpO2 96%   BMI 41.63 kg/m²      Personally reviewed the below labs:      Chemistry        Component Value Date/Time     04/27/2023 1634    K 5.0 04/27/2023 1634    CL 98 04/27/2023 1634    CO2 27 04/27/2023 1634    BUN 19 (H) 04/27/2023 1634    CREATININE 0.97 04/27/2023 1634     (H) 04/27/2023 1634        Component Value Date/Time    CALCIUM 9.7 04/27/2023 1634    ALKPHOS 135 04/27/2023 1634    AST 73 (H) 04/27/2023 1634    AST 25 02/08/2016 1021    ALT 98 (H) 04/27/2023 1634    BILITOT 0.7 04/27/2023 1634    ESTGFRAFRICA >60 02/15/2022 1018    EGFRNONAA >60 02/15/2022 1018            Lab Results   Component Value Date    TSH 2.780 08/29/2022       Recent Labs   Lab 08/29/22  1008   LDL Cholesterol 132.8   HDL 73   Cholesterol 232 H        Results for orders placed or performed in visit on 07/01/20   Vitamin D   Result Value Ref Range    Vit D, 25-Hydroxy 78 30 - 96 ng/mL     No results found. However, due to the size of the patient record, not all encounters were searched. Please check Results Review for a complete set of results.    Lab Results   Component Value Date    MICALBCREAT 8.1 08/29/2022       Hemoglobin A1C   Date Value Ref Range Status   02/16/2023 7.7 (H) 0.0 - 5.6 % Final     Comment:     Reference Interval:  5.0 - 5.6 Normal   5.7 - 6.4 High Risk   > 6.5 Diabetic      Hgb A1c results are standardized based on the (NGSP) National   Glycohemoglobin Standardization Program.      Hemoglobin A1C levels are related to mean serum/plasma glucose   during the preceding 2-3 months.        08/29/2022 6.8 (H) 0.0 - 5.6 " % Final     Comment:     Reference Interval:  5.0 - 5.6 Normal   5.7 - 6.4 High Risk   > 6.5 Diabetic      Hgb A1c results are standardized based on the (NGSP) National   Glycohemoglobin Standardization Program.      Hemoglobin A1C levels are related to mean serum/plasma glucose   during the preceding 2-3 months.        02/15/2022 6.2 (H) 0.0 - 5.6 % Final     Comment:     Reference Interval:  5.0 - 5.6 Normal   5.7 - 6.4 High Risk   > 6.5 Diabetic      Hgb A1c results are standardized based on the (NGSP) National   Glycohemoglobin Standardization Program.      Hemoglobin A1C levels are related to mean serum/plasma glucose   during the preceding 2-3 months.             ASSESSMENT and PLAN:      1. T2DM with hyperglycemia-     Continue levemir 30 u bid- take 12 hrs apart  Start Novolog 12 u Ac + correction 150/25  Log in 1 week  Has an allergy to latex- rash, daughter will watch ehr take 1st dose and notify me for any issues  Has never had an anaphylaxis rx in the past  RX for Continuous Glucose Monitor   -Recommend Dexcom  Continuous Glucose monitor                         Pt would benefit from therapeutic continuous glucose monitor to be able                         to make frequent insulin adjustments, based on current glucoses.     2. HTN - controlled, continue meds as previously prescribed and monitor.     3. HLP -  allergy to statins     4. Hypothyroidism- check lab w RTC, clinically euthyroid, takes w all her meds    5. Morbid obesity - Body mass index is 41.63 kg/m². Activity limited r/t back and pulmonary issues         Follow-up: in 3 months with lab prior

## 2023-06-05 NOTE — PATIENT INSTRUCTIONS
Low Carb Snacks  Snacks can be an important part of a balanced, healthy meal plan. They allow you to eat more frequently, feeling full and satisfied throughout the day. Also, they allow you to spread carbohydrates evenly, which may stabilize blood sugars.  Plus, snacks are enjoyable!     The amount of carbohydrate needed at snacks varies. Generally, about 15-30 grams of carbohydrate per snack is recommended.  Below you will find some tasty treats.       0-5 gm carb  Crystal Light  Vitamin Water Zero  Herbal tea, unsweetened  2 tsp peanut butter on celery  1./2 cup sugar-free jell-o  1 sugar-free popsicle  ¼ cup blueberries  8oz Blue Kaylyn unsweetened almond milk  5 baby carrots & celery sticks, cucumbers, bell peppers dipped in ¼ cup salsa, 2Tbsp light ranch dressing or 2Tbsp plain Greek yogurt  10 Goldfish crackers  ½ oz low-fat cheese or string cheese  1 closed handful of nuts, unsalted  1 Tbsp of sunflower seeds, unsalted  1 cup Smart Pop popcorn  1 whole grain brown rice cake        15 gm carb  1 small piece of fruit or ½ banana or 1/2 cup lite canned fruit  3 reinaldo cracker squares  3 cups Smart Pop popcorn, top spray butter, Gaines lite salt or cinnamon and Truvia  5 Vanilla Wafers  ½ cup low fat, no added sugar ice cream or frozen yogurt (Blue bell, Blue Bunny, Weight Watchers, Skinny Cow)  ½ turkey, ham, or chicken sandwich  ½ c fruit with ½ c Cottage cheese  4-6 unsalted wheat crackers with 1 oz low fat cheese or 1 tbsp peanut butter   30-45 goldfish crackers (depending on flavor)   7-8 Church mini brown rice cakes (caramel, apple cinnamon, chocolate)   12 Church mini brown rice cakes (cheddar, bbq, ranch)   1/3 cup hummus dip with raw veg  1/2 whole wheat nicole, 1Tbsp hummus  Mini Pizza (1/2 whole wheat English muffin, low-fat  cheese, tomato sauce)  100 calorie snack pack (Oreo, Chips Ahoy, Ritz Mix, Baked Cheetos)  4-6 oz. light or Greek Style yogurt (Chobani, Yoplait, Micky, [facility])  ½ cup  sugar-free pudding    6 in. wheat tortilla or nicole oven toasted chips (topped with spray butter flavoring, cinnamon, Truvia OR spray butter, garlic powder, chili powder)   18 BBQ Popchips (available at [employer], [employer], Fresh Market)    Celery with peanut butter  Celery with tuna salad  Hard boiled eggs- no yolk   Dill pickles and 2% cheddar cheese (no kidding, it's a great combo)  Jerky (beef or turkey -- try to find low-sugar varieties)  2 % Cheese sticks, such as string cheese  Sugar-free Jello, alone or with cottage cheese and a sprinkling of nuts. Make sugar-free lime Jello with part coconut milk -- For a large package, dissolve the powder in a cup of boiling water, add a can of coconut milk, and then add the rest of the water. Stir well.  Cheese with a few apple slices  4-ounce plain or sugar-free yogurt with berries and flax seed meal  Lettuce Roll-ups -- Roll luncheon meat, egg salad, tuna or other filling and veggies in lettuce leaves  Lunch Meat Roll-ups -- Roll cheese or veggies in lunch meat (read the labels for carbs on the lunch meat)  Spread bean dip, spinach dip, or other low-carb dip or spread on the lunch meat or lettuce and then roll it up  Raw veggies and spinach dip, or other low-carb dip  Low-carb snack bars (watch out for sugar alcohols, especially maltitol)  Product Review: Atkins Advantage Bars

## 2023-06-07 ENCOUNTER — PATIENT MESSAGE (OUTPATIENT)
Dept: ENDOCRINOLOGY | Facility: CLINIC | Age: 58
End: 2023-06-07
Payer: MEDICARE

## 2023-06-08 RX ORDER — PEN NEEDLE, DIABETIC 30 GX3/16"
NEEDLE, DISPOSABLE MISCELLANEOUS
Qty: 450 EACH | Refills: 4 | Status: SHIPPED | OUTPATIENT
Start: 2023-06-08 | End: 2024-02-08 | Stop reason: SDUPTHER

## 2023-06-12 ENCOUNTER — OUTPATIENT CASE MANAGEMENT (OUTPATIENT)
Dept: ADMINISTRATIVE | Facility: OTHER | Age: 58
End: 2023-06-12
Payer: MEDICARE

## 2023-06-12 NOTE — PROGRESS NOTES
6/12/2023  2nd attempt to complete Follow-Up  for Outpatient Care Management, left message.  Will mail unable to assess letter.

## 2023-06-12 NOTE — LETTER
Yara Santos  [address] [facility] [address] [address]      Dear Yara Santos,     I am your nurse with [facility]s Outpatient Care Management Department. I have been unsuccessful at reaching you since we spoke on 5/8/23.  At your earliest convenience, I would like to discuss your healthcare progress.      Please contact me at [phone] from 8:00AM to 4:30 PM on Monday thru Friday.     As you know, [facility] is a program offered to you through [facility] where a nurse is available 24/7 to answer questions or provide medical advice, their number is [phone].    Thanks,      Lexie Peralta RN  Outpatient Care Management

## 2023-06-19 PROBLEM — J96.11 CHRONIC HYPOXEMIC RESPIRATORY FAILURE: Status: RESOLVED | Noted: 2022-12-02 | Resolved: 2023-06-19

## 2023-06-27 ENCOUNTER — OUTPATIENT CASE MANAGEMENT (OUTPATIENT)
Dept: ADMINISTRATIVE | Facility: OTHER | Age: 58
End: 2023-06-27
Payer: MEDICARE

## 2023-06-27 NOTE — PROGRESS NOTES
6/27/2023 3rd attempt to complete Follow-Up  for Outpatient Care Management, left message with [facility] RN contact information. Case closure.

## 2023-07-06 PROBLEM — L43.8 OTHER LICHEN PLANUS: Status: ACTIVE | Noted: 2023-07-06

## 2023-07-10 DIAGNOSIS — J96.11 CHRONIC RESPIRATORY FAILURE WITH HYPOXIA: ICD-10-CM

## 2023-07-10 DIAGNOSIS — J84.9 ILD (INTERSTITIAL LUNG DISEASE): Primary | ICD-10-CM

## 2023-07-20 ENCOUNTER — OFFICE VISIT (OUTPATIENT)
Dept: TRANSPLANT | Facility: CLINIC | Age: 58
End: 2023-07-20
Payer: MEDICARE

## 2023-07-20 ENCOUNTER — HOSPITAL ENCOUNTER (OUTPATIENT)
Dept: PULMONOLOGY | Facility: CLINIC | Age: 58
Discharge: HOME | End: 2023-07-20
Payer: MEDICARE

## 2023-07-20 ENCOUNTER — OFFICE VISIT (OUTPATIENT)
Dept: PULMONOLOGY | Facility: CLINIC | Age: 58
End: 2023-07-20
Payer: MEDICARE

## 2023-07-20 ENCOUNTER — TELEPHONE (OUTPATIENT)
Dept: PULMONOLOGY | Facility: CLINIC | Age: 58
End: 2023-07-20

## 2023-07-20 VITALS
WEIGHT: 264.75 LBS | SYSTOLIC BLOOD PRESSURE: 123 MMHG | BODY MASS INDEX: 42.55 KG/M2 | DIASTOLIC BLOOD PRESSURE: 61 MMHG | HEART RATE: 89 BPM | HEIGHT: 66 IN | OXYGEN SATURATION: 93 %

## 2023-07-20 VITALS
HEART RATE: 89 BPM | DIASTOLIC BLOOD PRESSURE: 80 MMHG | OXYGEN SATURATION: 97 % | SYSTOLIC BLOOD PRESSURE: 142 MMHG | WEIGHT: 264.75 LBS | BODY MASS INDEX: 42.55 KG/M2 | HEIGHT: 66 IN

## 2023-07-20 VITALS — HEIGHT: 66 IN | BODY MASS INDEX: 42.55 KG/M2 | WEIGHT: 264.75 LBS

## 2023-07-20 DIAGNOSIS — G47.33 OSA (OBSTRUCTIVE SLEEP APNEA): ICD-10-CM

## 2023-07-20 DIAGNOSIS — J96.11 CHRONIC RESPIRATORY FAILURE WITH HYPOXIA: ICD-10-CM

## 2023-07-20 DIAGNOSIS — R53.81 PHYSICAL DECONDITIONING: ICD-10-CM

## 2023-07-20 DIAGNOSIS — J84.9 ILD (INTERSTITIAL LUNG DISEASE): ICD-10-CM

## 2023-07-20 DIAGNOSIS — D84.821 DRUG-INDUCED IMMUNODEFICIENCY: ICD-10-CM

## 2023-07-20 DIAGNOSIS — E66.01 MORBID OBESITY WITH BMI OF 40.0-44.9, ADULT: ICD-10-CM

## 2023-07-20 DIAGNOSIS — Z79.899 DRUG-INDUCED IMMUNODEFICIENCY: ICD-10-CM

## 2023-07-20 DIAGNOSIS — J84.9 ILD (INTERSTITIAL LUNG DISEASE): Primary | ICD-10-CM

## 2023-07-20 DIAGNOSIS — E66.01 SEVERE OBESITY (BMI >= 40): ICD-10-CM

## 2023-07-20 DIAGNOSIS — B37.0 THRUSH, ORAL: ICD-10-CM

## 2023-07-20 DIAGNOSIS — J96.11 CHRONIC HYPOXEMIC RESPIRATORY FAILURE: ICD-10-CM

## 2023-07-20 PROCEDURE — 94729 PR C02/MEMBANE DIFFUSE CAPACITY: ICD-10-PCS | Mod: 26,S$PBB,NTX, | Performed by: INTERNAL MEDICINE

## 2023-07-20 PROCEDURE — 99499 NO LOS: ICD-10-PCS | Mod: 25,S$PBB,NTX, | Performed by: PHYSICIAN ASSISTANT

## 2023-07-20 PROCEDURE — 94010 BREATHING CAPACITY TEST: ICD-10-PCS | Mod: 26,S$PBB,NTX, | Performed by: INTERNAL MEDICINE

## 2023-07-20 PROCEDURE — 99215 OFFICE O/P EST HI 40 MIN: CPT | Mod: PBBFAC,NTX | Performed by: PHYSICIAN ASSISTANT

## 2023-07-20 PROCEDURE — 99999 PR PBB SHADOW E&M-EST. PATIENT-LVL V: ICD-10-PCS | Mod: PBBFAC,TXP,, | Performed by: PHYSICIAN ASSISTANT

## 2023-07-20 PROCEDURE — 99999 PR PBB SHADOW E&M-EST. PATIENT-LVL V: CPT | Mod: PBBFAC,TXP,, | Performed by: PHYSICIAN ASSISTANT

## 2023-07-20 PROCEDURE — 99213 OFFICE O/P EST LOW 20 MIN: CPT | Mod: PBBFAC,27,PO,TXP | Performed by: INTERNAL MEDICINE

## 2023-07-20 PROCEDURE — 94618 PULMONARY STRESS TESTING: CPT | Mod: PBBFAC,NTX | Performed by: INTERNAL MEDICINE

## 2023-07-20 PROCEDURE — 99215 PR OFFICE/OUTPT VISIT, EST, LEVL V, 40-54 MIN: ICD-10-PCS | Mod: S$PBB,25,NTX, | Performed by: INTERNAL MEDICINE

## 2023-07-20 PROCEDURE — 94727 GAS DIL/WSHOT DETER LNG VOL: CPT | Mod: 26,S$PBB,NTX, | Performed by: INTERNAL MEDICINE

## 2023-07-20 PROCEDURE — 99215 OFFICE O/P EST HI 40 MIN: CPT | Mod: S$PBB,25,NTX, | Performed by: INTERNAL MEDICINE

## 2023-07-20 PROCEDURE — 99999 PR PBB SHADOW E&M-EST. PATIENT-LVL III: ICD-10-PCS | Mod: PBBFAC,TXP,, | Performed by: INTERNAL MEDICINE

## 2023-07-20 PROCEDURE — 99499 UNLISTED E&M SERVICE: CPT | Mod: 25,S$PBB,NTX, | Performed by: PHYSICIAN ASSISTANT

## 2023-07-20 PROCEDURE — 94729 DIFFUSING CAPACITY: CPT | Mod: PBBFAC,NTX | Performed by: INTERNAL MEDICINE

## 2023-07-20 PROCEDURE — 94010 BREATHING CAPACITY TEST: CPT | Mod: PBBFAC,NTX | Performed by: INTERNAL MEDICINE

## 2023-07-20 PROCEDURE — 94618 PULMONARY STRESS TESTING: CPT | Mod: 26,S$PBB,NTX, | Performed by: INTERNAL MEDICINE

## 2023-07-20 PROCEDURE — 94618 PULMONARY STRESS TESTING: ICD-10-PCS | Mod: 26,S$PBB,NTX, | Performed by: INTERNAL MEDICINE

## 2023-07-20 PROCEDURE — 94010 BREATHING CAPACITY TEST: CPT | Mod: 26,S$PBB,NTX, | Performed by: INTERNAL MEDICINE

## 2023-07-20 PROCEDURE — 94727 PR PULM FUNCTION TEST BY GAS: ICD-10-PCS | Mod: 26,S$PBB,NTX, | Performed by: INTERNAL MEDICINE

## 2023-07-20 PROCEDURE — 94727 GAS DIL/WSHOT DETER LNG VOL: CPT | Mod: PBBFAC,NTX | Performed by: INTERNAL MEDICINE

## 2023-07-20 PROCEDURE — 94729 DIFFUSING CAPACITY: CPT | Mod: 26,S$PBB,NTX, | Performed by: INTERNAL MEDICINE

## 2023-07-20 PROCEDURE — 99999 PR PBB SHADOW E&M-EST. PATIENT-LVL III: CPT | Mod: PBBFAC,TXP,, | Performed by: INTERNAL MEDICINE

## 2023-07-20 RX ORDER — FLUCONAZOLE 100 MG/1
TABLET ORAL
Qty: 8 TABLET | Refills: 0 | Status: SHIPPED | OUTPATIENT
Start: 2023-07-20 | End: 2023-07-27

## 2023-07-20 RX ORDER — PREDNISONE 5 MG/1
5 TABLET ORAL DAILY
Status: ON HOLD | COMMUNITY
End: 2023-11-13 | Stop reason: HOSPADM

## 2023-07-20 RX ORDER — MYCOPHENOLATE MOFETIL 500 MG/1
TABLET ORAL
Qty: 138 TABLET | Refills: 0 | Status: SHIPPED | OUTPATIENT
Start: 2023-07-20 | End: 2023-08-19

## 2023-07-20 RX ORDER — MYCOPHENOLATE MOFETIL 500 MG/1
1500 TABLET ORAL 2 TIMES DAILY
Qty: 180 TABLET | Refills: 11 | Status: SHIPPED | OUTPATIENT
Start: 2023-07-20 | End: 2024-07-19

## 2023-07-20 NOTE — PROGRESS NOTES
ADVANCED LUNG DISEASE FOLLOW UP VISIT                                                                                                                                            Reason for Visit:  Evaluation for ILD    Referring Physician: No ref. provider found    History of Present Illness: Yara Santos is a 57 y.o. female who is on 3L of oxygen.  She is on CPAP.  Her New York Heart Association Class is II and a Karnofsky score of 70% - Cares for self: Unable to carry on normal activity or active work. She is diabetic insulin dependent  History from initial visit:  Pt is a 56 yo female with asthma, HTN, arthritis, GERD, hypothyroidism, obesity referred for new evaluation due to ILD found on CT chest. Her daughter is present and assists w/ history. Patient states that she had lung problems for years. She has COPD and frequent bronchitis. She has had exacerbations and was taking prednisone 40 mg daily, and is not taking prednisone 20 mg daily.  Feels breathing is improved when on prednisone. Remains on 3L continuously.  Uses CPAP machine, but reports not liking the mask.  Admits to not being active and can't breathe when bending over.  She gets muscle spasms and deals with chronic pain.  Does not attend PT due to transportation issues.  Able to perform her ADLs, but requires rest.  Has mostly dry cough, but occasional is productive of clear phlegm.  Previously smoked 1-2 packs per day for over 40 years but stopped in 2020 when she began vaping.  She has had rheumatology eval in the past by Dr. Kaur, and was told she has osteoarthritis and was told to f/u with PCP.  Walked 800 feet on 3L NC with oxygen saturations dropping to 92% at the beginning, but maintained 94-95% throughout on last visit.  Sees pain specialist for chronic pain.    Presents today for follow up visit.  Reports that her respiratory status is about the same.  Admits to not being very active.  Still does not use CPAP due to discomfort.  Gets  short of breath with exertion.    Past Medical History:   Diagnosis Date    a Premature Ventricular Contractions     Dr. Austin Collier    b Hypertension     c Hypercholesterolemia     d Type 2 DM     e Hypothyroidism     23 Increased 50 Mcg Levothyroxine qAM With TFTs In 4 Months    f Morbid Obesity     f Osteopenia     History of recurrent UTIs     7/10/23 Refd to Dr Kim    i Asthma     i Oxygen dependent     3l nasal cannula    i Tobacco Use Disorder     Insomnia disorder     7/10/23 RXd Seroquel increase from 25 mg QHS to 50 mg QHS    j GERD     j H/O Colon Polyps     j Irritable Bowel Syndrome     j Nonalcoholic Steatohepatitis ([place]     l Bilateral Hip Osteoarthritis     l Bilateral Knee Arthritis     l Carpal tunnel syndrome     l DDD (degenerative disc disease), lumbar     l Lumbar Spinal DDD     l Tarsal Tunnel Syndrome     m Vitamin B12 Deficiency     n Depression And Anxiety     o Allergic rhinosinusitis     p OU Cataracts     q Lichen Planus     Dr. Lizzy Dominique    q Vitamin D Deficiency     Wellness Visit 2023        Past Surgical History:   Procedure Laterality Date     SECTION      x2    CHOLECYSTECTOMY      COLONOSCOPY   ?    polyps removed per pt report    COLONOSCOPY N/A 2022    Procedure: COLONOSCOPY;  Surgeon: Brett Jasso MD;  Location: [facility] ENDO;  Service: Endoscopy;  Laterality: N/A;    EPIDURAL STEROID INJECTION INTO CERVICAL SPINE N/A 2020    Procedure: Injection-steroid-epidural-cervical to left;  Surgeon: John Vinson MD;  Location: [facility] OR;  Service: Pain Management;  Laterality: N/A;    EPIDURAL STEROID INJECTION INTO LUMBAR SPINE N/A 2020    Procedure: Injection-steroid-epidural-lumbar, l5/s1 to left;  Surgeon: John Vinson MD;  Location: [facility] OR;  Service: Pain Management;  Laterality: N/A;    EPIDURAL STEROID INJECTION INTO LUMBAR SPINE N/A 2021    Procedure: Injection-steroid-epidural-lumbar L5/S1;  Surgeon:  John Vinson MD;  Location: [facility] OR;  Service: Pain Management;  Laterality: N/A;    EPIDURAL STEROID INJECTION INTO LUMBAR SPINE N/A 06/21/2022    Procedure: Injection-steroid-epidural-lumbar L5/S1 spread to left;  Surgeon: John Vinson MD;  Location: [facility];  Service: Pain Management;  Laterality: N/A;    ESOPHAGOGASTRODUODENOSCOPY      ESOPHAGOGASTRODUODENOSCOPY N/A 11/09/2022    Procedure: EGD (ESOPHAGOGASTRODUODENOSCOPY);  Surgeon: Brett Jasso MD;  Location: [facility] ENDO;  Service: Endoscopy;  Laterality: N/A;    INJECTION OF JOINT Left 09/20/2022    Procedure: INJECTION, JOINT- hip;  Surgeon: John Vinson MD;  Location: [facility];  Service: Pain Management;  Laterality: Left;    LAPAROSCOPIC CHOLECYSTECTOMY N/A 07/13/2022    Procedure: CHOLECYSTECTOMY, LAPAROSCOPIC;  Surgeon: Ra Santos MD;  Location: [facility] OR;  Service: General;  Laterality: N/A;    TRANSFORAMINAL EPIDURAL INJECTION OF STEROID Left 08/16/2022    Procedure: Injection,steroid,epidural,transforaminal approach L3/4 and L4/5;  Surgeon: John Vinson MD;  Location: [facility];  Service: Pain Management;  Laterality: Left;    TRANSFORAMINAL EPIDURAL INJECTION OF STEROID Left 02/22/2023    Procedure: Injection,steroid,epidural,transforaminal approach L3/4 and L4/5-Left;  Surgeon: John Vinson MD;  Location: [facility] OR;  Service: Pain Management;  Laterality: Left;  l3/4 and l4/5    UPPER GASTROINTESTINAL ENDOSCOPY  2020 ?    unsure of results       Allergies: Iodine and iodide containing products; Asa [aspirin]; Atorvastatin; Contact metal agent; Crestor [rosuvastatin]; Lasix [furosemide]; Lisinopril; Losartan; Lovastatin; Metformin; Salicylates; Sulfur; Sulfur, elemental; Valsartan; and Latex    Current Outpatient Medications   Medication Sig    ADVAIR DISKUS 500-50 mcg/dose DsDv diskus inhaler Inhale into the lungs 2 (two) times daily.    azelastine (ASTELIN) 137 mcg (0.1 %) nasal spray 1 spray (137 mcg  total) by Nasal route 2 (two) times daily as needed for Rhinitis.    blood sugar diagnostic (ACCU-CHEK TEJA PLUS TEST STRP) Strp Test blood sugar THREE TIMES daily; For diagnosis code E11.65    blood sugar diagnostic Strp To check BG one times daily, to use with insurance preferred meter    blood-glucose meter kit To check BG one times daily, to use with insurance preferred meter    busPIRone (BUSPAR) 30 MG Tab Take 1 tablet (30 mg total) by mouth 2 (two) times daily.    cholecalciferol, vitamin D3, 5,000 unit Tab Take 5,000 Units by mouth once daily. VITAMIN D-3 5000 UNIT TABS    clindamycin phosphate 1% (CLINDAGEL) 1 % gel Apply topically 2 (two) times daily.    cyanocobalamin 1,000 mcg/mL injection INJECT 1 MILLILITER INTO THE MUSCLE EVERY MONTH    cyclobenzaprine (FLEXERIL) 10 MG tablet Take 1 tablet by mouth three times daily as needed for muscle spasm    ezetimibe (ZETIA) 10 mg tablet TAKE 1 TABLET BY MOUTH ONCE DAILY IN THE EVENING    FLOVENT  mcg/actuation inhaler Inhale 2 puffs into the lungs 2 (two) times daily.    gabapentin (NEURONTIN) 300 MG capsule TAKE 1 CAPSULE BY MOUTH THREE TIMES DAILY    hydroCHLOROthiazide (HYDRODIURIL) 12.5 MG Tab Take 1 tablet by mouth once daily    insulin aspart U-100 (NOVOLOG FLEXPEN U-100 INSULIN) 100 unit/mL (3 mL) InPn pen 12 units Ac + correction Max TDD 66 u    insulin detemir U-100, Levemir, (LEVEMIR FLEXTOUCH U100 INSULIN) 100 unit/mL (3 mL) InPn pen 30 u bid    ipratropium (ATROVENT HFA) 17 mcg/actuation inhaler INHALE 2 PUFFS BY MOUTH TWICE DAILY AS DIRECTED    lancets Misc To check BG TWO times daily, to use with insurance preferred meter    lancing device with lancets (ACCU-CHEK MULTICLIX LANCET) Kit Testing needed THREE TIMES DAILY    levalbuterol (XOPENEX HFA) 45 mcg/actuation inhaler Inhale 2 puffs into the lungs every 6 (six) hours as needed.    levothyroxine (SYNTHROID) 50 MCG tablet Take 1 tablet (50 mcg total) by mouth before breakfast.    mupirocin  "(BACTROBAN) 2 % ointment Apply topically 2 (two) times daily as needed.    nintedanib (OFEV) 150 mg Cap Take 1 capsule (150 mg total) by mouth 2 (two) times a day.    omalizumab (XOLAIR) 150 mg/mL injection Inject 150 mg into the skin every 14 (fourteen) days.    omeprazole (PRILOSEC) 40 MG capsule Take 1 capsule (40 mg total) by mouth 2 (two) times a day.    ondansetron (ZOFRAN) 8 MG tablet TAKE 1 TABLET BY MOUTH EVERY 8 HOURS AS NEEDED FOR NAUSEA    pen needle, diabetic 32 gauge x 5/32" Ndle Uses 5 daily    predniSONE (DELTASONE) 5 MG tablet Take 5 mg by mouth once daily.    QUEtiapine (SEROQUEL) 50 MG tablet Take 1 tablet (50 mg total) by mouth nightly.    sertraline (ZOLOFT) 100 MG tablet Take 1 tablet by mouth twice daily    cetirizine (ZYRTEC) 10 MG tablet Take 1 tablet (10 mg total) by mouth daily as needed. (Patient taking differently: Take 10 mg by mouth every evening.)     No current facility-administered medications for this visit.       Immunization History   Administered Date(s) Administered    COVID-19, MRNA, LN-S, PF (MODERNA FULL 0.5 ML DOSE) 03/16/2021, 04/15/2021, 01/13/2022    Influenza 11/18/2021    Influenza (FLUBLOK) - Quadrivalent - Recombinant - PF *Preferred* (egg allergy) 11/24/2020    Influenza - Quadrivalent 10/20/2015    Influenza - Quadrivalent - MDCK - PF 12/24/2019    Influenza A (H1N1) 2009 Monovalent - IM 11/20/2009    Pneumococcal Conjugate - 13 Valent 07/10/2017    Pneumococcal Conjugate - 20 Valent 02/16/2023    Tdap 10/24/2017    Zoster Recombinant 07/11/2021, 05/11/2023     Family History:    Family History   Problem Relation Age of Onset    Arthritis Mother     Diabetes Mother     Hyperlipidemia Mother     Cancer Mother         uterine     Allergies Mother     Ovarian cancer Mother 58    Aneurysm Father     Hyperlipidemia Father     Breast cancer Maternal Aunt 65    Cancer Maternal Uncle         brain    Cancer Paternal Aunt         breast, bone and lung     Breast cancer " Paternal Aunt 78    Breast cancer Maternal Cousin 43    Brain cancer Maternal Cousin     Cirrhosis Neg Hx      Social History     Substance and Sexual Activity   Alcohol Use No      Social History     Substance and Sexual Activity   Drug Use Not Currently    Frequency: 20.0 times per week    Types: Marijuana    Comment: daily smoke and edibles      Social History     Socioeconomic History    Marital status:    Tobacco Use    Smoking status: Former     Packs/day: 1.00     Types: Cigarettes, Vaping with nicotine     Start date:      Quit date: 10/1/2022     Years since quittin.8     Passive exposure: Current    Smokeless tobacco: Never   Substance and Sexual Activity    Alcohol use: No    Drug use: Not Currently     Frequency: 20.0 times per week     Types: Marijuana     Comment: daily smoke and edibles    Sexual activity: Not Currently     Social Determinants of Health     Financial Resource Strain: Medium Risk    Difficulty of Paying Living Expenses: Somewhat hard   Food Insecurity: Food Insecurity Present    Worried About Running Out of Food in the Last Year: Often true    Ran Out of Food in the Last Year: Sometimes true   Transportation Needs: Unmet Transportation Needs    Lack of Transportation (Medical): Yes    Lack of Transportation (Non-Medical): Yes   Physical Activity: Inactive    Days of Exercise per Week: 0 days    Minutes of Exercise per Session: 0 min   Stress: Stress Concern Present    Feeling of Stress : Very much   Social Connections: Socially Isolated    Frequency of Communication with Friends and Family: Never    Frequency of Social Gatherings with Friends and Family: Never    Attends Mandaen Services: Never    Active Member of Clubs or Organizations: No    Attends Club or Organization Meetings: Never    Marital Status:    Housing Stability: High Risk    Unable to Pay for Housing in the Last Year: Yes    Number of Places Lived in the Last Year: 1    Unstable Housing in the  "Last Year: No     Review of Systems   Constitutional:  Positive for malaise/fatigue.   HENT:  Negative for congestion.    Respiratory:  Positive for cough and shortness of breath. Negative for hemoptysis, sputum production and wheezing.    Cardiovascular:  Positive for orthopnea. Negative for chest pain, palpitations, claudication, leg swelling and PND.   Musculoskeletal:  Positive for back pain (chronic), joint pain (chronic) and myalgias (chronic).   Vitals  /61 (BP Location: Right arm, Patient Position: Sitting, BP Method: Large (Automatic))   Pulse 89   Ht 5' 6" (1.676 m)   Wt 120.1 kg (264 lb 12.4 oz)   SpO2 (!) 93% Comment: 3 liters pulse  BMI 42.74 kg/m²   Physical Exam  Constitutional:       General: She is not in acute distress.     Appearance: She is morbidly obese.      Interventions: Nasal cannula in place.   Eyes:      Extraocular Movements: Extraocular movements intact.      Conjunctiva/sclera: Conjunctivae normal.   Cardiovascular:      Rate and Rhythm: Normal rate and regular rhythm.   Pulmonary:      Effort: No tachypnea, accessory muscle usage or respiratory distress.      Breath sounds: Examination of the right-lower field reveals decreased breath sounds. Examination of the left-lower field reveals decreased breath sounds. Decreased breath sounds present. No wheezing, rhonchi or rales.   Abdominal:      Palpations: Abdomen is soft.   Musculoskeletal:      Right lower leg: No edema.      Left lower leg: No edema.   Skin:     General: Skin is warm and dry.   Neurological:      Mental Status: She is oriented to person, place, and time.   Psychiatric:         Mood and Affect: Mood normal.         Behavior: Behavior is cooperative.       Labs:  No visits with results within 7 Day(s) from this visit.   Latest known visit with results is:   Lab Visit on 07/06/2023   Component Date Value    WBC 07/06/2023 13.06 (H)     RBC 07/06/2023 5.16     Hemoglobin 07/06/2023 13.5     Hematocrit 07/06/2023 " 43.0     MCV 07/06/2023 83     MCH 07/06/2023 26.2 (L)     MCHC 07/06/2023 31.4 (L)     RDW 07/06/2023 16.4 (H)     Platelets 07/06/2023 279     MPV 07/06/2023 10.1     Immature Granulocytes 07/06/2023 0.7 (H)     Gran # (ANC) 07/06/2023 8.0 (H)     Immature Grans (Abs) 07/06/2023 0.09 (H)     Lymph # 07/06/2023 3.9     Mono # 07/06/2023 0.8     Eos # 07/06/2023 0.2     Baso # 07/06/2023 0.07     nRBC 07/06/2023 0     Gran % 07/06/2023 61.3     Lymph % 07/06/2023 29.6     Mono % 07/06/2023 6.4     Eosinophil % 07/06/2023 1.5     Basophil % 07/06/2023 0.5     Differential Method 07/06/2023 Automated     Sodium 07/06/2023 140     Potassium 07/06/2023 4.4     Chloride 07/06/2023 99     CO2 07/06/2023 33 (H)     Glucose 07/06/2023 160 (H)     BUN 07/06/2023 14     Creatinine 07/06/2023 0.87     Calcium 07/06/2023 9.1     Total Protein 07/06/2023 7.5     Albumin 07/06/2023 4.4     Total Bilirubin 07/06/2023 0.8     Alkaline Phosphatase 07/06/2023 126     AST 07/06/2023 98 (H)     ALT 07/06/2023 108 (H)     Anion Gap 07/06/2023 8     eGFR 07/06/2023 >60     Hemoglobin A1C 07/06/2023 10.4 (H)     Estimated Avg Glucose 07/06/2023 252 (H)     Cholesterol 07/06/2023 249 (H)     Triglycerides 07/06/2023 134     HDL 07/06/2023 96 (H)     LDL Cholesterol 07/06/2023 126.2     HDL/Cholesterol Ratio 07/06/2023 38.6     Total Cholesterol/HDL Ra* 07/06/2023 2.6     Non-HDL Cholesterol 07/06/2023 153     Microalbumin, Urine 07/06/2023 18.6     Creatinine, Urine 07/06/2023 174.2     Microalb/Creat Ratio 07/06/2023 10.7     Free T4 07/06/2023 1.10     TSH 07/06/2023 4.880 (H)     Vit D, 25-Hydroxy 07/06/2023 65     Vitamin B-12 07/06/2023 >1000 (H)     RBC, UA 07/06/2023 3     WBC, UA 07/06/2023 47 (H)     Bacteria 07/06/2023 Many (A)     Squam Epithel, UA 07/06/2023 6     Hyaline Casts, UA 07/06/2023 4 (A)     Microscopic Comment 07/06/2023 SEE COMMENT        Pulmonary Function Tests 7/20/2023 3/23/2023 12/2/2022   FVC 2.87 2.94 3.34    FEV1 2.32 2.21 2.27   TLC (liters) 3.69 4.41 4.55   DLCO (ml/mmHg sec) 8.32 11.4 12.6   FVC% 84.4 90 102   FEV1% 86.8 84 86   FEF 25-75 2.07 1.78 1.23   FEF 25-75% 86.8 63 44   TLC% 69.9 78 86   RV 0.82 1.12 1.21   RV% 41.4 56 61   DLCO% 34.5 41 45     6MW 7/20/2023 4/13/2023 12/2/2022   6MWT Status completed without stopping completed without stopping completed with stops   Patient Reported Dyspnea;Lightheadedness;Dizziness;Other (Comment) Dyspnea Dyspnea;Dizziness   Was O2 used? Yes Yes Yes   Delivery Method Cannula;Pull Tank;Continuous Flow Cannula;Pull Tank;Continuous Flow -   6MW Distance walked (feet) 677 800 432   Distance walked (meters) 206.35 243.84 131.67   Did patient stop? No No Yes   Oxygen Saturation 98 96 98   Supplemental Oxygen 3 L/M 3 L/M Room Air   Heart Rate 88 85 77   Blood Pressure 158/73 126/77 100/62   Leo Dyspnea Rating  moderate moderate -   Oxygen Saturation 92 94 92   Supplemental Oxygen 3 L/M 3 L/M 2 L/M   Heart Rate 105 104 98   Blood Pressure 152/70 163/87 138/80   Leo Dyspnea Rating  heavy somewhat heavy -   Recovery Time (seconds) 136 391 -   Oxygen Saturation 97 98 94   Supplemental Oxygen 3 L/M 3 L/M Room Air   Heart Rate 94 88 84       Imaging:  Results for orders placed during the hospital encounter of 03/21/23    X-Ray Chest PA And Lateral    Narrative  EXAMINATION:  XR CHEST PA AND LATERAL    CLINICAL HISTORY:  Chest Pain;    TECHNIQUE:  PA and lateral views of the chest were performed.    COMPARISON:  03/08/2023    FINDINGS:  The heart size is normal.  The lungs are expanded with bilateral interstitial infiltrates, similar in appearance to the previous exam.  Pulmonary fibrosis would also be in the differential.  Calcified granuloma in the right midlung zone and left lung base.  No pneumothorax.  No detrimental change from the previous exam.    Impression  Stable appearance of bilateral pulmonary interstitial infiltrates/fibrotic change.      Electronically signed  by: Arnaldo Davis MD  Date:    03/21/2023  Time:    18:28      Results for orders placed during the hospital encounter of 03/21/23    CT Abdomen Pelvis With Contrast    Narrative  EXAMINATION:  CT ABDOMEN PELVIS WITH CONTRAST    CLINICAL HISTORY:  Epigastric pain;    TECHNIQUE:  Low dose axial images, sagittal and coronal reformations were obtained from the lung bases to the pubic symphysis following the IV administration of 80 mL of Omnipaque 350    COMPARISON:  CT scan of the abdomen and pelvis 02/28/2023    RADIATION DOSE:  873 DLP.    Automated exposure control.    Iterative reconstruction was utilized.    FINDINGS:  The lung bases demonstrate posterior dependent atelectatic change with pleural thickening.  There is a calcified granuloma at the extreme left lung base, stable in appearance from the previous exam.  The heart size is normal.  The liver is enlarged and diffusely low in attenuation consistent with fatty infiltration.    The gallbladder has been removed.    The stomach is partially decompressed.  The spleen is homogeneous in enhancement.    The pancreas is fatty replaced.    The kidneys are normal in size, shape, and position and concentrate and excrete contrast normally.    The small bowel loops are normal in caliber.    The appendix is visualized in the right lower quadrant and is unremarkable.    Several small bowel loops are fluid-filled.    Scattered colonic diverticulosis, uncomplicated.    The uterus is present in the pelvis and appears atrophic.  Urinary bladder is collapsed.  There is no pelvic free fluid.    The osseous structures demonstrate diffuse multilevel degenerative change.    Impression  1. No acute abnormality.  Mild hepatomegaly, stable from the previous exam.      Electronically signed by: Arnaldo Davis MD  Date:    03/21/2023  Time:    20:05    No results found. However, due to the size of the patient record, not all encounters were searched. Please check Results  Review for a complete set of results.          Cardiodiagnostics:  Results for orders placed in visit on 09/08/20    Echo Color Flow Doppler? Yes    Interpretation Summary  · The left ventricle is normal in size with normal systolic function. The estimated ejection fraction is 60%.  · Grade I diastolic dysfunction.  · Normal right ventricular systolic function.    Results for orders placed during the hospital encounter of 04/19/23    Echo    Interpretation Summary  · The left ventricle is normal in size with moderate concentric hypertrophy and normal systolic function.  · The estimated ejection fraction is 66%.  · Normal left ventricular diastolic function.  · Atrial fibrillation not observed.  · Normal right ventricular size with normal right ventricular systolic function.  · Normal central venous pressure (3 mmHg).      Assessment:  1. ILD (interstitial lung disease)    2. Chronic respiratory failure with hypoxia    3. MOLINA (obstructive sleep apnea)    4. Physical deconditioning    5. Severe obesity (BMI >= 40)      Plan: Chest CT with bilateral granulomas throughout the lung fields. Centrilobular emphysema. Interstitial fibrosis with GGO noted bilaterally.  Patient with smoking related ILD and emphysema; probable UIP.  Inactivity and body habitus likely contributing to patient's symptoms of shortness of breath, as well as underlying lung disease. Tolerating OFEV without difficulty.     --Continue supplemental oxygen, currently 3L NC.  Continue current inhaler therapy  --Continue CPAP, patient has been encouraged to contact [employer] company regarding mask options.  Stressed the importance of compliance with CPAP  --Encouraged weight loss through proper diet and exercise.  Will place home health physical therapy referral.    --Continue Dr. Bishop Helms to monitor.    --Encouraged to increase activity as possible.    --Continue follow up with cardiology.   --Patient asked about potential lung transplant.  At this time she  would have barriers, such as her BMI and physical deconditioning.    --Patient had good response with steroids.  Recommend considering steroid sparing agent, such as Imuran.  Recommend repeat chest CT for evaluation of progression of disease.  Patient to follow up with Dr. Alas, primary pulmonologist.     RTC in 6 months    Danny Mendoza NP  Lung Transplant/Advanced Lung Disease

## 2023-07-20 NOTE — TELEPHONE ENCOUNTER
Spoke with [facility]. They will reach out to patient for admit for home health.   ----- Message from Liliana Smith sent at 7/20/2023  4:12 PM CDT -----  Type: Needs Medical Advice  Who Called:  pt home health     Best Call Back Number: [phone]   Additional Information: pt home health calling in regards for referral wants to know if pt can be pushed back to Sunday b/c they are full please advise

## 2023-07-20 NOTE — PROGRESS NOTES
"7/20/2023    Yara Santos  Follow up-     No chief complaint on file.      HPI:   07/20/2023- pt here with daughter for follow up visit. Also saw [facility] pulm NP today. she plans to do at home PT- requests referral.  SOB increasing for 1 week, cough sounding more "croupy" for 1 wk. Mostly nonproductive.  She is on 5mg prednisone daily now.  Using 3L oxygen  She is open to trying cpap again with nasal mask and lower pressure. The pressure was too high made her feel like she was choking and she had tried full face mask with auto cpap.  Continues Ofev w/ no side effects  She sees endocrine for diabetes- struggling with control. They have told her prefer not to do ozempic/mounjaro at this time.  She needs to lose weight to be considered for transplant.  No smoking marijuana since April, no vaping since 10/2022  She had UTI 2 wk ago, completed cipro for 5d      05/25/2023-   Taper prednisone to 15mg daily 1 week then down to 10mg daily 1 week then down to 5mg daily and keep at 5mg until next appointment  Watch blood sugars closely while tapering prednisone- may need to decrease insulin if having low sugars  Follow up with endocrinology and let them know we are tapering blood sugar  Continue Ofev  Continue inhalers- advair and flovent for now. In the future may take you off flovent  Consider seeing a psychiatrist to help anxiety and skin picking issues  Oxygen continue, goal sats >88%  Continue to avoid smoking  Clindagel twice daily to skin lesions of buttocks  she is tolerating Ofev ok as long as she takes w/ food. Continues on O2 3L. Prednisone 20mg daily. She notes sharp intermittent pain R shoulder blade occasionally. She has cough, congestion in chest, not spitting up mucous. She has yellow/green nasal drainage. She has buttocks rash w/ sores that she picks at. She had augmentin 2 wks ago per PCP- this helped a little but now it is worse again. She has severe anxiety, unable to stop picking skin. Has avoided " marijuana >1 month.  Takes buspar, sertraline, and seroquel per PCP. Has seen psychiatrist 20 yr ago.  She takes advair and flovent inhalers per Dr. Fleming  She has been unable to tolerate CPAP due to moving too much at night, back pains  Blood glucose has been varying between 200s-400s    04/27/2023-   Continue oxygen  Continue prednisone 20mg daily 4 more weeks then will try to taper  Follow up with endocrinologist for blood sugars to get better controlled  Continue to avoid any smoking including cigarettes, vape, marijuana  Starting new medicine called Ofev (nintedanib) which is an antifibrotic to slow down scarring of the lung tissue. This medicine may come with the side effect of diarrhea, nausea, vomiting which can be better with using imodium as needed. While on this medicine I would like to monitor your liver function tests every month for 3 mos then switch to every 3 month blood draws. Please go to the lab for blood work now prior to starting the new medicine.  pt saw Dr. Comer and I spoke with her after visit. Pt feels very tired. Sats drop quickly with movement. She uses 3L oxygen continuously. The plan was to start her on Ofev however there is concern about her chronic GI symptoms of nausea/emesis so Dr Comer told her not to start it. She usually stays constipated. Currently her heartburn and vomiting are improved on 2x/day 40mg omeprazole.   Cough productive white/clear phlegm, very thick.  Has not been using CPAP machine at night. Has tried but pulls it off in her sleep.  She continues on prednisone 20mg daily. Tried to decrease to 10mg but was unable to tolerate. Has been on prednisone 2 mos now. She has a sulfa allergy- breaks out in rash.  She is currently being treated for skin infection of her buttocks- open wounds, not healing, chronic- getting augmentin now.  Admitted to smoking marijuana- joints, has smoked off and on since age 8, last time Sunday and states she quit for good.      2023-   Contact [employer] for possible backup tank- [facility] at [phone].  Taper prednisone to 30mg daily   Continue oxygen to keep sats >88%  Start using CPAP with bleed in oxygen 2L   Echo to check pulmonary pressures  Go to the lab for blood tests  Nystatin mouth wash for thrush- use 4 times daily  pt feels breathing is improved on prednisone. She is still on 3L continuously now but reports at times able to take O2 off and sat remains 94-98 while at rest.  She just got CPAP machine this am and will start using.   She bruises easy, hands are red and dry and mouth is dry. Has white spots in mouth, lump in throat and trouble swallowing.  Pt was exposed (in same room) to nephew with HIV and TB 1 week ago, requests testing.      2023-   Need backup oxygen tank- contact [employer]  Contact [employer] to follow up on CPAP machine  Use home oxygen continuously and at night now  Get blood work  See advanced lung disease specialist  May need open lung biopsy- will get opinion of advanced specialist  Need repeat pulmonary function tests  Start prednisone 40mg daily for now for flare of lung disease  pt feels her breathing is getting worse, she is going downhill. Daughter assists w/ history, takes notes. When bending over she can't breathe. She gets muscle spasms in the abdomen. She has a lot of cough and phlegm, clear/milky. Her sister  with end stage pulm fibrosis (they think due to RA) just this past Saturday, was on hospice. She wears O2 all day, requires 2L continuously now but not wearing w/ sleep. With walking she has to turn O2 up to 3L. She has noticed shaking of fingers and beginning to get clubbing of the fingertips. They have not received CPAP machine for home- heard from [facility] and was told insurance approval pending.  She is not smoking or vaping.  There has been a lot of construction going on at the house, adding on to the laundry room w/ new walls. She sleeps on the other  side of the house. Denies mold. There was a small area of water damage. They keep the area w/ construction sealed off behind a closed door.  She has had rheumatology eval in the past, Dr. Kaur, note reviewed, and was told she has osteoarthritis and was told to f/u with PCP.  Has been off prednisone for several mos but had been on 5mg daily for lichen planus.    12/19/2022-   For lung disease- will plan to follow with repeat testing to evaluate for any progression  Due to repeat CT chest 2/2023  Ordering overnight sleep study at home to evaluate for sleep apnea  If positive will order CPAP machine, notify clinic when machine is delivered to home to set up 31 days compliance appointment. You must use the machine for a least 4 hours every night.   Agree with trelegy  Cough/mucous may be due to sinuses- referral to ENT placed for further evaluation.  she did qualify for home O2 2L, ordered and delivered. She is seeing Dr Garcia allergist, new blood work pending. She had allergy prick test on back which was negative. She feels dyspnea is stable. She continues to have bad cough, thick light yellow/white mucous- at baseline. It is worse at night when she lies down. She has sinus stuffiness and allergies.  She was given trelegy inhaler sample, hasn't started yet.   She has avoided smoking and vaping entirely.  She feels very sleepy, falls asleep easily. Her sister has MOLINA. Daughter states that she snores and puffs w/ pursed lips when sleeping.    10/31/2022  Pulmonary function tests  6 min walk test  Go to the lab and get blood work  May request new rheumatology eval in the future  Suspect vaping caused some of the inflammation of lung tissue- need to avoid smoking/vaping   Pt is a 56 yo female with asthma, HTN, arthritis, GERD, hypothyroidism, obesity referred for new evaluation due to ILD found on CT chest. Her daughter is present and assists w/ history.  Pt reports has had lung problems for years and years. Reports  in past treated at [facility] for fungal pneumonia due to bird droppings ()  She has also been treated for COPD, frequent bronchitis. She recently has had exacerbation taking prednisone and z pack. Has tested neg for covid, has not checked for flu. Many sick contacts and just went on a trip. She went to altitude [place], at \[facility] [place] [facility]\"" peak had SOB, felt fevers, dizzy, had to use oxygen and lie in bed.  Inhalers: trelegy 200 once/d- new rx. Uses rescue albuterol 2-3x/day and feels benefit.  Nebulizer with duo neb uses 2x/day  This is the first exacerbation requiring prednisone so far this year.  Quit smoking 3 yr ago, 1-2 ppd since age 8  Quit vaping 3 wks- used juul and many other e cigarettes 1/2 cartridge daily- has entire drawer full.  She has been more short of breath gradual progressive for 1 year, symptoms vary.  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 Sister has RA with lung involvement  Has been told she has RA in past but Dr. Kaur said no RA, just OA. She sees derm with lichen planus and would use prednisone off and on, currently not on steroids.  No pets at home currently.  She lives in an older house in [place], mold visible ceilings of bathroom, earlier this year had mold in room- currently renovating.    The chief complaint problem is varies w/ instability at times.    PFSH:  Past Medical History:   Diagnosis Date    a Premature Ventricular Contractions     Dr. Austin Collier    b Hypertension     c Hypercholesterolemia     d Type 2 DM     e Hypothyroidism     23 Increased 50 Mcg Levothyroxine qAM With TFTs In 4 Months    f Morbid Obesity     f Osteopenia     History of recurrent UTIs     7/10/23 Refd to Dr Kim    i Asthma     i Oxygen dependent     3l nasal cannula    i Tobacco Use Disorder     Insomnia disorder     7/10/23 RXd Seroquel increase from 25 mg QHS to 50 mg QHS    j GERD      j H/O Colon Polyps     j Irritable Bowel Syndrome     j Nonalcoholic Steatohepatitis (N.A.S.H.)     l Bilateral Hip Osteoarthritis     l Bilateral Knee Arthritis     l Carpal tunnel syndrome     l DDD (degenerative disc disease), lumbar     l Lumbar Spinal DDD     l Tarsal Tunnel Syndrome     m Vitamin B12 Deficiency     n Depression And Anxiety     o Allergic rhinosinusitis     p OU Cataracts     q Lichen Planus     Dr. Lizzy Dominique    q Vitamin D Deficiency     Wellness Visit 2023          Past Surgical History:   Procedure Laterality Date     SECTION      x2    CHOLECYSTECTOMY      COLONOSCOPY   ?    polyps removed per pt report    COLONOSCOPY N/A 2022    Procedure: COLONOSCOPY;  Surgeon: Brett Jasso MD;  Location: [facility];  Service: Endoscopy;  Laterality: N/A;    EPIDURAL STEROID INJECTION INTO CERVICAL SPINE N/A 2020    Procedure: Injection-steroid-epidural-cervical to left;  Surgeon: John Vinson MD;  Location: [facility];  Service: Pain Management;  Laterality: N/A;    EPIDURAL STEROID INJECTION INTO LUMBAR SPINE N/A 2020    Procedure: Injection-steroid-epidural-lumbar, l5/s1 to left;  Surgeon: John Vinson MD;  Location: [facility];  Service: Pain Management;  Laterality: N/A;    EPIDURAL STEROID INJECTION INTO LUMBAR SPINE N/A 2021    Procedure: Injection-steroid-epidural-lumbar L5/S1;  Surgeon: John Vinson MD;  Location: [facility];  Service: Pain Management;  Laterality: N/A;    EPIDURAL STEROID INJECTION INTO LUMBAR SPINE N/A 2022    Procedure: Injection-steroid-epidural-lumbar L5/S1 spread to left;  Surgeon: John Vinson MD;  Location: [facility];  Service: Pain Management;  Laterality: N/A;    ESOPHAGOGASTRODUODENOSCOPY      ESOPHAGOGASTRODUODENOSCOPY N/A 2022    Procedure: EGD (ESOPHAGOGASTRODUODENOSCOPY);  Surgeon: Brett Jasso MD;  Location: [facility];  Service: Endoscopy;  Laterality: N/A;    INJECTION  OF JOINT Left 2022    Procedure: INJECTION, JOINT- hip;  Surgeon: John Vinson MD;  Location: [facility];  Service: Pain Management;  Laterality: Left;    LAPAROSCOPIC CHOLECYSTECTOMY N/A 2022    Procedure: CHOLECYSTECTOMY, LAPAROSCOPIC;  Surgeon: Ra Santos MD;  Location: [facility];  Service: General;  Laterality: N/A;    TRANSFORAMINAL EPIDURAL INJECTION OF STEROID Left 2022    Procedure: Injection,steroid,epidural,transforaminal approach L3/4 and L4/5;  Surgeon: John Vinson MD;  Location: [facility];  Service: Pain Management;  Laterality: Left;    TRANSFORAMINAL EPIDURAL INJECTION OF STEROID Left 2023    Procedure: Injection,steroid,epidural,transforaminal approach L3/4 and L4/5-Left;  Surgeon: John Vinson MD;  Location: [facility];  Service: Pain Management;  Laterality: Left;  l3/4 and l4/5    UPPER GASTROINTESTINAL ENDOSCOPY   ?    unsure of results     Social History     Tobacco Use    Smoking status: Former     Packs/day: 1.00     Types: Cigarettes, Vaping with nicotine     Start date:      Quit date: 10/1/2022     Years since quittin.8     Passive exposure: Current    Smokeless tobacco: Never   Substance Use Topics    Alcohol use: No    Drug use: Not Currently     Frequency: 20.0 times per week     Types: Marijuana     Comment: daily smoke and edibles     Family History   Problem Relation Age of Onset    Arthritis Mother     Diabetes Mother     Hyperlipidemia Mother     Cancer Mother         uterine     Allergies Mother     Ovarian cancer Mother 58    Aneurysm Father     Hyperlipidemia Father     Breast cancer Maternal Aunt 65    Cancer Maternal Uncle         brain    Cancer Paternal Aunt         breast, bone and lung     Breast cancer Paternal Aunt 78    Breast cancer Maternal Cousin 43    Brain cancer Maternal Cousin     Cirrhosis Neg Hx      Review of patient's allergies indicates:   Allergen Reactions    Iodine and iodide containing products  "Blisters     Reports it caused lichen planus flare up    Asa [aspirin] Itching and Other (See Comments)     Skin problem      Atorvastatin      Worsened Lichen Planus    Contact metal agent      Other reaction(s): Other (See Comments)    Crestor [rosuvastatin]      Worsens her lichen planus    Lasix [furosemide]      rash    Lisinopril      Hives    Losartan      Confusion    Lovastatin      Worsened Lichen Planus    Metformin Other (See Comments)     Stomach cramps     Salicylates     Sulfur      Other reaction(s): Other (See Comments)    Sulfur, elemental     Valsartan      breakouts    Latex Other (See Comments)     Skin problem         Performance Status:The patient's activity level is functions out of house.      Review of Systems:  a review of eleven systems covering constitutional, Eye, HEENT, Psych, Respiratory, Cardiac, GI, , Musculoskeletal, Endocrine, Dermatologic was negative except for pertinent findings as listed ABOVE and below:  Wounds of buttocks are healing very slowly. No drainage        Exam:Comprehensive exam done. BP (!) 142/80 (BP Location: Right arm, Patient Position: Sitting, BP Method: Large (Automatic))   Pulse 89   Ht 5' 6" (1.676 m)   Wt 120.1 kg (264 lb 12.4 oz)   SpO2 97% Comment: 3L  BMI 42.74 kg/m²   Exam included Vitals as listed, and patient's appearance and affect and alertness and mood, oral exam for yeast and hygiene and pharynx lesions and Mallapatti (M) score, neck with inspection for jvd and masses and thyroid abnormalities and lymph nodes (supraclavicular and infraclavicular nodes and axillary also examined and noted if abn), chest exam included symmetry and effort and fremitus and percussion and auscultation, cardiac exam included rhythm and gallops and murmur and rubs and jvd and edema, abdominal exam for mass and hepatosplenomegaly and tenderness and hernias and bowel sounds, Musculoskeletal exam with muscle tone and posture and mobility/gait and  strength, and " skin for rashes and cyanosis and pallor and turgor, extremity for clubbing.  Findings were normal except for pertinent findings listed below:  Oropharynx clear, M1  HR regular  Clear breath sounds bilat  No edema/joint swelling  Early clubbing fingertips        Radiographs (ct chest and cxr) reviewed: view by direct vision   CT chest 2/15/23- differences in technique compared to last scan but fibrotic findings may be slightly worse, also more diffuse ground glass  CT chest 8/31/22- diffuse parenchymal lung disease upper lobe predominant w/ ground glass and micronodules. There is a larger calcified LLL nodule and another calcified granuloma RML. Upper lobes have some fibrotic/emphysematous changes    Labs reviewed     Latest Reference Range & Units 04/13/23 12:31   POC PH 7.35 - 7.45  7.426   POC PCO2 35 - 45 mmHg 42.9   POC PO2 80 - 100 mmHg 70 (L)   POC HCO3 24 - 28 mmol/L 28.3 (H)   POC SATURATED O2 95 - 100 % 94 (L)   Sample  ARTERIAL   POC TCO2 23 - 27 mmol/L 30 (H)   POC BE -2 to 2 mmol/L 4   FiO2  0.30   Flow  3   DelSys  Nasal Can   Site  RR   Mode  SPONT   (L): Data is abnormally low  (H): Data is abnormally high  Lab Results   Component Value Date    WBC 13.06 (H) 07/06/2023    HGB 13.5 07/06/2023    HCT 43.0 07/06/2023    MCV 83 07/06/2023     07/06/2023       CMP  Sodium   Date Value Ref Range Status   07/06/2023 140 136 - 145 mmol/L Final     Potassium   Date Value Ref Range Status   07/06/2023 4.4 3.5 - 5.1 mmol/L Final     Comment:     Anion Gap reference range revised on 4/28/2023     Chloride   Date Value Ref Range Status   07/06/2023 99 95 - 110 mmol/L Final     CO2   Date Value Ref Range Status   07/06/2023 33 (H) 22 - 31 mmol/L Final     Glucose   Date Value Ref Range Status   07/06/2023 160 (H) 70 - 110 mg/dL Final     Comment:     The ADA recommends the following guidelines for fasting glucose:    Normal:       less than 100 mg/dL    Prediabetes:  100 mg/dL to 125 mg/dL    Diabetes:      126 mg/dL or higher       BUN   Date Value Ref Range Status   07/06/2023 14 7 - 18 mg/dL Final     Creatinine   Date Value Ref Range Status   07/06/2023 0.87 0.50 - 1.40 mg/dL Final     Calcium   Date Value Ref Range Status   07/06/2023 9.1 8.4 - 10.2 mg/dL Final     Total Protein   Date Value Ref Range Status   07/06/2023 7.5 6.0 - 8.4 g/dL Final     Albumin   Date Value Ref Range Status   07/06/2023 4.4 3.5 - 5.2 g/dL Final     Total Bilirubin   Date Value Ref Range Status   07/06/2023 0.8 0.2 - 1.3 mg/dL Final     Alkaline Phosphatase   Date Value Ref Range Status   07/06/2023 126 38 - 145 U/L Final     AST ([place])   Date Value Ref Range Status   02/08/2016 25 14 - 36 U/L Final     AST   Date Value Ref Range Status   07/06/2023 98 (H) 14 - 36 U/L Final     ALT   Date Value Ref Range Status   07/06/2023 108 (H) 0 - 35 U/L Final     Anion Gap   Date Value Ref Range Status   07/06/2023 8 mmol/L Final     Comment:     Anion Gap reference range revised on 4/28/2023     eGFR   Date Value Ref Range Status   07/06/2023 >60 >60 mL/min/1.73 m^2 Final        Latest Reference Range & Units 12/02/22 09:24   STEPHANIE Screen None Detected  None Detected   Smooth Muscle Ab <1:20  <1:20   IgG 650 - 1600 mg/dL 809   Hepatitis A Antibody IgG  See result image under hyperlink   Hep B S Ab IU/L <3.10   Hepatitis B Core Ab Total Negative  Negative   Hepatitis B Surface Ag  Negative      Latest Reference Range & Units 12/02/22 09:24   A-1 Antitrypsin 90 - 200 mg/dL 154      Latest Reference Range & Units 04/23/21 08:47 02/15/22 10:18 08/29/22 10:08   Eos # 0.0 - 0.5 K/uL 0.6 (H) 0.1 0.2     PFT  reviewed  7/20/23- mild restriction, severely reduced dlco      3/8/23- mild restriction, moderate reduced dlco      12/2/22- mild obstruction, no bd response, moderate reduced dlco      6mwt 12/2/22- 131.67 meters; sats 98%--> 87% with exertion, 92% on 2LPM  6mwt 4/13/23- 244m; sat 84 on RA at rest, 92-96% on 3LPM with exertion  6mwt  7/20/23- 206m; sat 92-98% on 3 LPM    Plan:  Clinical impression is resonably certain and repeated evaluation prn +/- follow up will be needed as below.     Problem List Items Addressed This Visit          Pulmonary    ILD (interstitial lung disease) - Primary    Current Assessment & Plan     ILD, progressive fibrotic, O2 dependent- suspecting smoking related ILD. Background hx of fungal pna w/ calcified granulomas.  Tolerating Ofev and counseled pt extensively on avoidance of exposures (smoke/vape).   She has been steroid dependent for several mos and starting to flare with taper of prednisone- starting cellcept for steroid sparing agent         Relevant Medications    mycophenolate (CELLCEPT) 500 mg Tab    mycophenolate (CELLCEPT) 500 mg Tab    Other Relevant Orders    CBC Auto Differential    Comprehensive Metabolic Panel    Chronic hypoxemic respiratory failure    Current Assessment & Plan     Patient with Hypoxic Respiratory failure which is Chronic.  she is on home oxygen at 3 LPM. Supplemental oxygen was provided and noted- [unfilled].   Signs/symptoms of respiratory failure include- dyspnea, cough, exertional intolerance. Contributing diagnoses includes - Interstitial lung disease Labs and images were reviewed. Patient Has not had a recent ABG. Will treat underlying causes and adjust management of respiratory failure as follows- see ILD plan         Relevant Orders    Ambulatory referral/consult to Home Health       Immunology/Multi System    Drug-induced immunodeficiency    Current Assessment & Plan     Prednisone  cellcept  For ILD            Endocrine    Morbid obesity with BMI of 40.0-44.9, adult    Relevant Orders    Ambulatory referral/consult to Home Health       Other    MOLINA (obstructive sleep apnea)    Current Assessment & Plan     Has been unable to tolerate CPAP  Agrees to cpap titration study   May want to try nasal mask         Relevant Orders    BiPAP/CPAP Titration ((Must have dx of MOLINA  from previous sleep study)     Other Visit Diagnoses       Thrush, oral        Relevant Medications    fluconazole (DIFLUCAN) 100 MG tablet    Physical deconditioning        Relevant Orders    Ambulatory referral/consult to Home Health                      Follow up in about 2 months (around 9/20/2023).    Discussed with patient above for education the following:      Patient Instructions   Cellcept new medicine to suppress immune system and treat the lungs- start 1 pill twice daily for a week then 2 pills twice daily for a week, then 3 pills twice daily (max dose)  Once you are taking the max dose cellcept for 2 wks can stop the prednisone  On cellcept need to get labs every 4 wks for the next 3 mos, then every 3 mos  Continue Ofev  CPAP/BIPAP titration study to get correct settings for you  Ask endocrine if they will consider Ozempic to help you with weight loss  For thrush take fluconazole as prescribed- 2 tablets on day 1 then 1 tablet daily for days 2-7  Home health PT/OT referral ordered          Eval took 51 min

## 2023-07-20 NOTE — PROCEDURES
Yara Santos is a 57 y.o.  female patient, who presents for a 6 minute walk test ordered by FAITH Carpio.  The diagnosis is Interstitial Lung Disease.  The patient's BMI is 42.8 kg/m2.  Predicted distance (lower limit of normal) is 266.96 meters.      Test Results:    The test was completed without stopping. The total time walked was 360 seconds. During walking, the patient reported:  Dyspnea, Lightheadedness, Dizziness, Other (Comment) (back and hip pain). The patient used supplemental oxygen and a cane for assistance during testing.     07/20/2023---------Distance: 206.35 meters (677 feet)     Lap Walk Time O2 Sat % Supplemental Oxygen Heart Rate Blood Pressure Leo Scale   Pre-exercise  (Resting) 0 0 98 % 3 L/M 88 bpm 158/73 mmHg 3   During Exercise 1 99 sec 94 % 3 L/M 71 bpm     During Exercise 2 206 sec 93 % 3 L/M 90 bpm     During Exercise 3 318 sec 93 % 3 L/M 97 bpm     End of Exercise  360 sec 92 % 3 L/M 105 bpm 152/70 mmHg 5-6   Post-exercise  (Recovery)   97 % 3 L/M  94 bpm       Recovery Time: 136 seconds    Performing nurse/tech: Frances COOPER      PREVIOUS STUDY:   04/13/2023---------Distance: 243.84 meters (800 feet)       Time O2 Sat % Supplemental Oxygen Heart Rate Blood Pressure Leo Scale   Pre-exercise  (Resting) 0 84 % Room Air 101 bpm 163/76 mmHg 5-6   Pre-exercise  (Resting) 0 96 % 3 L/M 85 bpm 126/77 mmHg 3   During Exercise 1 min 92 % 3 L/M 100 bpm       During Exercise 2 min 95 % 3 L/M 103 bpm       During Exercise 3 min 95 % 3 L/M 102 bpm       During Exercise 4 min 94 % 3 L/M 104 bpm       During Exercise 5 min 95 % 3 L/M 107 bpm       During Exercise 6 min 94 % 3 L/M 104 bpm 163/87 mmHg 4   Post-exercise  (Recovery)   98 % 3 L/M  88 bpm 141/70 mmHg        CLINICAL INTERPRETATION:  Six minute walk distance is 206.35 meters (677 feet) with heavy dyspnea.  During exercise, there was significant desaturation while breathing supplemental oxygen.  Both blood pressure and heart rate  remained stable with walking.  Hypertension was present prior to exercise.  The patient reported non-pulmonary symptoms during exercise.  Significant exercise impairment is likely due to desaturation and subjective symptoms.  The patient did complete the study, walking 360 seconds of the 360 second test.  Since the previous study in April 2023, exercise capacity may be somewhat worse.  Based upon age and body mass index, exercise capacity is less than predicted.

## 2023-07-20 NOTE — ASSESSMENT & PLAN NOTE
Patient with Hypoxic Respiratory failure which is Chronic.  she is on home oxygen at 3 LPM. Supplemental oxygen was provided and noted- [unfilled].   Signs/symptoms of respiratory failure include- dyspnea, cough, exertional intolerance. Contributing diagnoses includes - Interstitial lung disease Labs and images were reviewed. Patient Has not had a recent ABG. Will treat underlying causes and adjust management of respiratory failure as follows- see ILD plan

## 2023-07-20 NOTE — LETTER
July 20, 2023        Annette Alas  [address]  [address]  [facility] [address]  Phone: [phone]  Fax: [phone]             [facility] Trans[facility] 1st Fl  [address]  [facility] [address]  Phone: [phone]   Patient: Yara Santos   MR Number: [id number]   YOB: 1965   Date of Visit: 7/20/2023       Dear Dr. Annette Alas    Thank you for referring Yara Santos to me for evaluation. Attached you will find relevant portions of my assessment and plan of care.    If you have questions, please do not hesitate to call me. I look forward to following Yara Santos along with you.    Sincerely,    ABDOUL Levin    If you would like to receive this communication electronically, please contact [email or web address] or [phone] to request "[employer] Link access.    "[employer] Link is a tool which provides read-only access to select patient information with whom you have a relationship. Its easy to use and provides real time access to review your patients record including encounter summaries, notes, results, and demographic information.    If you feel you have received this communication in error or would no longer like to receive these types of communications, please e-mail [email or web address]

## 2023-07-20 NOTE — PATIENT INSTRUCTIONS
Cellcept new medicine to suppress immune system and treat the lungs- start 1 pill twice daily for a week then 2 pills twice daily for a week, then 3 pills twice daily (max dose)  Once you are taking the max dose cellcept for 2 wks can stop the prednisone  On cellcept need to get labs every 4 wks for the next 3 mos, then every 3 mos  Continue Ofev  CPAP/BIPAP titration study to get correct settings for you  Ask endocrine if they will consider Ozempic to help you with weight loss  For thrush take fluconazole as prescribed- 2 tablets on day 1 then 1 tablet daily for days 2-7  Home health PT/OT referral ordered

## 2023-07-20 NOTE — ASSESSMENT & PLAN NOTE
ILD, progressive fibrotic, O2 dependent- suspecting smoking related ILD. Background hx of fungal pna w/ calcified granulomas.  Tolerating Ofev and counseled pt extensively on avoidance of exposures (smoke/vape).   She has been steroid dependent for several mos and starting to flare with taper of prednisone- starting cellcept for steroid sparing agent

## 2023-07-23 PROCEDURE — G0180 MD CERTIFICATION HHA PATIENT: HCPCS | Mod: NTX,,, | Performed by: INTERNAL MEDICINE

## 2023-07-23 PROCEDURE — G0180 PR HOME HEALTH MD CERTIFICATION: ICD-10-PCS | Mod: NTX,,, | Performed by: INTERNAL MEDICINE

## 2023-07-28 DIAGNOSIS — J84.9 ILD (INTERSTITIAL LUNG DISEASE): Primary | ICD-10-CM

## 2023-07-31 ENCOUNTER — TELEPHONE (OUTPATIENT)
Dept: TRANSPLANT | Facility: CLINIC | Age: 58
End: 2023-07-31
Payer: MEDICARE

## 2023-08-04 ENCOUNTER — EXTERNAL HOME HEALTH (OUTPATIENT)
Dept: HOME HEALTH SERVICES | Facility: HOSPITAL | Age: 58
End: 2023-08-04
Payer: MEDICARE

## 2023-08-22 ENCOUNTER — PATIENT MESSAGE (OUTPATIENT)
Dept: PULMONOLOGY | Facility: CLINIC | Age: 58
End: 2023-08-22
Payer: MEDICARE

## 2023-08-27 DIAGNOSIS — L73.9 FOLLICULITIS OF PERINEUM: ICD-10-CM

## 2023-08-28 RX ORDER — CLINDAMYCIN PHOSPHATE 10 MG/G
GEL TOPICAL 2 TIMES DAILY
Qty: 60 G | Refills: 1 | OUTPATIENT
Start: 2023-08-28

## 2023-08-30 ENCOUNTER — OFFICE VISIT (OUTPATIENT)
Dept: PULMONOLOGY | Facility: CLINIC | Age: 58
End: 2023-08-30
Payer: MEDICARE

## 2023-08-30 VITALS
SYSTOLIC BLOOD PRESSURE: 178 MMHG | BODY MASS INDEX: 43.07 KG/M2 | OXYGEN SATURATION: 95 % | DIASTOLIC BLOOD PRESSURE: 84 MMHG | HEART RATE: 91 BPM | WEIGHT: 266.88 LBS

## 2023-08-30 DIAGNOSIS — J96.11 CHRONIC RESPIRATORY FAILURE WITH HYPOXIA: ICD-10-CM

## 2023-08-30 DIAGNOSIS — G47.33 OSA (OBSTRUCTIVE SLEEP APNEA): ICD-10-CM

## 2023-08-30 DIAGNOSIS — J01.00 SUBACUTE MAXILLARY SINUSITIS: ICD-10-CM

## 2023-08-30 DIAGNOSIS — E66.01 MORBID OBESITY WITH BMI OF 40.0-44.9, ADULT: ICD-10-CM

## 2023-08-30 DIAGNOSIS — J84.9 ILD (INTERSTITIAL LUNG DISEASE): Primary | ICD-10-CM

## 2023-08-30 DIAGNOSIS — Z79.899 DRUG-INDUCED IMMUNODEFICIENCY: ICD-10-CM

## 2023-08-30 DIAGNOSIS — D84.821 DRUG-INDUCED IMMUNODEFICIENCY: ICD-10-CM

## 2023-08-30 DIAGNOSIS — J06.9 UPPER RESPIRATORY TRACT INFECTION, UNSPECIFIED TYPE: ICD-10-CM

## 2023-08-30 PROCEDURE — 99999 PR PBB SHADOW E&M-EST. PATIENT-LVL V: ICD-10-PCS | Mod: PBBFAC,TXP,, | Performed by: INTERNAL MEDICINE

## 2023-08-30 PROCEDURE — 99215 OFFICE O/P EST HI 40 MIN: CPT | Mod: PBBFAC,PO,TXP | Performed by: INTERNAL MEDICINE

## 2023-08-30 PROCEDURE — 99214 PR OFFICE/OUTPT VISIT, EST, LEVL IV, 30-39 MIN: ICD-10-PCS | Mod: S$PBB,NTX,, | Performed by: INTERNAL MEDICINE

## 2023-08-30 PROCEDURE — 99214 OFFICE O/P EST MOD 30 MIN: CPT | Mod: S$PBB,NTX,, | Performed by: INTERNAL MEDICINE

## 2023-08-30 PROCEDURE — 99999 PR PBB SHADOW E&M-EST. PATIENT-LVL V: CPT | Mod: PBBFAC,TXP,, | Performed by: INTERNAL MEDICINE

## 2023-08-30 RX ORDER — FLUTICASONE PROPIONATE AND SALMETEROL 500; 50 UG/1; UG/1
1 POWDER RESPIRATORY (INHALATION) 2 TIMES DAILY
Qty: 60 EACH | Refills: 11 | Status: SHIPPED | OUTPATIENT
Start: 2023-08-30 | End: 2023-09-22

## 2023-08-30 RX ORDER — DOXYCYCLINE 100 MG/1
100 CAPSULE ORAL 2 TIMES DAILY
Qty: 20 CAPSULE | Refills: 0 | Status: SHIPPED | OUTPATIENT
Start: 2023-08-30 | End: 2023-12-07 | Stop reason: SINTOL

## 2023-08-30 NOTE — PROGRESS NOTES
"8/30/2023    Yara Santos  Follow up-     Chief Complaint   Patient presents with    Shortness of Breath       HPI:   08/30/2023- Pt here w/ daughter Margy  pt feeling bad, requiring more O2 than 3L at times, has continuous hacking and coughing and feels like she has pneumonia. It takes her 1 hour to shower due to needing to rest from [place] so often. She has sinus mucous 2 wks, cough productive yellow/green phlegm- 2 wks.  Has been taking MMF 3 pills bid.   Has been unable to do CPAP titration yet due to daughter's house almost catching on fire.  She has poor appetite, low PO intake and her blood sugar runs very high up to 400s then down to 70s  She has occasional fluid in ankles, in evening, has gained about 12# since may. She takes HCTZ daily.   Continues on advair inhaler, rescue inhaler 2x/day    07/20/2023-   Cellcept new medicine to suppress immune system and treat the lungs- start 1 pill twice daily for a week then 2 pills twice daily for a week, then 3 pills twice daily (max dose)  Once you are taking the max dose cellcept for 2 wks can stop the prednisone  On cellcept need to get labs every 4 wks for the next 3 mos, then every 3 mos  Continue Ofev  CPAP/BIPAP titration study to get correct settings for you  Ask endocrine if they will consider Ozempic to help you with weight loss  For thrush take fluconazole as prescribed- 2 tablets on day 1 then 1 tablet daily for days 2-7  Home health PT/OT referral ordered  pt here with daughter for follow up visit. Also saw [facility] pulm NP today. she plans to do at home PT- requests referral.  SOB increasing for 1 week, cough sounding more "croupy" for 1 wk. Mostly nonproductive.  She is on 5mg prednisone daily now.  Using 3L oxygen  She is open to trying cpap again with nasal mask and lower pressure. The pressure was too high made her feel like she was choking and she had tried full face mask with auto cpap.  Continues Ofev w/ no side effects  She sees endocrine for " diabetes- struggling with control. They have told her prefer not to do ozempic/mounjaro at this time.  She needs to lose weight to be considered for transplant.  No smoking marijuana since April, no vaping since 10/2022  She had UTI 2 wk ago, completed cipro for 5d      05/25/2023-   Taper prednisone to 15mg daily 1 week then down to 10mg daily 1 week then down to 5mg daily and keep at 5mg until next appointment  Watch blood sugars closely while tapering prednisone- may need to decrease insulin if having low sugars  Follow up with endocrinology and let them know we are tapering blood sugar  Continue Ofev  Continue inhalers- advair and flovent for now. In the future may take you off flovent  Consider seeing a psychiatrist to help anxiety and skin picking issues  Oxygen continue, goal sats >88%  Continue to avoid smoking  Clindagel twice daily to skin lesions of buttocks  she is tolerating Ofev ok as long as she takes w/ food. Continues on O2 3L. Prednisone 20mg daily. She notes sharp intermittent pain R shoulder blade occasionally. She has cough, congestion in chest, not spitting up mucous. She has yellow/green nasal drainage. She has buttocks rash w/ sores that she picks at. She had augmentin 2 wks ago per PCP- this helped a little but now it is worse again. She has severe anxiety, unable to stop picking skin. Has avoided marijuana >1 month.  Takes buspar, sertraline, and seroquel per PCP. Has seen psychiatrist 20 yr ago.  She takes advair and flovent inhalers per Dr. Fleming  She has been unable to tolerate CPAP due to moving too much at night, back pains  Blood glucose has been varying between 200s-400s    04/27/2023-   Continue oxygen  Continue prednisone 20mg daily 4 more weeks then will try to taper  Follow up with endocrinologist for blood sugars to get better controlled  Continue to avoid any smoking including cigarettes, vape, marijuana  Starting new medicine called Ofev (nintedanib) which is an  antifibrotic to slow down scarring of the lung tissue. This medicine may come with the side effect of diarrhea, nausea, vomiting which can be better with using imodium as needed. While on this medicine I would like to monitor your liver function tests every month for 3 mos then switch to every 3 month blood draws. Please go to the lab for blood work now prior to starting the new medicine.  pt saw Dr. Comer and I spoke with her after visit. Pt feels very tired. Sats drop quickly with movement. She uses 3L oxygen continuously. The plan was to start her on Ofev however there is concern about her chronic GI symptoms of nausea/emesis so Dr Comer told her not to start it. She usually stays constipated. Currently her heartburn and vomiting are improved on 2x/day 40mg omeprazole.   Cough productive white/clear phlegm, very thick.  Has not been using CPAP machine at night. Has tried but pulls it off in her sleep.  She continues on prednisone 20mg daily. Tried to decrease to 10mg but was unable to tolerate. Has been on prednisone 2 mos now. She has a sulfa allergy- breaks out in rash.  She is currently being treated for skin infection of her buttocks- open wounds, not healing, chronic- getting augmentin now.  Admitted to smoking marijuana- joints, has smoked off and on since age 8, last time Sunday and states she quit for good.     03/16/2023-   Contact "[employer] for possible backup tank- [facility] at [phone].  Taper prednisone to 30mg daily   Continue oxygen to keep sats >88%  Start using CPAP with bleed in oxygen 2L   Echo to check pulmonary pressures  Go to the lab for blood tests  Nystatin mouth wash for thrush- use 4 times daily  pt feels breathing is improved on prednisone. She is still on 3L continuously now but reports at times able to take O2 off and sat remains 94-98 while at rest.  She just got CPAP machine this am and will start using.   She bruises easy, hands are red and dry and mouth is dry. Has  white spots in mouth, lump in throat and trouble swallowing.  Pt was exposed (in same room) to nephew with HIV and TB 1 week ago, requests testing.      2023-   Need backup oxygen tank- contact DME company  Contact DME company to follow up on CPAP machine  Use home oxygen continuously and at night now  Get blood work  See advanced lung disease specialist  May need open lung biopsy- will get opinion of advanced specialist  Need repeat pulmonary function tests  Start prednisone 40mg daily for now for flare of lung disease  pt feels her breathing is getting worse, she is going downhill. Daughter assists w/ history, takes notes. When bending over she can't breathe. She gets muscle spasms in the abdomen. She has a lot of cough and phlegm, clear/milky. Her sister  with end stage pulm fibrosis (they think due to RA) just this past Saturday, was on hospice. She wears O2 all day, requires 2L continuously now but not wearing w/ sleep. With walking she has to turn O2 up to 3L. She has noticed shaking of fingers and beginning to get clubbing of the fingertips. They have not received CPAP machine for home- heard from [employer] and was told insurance approval pending.  She is not smoking or vaping.  There has been a lot of construction going on at the house, adding on to the laundry room w/ new walls. She sleeps on the other side of the house. Denies mold. There was a small area of water damage. They keep the area w/ construction sealed off behind a closed door.  She has had rheumatology eval in the past, Dr. Kaur, note reviewed, and was told she has osteoarthritis and was told to f/u with PCP.  Has been off prednisone for several mos but had been on 5mg daily for lichen planus.    2022-   For lung disease- will plan to follow with repeat testing to evaluate for any progression  Due to repeat CT chest 2023  Ordering overnight sleep study at home to evaluate for sleep apnea  If positive will order CPAP  machine, notify clinic when machine is delivered to home to set up 31 days compliance appointment. You must use the machine for a least 4 hours every night.   Agree with trelegy  Cough/mucous may be due to sinuses- referral to ENT placed for further evaluation.  she did qualify for home O2 2L, ordered and delivered. She is seeing Dr Garcia allergist, new blood work pending. She had allergy prick test on back which was negative. She feels dyspnea is stable. She continues to have bad cough, thick light yellow/white mucous- at baseline. It is worse at night when she lies down. She has sinus stuffiness and allergies.  She was given trelegy inhaler sample, hasn't started yet.   She has avoided smoking and vaping entirely.  She feels very sleepy, falls asleep easily. Her sister has MOLINA. Daughter states that she snores and puffs w/ pursed lips when sleeping.    10/31/2022  Pulmonary function tests  6 min walk test  Go to the lab and get blood work  May request new rheumatology eval in the future  Suspect vaping caused some of the inflammation of lung tissue- need to avoid smoking/vaping   Pt is a 56 yo female with asthma, HTN, arthritis, GERD, hypothyroidism, obesity referred for new evaluation due to ILD found on CT chest. Her daughter is present and assists w/ history.  Pt reports has had lung problems for years and years. Reports in past treated at [facility] for fungal pneumonia due to bird droppings (1998)  She has also been treated for COPD, frequent bronchitis. She recently has had exacerbation taking prednisone and z pack. Has tested neg for covid, has not checked for flu. Many sick contacts and just went on a trip. She went to [facility], at [facility] peak had SOB, felt fevers, dizzy, had to use oxygen and lie in bed.  Inhalers: trelegy 200 once/d- new rx. Uses rescue albuterol 2-3x/day and feels benefit.  Nebulizer with duo neb uses 2x/day  This is the first exacerbation requiring prednisone so far this  year.  Quit smoking 3 yr ago, 1-2 ppd since age 8  Quit vaping 3 wks- used juul and many other e cigarettes 1/2 cartridge daily- has entire drawer full.  She has been more short of breath gradual progressive for 1 year, symptoms vary.  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 Sister has RA with lung involvement  Has been told she has RA in past but Dr. Kaur said no RA, just OA. She sees derm with lichen planus and would use prednisone off and on, currently not on steroids.  No pets at home currently.  She lives in an older house in [place], mold visible ceilings of bathroom, earlier this year had mold in room- currently renovating.    The chief complaint problem is varies w/ instability at times.    PFSH:  Past Medical History:   Diagnosis Date    a Premature Ventricular Contractions     Dr. Austin Collier    b Hypertension     c Hypercholesterolemia     d Type 2 DM     e Hypothyroidism     23 Increased 50 Mcg Levothyroxine qAM With TFTs In 4 Months    f Morbid Obesity     f Osteopenia     History of recurrent UTIs     7/10/23 Refd to Dr Kim    i Asthma     i Oxygen dependent     3l nasal cannula    i Tobacco Use Disorder     Insomnia disorder     7/10/23 RXd Seroquel increase from 25 mg QHS to 50 mg QHS    j GERD     j H/O Colon Polyps     j Irritable Bowel Syndrome     j Nonalcoholic Steatohepatitis (N.A.S.H.)     l Bilateral Hip Osteoarthritis     l Bilateral Knee Arthritis     l Carpal tunnel syndrome     l DDD (degenerative disc disease), lumbar     l Lumbar Spinal DDD     l Tarsal Tunnel Syndrome     m Vitamin B12 Deficiency     n Depression And Anxiety     o Allergic rhinosinusitis     p OU Cataracts     q Lichen Planus     Dr. Lizzy Dominique    q Vitamin D Deficiency     Wellness Visit 2023          Past Surgical History:   Procedure Laterality Date     SECTION      x2     CHOLECYSTECTOMY      COLONOSCOPY  2020 ?    polyps removed per pt report    COLONOSCOPY N/A 11/29/2022    Procedure: COLONOSCOPY;  Surgeon: Brett Jasso MD;  [facility];  Service: Endoscopy;  Laterality: N/A;    EPIDURAL STEROID INJECTION INTO CERVICAL SPINE N/A 09/24/2020    Procedure: Injection-steroid-epidural-cervical to left;  Surgeon: John Vinson MD;  Location: [facility];  Service: Pain Management;  Laterality: N/A;    EPIDURAL STEROID INJECTION INTO LUMBAR SPINE N/A 11/12/2020    Procedure: Injection-steroid-epidural-lumbar, l5/s1 to left;  Surgeon: John Vinson MD;  Location: [facility];  Service: Pain Management;  Laterality: N/A;    EPIDURAL STEROID INJECTION INTO LUMBAR SPINE N/A 06/21/2021    Procedure: Injection-steroid-epidural-lumbar L5/S1;  Surgeon: John Vinson MD;  Location: [facility];  Service: Pain Management;  Laterality: N/A;    EPIDURAL STEROID INJECTION INTO LUMBAR SPINE N/A 06/21/2022    Procedure: Injection-steroid-epidural-lumbar L5/S1 spread to left;  Surgeon: John Vinson MD;  Location: [facility];  Service: Pain Management;  Laterality: N/A;    ESOPHAGOGASTRODUODENOSCOPY      ESOPHAGOGASTRODUODENOSCOPY N/A 11/09/2022    Procedure: EGD (ESOPHAGOGASTRODUODENOSCOPY);  Surgeon: Brett Jasso MD;  [facility];  Service: Endoscopy;  Laterality: N/A;    INJECTION OF JOINT Left 09/20/2022    Procedure: INJECTION, JOINT- hip;  Surgeon: John Vinson MD;  Location: [facility];  Service: Pain Management;  Laterality: Left;    LAPAROSCOPIC CHOLECYSTECTOMY N/A 07/13/2022    Procedure: CHOLECYSTECTOMY, LAPAROSCOPIC;  Surgeon: Ra Santos MD;  Location: [facility];  Service: General;  Laterality: N/A;    TRANSFORAMINAL EPIDURAL INJECTION OF STEROID Left 08/16/2022    Procedure: Injection,steroid,epidural,transforaminal approach L3/4 and L4/5;  Surgeon: John Vinson MD;  Location: [facility];  Service: Pain Management;  Laterality: Left;     TRANSFORAMINAL EPIDURAL INJECTION OF STEROID Left 2023    Procedure: Injection,steroid,epidural,transforaminal approach L3/4 and L4/5-Left;  Surgeon: John Vinson MD;  [facility];  Service: Pain Management;  Laterality: Left;  l3/4 and l4/5    UPPER GASTROINTESTINAL ENDOSCOPY   ?    unsure of results     Social History     Tobacco Use    Smoking status: Former     Current packs/day: 0.00     Average packs/day: 1 pack/day for 51.2 years (51.2 ttl pk-yrs)     Types: Cigarettes, Vaping with nicotine     Start date:      Quit date: 10/1/2022     Years since quittin.9     Passive exposure: Current    Smokeless tobacco: Never   Substance Use Topics    Alcohol use: No    Drug use: Not Currently     Frequency: 20.0 times per week     Types: Marijuana     Comment: daily smoke and edibles     Family History   Problem Relation Age of Onset    Arthritis Mother     Diabetes Mother     Hyperlipidemia Mother     Cancer Mother         uterine     Allergies Mother     Ovarian cancer Mother 58    Aneurysm Father     Hyperlipidemia Father     Breast cancer Maternal Aunt 65    Cancer Maternal Uncle         brain    Cancer Paternal Aunt         breast, bone and lung     Breast cancer Paternal Aunt 78    Breast cancer Maternal Cousin 43    Brain cancer Maternal Cousin     Cirrhosis Neg Hx      Review of patient's allergies indicates:   Allergen Reactions    Iodine and iodide containing products Blisters     Reports it caused lichen planus flare up    Asa [aspirin] Itching and Other (See Comments)     Skin problem      Atorvastatin      Worsened Lichen Planus    Contact metal agent      Other reaction(s): Other (See Comments)    Crestor [rosuvastatin]      Worsens her lichen planus    Lasix [furosemide]      rash    Lisinopril      Hives    Losartan      Confusion    Lovastatin      Worsened Lichen Planus    Metformin Other (See Comments)     Stomach cramps     Salicylates     Sulfur      Other reaction(s):  Other (See Comments)    Sulfur, elemental     Valsartan      breakouts    Latex Other (See Comments)     Skin problem         Performance Status:The patient's activity level is functions out of house.      Review of Systems:  a review of eleven systems covering constitutional, Eye, HEENT, Psych, Respiratory, Cardiac, GI, , Musculoskeletal, Endocrine, Dermatologic was negative except for pertinent findings as listed ABOVE and below:  Scabs on buttocks- scratches at night      Exam:Comprehensive exam done. BP (!) 178/84   Pulse 91   Wt 121.1 kg (266 lb 13.9 oz)   SpO2 95% Comment: WITH 3 L OF O2  BMI 43.07 kg/m²   Exam included Vitals as listed, and patient's appearance and affect and alertness and mood, oral exam for yeast and hygiene and pharynx lesions and Mallapatti (M) score, neck with inspection for jvd and masses and thyroid abnormalities and lymph nodes (supraclavicular and infraclavicular nodes and axillary also examined and noted if abn), chest exam included symmetry and effort and fremitus and percussion and auscultation, cardiac exam included rhythm and gallops and murmur and rubs and jvd and edema, abdominal exam for mass and hepatosplenomegaly and tenderness and hernias and bowel sounds, Musculoskeletal exam with muscle tone and posture and mobility/gait and  strength, and skin for rashes and cyanosis and pallor and turgor, extremity for clubbing.  Findings were normal except for pertinent findings listed below:  Oropharynx clear, M1  Maxillary sinus tender on left  HR regular  Clear breath sounds bilat  No edema/joint swelling  Early clubbing fingertips  Loud loose phlegm cough      Radiographs (ct chest and cxr) reviewed: view by direct vision   CT chest 2/15/23- differences in technique compared to last scan but fibrotic findings may be slightly worse, also more diffuse ground glass  CT chest 8/31/22- diffuse parenchymal lung disease upper lobe predominant w/ ground glass and micronodules.  There is a larger calcified LLL nodule and another calcified granuloma RML. Upper lobes have some fibrotic/emphysematous changes    Labs reviewed     Latest Reference Range & Units 04/13/23 12:31   POC PH 7.35 - 7.45  7.426   POC PCO2 35 - 45 mmHg 42.9   POC PO2 80 - 100 mmHg 70 (L)   POC HCO3 24 - 28 mmol/L 28.3 (H)   POC SATURATED O2 95 - 100 % 94 (L)   Sample  ARTERIAL   POC TCO2 23 - 27 mmol/L 30 (H)   POC BE -2 to 2 mmol/L 4   FiO2  0.30   Flow  3   DelSys  Nasal Can   Site  RR   Mode  SPONT   (L): Data is abnormally low  (H): Data is abnormally high  Lab Results   Component Value Date    WBC 13.06 (H) 07/06/2023    HGB 13.5 07/06/2023    HCT 43.0 07/06/2023    MCV 83 07/06/2023     07/06/2023       CMP  Sodium   Date Value Ref Range Status   07/06/2023 140 136 - 145 mmol/L Final     Potassium   Date Value Ref Range Status   07/06/2023 4.4 3.5 - 5.1 mmol/L Final     Comment:     Anion Gap reference range revised on 4/28/2023     Chloride   Date Value Ref Range Status   07/06/2023 99 95 - 110 mmol/L Final     CO2   Date Value Ref Range Status   07/06/2023 33 (H) 22 - 31 mmol/L Final     Glucose   Date Value Ref Range Status   07/06/2023 160 (H) 70 - 110 mg/dL Final     Comment:     The ADA recommends the following guidelines for fasting glucose:    Normal:       less than 100 mg/dL    Prediabetes:  100 mg/dL to 125 mg/dL    Diabetes:     126 mg/dL or higher       BUN   Date Value Ref Range Status   07/06/2023 14 7 - 18 mg/dL Final     Creatinine   Date Value Ref Range Status   07/06/2023 0.87 0.50 - 1.40 mg/dL Final     Calcium   Date Value Ref Range Status   07/06/2023 9.1 8.4 - 10.2 mg/dL Final     Total Protein   Date Value Ref Range Status   07/06/2023 7.5 6.0 - 8.4 g/dL Final     Albumin   Date Value Ref Range Status   07/06/2023 4.4 3.5 - 5.2 g/dL Final     Total Bilirubin   Date Value Ref Range Status   07/06/2023 0.8 0.2 - 1.3 mg/dL Final     Alkaline Phosphatase   Date Value Ref Range Status    07/06/2023 126 38 - 145 U/L Final     AST ([place])   Date Value Ref Range Status   02/08/2016 25 14 - 36 U/L Final     AST   Date Value Ref Range Status   07/06/2023 98 (H) 14 - 36 U/L Final     ALT   Date Value Ref Range Status   07/06/2023 108 (H) 0 - 35 U/L Final     Anion Gap   Date Value Ref Range Status   07/06/2023 8 mmol/L Final     Comment:     Anion Gap reference range revised on 4/28/2023     eGFR   Date Value Ref Range Status   07/06/2023 >60 >60 mL/min/1.73 m^2 Final        Latest Reference Range & Units 12/02/22 09:24   STEPHANIE Screen None Detected  None Detected   Smooth Muscle Ab <1:20  <1:20   IgG 650 - 1600 mg/dL 809   Hepatitis A Antibody IgG  See result image under hyperlink   Hep B S Ab IU/L <3.10   Hepatitis B Core Ab Total Negative  Negative   Hepatitis B Surface Ag  Negative      Latest Reference Range & Units 12/02/22 09:24   A-1 Antitrypsin 90 - 200 mg/dL 154      Latest Reference Range & Units 04/23/21 08:47 02/15/22 10:18 08/29/22 10:08   Eos # 0.0 - 0.5 K/uL 0.6 (H) 0.1 0.2     PFT  reviewed  7/20/23- mild restriction, severely reduced dlco      3/8/23- mild restriction, moderate reduced dlco      12/2/22- mild obstruction, no bd response, moderate reduced dlco      6mwt 12/2/22- 131.67 meters; sats 98%--> 87% with exertion, 92% on 2LPM  6mwt 4/13/23- 244m; sat 84 on RA at rest, 92-96% on 3LPM with exertion  6mwt 7/20/23- 206m; sat 92-98% on 3 LPM    Plan:  Clinical impression is resonably certain and repeated evaluation prn +/- follow up will be needed as below.  ILD, progressive fibrotic, O2 dependent- suspected smoking related ILD. Background hx of fungal pna w/ calcified granulomas.  Tolerating Ofev, just started MMF. Prednisone has been tapered off  Pt having acute illness with sinusitis, cough, at risk for severe infections- treating empirically and workup ordered as below    Problem List Items Addressed This Visit          Pulmonary    ILD (interstitial lung disease) -  Primary    Relevant Medications    fluticasone-salmeterol diskus inhaler 500-50 mcg    Other Relevant Orders    CBC Auto Differential    Comprehensive Metabolic Panel       Immunology/Multi System    Drug-induced immunodeficiency       Endocrine    Morbid obesity with BMI of 40.0-44.9, adult       Other    MOLINA (obstructive sleep apnea)     Other Visit Diagnoses       Chronic respiratory failure with hypoxia        Upper respiratory tract infection, unspecified type        Relevant Orders    X-Ray Chest PA And Lateral    Subacute maxillary sinusitis        Relevant Medications    doxycycline (VIBRAMYCIN) 100 MG Cap                        Follow up in about 2 months (around 10/30/2023).    Discussed with patient above for education the following:      Patient Instructions   Get chest xray  Go to the lab for blood work  Take doxycycline twice daily for 10 days for sinusitis  Advair inhaler sent to pharmacy- may be able to get generic  Continue MMF, ofev for now  CPAP titration study defer for now due to feeling sick

## 2023-08-30 NOTE — PATIENT INSTRUCTIONS
Get chest xray  Go to the lab for blood work  Take doxycycline twice daily for 10 days for sinusitis  Advair inhaler sent to pharmacy- may be able to get generic  Continue MMF, ofev for now  CPAP titration study defer for now due to feeling sick

## 2023-09-12 ENCOUNTER — OFFICE VISIT (OUTPATIENT)
Dept: GASTROENTEROLOGY | Facility: CLINIC | Age: 58
End: 2023-09-12
Payer: MEDICARE

## 2023-09-12 VITALS — HEIGHT: 66 IN | BODY MASS INDEX: 42.91 KG/M2 | WEIGHT: 267 LBS | RESPIRATION RATE: 19 BRPM

## 2023-09-12 DIAGNOSIS — R11.0 NAUSEA: ICD-10-CM

## 2023-09-12 DIAGNOSIS — K21.9 GASTROESOPHAGEAL REFLUX DISEASE WITHOUT ESOPHAGITIS: Primary | ICD-10-CM

## 2023-09-12 PROCEDURE — 99213 PR OFFICE/OUTPT VISIT, EST, LEVL III, 20-29 MIN: ICD-10-PCS | Mod: S$PBB,NTX,, | Performed by: INTERNAL MEDICINE

## 2023-09-12 PROCEDURE — 99999 PR PBB SHADOW E&M-EST. PATIENT-LVL III: CPT | Mod: PBBFAC,TXP,, | Performed by: INTERNAL MEDICINE

## 2023-09-12 PROCEDURE — 99213 OFFICE O/P EST LOW 20 MIN: CPT | Mod: PBBFAC,PO,NTX | Performed by: INTERNAL MEDICINE

## 2023-09-12 PROCEDURE — 99213 OFFICE O/P EST LOW 20 MIN: CPT | Mod: S$PBB,NTX,, | Performed by: INTERNAL MEDICINE

## 2023-09-12 PROCEDURE — 99999 PR PBB SHADOW E&M-EST. PATIENT-LVL III: ICD-10-PCS | Mod: PBBFAC,TXP,, | Performed by: INTERNAL MEDICINE

## 2023-09-12 RX ORDER — FAMOTIDINE 40 MG/1
40 TABLET, FILM COATED ORAL DAILY
Qty: 30 TABLET | Refills: 2 | Status: SHIPPED | OUTPATIENT
Start: 2023-09-12 | End: 2023-12-14

## 2023-09-12 RX ORDER — SUCRALFATE 1 G/1
1 TABLET ORAL 3 TIMES DAILY
Qty: 100 TABLET | Refills: 2 | Status: SHIPPED | OUTPATIENT
Start: 2023-09-12 | End: 2024-02-08

## 2023-09-12 NOTE — PROGRESS NOTES
"Pt returns re: chronic nausea and GERD. Life-long history GI issues. Nausea worse past several years. Prior GI evaluation reviewed. EGD/C-scope, CT scan, U/S, and GES. Taking omeprazole BID. Denies abd pain or vomiting. No recent constipation. No GI bleeding. No fever or jaundice. Weight increasing. On 3L oxygen for ILD/pulmonary fibrosis.    REVIEW OF SYSTEMS:   Constitutional: Negative for fever, appetite change and unexpected weight change.  HENT: Negative for sore throat and trouble swallowing.  Eyes: Negative for visual disturbance.  Respiratory: Negative for chest tightness, shortness of breath and wheezing.  Cardiovascular: Negative for chest pain.  Gastrointestinal:  as per HPI                      GENERAL:  Comfortable, in no acute distress.      SKIN: Non-jaundiced.                             HEENT EXAM:  Nonicteric.  No adenopathy.  Oropharynx is clear.               NECK:  Supple.                                                               LUNGS:  Clear.                                                               CARDIAC:  Regular rate and rhythm.  S1, S2.  No murmur.                      ABDOMEN:  Soft, positive bowel sounds, nontender.  No hepatosplenomegaly or masses.  No rebound or guarding.                                             EXTREMITIES:  No edema.       IMP: 1. Nausea          2. GERD    PLAN: 1. Continue BID omeprazole              2. Add mid-afternoon pepcid 40 mg              3. Add carafate suspension TID (pt's daughter to "dissolve" tablet)    "

## 2023-09-14 ENCOUNTER — LAB VISIT (OUTPATIENT)
Dept: LAB | Facility: HOSPITAL | Age: 58
End: 2023-09-14
Attending: NURSE PRACTITIONER
Payer: MEDICARE

## 2023-09-14 ENCOUNTER — OFFICE VISIT (OUTPATIENT)
Dept: ENDOCRINOLOGY | Facility: CLINIC | Age: 58
End: 2023-09-14
Payer: MEDICARE

## 2023-09-14 VITALS
HEIGHT: 66 IN | WEIGHT: 269.5 LBS | OXYGEN SATURATION: 96 % | SYSTOLIC BLOOD PRESSURE: 142 MMHG | HEART RATE: 88 BPM | BODY MASS INDEX: 43.31 KG/M2 | DIASTOLIC BLOOD PRESSURE: 88 MMHG

## 2023-09-14 DIAGNOSIS — I10 PRIMARY HYPERTENSION: ICD-10-CM

## 2023-09-14 DIAGNOSIS — E11.65 TYPE 2 DIABETES MELLITUS WITH HYPERGLYCEMIA, WITHOUT LONG-TERM CURRENT USE OF INSULIN: ICD-10-CM

## 2023-09-14 DIAGNOSIS — E03.9 ACQUIRED HYPOTHYROIDISM: ICD-10-CM

## 2023-09-14 DIAGNOSIS — E11.65 TYPE 2 DIABETES MELLITUS WITH HYPERGLYCEMIA, WITHOUT LONG-TERM CURRENT USE OF INSULIN: Primary | ICD-10-CM

## 2023-09-14 DIAGNOSIS — E66.01 MORBID OBESITY WITH BMI OF 40.0-44.9, ADULT: ICD-10-CM

## 2023-09-14 DIAGNOSIS — E78.00 HYPERCHOLESTEROLEMIA: ICD-10-CM

## 2023-09-14 LAB
ESTIMATED AVG GLUCOSE: 174 MG/DL (ref 68–131)
HBA1C MFR BLD: 7.7 % (ref 4–5.6)

## 2023-09-14 PROCEDURE — 95251 CONT GLUC MNTR ANALYSIS I&R: CPT | Mod: NTX,,, | Performed by: NURSE PRACTITIONER

## 2023-09-14 PROCEDURE — 83036 HEMOGLOBIN GLYCOSYLATED A1C: CPT | Mod: NTX | Performed by: NURSE PRACTITIONER

## 2023-09-14 PROCEDURE — 36415 COLL VENOUS BLD VENIPUNCTURE: CPT | Mod: PO,NTX | Performed by: NURSE PRACTITIONER

## 2023-09-14 PROCEDURE — 99215 OFFICE O/P EST HI 40 MIN: CPT | Mod: PBBFAC,PO,TXP | Performed by: NURSE PRACTITIONER

## 2023-09-14 PROCEDURE — 95251 PR GLUCOSE MONITOR, 72 HOUR, PHYS INTERP: ICD-10-PCS | Mod: NTX,,, | Performed by: NURSE PRACTITIONER

## 2023-09-14 PROCEDURE — 99999 PR PBB SHADOW E&M-EST. PATIENT-LVL V: ICD-10-PCS | Mod: PBBFAC,TXP,, | Performed by: NURSE PRACTITIONER

## 2023-09-14 PROCEDURE — 99999 PR PBB SHADOW E&M-EST. PATIENT-LVL V: CPT | Mod: PBBFAC,TXP,, | Performed by: NURSE PRACTITIONER

## 2023-09-14 PROCEDURE — 99214 PR OFFICE/OUTPT VISIT, EST, LEVL IV, 30-39 MIN: ICD-10-PCS | Mod: S$PBB,NTX,, | Performed by: NURSE PRACTITIONER

## 2023-09-14 PROCEDURE — 99214 OFFICE O/P EST MOD 30 MIN: CPT | Mod: S$PBB,NTX,, | Performed by: NURSE PRACTITIONER

## 2023-09-14 PROCEDURE — 84443 ASSAY THYROID STIM HORMONE: CPT | Mod: NTX | Performed by: NURSE PRACTITIONER

## 2023-09-14 NOTE — PROGRESS NOTES
CC: This 58 y.o. female presents for management of diabetes and chronic conditions pending review including HTN, HLP, hypothyroidism and morbid obesity    HPI: She was diagnosed with T2DM in 2014. Has been hospitalized r/t DM- hyperglycemia  Family hx of DM: brother and sisters, mom and dad, 2 daughters    Since last visit start meal dose insulin and Dexcom g7  See Dexcom download in Media tab  6% Very High  27% High  66% In Range  <1% Low  0% Very Low  A few large pp excursions but overall bg control much improved   Typically smaller pp excursions that come down within 30-60 mins  Hypos not patterned nor often  Off prednisone as well  Bg much improved  On home oxygen 3 L  Was of off dexcom ~ past week  Has been finger sticking               Diet: Eats 3 Meals a day, snacks fruit-  strawberries, watermelon, cantaloupe   Exercise: PT at home  CURRENT DM MEDS: Levemir 30 u bid, Novolog 12 u Ac + correction 150/50  Vial/pen:  Uses vial and pen  Glucometer type:  Accu Check Guide Me    Standards of Care:  Eye exam: 1/5/2023 Dr Pena     Regarding hypothyroidism  Dx'ed: 2014  Fmh: brother  Takes Lt4 after breakfast w her other meds  No hoarseness, voice changes, dysphagia, compressive symptoms, or head/neck exposure to XRT.   No personal or FH of thyroid cancer or MEN syndrome.   Not taking biotin.   + tremors of the hands  No intolerance to the heat or cold  + hair loss      ROS:   Gen: Appetite good, weight gain 12 lbs  Eyes: Denies visual disturbances  Resp: +SOB & +BRICEÑO, + cough  Cardiac: No palpitations, chest pain   GI: No nausea or vomiting, diarrhea, + constipation  /GYN: 1+ nocturia, no burning or pain.   MS/Neuro:   no numbness/ tingling in BLE;  walks w a cane , speech clear  Psych:  + depression and anxiety  Other systems: negative.    PE:  GENERAL: Well developed, well nourished.  PSYCH: AAOx3, appropriate mood and affect, pleasant expression, conversant, appears relaxed, well groomed.   EYES: Conjunctiva,  "corneas clear  NECK: Supple, trachea midline,   ABDOMEN: Soft, non-tender, non-distended   SKIN:  no acanthosis nigracans.      Personally reviewed Past Medical, Surgical, Social History.    BP (!) 142/88 (BP Location: Right arm, Patient Position: Sitting, BP Method: Large (Manual))   Pulse 88   Ht 5' 6" (1.676 m)   Wt 122.3 kg (269 lb 8.2 oz)   SpO2 96%   BMI 43.50 kg/m²      Personally reviewed the below labs:      Chemistry        Component Value Date/Time     08/31/2023 1325    K 4.4 08/31/2023 1325    CL 98 08/31/2023 1325    CO2 31 08/31/2023 1325    BUN 12 08/31/2023 1325    CREATININE 0.85 08/31/2023 1325     (H) 08/31/2023 1325        Component Value Date/Time    CALCIUM 9.3 08/31/2023 1325    ALKPHOS 104 08/31/2023 1325    AST 79 (H) 08/31/2023 1325    AST 25 02/08/2016 1021    ALT 64 (H) 08/31/2023 1325    BILITOT 0.9 08/31/2023 1325    ESTGFRAFRICA >60 02/15/2022 1018    EGFRNONAA >60 02/15/2022 1018            Lab Results   Component Value Date    TSH 4.880 (H) 07/06/2023       Recent Labs   Lab 07/06/23  1322   LDL Cholesterol 126.2   HDL 96 H   Cholesterol 249 H        Results for orders placed or performed in visit on 07/06/23   Vitamin D   Result Value Ref Range    Vit D, 25-Hydroxy 65 30 - 96 ng/mL     No results found. However, due to the size of the patient record, not all encounters were searched. Please check Results Review for a complete set of results.    Lab Results   Component Value Date    MICALBCREAT 10.7 07/06/2023       Hemoglobin A1C   Date Value Ref Range Status   07/06/2023 10.4 (H) 0.0 - 5.6 % Final     Comment:     Reference Interval:  5.0 - 5.6 Normal   5.7 - 6.4 High Risk   > 6.5 Diabetic      Hgb A1c results are standardized based on the (NGSP) National   Glycohemoglobin Standardization Program.      Hemoglobin A1C levels are related to mean serum/plasma glucose   during the preceding 2-3 months.        02/16/2023 7.7 (H) 0.0 - 5.6 % Final     Comment:     " Reference Interval:  5.0 - 5.6 Normal   5.7 - 6.4 High Risk   > 6.5 Diabetic      Hgb A1c results are standardized based on the (NGSP) National   Glycohemoglobin Standardization Program.      Hemoglobin A1C levels are related to mean serum/plasma glucose   during the preceding 2-3 months.        08/29/2022 6.8 (H) 0.0 - 5.6 % Final     Comment:     Reference Interval:  5.0 - 5.6 Normal   5.7 - 6.4 High Risk   > 6.5 Diabetic      Hgb A1c results are standardized based on the (NGSP) National   Glycohemoglobin Standardization Program.      Hemoglobin A1C levels are related to mean serum/plasma glucose   during the preceding 2-3 months.             ASSESSMENT and PLAN:      1. T2DM with hyperglycemia-     A1C today  Continue levemir 30 u bid, t Novolog 12 u Ac + correction 150/25  Continue Dexcom G7  Notify me for any issues  Ok to see pain mgt  No noted h/o pancreatitis, discuss GLP-1 & SGLT 2 options w RTC    2. HTN - controlled, continue meds as previously prescribed and monitor.     3. HLP -  allergy to statins     4. Hypothyroidism- tsh minimally above goal in July her dose was change to 50 mcg LT4 daily, will check tsh today    5. Morbid obesity - Body mass index is 43.5 kg/m². Activity limited r/t back and pulmonary issues         Follow-up: in 3 months with lab prior

## 2023-09-15 LAB — TSH SERPL DL<=0.005 MIU/L-ACNC: 3.08 UIU/ML (ref 0.4–4)

## 2023-09-17 DIAGNOSIS — R11.0 NAUSEA: ICD-10-CM

## 2023-09-17 DIAGNOSIS — K21.9 GASTROESOPHAGEAL REFLUX DISEASE WITHOUT ESOPHAGITIS: ICD-10-CM

## 2023-09-17 DIAGNOSIS — R10.13 EPIGASTRIC PAIN: ICD-10-CM

## 2023-09-19 RX ORDER — OMEPRAZOLE 40 MG/1
40 CAPSULE, DELAYED RELEASE ORAL 2 TIMES DAILY
Qty: 180 CAPSULE | Refills: 2 | Status: SHIPPED | OUTPATIENT
Start: 2023-09-19 | End: 2024-06-15

## 2023-09-21 PROCEDURE — G0179 MD RECERTIFICATION HHA PT: HCPCS | Mod: NTX,,, | Performed by: INTERNAL MEDICINE

## 2023-09-21 PROCEDURE — G0179 PR HOME HEALTH MD RECERTIFICATION: ICD-10-PCS | Mod: NTX,,, | Performed by: INTERNAL MEDICINE

## 2023-09-22 ENCOUNTER — PATIENT MESSAGE (OUTPATIENT)
Dept: PULMONOLOGY | Facility: CLINIC | Age: 58
End: 2023-09-22
Payer: MEDICARE

## 2023-09-22 ENCOUNTER — TELEPHONE (OUTPATIENT)
Dept: PULMONOLOGY | Facility: CLINIC | Age: 58
End: 2023-09-22
Payer: MEDICARE

## 2023-09-22 DIAGNOSIS — J84.9 ILD (INTERSTITIAL LUNG DISEASE): Primary | ICD-10-CM

## 2023-09-22 RX ORDER — FLUTICASONE FUROATE AND VILANTEROL 200; 25 UG/1; UG/1
1 POWDER RESPIRATORY (INHALATION) DAILY
Qty: 60 EACH | Refills: 11 | Status: SHIPPED | OUTPATIENT
Start: 2023-09-22 | End: 2023-11-30

## 2023-09-22 NOTE — TELEPHONE ENCOUNTER
Cover my meds #[id number]    Advair is not covered      covered inhalers are:Breo Ellipta (quantity limit of 60 inhalations per 30 days)  Symbicort (quantity limit of 10.2 grams per 30 days)  Breztri Aerosphere (quantity limit of 10.7 grams per 30 days)  Trelegy Ellipta (quantity limit of 60 inhalations per 30 days)

## 2023-09-26 ENCOUNTER — EXTERNAL HOME HEALTH (OUTPATIENT)
Dept: HOME HEALTH SERVICES | Facility: HOSPITAL | Age: 58
End: 2023-09-26
Payer: MEDICARE

## 2023-09-28 ENCOUNTER — HOSPITAL ENCOUNTER (OUTPATIENT)
Dept: RADIOLOGY | Facility: HOSPITAL | Age: 58
Discharge: HOME | End: 2023-09-28
Attending: NURSE PRACTITIONER
Payer: MEDICARE

## 2023-09-28 DIAGNOSIS — K76.0 FATTY LIVER: ICD-10-CM

## 2023-09-28 DIAGNOSIS — K74.60 CIRRHOSIS OF LIVER WITHOUT ASCITES, UNSPECIFIED HEPATIC CIRRHOSIS TYPE: ICD-10-CM

## 2023-09-28 PROCEDURE — 76705 ECHO EXAM OF ABDOMEN: CPT | Mod: 26,NTX,, | Performed by: RADIOLOGY

## 2023-09-28 PROCEDURE — 76705 ECHO EXAM OF ABDOMEN: CPT | Mod: TC,PO,NTX

## 2023-09-28 PROCEDURE — 76705 US ABDOMEN LIMITED: ICD-10-PCS | Mod: 26,NTX,, | Performed by: RADIOLOGY

## 2023-10-04 ENCOUNTER — DOCUMENT SCAN (OUTPATIENT)
Dept: HOME HEALTH SERVICES | Facility: HOSPITAL | Age: 58
End: 2023-10-04
Payer: MEDICARE

## 2023-10-11 DIAGNOSIS — E11.65 UNCONTROLLED TYPE 2 DIABETES MELLITUS WITH HYPERGLYCEMIA: ICD-10-CM

## 2023-10-11 RX ORDER — INSULIN ASPART 100 [IU]/ML
INJECTION, SOLUTION INTRAVENOUS; SUBCUTANEOUS
Qty: 30 ML | Refills: 4 | Status: SHIPPED | OUTPATIENT
Start: 2023-10-11

## 2023-10-20 ENCOUNTER — TELEPHONE (OUTPATIENT)
Dept: PAIN MEDICINE | Facility: CLINIC | Age: 58
End: 2023-10-20

## 2023-10-20 ENCOUNTER — OFFICE VISIT (OUTPATIENT)
Dept: PAIN MEDICINE | Facility: CLINIC | Age: 58
End: 2023-10-20
Payer: MEDICARE

## 2023-10-20 VITALS
DIASTOLIC BLOOD PRESSURE: 70 MMHG | HEIGHT: 66 IN | BODY MASS INDEX: 42.69 KG/M2 | WEIGHT: 265.63 LBS | HEART RATE: 84 BPM | SYSTOLIC BLOOD PRESSURE: 148 MMHG

## 2023-10-20 DIAGNOSIS — M54.16 LUMBAR RADICULOPATHY: Primary | ICD-10-CM

## 2023-10-20 DIAGNOSIS — E11.9 TYPE 2 DIABETES MELLITUS TREATED WITH INSULIN: ICD-10-CM

## 2023-10-20 DIAGNOSIS — M51.36 DDD (DEGENERATIVE DISC DISEASE), LUMBAR: ICD-10-CM

## 2023-10-20 DIAGNOSIS — M47.816 LUMBAR SPONDYLOSIS: ICD-10-CM

## 2023-10-20 DIAGNOSIS — Z79.4 TYPE 2 DIABETES MELLITUS TREATED WITH INSULIN: ICD-10-CM

## 2023-10-20 PROCEDURE — 99999 PR PBB SHADOW E&M-EST. PATIENT-LVL V: CPT | Mod: PBBFAC,,,

## 2023-10-20 PROCEDURE — 99215 OFFICE O/P EST HI 40 MIN: CPT | Mod: PBBFAC,PO

## 2023-10-20 PROCEDURE — 99214 PR OFFICE/OUTPT VISIT, EST, LEVL IV, 30-39 MIN: ICD-10-PCS | Mod: S$PBB,,,

## 2023-10-20 PROCEDURE — 99999 PR PBB SHADOW E&M-EST. PATIENT-LVL V: ICD-10-PCS | Mod: PBBFAC,,,

## 2023-10-20 PROCEDURE — 99214 OFFICE O/P EST MOD 30 MIN: CPT | Mod: S$PBB,,,

## 2023-10-20 RX ORDER — ALPRAZOLAM 0.5 MG/1
1 TABLET, ORALLY DISINTEGRATING ORAL ONCE AS NEEDED
Status: CANCELLED | OUTPATIENT
Start: 2023-10-20 | End: 2035-03-18

## 2023-10-20 NOTE — H&P (VIEW-ONLY)
This note was completed with dictation software and grammatical errors may exist.    CC: neck pain, left arm pain, back pain, left leg pain    HPI:   The patient is a 57-year-old woman with a history of diabetes, rheumatoid arthritis with chronic back pain who presents in referral from Dr. Gibson for  Back pain radiating into the bilateral legs, neck pain radiating into the left arm.  Today she is reporting the return of her left-sided lower back pain with radiation down left leg, 8/10, constant, worsened with standing and walking, somewhat alleviated with rest.  She reports some numbness and tingling throughout left leg in comparison to right.  She reports pain related weakness but denies any whitley weakness.  She denies any new changes to her bowel or bladder function.  She takes Tylenol as needed for severe pain, avoids NSAIDs due to discomfort.  She continues to take Flexeril and gabapentin without significant relief of her pain.  She has completed home health formal physical therapy without significant relief.      Pain intervention history: She had done epidural steroid injections for her back in the past, many years ago.   She is status post C7-T1 interlaminar epidural steroid injection on 09/24/2020 with 100% relief.   She is status post L5/S1 interlaminar epidural steroid injection to the left on 11/12/2020 with 50% relief of her back pain and 100% relief of her left leg pain.   She is status post L5/S1 interlaminar epidural steroid injection on 06/21/2021 with 80% relief.   She is status post L5/S1 interlaminar epidural steroid injection to the left on 06/21/2022 with 20% relief.  She is status post left L3/4 and L4/5 transforaminal epidural steroid injection on 08/16/2022 with 80% relief.  She is status post left intra-articular hip injection on 09/20/2022 with 90% relief of her previous left hip pain. She is status post left L3/4 and L4/5 transforaminal CORBY on 02/22/2022 with 80% relief of her previous  left-sided pain.     Spine surgeries:    Antineuropathics: gabapentin 400 milligrams twice daily with some relief of her upper back and low back pain  NSAIDs: cannot tolerate NSAIDs  Physical therapy: had done multiple rounds of physical therapy for her back in the past with increased pain  Antidepressants:  Muscle relaxers:  Opioids: She had received a prescription for Percocet 10/325 on 07/10/2020 which she takes sparingly, she still has some left over, does not like taking these  Antiplatelets/Anticoagulants:    She smokes marijuana on a regular basis, reports that this seems to help her pain in the neck and back and much of her arthritic pain, denies any major side effects from this.  She has used oils and edibles as well, generally has been using a vaporizer as well.    ROS:  She reports weight gain, easy bruising, diabetes, joint stiffness, joint swelling, back pain, memory loss, dizziness, difficulty sleeping, anxiety, depression and loss of balance.  Balance of review of systems is negative.    Lab Results   Component Value Date    HGBA1C 7.7 (H) 09/14/2023       Lab Results   Component Value Date    WBC 10.83 09/28/2023    HGB 13.6 09/28/2023    HCT 43.2 09/28/2023    MCV 84 09/28/2023     09/28/2023             Past Medical History:   Diagnosis Date    a Premature Ventricular Contractions     Dr. Austin Collier    b Hypertension     c Hypercholesterolemia     d Type 2 DM     e Hypothyroidism     7/7/23 Increased 50 Mcg Levothyroxine qAM With TFTs In 4 Months    f Morbid Obesity     f Osteopenia     History of recurrent UTIs     7/10/23 Refd to Dr Kim    i Asthma     i Oxygen dependent     3l nasal cannula    i Tobacco Use Disorder     Insomnia disorder     7/10/23 RXd Seroquel increase from 25 mg QHS to 50 mg QHS    j GERD     j H/O Colon Polyps     j Irritable Bowel Syndrome     j Nonalcoholic Steatohepatitis ([facility]     l Bilateral Hip Osteoarthritis     l Bilateral Knee Arthritis     l Carpal  tunnel syndrome     l DDD (degenerative disc disease), lumbar     l Lumbar Spinal DDD     l Tarsal Tunnel Syndrome     m Vitamin B12 Deficiency     n Depression And Anxiety     o Allergic rhinosinusitis     p OU Cataracts     q Lichen Planus     Dr. Lizzy Dominique    q Vitamin D Deficiency     Wellness Visit 2023        Past Surgical History:   Procedure Laterality Date     SECTION      x2    CHOLECYSTECTOMY      COLONOSCOPY   ?    polyps removed per pt report    COLONOSCOPY N/A 2022    Procedure: COLONOSCOPY;  Surgeon: Brett Jasso MD;  Location: [facility];  Service: Endoscopy;  Laterality: N/A;    EPIDURAL STEROID INJECTION INTO CERVICAL SPINE N/A 2020    Procedure: Injection-steroid-epidural-cervical to left;  Surgeon: John Vinson MD;  Location: [facility];  Service: Pain Management;  Laterality: N/A;    EPIDURAL STEROID INJECTION INTO LUMBAR SPINE N/A 2020    Procedure: Injection-steroid-epidural-lumbar, l5/s1 to left;  Surgeon: John Vinson MD;  Location: [facility];  Service: Pain Management;  Laterality: N/A;    EPIDURAL STEROID INJECTION INTO LUMBAR SPINE N/A 2021    Procedure: Injection-steroid-epidural-lumbar L5/S1;  Surgeon: John Vinson MD;  Location: [facility];  Service: Pain Management;  Laterality: N/A;    EPIDURAL STEROID INJECTION INTO LUMBAR SPINE N/A 2022    Procedure: Injection-steroid-epidural-lumbar L5/S1 spread to left;  Surgeon: John Vinson MD;  Location: [facility];  Service: Pain Management;  Laterality: N/A;    ESOPHAGOGASTRODUODENOSCOPY      ESOPHAGOGASTRODUODENOSCOPY N/A 2022    Procedure: EGD (ESOPHAGOGASTRODUODENOSCOPY);  Surgeon: Brett Jasso MD;  Location: [facility];  Service: Endoscopy;  Laterality: N/A;    INJECTION OF JOINT Left 2022    Procedure: INJECTION, JOINT- hip;  Surgeon: John Vinson MD;  Location: [facility];  Service: Pain Management;  Laterality: Left;    LAPAROSCOPIC  CHOLECYSTECTOMY N/A 2022    Procedure: CHOLECYSTECTOMY, LAPAROSCOPIC;  Surgeon: Ra Santos MD;  Location: [facility];  Service: General;  Laterality: N/A;    TRANSFORAMINAL EPIDURAL INJECTION OF STEROID Left 2022    Procedure: Injection,steroid,epidural,transforaminal approach L3/4 and L4/5;  Surgeon: John Vinson MD;  Location: [facility];  Service: Pain Management;  Laterality: Left;    TRANSFORAMINAL EPIDURAL INJECTION OF STEROID Left 2023    Procedure: Injection,steroid,epidural,transforaminal approach L3/4 and L4/5-Left;  Surgeon: John Vinson MD;  Location: [facility];  Service: Pain Management;  Laterality: Left;  l3/4 and l4/5    UPPER GASTROINTESTINAL ENDOSCOPY   ?    unsure of results       Social History     Socioeconomic History    Marital status:    Tobacco Use    Smoking status: Former     Current packs/day: 0.00     Average packs/day: 1 pack/day for 51.2 years (51.2 ttl pk-yrs)     Types: Cigarettes, Vaping with nicotine     Start date:      Quit date: 10/1/2022     Years since quittin.0     Passive exposure: Current    Smokeless tobacco: Never   Substance and Sexual Activity    Alcohol use: No    Drug use: Not Currently     Frequency: 20.0 times per week     Types: Marijuana     Comment: daily smoke and edibles    Sexual activity: Not Currently     Social Determinants of Health     Financial Resource Strain: Medium Risk (2023)    Overall Financial Resource Strain (CARDIA)     Difficulty of Paying Living Expenses: Somewhat hard   Food Insecurity: Food Insecurity Present (2023)    Hunger Vital Sign     Worried About Running Out of Food in the Last Year: Sometimes true     Ran Out of Food in the Last Year: Sometimes true   Transportation Needs: No Transportation Needs (2023)    PRAPARE - Transportation     Lack of Transportation (Medical): No     Lack of Transportation (Non-Medical): No   Recent Concern: Transportation Needs - Unmet  "Transportation Needs (7/17/2023)    PRAPARE - Transportation     Lack of Transportation (Medical): Yes     Lack of Transportation (Non-Medical): Yes   Physical Activity: Inactive (9/13/2023)    Exercise Vital Sign     Days of Exercise per Week: 0 days     Minutes of Exercise per Session: 0 min   Stress: Stress Concern Present (9/13/2023)    [place] [employer] - Occupational Stress Questionnaire     Feeling of Stress : To some extent   Social Connections: Socially Isolated (9/13/2023)    Social Connection and Isolation Panel [NHANES]     Frequency of Communication with Friends and Family: Never     Frequency of Social Gatherings with Friends and Family: Never     Attends [employer] Services: Never     Active Member of Clubs or Organizations: No     Attends Club or Organization Meetings: Never     Marital Status:    Housing Stability: Low Risk  (9/13/2023)    Housing Stability Vital Sign     Unable to Pay for Housing in the Last Year: No     Number of Places Lived in the Last Year: 1     Unstable Housing in the Last Year: No   Recent Concern: Housing Stability - High Risk (7/17/2023)    Housing Stability Vital Sign     Unable to Pay for Housing in the Last Year: Yes     Number of Places Lived in the Last Year: 1     Unstable Housing in the Last Year: No         Medications/Allergies: See med card    Vitals:    10/20/23 1355   BP: (!) 148/70   Pulse: 84   Weight: 120.5 kg (265 lb 10.5 oz)   Height: 5' 6" (1.676 m)   PainSc:   8   PainLoc: Back           Physical exam:  Gen: A and O x3, pleasant, well-groomed  Skin: No rashes or obvious lesions  HEENT: PERRLA, no obvious deformities on ears or in canals.Trachea midline.  CVS: Regular rate and rhythm, normal palpable pulses.  Abdomen: Soft, NT/ND.  Musculoskeletal: antalgic gait, ambulates with cane.     Neuro:  Lower extremities: 5/5 strength bilaterally except for 5-/5 left hip flexion, likely pain related  Reflexes:  Patellar 1+, Achilles " 1+ bilaterally.  Sensory: Intact and symmetrical to light touch and pinprick in L2-S1 dermatomes bilaterally.        Lumbar spine:  Lumbar spine:   Range of motion is moderately decreased with flexion with mild increased low back pain during each maneuver.  Rony's test causes no increased pain on either side.    Supine straight leg raise is negative bilaterally.  Internal and external rotation of the hip causes severe groin pain bilaterally.  Myofascial exam:  There is tenderness to palpation over the midline lumbar spine and lumbar paraspinous musculature.      Imagin20 MRI C-spine:  ALIGNMENT: Normal.  Lateral masses of C1 and C2 are congruent.  Slight reversal of the normal lordotic curvature.   BONES: Vertebral body heights are maintained.  Multilevel endplate changes with mild type 1 endplate changes at C4-5.  No aggressive bone marrow signal.   PARASPINAL AREA: Normal.   CERVICAL DISC LEVELS:  C2-C3: No disc herniation or significant posterior osseous ridging. No significant spinal canal or foraminal stenosis.   C3-C4: Mild disc osteophyte complex with prominent central component.  Moderate left facet hypertrophy.  Slight ventral cord flattening with preserved ventral and dorsal CSF.  Mild left foraminal stenosis.   C4-C5: Mild disc osteophyte complex.  Mild-moderate left facet hypertrophy.  Mild ventral cord flattening within sliver preserved ventral and preserved dorsal CSF.  Moderate right and mild-moderate left foraminal stenosis.   C5-C6: Mild-moderate disc osteophyte complex with prominent bilateral uncovertebral spurring.  Mild ventral cord flattening within sliver preserved ventral and preserved dorsal CSF.  Moderate-severe bilateral foraminal stenosis.   C6-C7: Mild-moderate disc osteophyte complex.  Slight ventral cord flattening with preserved ventral and dorsal CSF.  Moderate left and mild right foraminal stenosis.   C7-T1: No disc herniation or significant posterior osseous ridging.   Mild left facet hypertrophy.  No significant spinal canal or foraminal stenosis.     6/4/18 MRI C-spine:  There is mild lumbar dextrocurvature.  The vertebral body alignment and vertebral body heights are maintained.  The paravertebral soft tissues appear within normal limits.  No marrow replacement process or fracture.  The visualized distal spinal cord and conus medullaris are within normal limits.  The conus terminates at L1.   L1-2: Normal disc signal and disc space height.  Minimal disc bulge seen.  No central canal foraminal stenosis   L2-3: There is disc desiccation and mild disc space narrowing.  There is mild moderate diffuse disc bulge asymmetric right results in mild right lateral recess narrowing with mild abutment of the descending right L3 nerve root.  There is mild moderate right-sided foraminal stenosis.  No central canal stenosis   L3-4: There is disc desiccation.  There is mild moderate diffuse disc bulge.  There is mild bilateral facet arthrosis.  There is mild moderate left neural foraminal stenosis.  There is no central canal stenosis.   L4-5 there is disc desiccation and mild disc space narrowing.  There is mild moderate bilateral facet arthrosis.  There is moderate diffuse disc bulge asymmetric left resulting in mild moderate left-sided foraminal stenosis with mild abutment of the exited left L4 nerve root.  No central canal stenosis.   L5-S1: There is mild diffuse disc bulge.  There is moderate to severe right and mild moderate left facet arthrosis.  There is no central canal stenosis or significant neural foraminal narrowing.      X-ray right and left hip 08/28/2018:  Normal right and left hips.  No significant osteoarthritis.    DEXA 7/29/19:  Spine bone mineral density is 1.047 g/cm 2 with T-score -1.1 and Z-score -1.4.  This compares to previous bone mineral density measurement of 1.075 and T-score of -0.9.  Femoral total mean bone mineral density measurement is 0.958 g/cm 2 with T-score  -0.4 and Z-score -0.5.  This compares to previous bone mineral density measurement of 1.028 and T-score of 0.2.  FRAX measurement is provided of 10 year major osteoporotic fracture 10.9 % and hip fracture 0.8 %.    04/25/2022 MRI lumbar spine  T12-L1: Mild bilateral facet arthropathy.  Ligamentum flavum thickening.  No neural foraminal or spinal canal stenosis.  L1-L2: Minimal diffuse disc bulge.  Mild bilateral facet arthropathy.  There is no neural foraminal stenosis.  There is no spinal canal stenosis.  L2-L3: Right asymmetric diffuse disc bulge.  Mild bilateral facet arthropathy.  Mild-to-moderate right neural foraminal stenosis.  Mild spinal canal stenosis.  L3-L4: Left asymmetric severe disc space narrowing with prominent degenerative endplate change and marginal osteophyte formation, progressed since the prior study.  Diffuse disc bulge with osteophytic ridging.  Left subarticular/foraminal disc extrusion has significantly decreased in size since the prior study.  Moderate, left greater than right, facet arthropathy.  Ligamentum flavum thickening.  Severe left neural foraminal stenosis.  Moderate spinal canal stenosis.  Left greater than right lateral recess stenosis.  L4-L5: Diffuse disc bulge with osteophytic ridging.  Moderate bilateral facet arthropathy.  Ligamentum flavum thickening.  Moderate left and mild right neural foraminal stenosis.  Mild spinal canal stenosis.  L5-S1: Mild diffuse disc bulge with osteophytic ridging.  Severe right and moderate left facet arthropathy.  Mild right neural foraminal stenosis.  There is no spinal canal stenosis.    Assessment:  The patient is a 57-year-old woman with a history of diabetes, rheumatoid arthritis with chronic back pain who presents in referral from Dr. Gibson for  Back pain radiating into the bilateral legs, neck pain radiating into the left arm.    1. Lumbar radiculopathy        2. DDD (degenerative disc disease), lumbar        3. Lumbar spondylosis         4. Type 2 diabetes mellitus treated with insulin              Plan:  I believe her lumbar radicular pain has returned.  She has been working on maintaining good blood glucose levels.  Recent A1c 7.7  At this time, I think is reasonable to repeat lumbar epidural.  We will schedule her for a repeat left L3/4 and L4/5 transforaminal CORBY.  She has responded well to these in the past providing 80% relief lasting well over 6 months.  Follow-up 4 weeks post procedure or sooner if needed.  If she fails get lasting relief with this will update her lumbar MRI.

## 2023-10-20 NOTE — TELEPHONE ENCOUNTER
Please schedule patient for the following procedure:    Physician - Dr Vinson    Type of Procedure/Injection - Lumbar Transforaminal Epidural  L3/4 and L4/5           Laterality - Left      Type of Sedation - Local      Need to hold medication - Yes      NSAIDs for 2 days      Clearance needed - No      Follow up - 4 week

## 2023-10-20 NOTE — PROGRESS NOTES
This note was completed with dictation software and grammatical errors may exist.    CC: neck pain, left arm pain, back pain, left leg pain    HPI:   The patient is a 57-year-old woman with a history of diabetes, rheumatoid arthritis with chronic back pain who presents in referral from Dr. Gibson for  Back pain radiating into the bilateral legs, neck pain radiating into the left arm.  Today she is reporting the return of her left-sided lower back pain with radiation down left leg, 8/10, constant, worsened with standing and walking, somewhat alleviated with rest.  She reports some numbness and tingling throughout left leg in comparison to right.  She reports pain related weakness but denies any whitley weakness.  She denies any new changes to her bowel or bladder function.  She takes Tylenol as needed for severe pain, avoids NSAIDs due to discomfort.  She continues to take Flexeril and gabapentin without significant relief of her pain.  She has completed home health formal physical therapy without significant relief.      Pain intervention history: She had done epidural steroid injections for her back in the past, many years ago.   She is status post C7-T1 interlaminar epidural steroid injection on 09/24/2020 with 100% relief.   She is status post L5/S1 interlaminar epidural steroid injection to the left on 11/12/2020 with 50% relief of her back pain and 100% relief of her left leg pain.   She is status post L5/S1 interlaminar epidural steroid injection on 06/21/2021 with 80% relief.   She is status post L5/S1 interlaminar epidural steroid injection to the left on 06/21/2022 with 20% relief.  She is status post left L3/4 and L4/5 transforaminal epidural steroid injection on 08/16/2022 with 80% relief.  She is status post left intra-articular hip injection on 09/20/2022 with 90% relief of her previous left hip pain. She is status post left L3/4 and L4/5 transforaminal CORBY on 02/22/2022 with 80% relief of her previous  left-sided pain.     Spine surgeries:    Antineuropathics: gabapentin 400 milligrams twice daily with some relief of her upper back and low back pain  NSAIDs: cannot tolerate NSAIDs  Physical therapy: had done multiple rounds of physical therapy for her back in the past with increased pain  Antidepressants:  Muscle relaxers:  Opioids: She had received a prescription for Percocet 10/325 on 07/10/2020 which she takes sparingly, she still has some left over, does not like taking these  Antiplatelets/Anticoagulants:    She smokes marijuana on a regular basis, reports that this seems to help her pain in the neck and back and much of her arthritic pain, denies any major side effects from this.  She has used oils and edibles as well, generally has been using a vaporizer as well.    ROS:  She reports weight gain, easy bruising, diabetes, joint stiffness, joint swelling, back pain, memory loss, dizziness, difficulty sleeping, anxiety, depression and loss of balance.  Balance of review of systems is negative.    Lab Results   Component Value Date    HGBA1C 7.7 (H) 09/14/2023       Lab Results   Component Value Date    WBC 10.83 09/28/2023    HGB 13.6 09/28/2023    HCT 43.2 09/28/2023    MCV 84 09/28/2023     09/28/2023             Past Medical History:   Diagnosis Date    a Premature Ventricular Contractions     Dr. Austin Collier    b Hypertension     c Hypercholesterolemia     d Type 2 DM     e Hypothyroidism     7/7/23 Increased 50 Mcg Levothyroxine qAM With TFTs In 4 Months    f Morbid Obesity     f Osteopenia     History of recurrent UTIs     7/10/23 Refd to Dr Kim    i Asthma     i Oxygen dependent     3l nasal cannula    i Tobacco Use Disorder     Insomnia disorder     7/10/23 RXd Seroquel increase from 25 mg QHS to 50 mg QHS    j GERD     j H/O Colon Polyps     j Irritable Bowel Syndrome     j Nonalcoholic Steatohepatitis ([place]     l Bilateral Hip Osteoarthritis     l Bilateral Knee Arthritis     l Carpal  tunnel syndrome     l DDD (degenerative disc disease), lumbar     l Lumbar Spinal DDD     l Tarsal Tunnel Syndrome     m Vitamin B12 Deficiency     n Depression And Anxiety     o Allergic rhinosinusitis     p OU Cataracts     q Lichen Planus     Dr. Lizzy Dominique    q Vitamin D Deficiency     Wellness Visit 2023        Past Surgical History:   Procedure Laterality Date     SECTION      x2    CHOLECYSTECTOMY      COLONOSCOPY   ?    polyps removed per pt report    COLONOSCOPY N/A 2022    Procedure: COLONOSCOPY;  Surgeon: Brett Jasso MD;  Location: [facility];  Service: Endoscopy;  Laterality: N/A;    EPIDURAL STEROID INJECTION INTO CERVICAL SPINE N/A 2020    Procedure: Injection-steroid-epidural-cervical to left;  Surgeon: John Vinson MD;  Location: [facility];  Service: Pain Management;  Laterality: N/A;    EPIDURAL STEROID INJECTION INTO LUMBAR SPINE N/A 2020    Procedure: Injection-steroid-epidural-lumbar, l5/s1 to left;  Surgeon: John Vinson MD;  Location: [facility];  Service: Pain Management;  Laterality: N/A;    EPIDURAL STEROID INJECTION INTO LUMBAR SPINE N/A 2021    Procedure: Injection-steroid-epidural-lumbar L5/S1;  Surgeon: John Vinson MD;  Location: [facility];  Service: Pain Management;  Laterality: N/A;    EPIDURAL STEROID INJECTION INTO LUMBAR SPINE N/A 2022    Procedure: Injection-steroid-epidural-lumbar L5/S1 spread to left;  Surgeon: John Vinson MD;  Location: [facility];  Service: Pain Management;  Laterality: N/A;    ESOPHAGOGASTRODUODENOSCOPY      ESOPHAGOGASTRODUODENOSCOPY N/A 2022    Procedure: EGD (ESOPHAGOGASTRODUODENOSCOPY);  Surgeon: Brett Jasso MD;  Location: [facility];  Service: Endoscopy;  Laterality: N/A;    INJECTION OF JOINT Left 2022    Procedure: INJECTION, JOINT- hip;  Surgeon: John Vinson MD;  Location: [facility];  Service: Pain Management;  Laterality: Left;    LAPAROSCOPIC  CHOLECYSTECTOMY N/A 2022    Procedure: CHOLECYSTECTOMY, LAPAROSCOPIC;  Surgeon: Ra Santos MD;  Location: [facility];  Service: General;  Laterality: N/A;    TRANSFORAMINAL EPIDURAL INJECTION OF STEROID Left 2022    Procedure: Injection,steroid,epidural,transforaminal approach L3/4 and L4/5;  Surgeon: John Vinson MD;  Location: [facility];  Service: Pain Management;  Laterality: Left;    TRANSFORAMINAL EPIDURAL INJECTION OF STEROID Left 2023    Procedure: Injection,steroid,epidural,transforaminal approach L3/4 and L4/5-Left;  Surgeon: John Vinson MD;  Location: [facility];  Service: Pain Management;  Laterality: Left;  l3/4 and l4/5    UPPER GASTROINTESTINAL ENDOSCOPY   ?    unsure of results       Social History     Socioeconomic History    Marital status:    Tobacco Use    Smoking status: Former     Current packs/day: 0.00     Average packs/day: 1 pack/day for 51.2 years (51.2 ttl pk-yrs)     Types: Cigarettes, Vaping with nicotine     Start date:      Quit date: 10/1/2022     Years since quittin.0     Passive exposure: Current    Smokeless tobacco: Never   Substance and Sexual Activity    Alcohol use: No    Drug use: Not Currently     Frequency: 20.0 times per week     Types: Marijuana     Comment: daily smoke and edibles    Sexual activity: Not Currently     Social Determinants of Health     Financial Resource Strain: Medium Risk (2023)    Overall Financial Resource Strain (CARDIA)     Difficulty of Paying Living Expenses: Somewhat hard   Food Insecurity: Food Insecurity Present (2023)    Hunger Vital Sign     Worried About Running Out of Food in the Last Year: Sometimes true     Ran Out of Food in the Last Year: Sometimes true   Transportation Needs: No Transportation Needs (2023)    PRAPARE - Transportation     Lack of Transportation (Medical): No     Lack of Transportation (Non-Medical): No   Recent Concern: Transportation Needs - Unmet  "Transportation Needs (7/17/2023)    PRAPARE - Transportation     Lack of Transportation (Medical): Yes     Lack of Transportation (Non-Medical): Yes   Physical Activity: Inactive (9/13/2023)    Exercise Vital Sign     Days of Exercise per Week: 0 days     Minutes of Exercise per Session: 0 min   Stress: Stress Concern Present (9/13/2023)    [employer] [place] [employer] - Occupational Stress Questionnaire     Feeling of Stress : To some extent   Social Connections: Socially Isolated (9/13/2023)    Social Connection and Isolation Panel [NHANES]     Frequency of Communication with Friends and Family: Never     Frequency of Social Gatherings with Friends and Family: Never     Attends Druze Services: Never     Active Member of Clubs or Organizations: No     Attends Club or Organization Meetings: Never     Marital Status:    Housing Stability: Low Risk  (9/13/2023)    Housing Stability Vital Sign     Unable to Pay for Housing in the Last Year: No     Number of Places Lived in the Last Year: 1     Unstable Housing in the Last Year: No   Recent Concern: Housing Stability - High Risk (7/17/2023)    Housing Stability Vital Sign     Unable to Pay for Housing in the Last Year: Yes     Number of Places Lived in the Last Year: 1     Unstable Housing in the Last Year: No         Medications/Allergies: See med card    Vitals:    10/20/23 1355   BP: (!) 148/70   Pulse: 84   Weight: 120.5 kg (265 lb 10.5 oz)   Height: 5' 6" (1.676 m)   PainSc:   8   PainLoc: Back           Physical exam:  Gen: A and O x3, pleasant, well-groomed  Skin: No rashes or obvious lesions  HEENT: PERRLA, no obvious deformities on ears or in canals.Trachea midline.  CVS: Regular rate and rhythm, normal palpable pulses.  Abdomen: Soft, NT/ND.  Musculoskeletal: antalgic gait, ambulates with cane.     Neuro:  Lower extremities: 5/5 strength bilaterally except for 5-/5 left hip flexion, likely pain related  Reflexes:  Patellar 1+, Achilles " 1+ bilaterally.  Sensory: Intact and symmetrical to light touch and pinprick in L2-S1 dermatomes bilaterally.        Lumbar spine:  Lumbar spine:   Range of motion is moderately decreased with flexion with mild increased low back pain during each maneuver.  Rony's test causes no increased pain on either side.    Supine straight leg raise is negative bilaterally.  Internal and external rotation of the hip causes severe groin pain bilaterally.  Myofascial exam:  There is tenderness to palpation over the midline lumbar spine and lumbar paraspinous musculature.      Imagin20 MRI C-spine:  ALIGNMENT: Normal.  Lateral masses of C1 and C2 are congruent.  Slight reversal of the normal lordotic curvature.   BONES: Vertebral body heights are maintained.  Multilevel endplate changes with mild type 1 endplate changes at C4-5.  No aggressive bone marrow signal.   PARASPINAL AREA: Normal.   CERVICAL DISC LEVELS:  C2-C3: No disc herniation or significant posterior osseous ridging. No significant spinal canal or foraminal stenosis.   C3-C4: Mild disc osteophyte complex with prominent central component.  Moderate left facet hypertrophy.  Slight ventral cord flattening with preserved ventral and dorsal CSF.  Mild left foraminal stenosis.   C4-C5: Mild disc osteophyte complex.  Mild-moderate left facet hypertrophy.  Mild ventral cord flattening within sliver preserved ventral and preserved dorsal CSF.  Moderate right and mild-moderate left foraminal stenosis.   C5-C6: Mild-moderate disc osteophyte complex with prominent bilateral uncovertebral spurring.  Mild ventral cord flattening within sliver preserved ventral and preserved dorsal CSF.  Moderate-severe bilateral foraminal stenosis.   C6-C7: Mild-moderate disc osteophyte complex.  Slight ventral cord flattening with preserved ventral and dorsal CSF.  Moderate left and mild right foraminal stenosis.   C7-T1: No disc herniation or significant posterior osseous ridging.   Mild left facet hypertrophy.  No significant spinal canal or foraminal stenosis.     6/4/18 MRI C-spine:  There is mild lumbar dextrocurvature.  The vertebral body alignment and vertebral body heights are maintained.  The paravertebral soft tissues appear within normal limits.  No marrow replacement process or fracture.  The visualized distal spinal cord and conus medullaris are within normal limits.  The conus terminates at L1.   L1-2: Normal disc signal and disc space height.  Minimal disc bulge seen.  No central canal foraminal stenosis   L2-3: There is disc desiccation and mild disc space narrowing.  There is mild moderate diffuse disc bulge asymmetric right results in mild right lateral recess narrowing with mild abutment of the descending right L3 nerve root.  There is mild moderate right-sided foraminal stenosis.  No central canal stenosis   L3-4: There is disc desiccation.  There is mild moderate diffuse disc bulge.  There is mild bilateral facet arthrosis.  There is mild moderate left neural foraminal stenosis.  There is no central canal stenosis.   L4-5 there is disc desiccation and mild disc space narrowing.  There is mild moderate bilateral facet arthrosis.  There is moderate diffuse disc bulge asymmetric left resulting in mild moderate left-sided foraminal stenosis with mild abutment of the exited left L4 nerve root.  No central canal stenosis.   L5-S1: There is mild diffuse disc bulge.  There is moderate to severe right and mild moderate left facet arthrosis.  There is no central canal stenosis or significant neural foraminal narrowing.      X-ray right and left hip 08/28/2018:  Normal right and left hips.  No significant osteoarthritis.    DEXA 7/29/19:  Spine bone mineral density is 1.047 g/cm 2 with T-score -1.1 and Z-score -1.4.  This compares to previous bone mineral density measurement of 1.075 and T-score of -0.9.  Femoral total mean bone mineral density measurement is 0.958 g/cm 2 with T-score  -0.4 and Z-score -0.5.  This compares to previous bone mineral density measurement of 1.028 and T-score of 0.2.  FRAX measurement is provided of 10 year major osteoporotic fracture 10.9 % and hip fracture 0.8 %.    04/25/2022 MRI lumbar spine  T12-L1: Mild bilateral facet arthropathy.  Ligamentum flavum thickening.  No neural foraminal or spinal canal stenosis.  L1-L2: Minimal diffuse disc bulge.  Mild bilateral facet arthropathy.  There is no neural foraminal stenosis.  There is no spinal canal stenosis.  L2-L3: Right asymmetric diffuse disc bulge.  Mild bilateral facet arthropathy.  Mild-to-moderate right neural foraminal stenosis.  Mild spinal canal stenosis.  L3-L4: Left asymmetric severe disc space narrowing with prominent degenerative endplate change and marginal osteophyte formation, progressed since the prior study.  Diffuse disc bulge with osteophytic ridging.  Left subarticular/foraminal disc extrusion has significantly decreased in size since the prior study.  Moderate, left greater than right, facet arthropathy.  Ligamentum flavum thickening.  Severe left neural foraminal stenosis.  Moderate spinal canal stenosis.  Left greater than right lateral recess stenosis.  L4-L5: Diffuse disc bulge with osteophytic ridging.  Moderate bilateral facet arthropathy.  Ligamentum flavum thickening.  Moderate left and mild right neural foraminal stenosis.  Mild spinal canal stenosis.  L5-S1: Mild diffuse disc bulge with osteophytic ridging.  Severe right and moderate left facet arthropathy.  Mild right neural foraminal stenosis.  There is no spinal canal stenosis.    Assessment:  The patient is a 57-year-old woman with a history of diabetes, rheumatoid arthritis with chronic back pain who presents in referral from Dr. Gibson for  Back pain radiating into the bilateral legs, neck pain radiating into the left arm.    1. Lumbar radiculopathy        2. DDD (degenerative disc disease), lumbar        3. Lumbar spondylosis         4. Type 2 diabetes mellitus treated with insulin              Plan:  I believe her lumbar radicular pain has returned.  She has been working on maintaining good blood glucose levels.  Recent A1c 7.7  At this time, I think is reasonable to repeat lumbar epidural.  We will schedule her for a repeat left L3/4 and L4/5 transforaminal CORBY.  She has responded well to these in the past providing 80% relief lasting well over 6 months.  Follow-up 4 weeks post procedure or sooner if needed.  If she fails get lasting relief with this will update her lumbar MRI.

## 2023-10-23 PROBLEM — J96.11 CHRONIC HYPOXEMIC RESPIRATORY FAILURE: Status: RESOLVED | Noted: 2022-12-02 | Resolved: 2023-10-23

## 2023-10-23 NOTE — TELEPHONE ENCOUNTER
Spoke with patient and scheduled. Advised to hold NSAIDS x 2 days prior. Pre op information given and 4 week follow up appointment scheduled.

## 2023-11-13 ENCOUNTER — HOSPITAL ENCOUNTER (OUTPATIENT)
Dept: RADIOLOGY | Facility: HOSPITAL | Age: 58
Discharge: HOME | End: 2023-11-13
Attending: ANESTHESIOLOGY | Admitting: ANESTHESIOLOGY
Payer: MEDICARE

## 2023-11-13 ENCOUNTER — HOSPITAL ENCOUNTER (OUTPATIENT)
Facility: HOSPITAL | Age: 58
Discharge: HOME | End: 2023-11-13
Attending: ANESTHESIOLOGY | Admitting: ANESTHESIOLOGY
Payer: MEDICARE

## 2023-11-13 VITALS
DIASTOLIC BLOOD PRESSURE: 72 MMHG | OXYGEN SATURATION: 99 % | WEIGHT: 265 LBS | HEART RATE: 81 BPM | TEMPERATURE: 98 F | BODY MASS INDEX: 42.59 KG/M2 | RESPIRATION RATE: 14 BRPM | SYSTOLIC BLOOD PRESSURE: 138 MMHG | HEIGHT: 66 IN

## 2023-11-13 DIAGNOSIS — M54.16 LUMBAR RADICULOPATHY: ICD-10-CM

## 2023-11-13 DIAGNOSIS — M54.50 LOWER BACK PAIN: ICD-10-CM

## 2023-11-13 LAB — GLUCOSE SERPL-MCNC: 126 MG/DL (ref 70–110)

## 2023-11-13 PROCEDURE — 64483 PR EPIDURAL INJ, ANES/STEROID, TRANSFORAMINAL, LUMB/SACR, SNGL LEVL: ICD-10-PCS | Mod: LT,NTX,, | Performed by: ANESTHESIOLOGY

## 2023-11-13 PROCEDURE — 76000 FLUOROSCOPY <1 HR PHYS/QHP: CPT | Mod: TC,PO,TXP

## 2023-11-13 PROCEDURE — 64484 NJX AA&/STRD TFRM EPI L/S EA: CPT | Mod: PO,LT,TXP | Performed by: ANESTHESIOLOGY

## 2023-11-13 PROCEDURE — 64484 PRA INJECT ANES/STEROID FORAMEN LUMBAR/SACRAL W IMG GUIDE ,EA ADD LEVEL: ICD-10-PCS | Mod: LT,NTX,, | Performed by: ANESTHESIOLOGY

## 2023-11-13 PROCEDURE — 63600175 PHARM REV CODE 636 W HCPCS: Mod: PO,NTX | Performed by: ANESTHESIOLOGY

## 2023-11-13 PROCEDURE — 25000003 PHARM REV CODE 250: Mod: PO,TXP | Performed by: ANESTHESIOLOGY

## 2023-11-13 PROCEDURE — 64483 NJX AA&/STRD TFRM EPI L/S 1: CPT | Mod: PO,LT,TXP | Performed by: ANESTHESIOLOGY

## 2023-11-13 PROCEDURE — 64483 NJX AA&/STRD TFRM EPI L/S 1: CPT | Mod: LT,NTX,, | Performed by: ANESTHESIOLOGY

## 2023-11-13 PROCEDURE — 82962 GLUCOSE BLOOD TEST: CPT | Mod: PO,NTX | Performed by: ANESTHESIOLOGY

## 2023-11-13 PROCEDURE — 64484 NJX AA&/STRD TFRM EPI L/S EA: CPT | Mod: LT,NTX,, | Performed by: ANESTHESIOLOGY

## 2023-11-13 RX ORDER — ALPRAZOLAM 0.5 MG/1
1 TABLET, ORALLY DISINTEGRATING ORAL ONCE AS NEEDED
Status: COMPLETED | OUTPATIENT
Start: 2023-11-13 | End: 2023-11-13

## 2023-11-13 RX ORDER — BUPIVACAINE HYDROCHLORIDE 2.5 MG/ML
INJECTION, SOLUTION EPIDURAL; INFILTRATION; INTRACAUDAL
Status: DISCONTINUED | OUTPATIENT
Start: 2023-11-13 | End: 2023-11-13 | Stop reason: HOSPADM

## 2023-11-13 RX ORDER — METHYLPREDNISOLONE ACETATE 80 MG/ML
INJECTION, SUSPENSION INTRA-ARTICULAR; INTRALESIONAL; INTRAMUSCULAR; SOFT TISSUE
Status: DISCONTINUED | OUTPATIENT
Start: 2023-11-13 | End: 2023-11-13 | Stop reason: HOSPADM

## 2023-11-13 RX ORDER — LIDOCAINE HYDROCHLORIDE 10 MG/ML
INJECTION, SOLUTION EPIDURAL; INFILTRATION; INTRACAUDAL; PERINEURAL
Status: DISCONTINUED | OUTPATIENT
Start: 2023-11-13 | End: 2023-11-13 | Stop reason: HOSPADM

## 2023-11-13 RX ADMIN — ALPRAZOLAM 0.5 MG: 0.5 TABLET, ORALLY DISINTEGRATING ORAL at 02:11

## 2023-11-13 NOTE — DISCHARGE SUMMARY
[facility] - Surgery  Discharge Note  Short Stay    Procedure(s) (LRB):  Injection,steroid,epidural,transforaminal approach         L3/4,L4/5 (Left)      OUTCOME: Patient tolerated treatment/procedure well without complication and is now ready for discharge.    DISPOSITION: Home or Self Care    FINAL DIAGNOSIS:  Lumbar radiculopathy    FOLLOWUP: In clinic    DISCHARGE INSTRUCTIONS:    Discharge Procedure Orders   Diet Adult Regular     No dressing needed     Notify your health care provider if you experience any of the following:  temperature >100.4     Activity as tolerated

## 2023-11-13 NOTE — PLAN OF CARE
VSS on o2 at 3L. PT with chronic SOB d/t pulmonary fibrosis. all questions answered. Denies recent fever or illness. Pt states ready for procedure.

## 2023-11-13 NOTE — OP NOTE
PROCEDURE DATE: 11/13/2023    PROCEDURE: Left L3/4 and L4/5 transforaminal epidural steroid injection under fluoroscopy    DIAGNOSIS: Lumbar  Radiculopathy    Post op diagnosis: Same    PHYSICIAN: John Vinson MD    MEDICATIONS INJECTED:  Methylprednisolone 40mg (1ml) and 1ml 0.25% bupivicaine at each nerve root.     LOCAL ANESTHETIC INJECTED:  Lidocaine 1%. 4 ml per site.    SEDATION MEDICATIONS: none    ESTIMATED BLOOD LOSS:  none    COMPLICATIONS:  none    TECHNIQUE:   A time-out was taken to identify patient and procedure side prior to starting the procedure. The patient was placed in a prone position, prepped and draped in the usual sterile fashion using ChloraPrep and sterile towels.  The area to be injected was determined under fluoroscopic guidance in AP and oblique view.  Local anesthetic was given by raising a wheal and going down to the hub of a 25-gauge 1.5 inch needle.  In oblique view, a 5 inch 22-gauge bent-tip spinal needle was introduced towards 6 oclock position of the pedicle of each above named nerve root level.  The needle was walked medially then hinged into the neural foramen and position was confirmed in AP and lateral views.  No contrast dye was injected due to allergy.  After negative aspiration for blood or CSF, the medication was then injected. This was performed at the left L3/4 and L4/5 level(s). The patient tolerated the procedure well.    The patient was monitored after the procedure.  Patient was given post procedure and discharge instructions to follow at home. The patient was discharged in a stable condition.

## 2023-11-28 ENCOUNTER — EXTERNAL HOME HEALTH (OUTPATIENT)
Dept: HOME HEALTH SERVICES | Facility: HOSPITAL | Age: 58
End: 2023-11-28
Payer: MEDICARE

## 2023-11-30 ENCOUNTER — TELEPHONE (OUTPATIENT)
Dept: PULMONOLOGY | Facility: CLINIC | Age: 58
End: 2023-11-30

## 2023-11-30 ENCOUNTER — OFFICE VISIT (OUTPATIENT)
Dept: PULMONOLOGY | Facility: CLINIC | Age: 58
End: 2023-11-30
Payer: MEDICARE

## 2023-11-30 VITALS
OXYGEN SATURATION: 94 % | DIASTOLIC BLOOD PRESSURE: 76 MMHG | SYSTOLIC BLOOD PRESSURE: 150 MMHG | BODY MASS INDEX: 42.71 KG/M2 | HEART RATE: 83 BPM | WEIGHT: 265.75 LBS | HEIGHT: 66 IN

## 2023-11-30 DIAGNOSIS — G47.33 OSA (OBSTRUCTIVE SLEEP APNEA): ICD-10-CM

## 2023-11-30 DIAGNOSIS — R74.8 ELEVATED LIVER ENZYMES: ICD-10-CM

## 2023-11-30 DIAGNOSIS — J96.11 CHRONIC RESPIRATORY FAILURE WITH HYPOXIA: ICD-10-CM

## 2023-11-30 DIAGNOSIS — J84.9 ILD (INTERSTITIAL LUNG DISEASE): Primary | ICD-10-CM

## 2023-11-30 PROCEDURE — 99214 OFFICE O/P EST MOD 30 MIN: CPT | Mod: S$PBB,NTX,, | Performed by: INTERNAL MEDICINE

## 2023-11-30 PROCEDURE — 99215 OFFICE O/P EST HI 40 MIN: CPT | Mod: PBBFAC,PO,TXP | Performed by: INTERNAL MEDICINE

## 2023-11-30 PROCEDURE — 99999 PR PBB SHADOW E&M-EST. PATIENT-LVL V: CPT | Mod: PBBFAC,TXP,, | Performed by: INTERNAL MEDICINE

## 2023-11-30 PROCEDURE — 99999 PR PBB SHADOW E&M-EST. PATIENT-LVL V: ICD-10-PCS | Mod: PBBFAC,TXP,, | Performed by: INTERNAL MEDICINE

## 2023-11-30 PROCEDURE — 99214 PR OFFICE/OUTPT VISIT, EST, LEVL IV, 30-39 MIN: ICD-10-PCS | Mod: S$PBB,NTX,, | Performed by: INTERNAL MEDICINE

## 2023-11-30 RX ORDER — FLUTICASONE PROPIONATE AND SALMETEROL 50; 500 UG/1; UG/1
1 POWDER RESPIRATORY (INHALATION) 2 TIMES DAILY
COMMUNITY
Start: 2023-10-17 | End: 2024-01-03

## 2023-11-30 RX ORDER — RESPIRATORY SYNCYTIAL VISUS VACCINE RECOMBINANT, ADJUVANTED 120MCG/0.5
0.5 KIT INTRAMUSCULAR ONCE
COMMUNITY
Start: 2023-10-12 | End: 2023-12-07 | Stop reason: ALTCHOICE
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
## 2023-11-30 NOTE — PATIENT INSTRUCTIONS
Continue oxygen 3L  Will look into [id number] device for respiratory support  Continue ofev and MMF  Labs every 3 mos- due in December  Get CPAP titration rescheduled

## 2023-12-07 PROBLEM — G35 MULTIPLE SCLEROSIS: Status: RESOLVED | Noted: 2022-02-15 | Resolved: 2023-12-07

## 2023-12-07 PROBLEM — R41.3 SHORT-TERM MEMORY LOSS: Status: ACTIVE | Noted: 2023-12-07

## 2023-12-11 ENCOUNTER — OFFICE VISIT (OUTPATIENT)
Dept: PAIN MEDICINE | Facility: CLINIC | Age: 58
End: 2023-12-11
Payer: MEDICARE

## 2023-12-11 ENCOUNTER — TELEPHONE (OUTPATIENT)
Dept: PAIN MEDICINE | Facility: CLINIC | Age: 58
End: 2023-12-11

## 2023-12-11 ENCOUNTER — TELEPHONE (OUTPATIENT)
Dept: ENDOCRINOLOGY | Facility: CLINIC | Age: 58
End: 2023-12-11
Payer: MEDICARE

## 2023-12-11 VITALS
WEIGHT: 266.31 LBS | SYSTOLIC BLOOD PRESSURE: 143 MMHG | BODY MASS INDEX: 42.8 KG/M2 | HEIGHT: 66 IN | DIASTOLIC BLOOD PRESSURE: 63 MMHG | HEART RATE: 98 BPM

## 2023-12-11 DIAGNOSIS — M47.816 LUMBAR SPONDYLOSIS: Primary | ICD-10-CM

## 2023-12-11 DIAGNOSIS — Z79.4 TYPE 2 DIABETES MELLITUS TREATED WITH INSULIN: ICD-10-CM

## 2023-12-11 DIAGNOSIS — M54.16 LUMBAR RADICULOPATHY: ICD-10-CM

## 2023-12-11 DIAGNOSIS — M51.36 DDD (DEGENERATIVE DISC DISEASE), LUMBAR: ICD-10-CM

## 2023-12-11 DIAGNOSIS — E11.9 TYPE 2 DIABETES MELLITUS TREATED WITH INSULIN: ICD-10-CM

## 2023-12-11 PROCEDURE — 99213 OFFICE O/P EST LOW 20 MIN: CPT | Mod: PBBFAC,PO

## 2023-12-11 PROCEDURE — 99999 PR PBB SHADOW E&M-EST. PATIENT-LVL III: ICD-10-PCS | Mod: PBBFAC,,,

## 2023-12-11 PROCEDURE — 99214 OFFICE O/P EST MOD 30 MIN: CPT | Mod: S$PBB,,,

## 2023-12-11 PROCEDURE — 99214 PR OFFICE/OUTPT VISIT, EST, LEVL IV, 30-39 MIN: ICD-10-PCS | Mod: S$PBB,,,

## 2023-12-11 PROCEDURE — 99999 PR PBB SHADOW E&M-EST. PATIENT-LVL III: CPT | Mod: PBBFAC,,,

## 2023-12-11 NOTE — PROGRESS NOTES
This note was completed with dictation software and grammatical errors may exist.    CC: neck pain, left arm pain, back pain, left leg pain    HPI:   The patient is a 57-year-old woman with a history of diabetes, rheumatoid arthritis with chronic back pain who presents in referral from Dr. Gibson for  Back pain radiating into the bilateral legs, neck pain radiating into the left arm.  She is status post left L3/4 and L4/5 transforaminal epidural steroid injection on 11/13/2023 with 100% relief of her previous left lateral hip and leg pain.  Today she is reporting continued lower back pain, located across her lower back without radiation into her legs, constant, 7/10, worsened with physical activities, somewhat improved with rest.  She continues to have some diabetic related neuropathy but denies any new or worsening numbness, weakness or any new changes to her bowel or bladder function.    Pain intervention history: She had done epidural steroid injections for her back in the past, many years ago.   She is status post C7-T1 interlaminar epidural steroid injection on 09/24/2020 with 100% relief.   She is status post L5/S1 interlaminar epidural steroid injection to the left on 11/12/2020 with 50% relief of her back pain and 100% relief of her left leg pain.   She is status post L5/S1 interlaminar epidural steroid injection on 06/21/2021 with 80% relief.   She is status post L5/S1 interlaminar epidural steroid injection to the left on 06/21/2022 with 20% relief.  She is status post left L3/4 and L4/5 transforaminal epidural steroid injection on 08/16/2022 with 80% relief.  She is status post left intra-articular hip injection on 09/20/2022 with 90% relief of her previous left hip pain. She is status post left L3/4 and L4/5 transforaminal CORBY on 02/22/2022 with 80% relief of her previous left-sided pain.  She is status post left L3/4 and L4/5 transforaminal epidural steroid injection on 11/13/2023 with 100% relief of  her previous left lateral hip and leg pain.     Spine surgeries:    Antineuropathics: gabapentin 400 milligrams twice daily with some relief of her upper back and low back pain  NSAIDs: cannot tolerate NSAIDs  Physical therapy: had done multiple rounds of physical therapy for her back in the past with increased pain  Antidepressants:  Muscle relaxers:  Opioids: She had received a prescription for Percocet 10/325 on 07/10/2020 which she takes sparingly, she still has some left over, does not like taking these  Antiplatelets/Anticoagulants:    She smokes marijuana on a regular basis, reports that this seems to help her pain in the neck and back and much of her arthritic pain, denies any major side effects from this.  She has used oils and edibles as well, generally has been using a vaporizer as well.    ROS:  She reports weight gain, easy bruising, diabetes, joint stiffness, joint swelling, back pain, memory loss, dizziness, difficulty sleeping, anxiety, depression and loss of balance.  Balance of review of systems is negative.    Lab Results   Component Value Date    HGBA1C 6.9 (H) 11/07/2023       Lab Results   Component Value Date    WBC 10.83 09/28/2023    HGB 13.6 09/28/2023    HCT 43.2 09/28/2023    MCV 84 09/28/2023     09/28/2023             Past Medical History:   Diagnosis Date    a Premature Ventricular Contractions     Dr. Austin Collier    b Hypertension     c Hypercholesterolemia     d Type 2 DM     e Hypothyroidism     7/7/23 Increased To 50 Mcg Levothyroxine qAM    f Morbid Obesity     f Osteopenia     History of recurrent UTIs     7/10/23 Refd to Dr Kim    i Asthma     i Oxygen dependent     3l nasal cannula    i Tobacco Use Disorder     j GERD     j H/O Colon Polyps     j Irritable Bowel Syndrome     j Nonalcoholic Steatohepatitis ([place]     l Bilateral Hip Osteoarthritis     l Bilateral Knee Arthritis     l Carpal tunnel syndrome     l DDD (degenerative disc disease), lumbar     l Lumbar  Spinal DDD     l Tarsal Tunnel Syndrome     m Short Term Memory Loss     23 Referred To [facility]; 23 Head CT Scan W/O Contrast = Ordered; 23 Lab W/U = Ordered; 23 TFTs (On Levothyroxine) = Normal    m Vitamin B12 Deficiency     n Depression And Anxiety     n Insomnia disorder     7/10/23 RXd Seroquel increase from 25 mg QHS to 50 mg QHS    o Allergic rhinosinusitis     p OU Cataracts     q Lichen Planus     Dr. Lizzy Dominique    q Vitamin D Deficiency     Wellness Visit 2023        Past Surgical History:   Procedure Laterality Date     SECTION      x2    CHOLECYSTECTOMY      COLONOSCOPY   ?    polyps removed per pt report    COLONOSCOPY N/A 2022    Procedure: COLONOSCOPY;  Surgeon: Brett Jasso MD;  Location: [facility];  Service: Endoscopy;  Laterality: N/A;    EPIDURAL STEROID INJECTION INTO CERVICAL SPINE N/A 2020    Procedure: Injection-steroid-epidural-cervical to left;  Surgeon: John Vinson MD;  Location: [facility];  Service: Pain Management;  Laterality: N/A;    EPIDURAL STEROID INJECTION INTO LUMBAR SPINE N/A 2020    Procedure: Injection-steroid-epidural-lumbar, l5/s1 to left;  Surgeon: John Vinson MD;  Location: [facility];  Service: Pain Management;  Laterality: N/A;    EPIDURAL STEROID INJECTION INTO LUMBAR SPINE N/A 2021    Procedure: Injection-steroid-epidural-lumbar L5/S1;  Surgeon: John Vinson MD;  Location: [facility];  Service: Pain Management;  Laterality: N/A;    EPIDURAL STEROID INJECTION INTO LUMBAR SPINE N/A 2022    Procedure: Injection-steroid-epidural-lumbar L5/S1 spread to left;  Surgeon: John Vinson MD;  [facility];  Service: Pain Management;  Laterality: N/A;    ESOPHAGOGASTRODUODENOSCOPY      ESOPHAGOGASTRODUODENOSCOPY N/A 2022    Procedure: EGD (ESOPHAGOGASTRODUODENOSCOPY);  Surgeon: Brett Jasso MD;  Location: [facility];  Service: Endoscopy;  Laterality: N/A;     INJECTION OF JOINT Left 2022    Procedure: INJECTION, JOINT- hip;  Surgeon: John Vinson MD;  Location: [facility];  Service: Pain Management;  Laterality: Left;    LAPAROSCOPIC CHOLECYSTECTOMY N/A 2022    Procedure: CHOLECYSTECTOMY, LAPAROSCOPIC;  Surgeon: Ra Santos MD;  Location: [facility];  Service: General;  Laterality: N/A;    TRANSFORAMINAL EPIDURAL INJECTION OF STEROID Left 2022    Procedure: Injection,steroid,epidural,transforaminal approach L3/4 and L4/5;  Surgeon: John Vinson MD;  Location: [facility];  Service: Pain Management;  Laterality: Left;    TRANSFORAMINAL EPIDURAL INJECTION OF STEROID Left 2023    Procedure: Injection,steroid,epidural,transforaminal approach L3/4 and L4/5-Left;  Surgeon: John Vinson MD;  Location: [facility];  Service: Pain Management;  Laterality: Left;  l3/4 and l4/5    TRANSFORAMINAL EPIDURAL INJECTION OF STEROID Left 2023    Procedure: Injection,steroid,epidural,transforaminal approach         L3/4,L4/5;  Surgeon: John Vinson MD;  Location: [facility];  Service: Pain Management;  Laterality: Left;    UPPER GASTROINTESTINAL ENDOSCOPY   ?    unsure of results       Social History     Socioeconomic History    Marital status:    Tobacco Use    Smoking status: Former     Average packs/day: 1 pack/day for 52.4 years (51.2 ttl pk-yrs)     Types: Cigarettes, Vaping with nicotine     Start date:      Quit date: 10/1/2022     Years since quittin.1     Passive exposure: Past    Smokeless tobacco: Never   Substance and Sexual Activity    Alcohol use: No    Drug use: Not Currently     Frequency: 20.0 times per week     Types: Marijuana     Comment: daily smoke and edibles    Sexual activity: Not Currently     Social Determinants of Health     Financial Resource Strain: High Risk (2023)    Overall Financial Resource Strain (CARDIA)     Difficulty of Paying Living Expenses: Very hard   Food Insecurity: Food  "Insecurity Present (12/5/2023)    Hunger Vital Sign     Worried About Running Out of Food in the Last Year: Often true     Ran Out of Food in the Last Year: Sometimes true   Transportation Needs: Unmet Transportation Needs (12/5/2023)    PRAPARE - Transportation     Lack of Transportation (Medical): Yes     Lack of Transportation (Non-Medical): Yes   Physical Activity: Inactive (12/5/2023)    Exercise Vital Sign     Days of Exercise per Week: 0 days     Minutes of Exercise per Session: 0 min   Stress: Stress Concern Present (12/5/2023)    [employer] [place] [employer] - Occupational Stress Questionnaire     Feeling of Stress : Very much   Social Connections: Socially Isolated (12/5/2023)    Social Connection and Isolation Panel [NHANES]     Frequency of Communication with Friends and Family: Never     Frequency of Social Gatherings with Friends and Family: Never     Attends [employer] Services: Never     Active Member of Clubs or Organizations: No     Attends Club or Organization Meetings: Never     Marital Status:    Housing Stability: Low Risk  (12/5/2023)    Housing Stability Vital Sign     Unable to Pay for Housing in the Last Year: No     Number of Places Lived in the Last Year: 1     Unstable Housing in the Last Year: No         Medications/Allergies: See med card    Vitals:    12/11/23 1420   BP: (!) 143/63   Pulse: 98   Weight: 120.8 kg (266 lb 5.1 oz)   Height: 5' 6" (1.676 m)   PainSc:   7   PainLoc: Back           Physical exam:  Gen: A and O x3, pleasant, well-groomed  Skin: No rashes or obvious lesions  HEENT: PERRLA, no obvious deformities on ears or in canals.Trachea midline.  CVS: Regular rate and rhythm, normal palpable pulses.  Abdomen: Soft, NT/ND.  Musculoskeletal: antalgic gait, ambulates with cane.     Neuro:  Lower extremities: 5/5 strength bilaterally except for 5/5 left hip flexion,   Reflexes:  Patellar 1+, Achilles 1+ bilaterally.  Sensory: Intact and symmetrical to light " touch and pinprick in L2-S1 dermatomes bilaterally.        Lumbar spine:  Lumbar spine:   Range of motion is moderately decreased with flexion with severe increased low back pain during each maneuver.  Rony's test causes no increased pain on either side.    Supine straight leg raise is negative bilaterally.  Internal and external rotation of the hip causes severe groin pain bilaterally.  Myofascial exam:  There is tenderness to palpation over the midline lumbar spine and lumbar paraspinous musculature.      Imagin20 MRI C-spine:  ALIGNMENT: Normal.  Lateral masses of C1 and C2 are congruent.  Slight reversal of the normal lordotic curvature.   BONES: Vertebral body heights are maintained.  Multilevel endplate changes with mild type 1 endplate changes at C4-5.  No aggressive bone marrow signal.   PARASPINAL AREA: Normal.   CERVICAL DISC LEVELS:  C2-C3: No disc herniation or significant posterior osseous ridging. No significant spinal canal or foraminal stenosis.   C3-C4: Mild disc osteophyte complex with prominent central component.  Moderate left facet hypertrophy.  Slight ventral cord flattening with preserved ventral and dorsal CSF.  Mild left foraminal stenosis.   C4-C5: Mild disc osteophyte complex.  Mild-moderate left facet hypertrophy.  Mild ventral cord flattening within sliver preserved ventral and preserved dorsal CSF.  Moderate right and mild-moderate left foraminal stenosis.   C5-C6: Mild-moderate disc osteophyte complex with prominent bilateral uncovertebral spurring.  Mild ventral cord flattening within sliver preserved ventral and preserved dorsal CSF.  Moderate-severe bilateral foraminal stenosis.   C6-C7: Mild-moderate disc osteophyte complex.  Slight ventral cord flattening with preserved ventral and dorsal CSF.  Moderate left and mild right foraminal stenosis.   C7-T1: No disc herniation or significant posterior osseous ridging.  Mild left facet hypertrophy.  No significant spinal canal  or foraminal stenosis.     6/4/18 MRI C-spine:  There is mild lumbar dextrocurvature.  The vertebral body alignment and vertebral body heights are maintained.  The paravertebral soft tissues appear within normal limits.  No marrow replacement process or fracture.  The visualized distal spinal cord and conus medullaris are within normal limits.  The conus terminates at L1.   L1-2: Normal disc signal and disc space height.  Minimal disc bulge seen.  No central canal foraminal stenosis   L2-3: There is disc desiccation and mild disc space narrowing.  There is mild moderate diffuse disc bulge asymmetric right results in mild right lateral recess narrowing with mild abutment of the descending right L3 nerve root.  There is mild moderate right-sided foraminal stenosis.  No central canal stenosis   L3-4: There is disc desiccation.  There is mild moderate diffuse disc bulge.  There is mild bilateral facet arthrosis.  There is mild moderate left neural foraminal stenosis.  There is no central canal stenosis.   L4-5 there is disc desiccation and mild disc space narrowing.  There is mild moderate bilateral facet arthrosis.  There is moderate diffuse disc bulge asymmetric left resulting in mild moderate left-sided foraminal stenosis with mild abutment of the exited left L4 nerve root.  No central canal stenosis.   L5-S1: There is mild diffuse disc bulge.  There is moderate to severe right and mild moderate left facet arthrosis.  There is no central canal stenosis or significant neural foraminal narrowing.      X-ray right and left hip 08/28/2018:  Normal right and left hips.  No significant osteoarthritis.    DEXA 7/29/19:  Spine bone mineral density is 1.047 g/cm 2 with T-score -1.1 and Z-score -1.4.  This compares to previous bone mineral density measurement of 1.075 and T-score of -0.9.  Femoral total mean bone mineral density measurement is 0.958 g/cm 2 with T-score -0.4 and Z-score -0.5.  This compares to previous bone  mineral density measurement of 1.028 and T-score of 0.2.  FRAX measurement is provided of 10 year major osteoporotic fracture 10.9 % and hip fracture 0.8 %.    04/25/2022 MRI lumbar spine  T12-L1: Mild bilateral facet arthropathy.  Ligamentum flavum thickening.  No neural foraminal or spinal canal stenosis.  L1-L2: Minimal diffuse disc bulge.  Mild bilateral facet arthropathy.  There is no neural foraminal stenosis.  There is no spinal canal stenosis.  L2-L3: Right asymmetric diffuse disc bulge.  Mild bilateral facet arthropathy.  Mild-to-moderate right neural foraminal stenosis.  Mild spinal canal stenosis.  L3-L4: Left asymmetric severe disc space narrowing with prominent degenerative endplate change and marginal osteophyte formation, progressed since the prior study.  Diffuse disc bulge with osteophytic ridging.  Left subarticular/foraminal disc extrusion has significantly decreased in size since the prior study.  Moderate, left greater than right, facet arthropathy.  Ligamentum flavum thickening.  Severe left neural foraminal stenosis.  Moderate spinal canal stenosis.  Left greater than right lateral recess stenosis.  L4-L5: Diffuse disc bulge with osteophytic ridging.  Moderate bilateral facet arthropathy.  Ligamentum flavum thickening.  Moderate left and mild right neural foraminal stenosis.  Mild spinal canal stenosis.  L5-S1: Mild diffuse disc bulge with osteophytic ridging.  Severe right and moderate left facet arthropathy.  Mild right neural foraminal stenosis.  There is no spinal canal stenosis.    Assessment:  The patient is a 57-year-old woman with a history of diabetes, rheumatoid arthritis with chronic back pain who presents in referral from Dr. Gibson for  Back pain radiating into the bilateral legs, neck pain radiating into the left arm.    1. Lumbar spondylosis        2. Lumbar radiculopathy        3. DDD (degenerative disc disease), lumbar        4. Type 2 diabetes mellitus treated with insulin                 Plan:  She responded well to the repeat transforaminal CORBY.  Previous left-sided radicular pain has nearly resolved.  I believe her remaining lower back pain is axial facet mediated pain.  This pain is limiting her mobility interfering with her ADLs and quality of life.  Too severe to restart formal physical therapy.  For her continued pain I would like to schedule her for bilateral L4/5 and L5/S1 diagnostic medial branch blocks.  If successful we will repeat the blocks prior to proceeding with radiofrequency ablation.  Follow-up 4 weeks post procedure or sooner if needed.

## 2023-12-11 NOTE — TELEPHONE ENCOUNTER
S/w rep he will fax orders for diabetes supplies to us.Gave him the right fax number in case they faxed it somewhere else

## 2023-12-11 NOTE — H&P (VIEW-ONLY)
This note was completed with dictation software and grammatical errors may exist.    CC: neck pain, left arm pain, back pain, left leg pain    HPI:   The patient is a 57-year-old woman with a history of diabetes, rheumatoid arthritis with chronic back pain who presents in referral from Dr. Gibson for  Back pain radiating into the bilateral legs, neck pain radiating into the left arm.  She is status post left L3/4 and L4/5 transforaminal epidural steroid injection on 11/13/2023 with 100% relief of her previous left lateral hip and leg pain.  Today she is reporting continued lower back pain, located across her lower back without radiation into her legs, constant, 7/10, worsened with physical activities, somewhat improved with rest.  She continues to have some diabetic related neuropathy but denies any new or worsening numbness, weakness or any new changes to her bowel or bladder function.    Pain intervention history: She had done epidural steroid injections for her back in the past, many years ago.   She is status post C7-T1 interlaminar epidural steroid injection on 09/24/2020 with 100% relief.   She is status post L5/S1 interlaminar epidural steroid injection to the left on 11/12/2020 with 50% relief of her back pain and 100% relief of her left leg pain.   She is status post L5/S1 interlaminar epidural steroid injection on 06/21/2021 with 80% relief.   She is status post L5/S1 interlaminar epidural steroid injection to the left on 06/21/2022 with 20% relief.  She is status post left L3/4 and L4/5 transforaminal epidural steroid injection on 08/16/2022 with 80% relief.  She is status post left intra-articular hip injection on 09/20/2022 with 90% relief of her previous left hip pain. She is status post left L3/4 and L4/5 transforaminal CORBY on 02/22/2022 with 80% relief of her previous left-sided pain.  She is status post left L3/4 and L4/5 transforaminal epidural steroid injection on 11/13/2023 with 100% relief of  her previous left lateral hip and leg pain.     Spine surgeries:    Antineuropathics: gabapentin 400 milligrams twice daily with some relief of her upper back and low back pain  NSAIDs: cannot tolerate NSAIDs  Physical therapy: had done multiple rounds of physical therapy for her back in the past with increased pain  Antidepressants:  Muscle relaxers:  Opioids: She had received a prescription for Percocet 10/325 on 07/10/2020 which she takes sparingly, she still has some left over, does not like taking these  Antiplatelets/Anticoagulants:    She smokes marijuana on a regular basis, reports that this seems to help her pain in the neck and back and much of her arthritic pain, denies any major side effects from this.  She has used oils and edibles as well, generally has been using a vaporizer as well.    ROS:  She reports weight gain, easy bruising, diabetes, joint stiffness, joint swelling, back pain, memory loss, dizziness, difficulty sleeping, anxiety, depression and loss of balance.  Balance of review of systems is negative.    Lab Results   Component Value Date    HGBA1C 6.9 (H) 11/07/2023       Lab Results   Component Value Date    WBC 10.83 09/28/2023    HGB 13.6 09/28/2023    HCT 43.2 09/28/2023    MCV 84 09/28/2023     09/28/2023             Past Medical History:   Diagnosis Date    a Premature Ventricular Contractions     Dr. Austin Collier    b Hypertension     c Hypercholesterolemia     d Type 2 DM     e Hypothyroidism     7/7/23 Increased To 50 Mcg Levothyroxine qAM    f Morbid Obesity     f Osteopenia     History of recurrent UTIs     7/10/23 Refd to Dr Kim    i Asthma     i Oxygen dependent     3l nasal cannula    i Tobacco Use Disorder     j GERD     j H/O Colon Polyps     j Irritable Bowel Syndrome     j Nonalcoholic Steatohepatitis ([place]     l Bilateral Hip Osteoarthritis     l Bilateral Knee Arthritis     l Carpal tunnel syndrome     l DDD (degenerative disc disease), lumbar     l Lumbar  Spinal DDD     l Tarsal Tunnel Syndrome     m Short Term Memory Loss     23 Referred To [facility]; 23 Head CT Scan W/O Contrast = Ordered; 23 Lab W/U = Ordered; 23 TFTs (On Levothyroxine) = Normal    m Vitamin B12 Deficiency     n Depression And Anxiety     n Insomnia disorder     7/10/23 RXd Seroquel increase from 25 mg QHS to 50 mg QHS    o Allergic rhinosinusitis     p OU Cataracts     q Lichen Planus     Dr. Lizzy Dominique    q Vitamin D Deficiency     Wellness Visit 2023        Past Surgical History:   Procedure Laterality Date     SECTION      x2    CHOLECYSTECTOMY      COLONOSCOPY   ?    polyps removed per pt report    COLONOSCOPY N/A 2022    Procedure: COLONOSCOPY;  Surgeon: Brett Jasso MD;  Location: [facility];  Service: Endoscopy;  Laterality: N/A;    EPIDURAL STEROID INJECTION INTO CERVICAL SPINE N/A 2020    Procedure: Injection-steroid-epidural-cervical to left;  Surgeon: John Vinson MD;  Location: [facility];  Service: Pain Management;  Laterality: N/A;    EPIDURAL STEROID INJECTION INTO LUMBAR SPINE N/A 2020    Procedure: Injection-steroid-epidural-lumbar, l5/s1 to left;  Surgeon: John Vinson MD;  Location: [facility];  Service: Pain Management;  Laterality: N/A;    EPIDURAL STEROID INJECTION INTO LUMBAR SPINE N/A 2021    Procedure: Injection-steroid-epidural-lumbar L5/S1;  Surgeon: John Vinson MD;  Location: [facility];  Service: Pain Management;  Laterality: N/A;    EPIDURAL STEROID INJECTION INTO LUMBAR SPINE N/A 2022    Procedure: Injection-steroid-epidural-lumbar L5/S1 spread to left;  Surgeon: John Vinson MD;  Location: [facility];  Service: Pain Management;  Laterality: N/A;    ESOPHAGOGASTRODUODENOSCOPY      ESOPHAGOGASTRODUODENOSCOPY N/A 2022    Procedure: EGD (ESOPHAGOGASTRODUODENOSCOPY);  Surgeon: Brett Jasso MD;  Location: [facility];  Service: Endoscopy;  Laterality: N/A;     INJECTION OF JOINT Left 2022    Procedure: INJECTION, JOINT- hip;  Surgeon: John Vinson MD;  Location: [facility];  Service: Pain Management;  Laterality: Left;    LAPAROSCOPIC CHOLECYSTECTOMY N/A 2022    Procedure: CHOLECYSTECTOMY, LAPAROSCOPIC;  Surgeon: Ra Santos MD;  Location: [facility];  Service: General;  Laterality: N/A;    TRANSFORAMINAL EPIDURAL INJECTION OF STEROID Left 2022    Procedure: Injection,steroid,epidural,transforaminal approach L3/4 and L4/5;  Surgeon: John Vinson MD;  Location: [facility];  Service: Pain Management;  Laterality: Left;    TRANSFORAMINAL EPIDURAL INJECTION OF STEROID Left 2023    Procedure: Injection,steroid,epidural,transforaminal approach L3/4 and L4/5-Left;  Surgeon: John Vinson MD;  Location: [facility];  Service: Pain Management;  Laterality: Left;  l3/4 and l4/5    TRANSFORAMINAL EPIDURAL INJECTION OF STEROID Left 2023    Procedure: Injection,steroid,epidural,transforaminal approach         L3/4,L4/5;  Surgeon: John Vinson MD;  Location: [facility];  Service: Pain Management;  Laterality: Left;    UPPER GASTROINTESTINAL ENDOSCOPY   ?    unsure of results       Social History     Socioeconomic History    Marital status:    Tobacco Use    Smoking status: Former     Average packs/day: 1 pack/day for 52.4 years (51.2 ttl pk-yrs)     Types: Cigarettes, Vaping with nicotine     Start date:      Quit date: 10/1/2022     Years since quittin.1     Passive exposure: Past    Smokeless tobacco: Never   Substance and Sexual Activity    Alcohol use: No    Drug use: Not Currently     Frequency: 20.0 times per week     Types: Marijuana     Comment: daily smoke and edibles    Sexual activity: Not Currently     Social Determinants of Health     Financial Resource Strain: High Risk (2023)    Overall Financial Resource Strain (CARDIA)     Difficulty of Paying Living Expenses: Very hard   Food Insecurity: Food  "Insecurity Present (12/5/2023)    Hunger Vital Sign     Worried About Running Out of Food in the Last Year: Often true     Ran Out of Food in the Last Year: Sometimes true   Transportation Needs: Unmet Transportation Needs (12/5/2023)    PRAPARE - Transportation     Lack of Transportation (Medical): Yes     Lack of Transportation (Non-Medical): Yes   Physical Activity: Inactive (12/5/2023)    Exercise Vital Sign     Days of Exercise per Week: 0 days     Minutes of Exercise per Session: 0 min   Stress: Stress Concern Present (12/5/2023)    [employer] - Occupational Stress Questionnaire     Feeling of Stress : Very much   Social Connections: Socially Isolated (12/5/2023)    Social Connection and Isolation Panel [NHANES]     Frequency of Communication with Friends and Family: Never     Frequency of Social Gatherings with Friends and Family: Never     Attends Zoroastrian Services: Never     Active Member of Clubs or Organizations: No     Attends Club or Organization Meetings: Never     Marital Status:    Housing Stability: Low Risk  (12/5/2023)    Housing Stability Vital Sign     Unable to Pay for Housing in the Last Year: No     Number of Places Lived in the Last Year: 1     Unstable Housing in the Last Year: No         Medications/Allergies: See med card    Vitals:    12/11/23 1420   BP: (!) 143/63   Pulse: 98   Weight: 120.8 kg (266 lb 5.1 oz)   Height: 5' 6" (1.676 m)   PainSc:   7   PainLoc: Back           Physical exam:  Gen: A and O x3, pleasant, well-groomed  Skin: No rashes or obvious lesions  HEENT: PERRLA, no obvious deformities on ears or in canals.Trachea midline.  CVS: Regular rate and rhythm, normal palpable pulses.  Abdomen: Soft, NT/ND.  Musculoskeletal: antalgic gait, ambulates with cane.     Neuro:  Lower extremities: 5/5 strength bilaterally except for 5/5 left hip flexion,   Reflexes:  Patellar 1+, Achilles 1+ bilaterally.  Sensory: Intact and symmetrical to light " touch and pinprick in L2-S1 dermatomes bilaterally.        Lumbar spine:  Lumbar spine:   Range of motion is moderately decreased with flexion with severe increased low back pain during each maneuver.  Rony's test causes no increased pain on either side.    Supine straight leg raise is negative bilaterally.  Internal and external rotation of the hip causes severe groin pain bilaterally.  Myofascial exam:  There is tenderness to palpation over the midline lumbar spine and lumbar paraspinous musculature.      Imagin20 MRI C-spine:  ALIGNMENT: Normal.  Lateral masses of C1 and C2 are congruent.  Slight reversal of the normal lordotic curvature.   BONES: Vertebral body heights are maintained.  Multilevel endplate changes with mild type 1 endplate changes at C4-5.  No aggressive bone marrow signal.   PARASPINAL AREA: Normal.   CERVICAL DISC LEVELS:  C2-C3: No disc herniation or significant posterior osseous ridging. No significant spinal canal or foraminal stenosis.   C3-C4: Mild disc osteophyte complex with prominent central component.  Moderate left facet hypertrophy.  Slight ventral cord flattening with preserved ventral and dorsal CSF.  Mild left foraminal stenosis.   C4-C5: Mild disc osteophyte complex.  Mild-moderate left facet hypertrophy.  Mild ventral cord flattening within sliver preserved ventral and preserved dorsal CSF.  Moderate right and mild-moderate left foraminal stenosis.   C5-C6: Mild-moderate disc osteophyte complex with prominent bilateral uncovertebral spurring.  Mild ventral cord flattening within sliver preserved ventral and preserved dorsal CSF.  Moderate-severe bilateral foraminal stenosis.   C6-C7: Mild-moderate disc osteophyte complex.  Slight ventral cord flattening with preserved ventral and dorsal CSF.  Moderate left and mild right foraminal stenosis.   C7-T1: No disc herniation or significant posterior osseous ridging.  Mild left facet hypertrophy.  No significant spinal canal  or foraminal stenosis.     6/4/18 MRI C-spine:  There is mild lumbar dextrocurvature.  The vertebral body alignment and vertebral body heights are maintained.  The paravertebral soft tissues appear within normal limits.  No marrow replacement process or fracture.  The visualized distal spinal cord and conus medullaris are within normal limits.  The conus terminates at L1.   L1-2: Normal disc signal and disc space height.  Minimal disc bulge seen.  No central canal foraminal stenosis   L2-3: There is disc desiccation and mild disc space narrowing.  There is mild moderate diffuse disc bulge asymmetric right results in mild right lateral recess narrowing with mild abutment of the descending right L3 nerve root.  There is mild moderate right-sided foraminal stenosis.  No central canal stenosis   L3-4: There is disc desiccation.  There is mild moderate diffuse disc bulge.  There is mild bilateral facet arthrosis.  There is mild moderate left neural foraminal stenosis.  There is no central canal stenosis.   L4-5 there is disc desiccation and mild disc space narrowing.  There is mild moderate bilateral facet arthrosis.  There is moderate diffuse disc bulge asymmetric left resulting in mild moderate left-sided foraminal stenosis with mild abutment of the exited left L4 nerve root.  No central canal stenosis.   L5-S1: There is mild diffuse disc bulge.  There is moderate to severe right and mild moderate left facet arthrosis.  There is no central canal stenosis or significant neural foraminal narrowing.      X-ray right and left hip 08/28/2018:  Normal right and left hips.  No significant osteoarthritis.    DEXA 7/29/19:  Spine bone mineral density is 1.047 g/cm 2 with T-score -1.1 and Z-score -1.4.  This compares to previous bone mineral density measurement of 1.075 and T-score of -0.9.  Femoral total mean bone mineral density measurement is 0.958 g/cm 2 with T-score -0.4 and Z-score -0.5.  This compares to previous bone  mineral density measurement of 1.028 and T-score of 0.2.  FRAX measurement is provided of 10 year major osteoporotic fracture 10.9 % and hip fracture 0.8 %.    04/25/2022 MRI lumbar spine  T12-L1: Mild bilateral facet arthropathy.  Ligamentum flavum thickening.  No neural foraminal or spinal canal stenosis.  L1-L2: Minimal diffuse disc bulge.  Mild bilateral facet arthropathy.  There is no neural foraminal stenosis.  There is no spinal canal stenosis.  L2-L3: Right asymmetric diffuse disc bulge.  Mild bilateral facet arthropathy.  Mild-to-moderate right neural foraminal stenosis.  Mild spinal canal stenosis.  L3-L4: Left asymmetric severe disc space narrowing with prominent degenerative endplate change and marginal osteophyte formation, progressed since the prior study.  Diffuse disc bulge with osteophytic ridging.  Left subarticular/foraminal disc extrusion has significantly decreased in size since the prior study.  Moderate, left greater than right, facet arthropathy.  Ligamentum flavum thickening.  Severe left neural foraminal stenosis.  Moderate spinal canal stenosis.  Left greater than right lateral recess stenosis.  L4-L5: Diffuse disc bulge with osteophytic ridging.  Moderate bilateral facet arthropathy.  Ligamentum flavum thickening.  Moderate left and mild right neural foraminal stenosis.  Mild spinal canal stenosis.  L5-S1: Mild diffuse disc bulge with osteophytic ridging.  Severe right and moderate left facet arthropathy.  Mild right neural foraminal stenosis.  There is no spinal canal stenosis.    Assessment:  The patient is a 57-year-old woman with a history of diabetes, rheumatoid arthritis with chronic back pain who presents in referral from Dr. Gibson for  Back pain radiating into the bilateral legs, neck pain radiating into the left arm.    1. Lumbar spondylosis        2. Lumbar radiculopathy        3. DDD (degenerative disc disease), lumbar        4. Type 2 diabetes mellitus treated with insulin                 Plan:  She responded well to the repeat transforaminal CORBY.  Previous left-sided radicular pain has nearly resolved.  I believe her remaining lower back pain is axial facet mediated pain.  This pain is limiting her mobility interfering with her ADLs and quality of life.  Too severe to restart formal physical therapy.  For her continued pain I would like to schedule her for bilateral L4/5 and L5/S1 diagnostic medial branch blocks.  If successful we will repeat the blocks prior to proceeding with radiofrequency ablation.  Follow-up 4 weeks post procedure or sooner if needed.

## 2023-12-11 NOTE — TELEPHONE ENCOUNTER
Please schedule patient for the following procedure:    Physician - Dr Vinson    Type of Procedure/Injection - Lumbar Medial Branch Block  L4/5 and L5/S1           Laterality - Bilateral      Anxiolysis- RNIV      Need to hold medication - No      N/A      Clearance needed - No      Follow up - 4 week

## 2023-12-11 NOTE — TELEPHONE ENCOUNTER
----- Message from Fang Mendoza sent at 12/11/2023 10:27 AM CST -----  Type: Needs Medical Advice  Who Called:  Jeff  Jasbir Call Back Number: [phone]  Additional Information: Jeff is calling in regards to following up on the pt's diabetic supplies. Jeff states that they sent over request for her chart on 12/08. Please call back and advise. Thanks!

## 2023-12-12 DIAGNOSIS — M47.816 LUMBAR SPONDYLOSIS: Primary | ICD-10-CM

## 2023-12-12 RX ORDER — SODIUM CHLORIDE, SODIUM LACTATE, POTASSIUM CHLORIDE, CALCIUM CHLORIDE 600; 310; 30; 20 MG/100ML; MG/100ML; MG/100ML; MG/100ML
INJECTION, SOLUTION INTRAVENOUS CONTINUOUS
Status: CANCELLED | OUTPATIENT
Start: 2023-12-12

## 2023-12-13 ENCOUNTER — PATIENT MESSAGE (OUTPATIENT)
Dept: PAIN MEDICINE | Facility: CLINIC | Age: 58
End: 2023-12-13
Payer: MEDICARE

## 2023-12-13 ENCOUNTER — TELEPHONE (OUTPATIENT)
Dept: ENDOCRINOLOGY | Facility: CLINIC | Age: 58
End: 2023-12-13
Payer: MEDICARE

## 2023-12-13 NOTE — TELEPHONE ENCOUNTER
Attempted to reach patient to schedule. No answer. Left voicemail and sent [employer] message regarding scheduling.  *Patient's case request has been placed. Patient would need to pick a date.*

## 2023-12-13 NOTE — TELEPHONE ENCOUNTER
----- Message from Gideon Brewster sent at 12/13/2023 11:10 AM CST -----  Type:  Call Back         Who called:[facility]          What is the request in detail: Requesting the Pt's Most recent Chart notes to be Fax at [phone]          Can the clinic reply by [facility]?no          Would the patient rather a call back or a response via [facility]? Call back          Best call back number:[phone]         Additional Information:           Thank You

## 2023-12-14 RX ORDER — FAMOTIDINE 40 MG/1
40 TABLET, FILM COATED ORAL
Qty: 30 TABLET | Refills: 3 | Status: SHIPPED | OUTPATIENT
Start: 2023-12-14

## 2023-12-18 ENCOUNTER — TELEPHONE (OUTPATIENT)
Dept: PAIN MEDICINE | Facility: CLINIC | Age: 58
End: 2023-12-18

## 2023-12-18 NOTE — TELEPHONE ENCOUNTER
----- Message from Emily Medley sent at 12/18/2023  8:34 AM CST -----  Type:  Patient Returning Call    Who Called:  Pt    Who Left Message for Patient:  Estrella Cortez    Does the patient know what this is regarding?:  Yes    Would the patient rather a call back or a response via [facility]?  Call back    Best Call Back Number:  [phone]    Additional Information:  Pt is reaching back out to Estrella.  Please call back to advise. Thanks!

## 2023-12-18 NOTE — TELEPHONE ENCOUNTER
Spoke with patient, scheduled procedure appointment. Reviewed pre-op instructions. Patient verbalized understanding.

## 2024-01-01 PROBLEM — I65.23 BILATERAL CAROTID ARTERY STENOSIS: Status: ACTIVE | Noted: 2024-01-01

## 2024-01-03 ENCOUNTER — OFFICE VISIT (OUTPATIENT)
Dept: PULMONOLOGY | Facility: CLINIC | Age: 59
End: 2024-01-03
Payer: MEDICARE

## 2024-01-03 ENCOUNTER — TELEPHONE (OUTPATIENT)
Dept: SURGERY | Facility: HOSPITAL | Age: 59
End: 2024-01-03
Payer: MEDICARE

## 2024-01-03 VITALS
DIASTOLIC BLOOD PRESSURE: 76 MMHG | HEART RATE: 97 BPM | HEIGHT: 66 IN | BODY MASS INDEX: 42.92 KG/M2 | SYSTOLIC BLOOD PRESSURE: 136 MMHG | OXYGEN SATURATION: 93 % | WEIGHT: 267.06 LBS

## 2024-01-03 DIAGNOSIS — B37.0 THRUSH, ORAL: ICD-10-CM

## 2024-01-03 DIAGNOSIS — J84.9 ILD (INTERSTITIAL LUNG DISEASE): ICD-10-CM

## 2024-01-03 DIAGNOSIS — D84.821 DRUG-INDUCED IMMUNODEFICIENCY: ICD-10-CM

## 2024-01-03 DIAGNOSIS — J96.11 CHRONIC RESPIRATORY FAILURE WITH HYPOXIA: Primary | ICD-10-CM

## 2024-01-03 DIAGNOSIS — E66.01 MORBID OBESITY WITH BMI OF 40.0-44.9, ADULT: ICD-10-CM

## 2024-01-03 DIAGNOSIS — G47.33 OSA (OBSTRUCTIVE SLEEP APNEA): ICD-10-CM

## 2024-01-03 DIAGNOSIS — Z79.899 DRUG-INDUCED IMMUNODEFICIENCY: ICD-10-CM

## 2024-01-03 PROCEDURE — 99999 PR PBB SHADOW E&M-EST. PATIENT-LVL V: CPT | Mod: PBBFAC,TXP,, | Performed by: INTERNAL MEDICINE

## 2024-01-03 PROCEDURE — 99214 OFFICE O/P EST MOD 30 MIN: CPT | Mod: S$PBB,TXP,, | Performed by: INTERNAL MEDICINE

## 2024-01-03 PROCEDURE — 99215 OFFICE O/P EST HI 40 MIN: CPT | Mod: PBBFAC,PO,TXP | Performed by: INTERNAL MEDICINE

## 2024-01-03 RX ORDER — FLUCONAZOLE 100 MG/1
TABLET ORAL
Qty: 8 TABLET | Refills: 0 | Status: SHIPPED | OUTPATIENT
Start: 2024-01-03 | End: 2024-01-10

## 2024-01-03 RX ORDER — FLUTICASONE FUROATE, UMECLIDINIUM BROMIDE AND VILANTEROL TRIFENATATE 200; 62.5; 25 UG/1; UG/1; UG/1
1 POWDER RESPIRATORY (INHALATION) DAILY
Qty: 60 EACH | Refills: 11 | Status: SHIPPED | OUTPATIENT
Start: 2024-01-03

## 2024-01-03 RX ORDER — DUPILUMAB 300 MG/2ML
INJECTION, SOLUTION SUBCUTANEOUS
COMMUNITY
Start: 2023-12-21

## 2024-01-03 NOTE — PATIENT INSTRUCTIONS
Fluconazole sent in for recurrent thrush- suspect may be in esophagus as well  Continue MMF, Ofev  Continue trelegy inhaler- prescription sent to pharmacy  Continue oxygen 3L  Retry CPAP nightly- I ordered adjustment of CPAP pressure to make it easier to tolerate  CPAP titration study deferred for now at your preference  Weight loss recommended   No
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
## 2024-01-03 NOTE — TELEPHONE ENCOUNTER
Patient is scheduled for MBB on 1/8/24. Per her pulm appt today, she is starting a 7 day course of diflucan for thrush. If this will affect patient's ability to have procedure, please reach out to pt to reschedule. Thank you.

## 2024-01-08 ENCOUNTER — HOSPITAL ENCOUNTER (OUTPATIENT)
Dept: RADIOLOGY | Facility: HOSPITAL | Age: 59
Discharge: HOME | End: 2024-01-08
Attending: ANESTHESIOLOGY | Admitting: ANESTHESIOLOGY
Payer: MEDICARE

## 2024-01-08 ENCOUNTER — HOSPITAL ENCOUNTER (OUTPATIENT)
Facility: HOSPITAL | Age: 59
Discharge: HOME | End: 2024-01-08
Attending: ANESTHESIOLOGY | Admitting: ANESTHESIOLOGY
Payer: MEDICARE

## 2024-01-08 VITALS
TEMPERATURE: 97 F | HEIGHT: 66 IN | DIASTOLIC BLOOD PRESSURE: 63 MMHG | BODY MASS INDEX: 42.91 KG/M2 | RESPIRATION RATE: 18 BRPM | OXYGEN SATURATION: 98 % | WEIGHT: 267 LBS | SYSTOLIC BLOOD PRESSURE: 131 MMHG | HEART RATE: 81 BPM

## 2024-01-08 DIAGNOSIS — M47.816 LUMBAR SPONDYLOSIS: ICD-10-CM

## 2024-01-08 DIAGNOSIS — M54.50 LOWER BACK PAIN: ICD-10-CM

## 2024-01-08 LAB — GLUCOSE SERPL-MCNC: 130 MG/DL (ref 70–110)

## 2024-01-08 PROCEDURE — 64494 INJ PARAVERT F JNT L/S 2 LEV: CPT | Mod: 50,PO,NTX | Performed by: ANESTHESIOLOGY

## 2024-01-08 PROCEDURE — 76000 FLUOROSCOPY <1 HR PHYS/QHP: CPT | Mod: TC,PO,TXP

## 2024-01-08 PROCEDURE — 64493 INJ PARAVERT F JNT L/S 1 LEV: CPT | Mod: 50,KX,NTX, | Performed by: ANESTHESIOLOGY

## 2024-01-08 PROCEDURE — 63600175 PHARM REV CODE 636 W HCPCS: Mod: JZ,JG,PO,NTX | Performed by: ANESTHESIOLOGY

## 2024-01-08 PROCEDURE — 64493 INJ PARAVERT F JNT L/S 1 LEV: CPT | Mod: 50,PO,TXP | Performed by: ANESTHESIOLOGY

## 2024-01-08 PROCEDURE — 64494 INJ PARAVERT F JNT L/S 2 LEV: CPT | Mod: 50,KX,NTX, | Performed by: ANESTHESIOLOGY

## 2024-01-08 PROCEDURE — 25000003 PHARM REV CODE 250: Mod: PO,NTX | Performed by: ANESTHESIOLOGY

## 2024-01-08 RX ORDER — MIDAZOLAM HYDROCHLORIDE 2 MG/2ML
INJECTION, SOLUTION INTRAMUSCULAR; INTRAVENOUS
Status: DISCONTINUED | OUTPATIENT
Start: 2024-01-08 | End: 2024-01-08 | Stop reason: HOSPADM

## 2024-01-08 RX ORDER — BUPIVACAINE HYDROCHLORIDE 2.5 MG/ML
INJECTION, SOLUTION EPIDURAL; INFILTRATION; INTRACAUDAL
Status: DISCONTINUED | OUTPATIENT
Start: 2024-01-08 | End: 2024-01-08 | Stop reason: HOSPADM

## 2024-01-08 RX ORDER — SODIUM CHLORIDE, SODIUM LACTATE, POTASSIUM CHLORIDE, CALCIUM CHLORIDE 600; 310; 30; 20 MG/100ML; MG/100ML; MG/100ML; MG/100ML
INJECTION, SOLUTION INTRAVENOUS CONTINUOUS
Status: DISCONTINUED | OUTPATIENT
Start: 2024-01-08 | End: 2024-01-08 | Stop reason: HOSPADM

## 2024-01-08 RX ORDER — LIDOCAINE HYDROCHLORIDE 10 MG/ML
INJECTION INFILTRATION; PERINEURAL
Status: DISCONTINUED | OUTPATIENT
Start: 2024-01-08 | End: 2024-01-08 | Stop reason: HOSPADM

## 2024-01-08 RX ADMIN — SODIUM CHLORIDE, POTASSIUM CHLORIDE, SODIUM LACTATE AND CALCIUM CHLORIDE: 600; 310; 30; 20 INJECTION, SOLUTION INTRAVENOUS at 01:01

## 2024-01-08 NOTE — DISCHARGE SUMMARY
[facility] - [facility]  Discharge Note  Short Stay    Procedure(s) (LRB):  Block-nerve-medial branch-lumbar    L4/5, L5/S1 (Bilateral)      OUTCOME: Patient tolerated treatment/procedure well without complication and is now ready for discharge.    DISPOSITION: Home or Self Care    FINAL DIAGNOSIS:  Lumbar spondylosis    FOLLOWUP: In clinic    DISCHARGE INSTRUCTIONS:    Discharge Procedure Orders   Diet Adult Regular     No dressing needed     Notify your health care provider if you experience any of the following:  temperature >100.4     Activity as tolerated

## 2024-01-08 NOTE — OP NOTE
PROCEDURE DATE: 1/8/2024    PROCEDURE:  Diagnostic bilateral L4/5 and L5/S1 medial branch nerve block     DIAGNOSIS:  Lumbar spondylosis    Post Op diagnosis: Same    PHYSICIAN: John Vinson MD    MEDICATIONS INJECTED: 0.25% bupivicaine, 1ml at each level    LOCAL ANESTHETIC USED: Lidocaine 1%, 2ml at each level    SEDATION MEDICATIONS:2mg versed    ESTIMATED BLOOD LOSS:  none    COMPLICATIONS:  none    TECHNIQUE: A time out was taken to identify the patient, procedure and side of the procedure. The patient was placed in a prone position, then prepped and draped in the usual sterile fashion using ChloraPrep and sterile towels.  The levels were determined under fluoroscopic guidance and then marked.  Local anesthetic was given by raising a wheal at the skin over each site and then infiltrated approximately 2cm deeper.  A 25-gauge 3.5 inch needle was introduced to the anatomic location of the right and then left L4/5 and L5/S1 medial branch nerves on the bilateral side.  The above medication was then injected. The patient tolerated the procedure well.     The patient was monitored after the procedure. The patient will be contacted in the next few days to determine extent of relief.  Patient was given post procedure and discharge instructions to follow at home.  The patient was discharged in a stable condition.

## 2024-01-18 ENCOUNTER — DOCUMENT SCAN (OUTPATIENT)
Dept: HOME HEALTH SERVICES | Facility: HOSPITAL | Age: 59
End: 2024-01-18
Payer: MEDICARE

## 2024-01-19 ENCOUNTER — DOCUMENT SCAN (OUTPATIENT)
Dept: HOME HEALTH SERVICES | Facility: HOSPITAL | Age: 59
End: 2024-01-19
Payer: MEDICARE

## 2024-01-30 ENCOUNTER — TELEPHONE (OUTPATIENT)
Dept: PULMONOLOGY | Facility: CLINIC | Age: 59
End: 2024-01-30
Payer: MEDICARE

## 2024-01-30 DIAGNOSIS — J84.9 ILD (INTERSTITIAL LUNG DISEASE): Primary | ICD-10-CM

## 2024-02-01 PROCEDURE — G0180 MD CERTIFICATION HHA PATIENT: HCPCS | Mod: NTX,,, | Performed by: INTERNAL MEDICINE

## 2024-02-08 ENCOUNTER — OFFICE VISIT (OUTPATIENT)
Dept: URGENT CARE | Facility: CLINIC | Age: 59
End: 2024-02-08
Payer: MEDICARE

## 2024-02-08 ENCOUNTER — OFFICE VISIT (OUTPATIENT)
Dept: ENDOCRINOLOGY | Facility: CLINIC | Age: 59
End: 2024-02-08
Payer: MEDICARE

## 2024-02-08 VITALS
RESPIRATION RATE: 20 BRPM | OXYGEN SATURATION: 97 % | HEART RATE: 97 BPM | DIASTOLIC BLOOD PRESSURE: 84 MMHG | TEMPERATURE: 98 F | SYSTOLIC BLOOD PRESSURE: 147 MMHG

## 2024-02-08 VITALS
HEIGHT: 66 IN | SYSTOLIC BLOOD PRESSURE: 134 MMHG | BODY MASS INDEX: 42.91 KG/M2 | HEART RATE: 94 BPM | WEIGHT: 267 LBS | DIASTOLIC BLOOD PRESSURE: 76 MMHG | OXYGEN SATURATION: 95 %

## 2024-02-08 DIAGNOSIS — E66.01 MORBID OBESITY WITH BMI OF 40.0-44.9, ADULT: ICD-10-CM

## 2024-02-08 DIAGNOSIS — I10 PRIMARY HYPERTENSION: ICD-10-CM

## 2024-02-08 DIAGNOSIS — E03.8 OTHER SPECIFIED HYPOTHYROIDISM: ICD-10-CM

## 2024-02-08 DIAGNOSIS — R30.0 DYSURIA: Primary | ICD-10-CM

## 2024-02-08 DIAGNOSIS — E78.00 HYPERCHOLESTEROLEMIA: ICD-10-CM

## 2024-02-08 DIAGNOSIS — E11.65 TYPE 2 DIABETES MELLITUS WITH HYPERGLYCEMIA, WITHOUT LONG-TERM CURRENT USE OF INSULIN: Primary | ICD-10-CM

## 2024-02-08 LAB
BILIRUB UR QL STRIP: NEGATIVE
GLUCOSE UR QL STRIP: NEGATIVE
KETONES UR QL STRIP: NEGATIVE
LEUKOCYTE ESTERASE UR QL STRIP: NEGATIVE
PH, POC UA: 6.5 (ref 5–8)
POC BLOOD, URINE: NEGATIVE
POC NITRATES, URINE: NEGATIVE
PROT UR QL STRIP: NEGATIVE
SP GR UR STRIP: 1.01 (ref 1–1.03)
UROBILINOGEN UR STRIP-ACNC: 1 (ref 0.1–1.1)

## 2024-02-08 PROCEDURE — 99999 PR PBB SHADOW E&M-EST. PATIENT-LVL V: CPT | Mod: PBBFAC,TXP,, | Performed by: NURSE PRACTITIONER

## 2024-02-08 PROCEDURE — 99213 OFFICE O/P EST LOW 20 MIN: CPT | Mod: S$GLB,TXP,, | Performed by: NURSE PRACTITIONER

## 2024-02-08 PROCEDURE — 81003 URINALYSIS AUTO W/O SCOPE: CPT | Mod: QW,S$GLB,TXP, | Performed by: NURSE PRACTITIONER

## 2024-02-08 PROCEDURE — 99214 OFFICE O/P EST MOD 30 MIN: CPT | Mod: S$PBB,NTX,, | Performed by: NURSE PRACTITIONER

## 2024-02-08 PROCEDURE — 87086 URINE CULTURE/COLONY COUNT: CPT | Mod: TXP | Performed by: NURSE PRACTITIONER

## 2024-02-08 PROCEDURE — 95251 CONT GLUC MNTR ANALYSIS I&R: CPT | Mod: NTX,,, | Performed by: NURSE PRACTITIONER

## 2024-02-08 PROCEDURE — 99215 OFFICE O/P EST HI 40 MIN: CPT | Mod: PBBFAC,PO,TXP | Performed by: NURSE PRACTITIONER

## 2024-02-08 RX ORDER — INSULIN GLARGINE 100 [IU]/ML
INJECTION, SOLUTION SUBCUTANEOUS
Qty: 60 ML | Refills: 4 | Status: SHIPPED | OUTPATIENT
Start: 2024-02-08 | End: 2024-02-12

## 2024-02-08 RX ORDER — NITROFURANTOIN 25; 75 MG/1; MG/1
100 CAPSULE ORAL 2 TIMES DAILY
Qty: 14 CAPSULE | Refills: 0 | Status: SHIPPED | OUTPATIENT
Start: 2024-02-08 | End: 2024-02-15

## 2024-02-08 RX ORDER — PEN NEEDLE, DIABETIC 30 GX3/16"
NEEDLE, DISPOSABLE MISCELLANEOUS
Qty: 450 EACH | Refills: 4 | Status: SHIPPED | OUTPATIENT
Start: 2024-02-08

## 2024-02-08 NOTE — PROGRESS NOTES
CC: This 58 y.o. female presents for management of diabetes and chronic conditions pending review including HTN, HLP, hypothyroidism and morbid obesity    HPI: She was diagnosed with T2DM in 2014. Has been hospitalized r/t DM- hyperglycemia  Family hx of DM: brother and sisters, mom and dad, 2 daughters    Since last visit start meal dose insulin and Dexcom g7  See Dexcom download in Media tab  <1% Very High  19% High  79% In Range  1% Low  <1% Very Low   Small pp excursions noted, overall bg control is great  Some hypos noted in am but not daily     Diet: Eats 3 Meals a day, snacks fruit-  strawberries, grapes, pears   Exercise: PT at home- twice a week  CURRENT DM MEDS: Levemir 30 u bid, Novolog 12 u Ac + correction 150/50  Vial/pen:  Uses vial and pen  Glucometer type:  Accu Check Guide Me    Standards of Care:  Eye exam: 1/5/2023 Dr Pena     Regarding hypothyroidism  Dx'ed: 2014  Fmh: brother  Takes Lt4 after breakfast w her other meds  No hoarseness, voice changes, dysphagia, compressive symptoms, or head/neck exposure to XRT.   No personal or FH of thyroid cancer or MEN syndrome.   Not taking biotin.   + tremors of the hands  + intolerance to the heat   + hair loss      ROS:   Gen: Appetite good, weight loss 2 lbs  Eyes: Denies visual disturbances  Resp: +SOB  + cough  Cardiac: No palpitations, +chest pain- just had Dr Collier appt today, +dry mouth   GI: + nausea- chronic, no  vomiting, diarrhea, + constipation  /GYN: 1-2+ nocturia, no burning or pain.   MS/Neuro: no numbness/ tingling in BLE;  walks w a cane , speech clear  Psych:  + depression and anxiety  Other systems: negative.    PE:  GENERAL: Well developed, well nourished.  PSYCH: AAOx3, appropriate mood and affect, pleasant expression, conversant, appears relaxed, well groomed.   EYES: Conjunctiva, corneas clear  NECK: Supple, trachea midline,   ABDOMEN: Soft, non-tender, non-distended   SKIN:  no acanthosis nigracans.      Personally reviewed Past  "Medical, Surgical, Social History.    /76 (BP Location: Right arm, Patient Position: Sitting, BP Method: Medium (Manual))   Pulse 94   Ht 5' 6" (1.676 m)   Wt 121.1 kg (267 lb)   SpO2 95%   BMI 43.09 kg/m²      Personally reviewed the below labs:      Chemistry        Component Value Date/Time     09/28/2023 1051    K 4.2 09/28/2023 1051     09/28/2023 1051    CO2 31 (H) 09/28/2023 1051    BUN 13 09/28/2023 1051    CREATININE 0.9 09/28/2023 1051     (H) 09/28/2023 1051        Component Value Date/Time    CALCIUM 9.9 09/28/2023 1051    ALKPHOS 94 09/28/2023 1051    AST 79 (H) 09/28/2023 1051    AST 25 02/08/2016 1021    ALT 51 (H) 09/28/2023 1051    BILITOT 0.7 09/28/2023 1051    ESTGFRAFRICA >60 02/15/2022 1018    EGFRNONAA >60 02/15/2022 1018            Lab Results   Component Value Date    TSH 2.400 11/07/2023       Recent Labs   Lab 07/06/23  1322   LDL Cholesterol 126.2   HDL 96 H   Cholesterol 249 H        Results for orders placed or performed in visit on 07/06/23   Vitamin D   Result Value Ref Range    Vit D, 25-Hydroxy 65 30 - 96 ng/mL     No results found. However, due to the size of the patient record, not all encounters were searched. Please check Results Review for a complete set of results.    Lab Results   Component Value Date    MICALBCREAT 10.7 07/06/2023       Hemoglobin A1C   Date Value Ref Range Status   11/07/2023 6.9 (H) 0.0 - 5.6 % Final     Comment:     Reference Interval:  5.0 - 5.6 Normal   5.7 - 6.4 High Risk   > 6.5 Diabetic      Hgb A1c results are standardized based on the ([employer]) National   Glycohemoglobin Standardization Program.      Hemoglobin A1C levels are related to mean serum/plasma glucose   during the preceding 2-3 months.        09/14/2023 7.7 (H) 4.0 - 5.6 % Final     Comment:     ADA Screening Guidelines:  5.7-6.4%  Consistent with prediabetes  >or=6.5%  Consistent with diabetes    High levels of fetal hemoglobin interfere with the HbA1C  assay. " Heterozygous hemoglobin variants (HbS, HgC, etc)do  not significantly interfere with this assay.   However, presence of multiple variants may affect accuracy.     07/06/2023 10.4 (H) 0.0 - 5.6 % Final     Comment:     Reference Interval:  5.0 - 5.6 Normal   5.7 - 6.4 High Risk   > 6.5 Diabetic      Hgb A1c results are standardized based on the ([employer]) National   Glycohemoglobin Standardization Program.      Hemoglobin A1C levels are related to mean serum/plasma glucose   during the preceding 2-3 months.             ASSESSMENT and PLAN:      1. T2DM with hyperglycemia-     A1C today  Change levemir to lantus for ins purposes- 30 u qam, 28 u qhs  Continue Novolog 12 u Ac + correction 150/25  Continue Dexcom G7- notify me for any issues     2. HTN - controlled, continue meds as previously prescribed and monitor.     3. HLP -  allergy to statins     4. Hypothyroidism- Contiue 50 mcg LT4 daily, last TSh WNL    5. Morbid obesity - Body mass index is 43.09 kg/m². Activity limited r/t back and pulmonary issues         Follow-up: in 3 months with A1C, CMP, Lipid, UMCR

## 2024-02-08 NOTE — PROGRESS NOTES
Subjective:      Patient ID: Yara Santos is a 58 y.o. female.    Vitals:  temperature is 97.9 °F (36.6 °C). Her blood pressure is 147/84 (abnormal) and her pulse is 97. Her respiration is 20 and oxygen saturation is 97%.     Chief Complaint: Dysuria    Pt presents with poss UTI-burning, urgency and frequency x 1 week, lower abd pressure., dark color urine    Dysuria   This is a new problem. The current episode started in the past 7 days. The problem occurs intermittently. The problem has been unchanged. The quality of the pain is described as aching and burning. The pain is at a severity of 2/10. The pain is mild. There has been no fever. She is Not sexually active. There is No history of pyelonephritis. Associated symptoms include frequency, hesitancy and urgency. She has tried nothing for the symptoms. Her past medical history is significant for diabetes mellitus and hypertension.       Genitourinary:  Positive for dysuria, frequency and urgency.      Objective:     Physical Exam   Constitutional: She is oriented to person, place, and time. She appears well-developed.   HENT:   Head: Normocephalic and atraumatic.   Ears:   Right Ear: External ear normal.   Left Ear: External ear normal.   Nose: Nose normal. No nasal deformity. No epistaxis.   Mouth/Throat: Oropharynx is clear and moist and mucous membranes are normal.   Eyes: Lids are normal.   Neck: Trachea normal and phonation normal. Neck supple.   Cardiovascular: Normal pulses.   Pulmonary/Chest: Effort normal.   Abdominal: Normal appearance and bowel sounds are normal. She exhibits no distension. Soft. There is no abdominal tenderness. There is no left CVA tenderness and no right CVA tenderness.   Neurological: She is alert and oriented to person, place, and time.   Skin: Skin is warm, dry and intact.   Psychiatric: Her speech is normal and behavior is normal.   Nursing note and vitals reviewed.      Assessment:     1. Dysuria        Plan:       Results  "for orders placed or performed in visit on 02/08/24   POCT Urinalysis, Dipstick, Automated, W/O Scope   Result Value Ref Range    POC Blood, Urine Negative Negative    POC Bilirubin, Urine Negative Negative    POC Urobilinogen, Urine 1.0 0.1 - 1.1    POC Ketones, Urine Negative Negative    POC Protein, Urine Negative Negative    POC Nitrates, Urine Negative Negative    POC Glucose, Urine Negative Negative    pH, UA 6.5 5 - 8    POC Specific Gravity, Urine 1.010 1.003 - 1.029    POC Leukocytes, Urine Negative Negative     *Note: Due to a large number of results and/or encounters for the requested time period, some results have not been displayed. A complete set of results can be found in Results Review.         Dysuria  -     POCT Urinalysis, Dipstick, Automated, W/O Scope  -     Urine culture  -     nitrofurantoin, macrocrystal-monohydrate, (MACROBID) 100 MG capsule; Take 1 capsule (100 mg total) by mouth 2 (two) times daily. for 7 days  Dispense: 14 capsule; Refill: 0      Patient Instructions   Bladder Infection, Female (Adult)    Urine is normally doesn't have any bacteria in it. But bacteria can get into the urinary tract from the skin around the rectum. Or they can travel in the blood from elsewhere in the body. Once they are in your urinary tract, they can cause infection in the urethra (urethritis), the bladder (cystitis), or the kidneys (pyelonephritis).  The most common place for an infection is in the bladder. This is called a bladder infection. This is one of the most common infections in women. Most bladder infections are easily treated. They are not serious unless the infection spreads to the kidney.  The phrases "bladder infection," "UTI," and "cystitis" are often used to describe the same thing. But they are not always the same. Cystitis is an inflammation of the bladder. The most common cause of cystitis is an infection.  Symptoms  The infection causes inflammation in the urethra and bladder. This " causes many of the symptoms. The most common symptoms of a bladder infection are:  Pain or burning when urinating  Having to urinate more often than usual  Urgent need to urinate  Only a small amount of urine comes out  Blood in urine  Abdominal discomfort. This is usually in the lower abdomen above the pubic bone.  Cloudy urine  Strong- or bad-smelling urine  Unable to urinate (urinary retention)  Unable to hold urine in (urinary incontinence)  Fever  Loss of appetite  Confusion (in older adults)    UTI Relief   - Increase water intake.  - Decrease sodas, tea, coffee and energy drinks until symptoms resolve.  - Cranberry products are thought to protect against UTI by inhibiting bacterial growth and adhesion to the bladder.  - Tylenol (acetaminophen) and/or NSAIDS (motrin, ibuprofen) as needed for discomfort.  - Timed voiding every 2-3 hours ( void every 2-3 hours even if you do not feel the urge).  - OTC AZO/pyridium if symptoms are persistent - this will turn your urine red/orange. This will help with symptomatic relief, but will not treat the infection.  - Avoid tight fitting cloths, douching, tampon use.  - Wipe front to back.   - Void prior to and after intercourse.   - Use probiotics especially with any antibiotic therapy.  Patient aware and verbalized understanding.      INSTRUCTIONS:  - Rest.  - Drink plenty of fluids.  - Take Tylenol and/or Ibuprofen as directed as needed for fever/pain.  Do not take more than the recommended dose.  - follow up with your PCP within the next 1-2 weeks as needed.  - You must understand that you have received an Urgent Care treatment only and that you may be released before all of your medical problems are known or treated.   - You, the patient, will arrange for follow up care as instructed.   - If your condition worsens or fails to improve we recommend that you receive another evaluation at the ER immediately or contact your PCP to discuss your concerns.   - You can call  [phone] or [phone] to help schedule an appointment with the appropriate provider.     -If you smoke cigarettes, it would be beneficial for you to stop.

## 2024-02-08 NOTE — PATIENT INSTRUCTIONS
"Bladder Infection, Female (Adult)    Urine is normally doesn't have any bacteria in it. But bacteria can get into the urinary tract from the skin around the rectum. Or they can travel in the blood from elsewhere in the body. Once they are in your urinary tract, they can cause infection in the urethra (urethritis), the bladder (cystitis), or the kidneys (pyelonephritis).  The most common place for an infection is in the bladder. This is called a bladder infection. This is one of the most common infections in women. Most bladder infections are easily treated. They are not serious unless the infection spreads to the kidney.  The phrases "bladder infection," "UTI," and "cystitis" are often used to describe the same thing. But they are not always the same. Cystitis is an inflammation of the bladder. The most common cause of cystitis is an infection.  Symptoms  The infection causes inflammation in the urethra and bladder. This causes many of the symptoms. The most common symptoms of a bladder infection are:  Pain or burning when urinating  Having to urinate more often than usual  Urgent need to urinate  Only a small amount of urine comes out  Blood in urine  Abdominal discomfort. This is usually in the lower abdomen above the pubic bone.  Cloudy urine  Strong- or bad-smelling urine  Unable to urinate (urinary retention)  Unable to hold urine in (urinary incontinence)  Fever  Loss of appetite  Confusion (in older adults)    UTI Relief   - Increase water intake.  - Decrease sodas, tea, coffee and energy drinks until symptoms resolve.  - Cranberry products are thought to protect against UTI by inhibiting bacterial growth and adhesion to the bladder.  - Tylenol (acetaminophen) and/or NSAIDS (motrin, ibuprofen) as needed for discomfort.  - Timed voiding every 2-3 hours ( void every 2-3 hours even if you do not feel the urge).  - OTC AZO/pyridium if symptoms are persistent - this will turn your urine red/orange. This will help " with symptomatic relief, but will not treat the infection.  - Avoid tight fitting cloths, douching, tampon use.  - Wipe front to back.   - Void prior to and after intercourse.   - Use probiotics especially with any antibiotic therapy.  Patient aware and verbalized understanding.      INSTRUCTIONS:  - Rest.  - Drink plenty of fluids.  - Take Tylenol and/or Ibuprofen as directed as needed for fever/pain.  Do not take more than the recommended dose.  - follow up with your PCP within the next 1-2 weeks as needed.  - You must understand that you have received an Urgent Care treatment only and that you may be released before all of your medical problems are known or treated.   - You, the patient, will arrange for follow up care as instructed.   - If your condition worsens or fails to improve we recommend that you receive another evaluation at the ER immediately or contact your PCP to discuss your concerns.   - You can call [phone] or [phone] to help schedule an appointment with the appropriate provider.     -If you smoke cigarettes, it would be beneficial for you to stop.

## 2024-02-09 LAB — BACTERIA UR CULT: NORMAL

## 2024-02-10 ENCOUNTER — TELEPHONE (OUTPATIENT)
Dept: URGENT CARE | Facility: CLINIC | Age: 59
End: 2024-02-10
Payer: MEDICARE

## 2024-02-10 NOTE — TELEPHONE ENCOUNTER
Spoke with patients daughter for UA Cx results , on my chart - MM , pt daughter will have her review

## 2024-02-12 ENCOUNTER — PATIENT MESSAGE (OUTPATIENT)
Dept: ENDOCRINOLOGY | Facility: CLINIC | Age: 59
End: 2024-02-12
Payer: MEDICARE

## 2024-02-12 RX ORDER — INSULIN GLARGINE 100 [IU]/ML
INJECTION, SOLUTION SUBCUTANEOUS
Qty: 30 ML | Refills: 6 | Status: SHIPPED | OUTPATIENT
Start: 2024-02-12 | End: 2024-04-15 | Stop reason: SDUPTHER

## 2024-02-14 ENCOUNTER — EXTERNAL HOME HEALTH (OUTPATIENT)
Dept: HOME HEALTH SERVICES | Facility: HOSPITAL | Age: 59
End: 2024-02-14
Payer: MEDICARE

## 2024-03-04 PROBLEM — J96.11 CHRONIC RESPIRATORY FAILURE WITH HYPOXIA: Status: RESOLVED | Noted: 2022-12-02 | Resolved: 2024-03-04

## 2024-03-12 ENCOUNTER — DOCUMENT SCAN (OUTPATIENT)
Dept: HOME HEALTH SERVICES | Facility: HOSPITAL | Age: 59
End: 2024-03-12
Payer: MEDICARE

## 2024-03-19 ENCOUNTER — DOCUMENT SCAN (OUTPATIENT)
Dept: HOME HEALTH SERVICES | Facility: HOSPITAL | Age: 59
End: 2024-03-19
Payer: MEDICARE

## 2024-04-01 PROCEDURE — G0179 MD RECERTIFICATION HHA PT: HCPCS | Mod: NTX,,, | Performed by: INTERNAL MEDICINE

## 2024-04-08 ENCOUNTER — DOCUMENT SCAN (OUTPATIENT)
Dept: HOME HEALTH SERVICES | Facility: HOSPITAL | Age: 59
End: 2024-04-08
Payer: MEDICARE

## 2024-04-13 ENCOUNTER — PATIENT MESSAGE (OUTPATIENT)
Dept: ENDOCRINOLOGY | Facility: CLINIC | Age: 59
End: 2024-04-13
Payer: MEDICARE

## 2024-04-13 DIAGNOSIS — E11.65 TYPE 2 DIABETES MELLITUS WITH HYPERGLYCEMIA, WITHOUT LONG-TERM CURRENT USE OF INSULIN: Primary | ICD-10-CM

## 2024-04-13 DIAGNOSIS — E11.65 UNCONTROLLED TYPE 2 DIABETES MELLITUS WITH HYPERGLYCEMIA: ICD-10-CM

## 2024-04-15 DIAGNOSIS — J84.89 PROGRESSIVE FIBROSING INTERSTITIAL LUNG DISEASE: ICD-10-CM

## 2024-04-15 DIAGNOSIS — J84.89 PROGRESSIVE FIBROSING INTERSTITIAL LUNG DISEASE: Primary | ICD-10-CM

## 2024-04-15 RX ORDER — INSULIN GLARGINE 100 [IU]/ML
INJECTION, SOLUTION SUBCUTANEOUS
Qty: 30 ML | Refills: 6 | Status: SHIPPED | OUTPATIENT
Start: 2024-04-15

## 2024-04-15 RX ORDER — NINTEDANIB 150 MG/1
150 CAPSULE ORAL 2 TIMES DAILY
Qty: 60 CAPSULE | Refills: 11 | Status: SHIPPED | OUTPATIENT
Start: 2024-04-15

## 2024-04-15 RX ORDER — INSULIN LISPRO 100 [IU]/ML
INJECTION, SOLUTION INTRAVENOUS; SUBCUTANEOUS
Qty: 30 ML | Refills: 4 | Status: SHIPPED | OUTPATIENT
Start: 2024-04-15

## 2024-04-15 RX ORDER — NINTEDANIB 150 MG/1
1 CAPSULE ORAL 2 TIMES DAILY
Qty: 60 CAPSULE | Refills: 11 | OUTPATIENT
Start: 2024-04-15

## 2024-04-18 RX ORDER — INSULIN LISPRO 100 [IU]/ML
INJECTION, SOLUTION INTRAVENOUS; SUBCUTANEOUS
Qty: 30 ML | Refills: 4 | Status: SHIPPED | OUTPATIENT
Start: 2024-04-18

## 2024-04-18 RX ORDER — INSULIN GLARGINE 100 [IU]/ML
INJECTION, SOLUTION SUBCUTANEOUS
Qty: 30 ML | Refills: 6 | Status: SHIPPED | OUTPATIENT
Start: 2024-04-18

## 2024-04-19 NOTE — TELEPHONE ENCOUNTER
[facility] rx routed to Donna Abdi NP and sent to [facility] on 4/15/24 since the provider is out of the office.

## 2024-05-02 ENCOUNTER — TELEPHONE (OUTPATIENT)
Dept: PULMONOLOGY | Facility: CLINIC | Age: 59
End: 2024-05-02
Payer: MEDICARE

## 2024-05-02 NOTE — TELEPHONE ENCOUNTER
Spoke to Lisa with [facility].  Reports has had some falls with some bruising.  Patient refuses to go to ER.  Lisa reports she seems to be doing fine but wanted to report the falls to our office since Dr. Alas is the ordering physican for her home health.  I assured her we would document the chart.

## 2024-05-02 NOTE — TELEPHONE ENCOUNTER
----- Message from Toni Levin sent at 5/2/2024  1:21 PM CDT -----  Regarding: advice  Type: Needs Medical Advice  Who Called:  Lisa [facility] nurse   Symptoms (please be specific):    How long has patient had these symptoms:    Pharmacy name and phone #:    Best Call Back Number: [phone]  Additional Information: pt had 2 falls this past weekend. Only bursing on left arm and knee. Daughter try to bring pt to the ER and pt refused both times Please call  to advice . Thanks

## 2024-05-02 NOTE — TELEPHONE ENCOUNTER
----- Message from Lucie Cabezas sent at 5/2/2024  4:15 PM CDT -----  non-specific  Lisa called from in [facility] home health regarding Yara.  Please call back to advise [phone]

## 2024-05-03 ENCOUNTER — TELEPHONE (OUTPATIENT)
Dept: PULMONOLOGY | Facility: CLINIC | Age: 59
End: 2024-05-03
Payer: MEDICARE

## 2024-05-07 ENCOUNTER — DOCUMENT SCAN (OUTPATIENT)
Dept: HOME HEALTH SERVICES | Facility: HOSPITAL | Age: 59
End: 2024-05-07

## 2024-05-07 ENCOUNTER — DOCUMENT SCAN (OUTPATIENT)
Dept: HOME HEALTH SERVICES | Facility: HOSPITAL | Age: 59
End: 2024-05-07
Payer: MEDICARE

## 2024-05-09 ENCOUNTER — EXTERNAL HOME HEALTH (OUTPATIENT)
Dept: HOME HEALTH SERVICES | Facility: HOSPITAL | Age: 59
End: 2024-05-09
Payer: MEDICARE

## 2024-05-10 ENCOUNTER — DOCUMENT SCAN (OUTPATIENT)
Dept: HOME HEALTH SERVICES | Facility: HOSPITAL | Age: 59
End: 2024-05-10
Payer: MEDICARE

## 2024-05-13 NOTE — ADDENDUM NOTE
Addended by: DWIGHT MATTHEWS on: 5/13/2024 02:17 PM     Modules accepted: Orders, Level of Service

## 2024-05-13 NOTE — ADDENDUM NOTE
Addended by: DWIGHT MATTHEWS on: 5/13/2024 02:20 PM     Modules accepted: Orders, Level of Service

## 2024-05-13 NOTE — ADDENDUM NOTE
Addended by: DWIGHT MATTHEWS on: 5/13/2024 02:20 PM     Modules accepted: Orders, Level of Service     n/a

## 2024-05-14 ENCOUNTER — DOCUMENT SCAN (OUTPATIENT)
Dept: HOME HEALTH SERVICES | Facility: HOSPITAL | Age: 59
End: 2024-05-14

## 2024-05-14 ENCOUNTER — DOCUMENT SCAN (OUTPATIENT)
Dept: HOME HEALTH SERVICES | Facility: HOSPITAL | Age: 59
End: 2024-05-14
Payer: MEDICARE

## 2024-05-15 ENCOUNTER — DOCUMENT SCAN (OUTPATIENT)
Dept: HOME HEALTH SERVICES | Facility: HOSPITAL | Age: 59
End: 2024-05-15
Payer: MEDICARE

## 2024-05-15 ENCOUNTER — TELEPHONE (OUTPATIENT)
Dept: ENDOCRINOLOGY | Facility: CLINIC | Age: 59
End: 2024-05-15
Payer: MEDICARE

## 2024-05-15 NOTE — TELEPHONE ENCOUNTER
----- Message from Lizzy Rousseau sent at 5/15/2024 10:32 AM CDT -----  Contact: Jose  Type:  Needs Medical Advice    Who Called: [employer]    Would the patient rather a call back or a response via [facility]? Call     Best Call Back Number: [phone]    Additional Information:  [facility] [employer] would like to speak with the nurse in regards to clinical notes.     Please call to advise

## 2024-05-15 NOTE — TELEPHONE ENCOUNTER
Attempted to return call to [facility], held for a long time, no one answered, so I hung up. We have not received a form for pt from [employer].

## 2024-05-20 ENCOUNTER — TELEPHONE (OUTPATIENT)
Dept: PULMONOLOGY | Facility: CLINIC | Age: 59
End: 2024-05-20
Payer: MEDICARE

## 2024-05-31 ENCOUNTER — TELEPHONE (OUTPATIENT)
Dept: PULMONOLOGY | Facility: CLINIC | Age: 59
End: 2024-05-31
Payer: MEDICARE

## 2024-06-06 ENCOUNTER — TELEPHONE (OUTPATIENT)
Dept: ENDOCRINOLOGY | Facility: CLINIC | Age: 59
End: 2024-06-06
Payer: MEDICARE

## 2024-06-06 NOTE — TELEPHONE ENCOUNTER
----- Message from Maryse Samuelsry sent at 6/6/2024  1:01 PM CDT -----  .Type:  Patient Call Back    Who Called: [facility] NURSE       Does the patient know what this is regarding?: SHE WANTED TO LET THE PROVIDER KNOW THAT PT HAS BEEN FORGETTING TO TAKE HER INSULIN NIGHT. PT HAS DONE THIS 3 NIGHTS IN A ROW PLEASE REACH OUT TO PT AS WELL.       Would the patient rather a call back YES     Best Call Back Number: [phone]    Additional Information: Thank You

## 2024-06-11 ENCOUNTER — DOCUMENT SCAN (OUTPATIENT)
Dept: HOME HEALTH SERVICES | Facility: HOSPITAL | Age: 59
End: 2024-06-11
Payer: MEDICARE

## 2024-06-12 ENCOUNTER — EXTERNAL HOME HEALTH (OUTPATIENT)
Dept: HOME HEALTH SERVICES | Facility: HOSPITAL | Age: 59
End: 2024-06-12
Payer: MEDICARE

## 2024-07-16 DIAGNOSIS — R11.0 NAUSEA: ICD-10-CM

## 2024-07-16 DIAGNOSIS — K21.9 GASTROESOPHAGEAL REFLUX DISEASE WITHOUT ESOPHAGITIS: ICD-10-CM

## 2024-07-16 DIAGNOSIS — R10.13 EPIGASTRIC PAIN: ICD-10-CM

## 2024-07-17 RX ORDER — OMEPRAZOLE 40 MG/1
40 CAPSULE, DELAYED RELEASE ORAL 2 TIMES DAILY
Qty: 180 CAPSULE | Refills: 0 | Status: SHIPPED | OUTPATIENT
Start: 2024-07-17

## 2024-08-05 ENCOUNTER — PATIENT MESSAGE (OUTPATIENT)
Dept: PULMONOLOGY | Facility: CLINIC | Age: 59
End: 2024-08-05
Payer: MEDICARE

## 2024-08-05 DIAGNOSIS — J84.9 ILD (INTERSTITIAL LUNG DISEASE): ICD-10-CM

## 2024-08-05 RX ORDER — MYCOPHENOLATE MOFETIL 500 MG/1
1500 TABLET ORAL 2 TIMES DAILY
Qty: 180 TABLET | Refills: 2 | Status: SHIPPED | OUTPATIENT
Start: 2024-08-05

## 2024-08-15 ENCOUNTER — TELEPHONE (OUTPATIENT)
Dept: PAIN MEDICINE | Facility: CLINIC | Age: 59
End: 2024-08-15

## 2024-08-15 ENCOUNTER — OFFICE VISIT (OUTPATIENT)
Dept: PAIN MEDICINE | Facility: CLINIC | Age: 59
End: 2024-08-15
Payer: MEDICARE

## 2024-08-15 VITALS
WEIGHT: 254.63 LBS | HEART RATE: 81 BPM | SYSTOLIC BLOOD PRESSURE: 123 MMHG | DIASTOLIC BLOOD PRESSURE: 58 MMHG | BODY MASS INDEX: 40.92 KG/M2 | HEIGHT: 66 IN

## 2024-08-15 DIAGNOSIS — M51.36 DDD (DEGENERATIVE DISC DISEASE), LUMBAR: ICD-10-CM

## 2024-08-15 DIAGNOSIS — E11.9 TYPE 2 DIABETES MELLITUS TREATED WITH INSULIN: ICD-10-CM

## 2024-08-15 DIAGNOSIS — E11.65 TYPE 2 DIABETES MELLITUS WITH HYPERGLYCEMIA, WITHOUT LONG-TERM CURRENT USE OF INSULIN: ICD-10-CM

## 2024-08-15 DIAGNOSIS — M54.16 LUMBAR RADICULOPATHY: Primary | ICD-10-CM

## 2024-08-15 DIAGNOSIS — M47.816 LUMBAR SPONDYLOSIS: ICD-10-CM

## 2024-08-15 DIAGNOSIS — Z79.4 TYPE 2 DIABETES MELLITUS TREATED WITH INSULIN: ICD-10-CM

## 2024-08-15 PROCEDURE — 99214 OFFICE O/P EST MOD 30 MIN: CPT | Mod: S$PBB,,,

## 2024-08-15 PROCEDURE — 99215 OFFICE O/P EST HI 40 MIN: CPT | Mod: PBBFAC,PO

## 2024-08-15 PROCEDURE — 99999 PR PBB SHADOW E&M-EST. PATIENT-LVL V: CPT | Mod: PBBFAC,,,

## 2024-08-15 RX ORDER — SODIUM CHLORIDE SOLN NEBU 3% 3 %
NEBU SOLN INHALATION
COMMUNITY
Start: 2024-03-07

## 2024-08-15 RX ORDER — ALPRAZOLAM 1 MG/1
1 TABLET, ORALLY DISINTEGRATING ORAL ONCE AS NEEDED
OUTPATIENT
Start: 2024-08-15 | End: 2036-01-12

## 2024-08-15 NOTE — TELEPHONE ENCOUNTER
Please schedule patient for the following procedure:    Physician - Dr Vinson    Type of Procedure/Injection - Lumbar Epidural  L4/5           Laterality - NA      Anxiolysis- Local      Need to hold medication - Yes      NSAIDs for 2 days      Clearance needed - No      Follow up - 4 week

## 2024-08-15 NOTE — PROGRESS NOTES
This note was completed with dictation software and grammatical errors may exist.    CC: neck pain, left arm pain, back pain, left leg pain    HPI:   The patient is a 57-year-old woman with a history of diabetes, rheumatoid arthritis with chronic back pain who presents in referral from Dr. Gibson for  Back pain radiating into the bilateral legs, neck pain radiating into the left arm.  Today she is reporting return of her lower back pain with radiation down left leg into left foot.  She is also reporting some pain radiating into her right buttock and right leg, 8/10, constant, worsened with standing and walking, minimal relief with rest.  She is not finding significant relief with Flexeril, gabapentin.  Does not tolerate Tylenol or NSAIDs.  No new changes to her bowel or bladder function.  She has recently completed formal physical therapy with home health without significant relief of her pain.    Pain intervention history: She had done epidural steroid injections for her back in the past, many years ago.   She is status post C7-T1 interlaminar epidural steroid injection on 09/24/2020 with 100% relief.   She is status post L5/S1 interlaminar epidural steroid injection to the left on 11/12/2020 with 50% relief of her back pain and 100% relief of her left leg pain.   She is status post L5/S1 interlaminar epidural steroid injection on 06/21/2021 with 80% relief.   She is status post L5/S1 interlaminar epidural steroid injection to the left on 06/21/2022 with 20% relief.  She is status post left L3/4 and L4/5 transforaminal epidural steroid injection on 08/16/2022 with 80% relief.  She is status post left intra-articular hip injection on 09/20/2022 with 90% relief of her previous left hip pain. She is status post left L3/4 and L4/5 transforaminal CORBY on 02/22/2022 with 80% relief of her previous left-sided pain.  She is status post left L3/4 and L4/5 transforaminal epidural steroid injection on 11/13/2023 with 100%  relief of her previous left lateral hip and leg pain.  She is status post bilateral L4/5 and L5/S1 diagnostic medial branch block on 01/08/2024 without significant relief.    Spine surgeries:    Antineuropathics: gabapentin 400 milligrams twice daily with some relief of her upper back and low back pain  NSAIDs: cannot tolerate NSAIDs  Physical therapy: had done multiple rounds of physical therapy for her back in the past with increased pain  Antidepressants:  Muscle relaxers:  Opioids: She had received a prescription for Percocet 10/325 on 07/10/2020 which she takes sparingly, she still has some left over, does not like taking these  Antiplatelets/Anticoagulants:    She smokes marijuana on a regular basis, reports that this seems to help her pain in the neck and back and much of her arthritic pain, denies any major side effects from this.  She has used oils and edibles as well, generally has been using a vaporizer as well.    ROS:  She reports weight gain, easy bruising, diabetes, joint stiffness, joint swelling, back pain, memory loss, dizziness, difficulty sleeping, anxiety, depression and loss of balance.  Balance of review of systems is negative.    Lab Results   Component Value Date    HGBA1C 6.9 (H) 11/07/2023       Lab Results   Component Value Date    WBC 10.83 09/28/2023    HGB 13.6 09/28/2023    HCT 43.2 09/28/2023    MCV 84 09/28/2023     09/28/2023             Past Medical History:   Diagnosis Date    a Mild Bilateral Carotid Artery Stenosis     Will Repeat A Carotid U/S In 3-4 Years; 12/19/23 Bilateral Carotid AA U/S = Mild BICA Disease (See Report)    a Premature Ventricular Contractions     Dr. Austin Collier    b Hypertension     c Hypercholesterolemia     d Type 2 DM     e Hypothyroidism     7/7/23 Increased To 50 Mcg Levothyroxine qAM    f Morbid Obesity     f Osteopenia     History of recurrent UTIs     7/10/23 Refd to Dr Kim    i Asthma     i Oxygen dependent     3l nasal cannula    i  Tobacco Use Disorder     j GERD     j H/O Colon Polyps     j Irritable Bowel Syndrome     j Nonalcoholic Steatohepatitis ([facility]     l Bilateral Hip Osteoarthritis     l Bilateral Knee Arthritis     l Carpal tunnel syndrome     l DDD (degenerative disc disease), lumbar     l Lumbar Spinal DDD     l Tarsal Tunnel Syndrome     m Short Term Memory Loss     23 Referred To [facility]rology; 23 Head CT Scan W/O Contrast = Normal (See Report); 23 Vitamin B12 And RPR = Normal; 23 TFTs (On Levothyroxine) = Normal    m Vitamin B12 Deficiency     n Depression And Anxiety     n Insomnia disorder     7/10/23 RXd Seroquel increase from 25 mg QHS to 50 mg QHS    o Allergic rhinosinusitis     On home oxygen therapy 2024    3L    p OU Cataracts     q Lichen Planus     Dr. Lizzy Dominique    q Vitamin D Deficiency     Wellness Visit 2023        Past Surgical History:   Procedure Laterality Date     SECTION      x2    CHOLECYSTECTOMY      COLONOSCOPY   ?    polyps removed per pt report    COLONOSCOPY N/A 2022    Procedure: COLONOSCOPY;  Surgeon: Brett Jasso MD;  Location: [facility] ENDO;  Service: Endoscopy;  Laterality: N/A;    EPIDURAL STEROID INJECTION INTO CERVICAL SPINE N/A 2020    Procedure: Injection-steroid-epidural-cervical to left;  Surgeon: John Vinson MD;  Location: [facility] OR;  Service: Pain Management;  Laterality: N/A;    EPIDURAL STEROID INJECTION INTO LUMBAR SPINE N/A 2020    Procedure: Injection-steroid-epidural-lumbar, l5/s1 to left;  Surgeon: John Vinson MD;  Location: [facility] OR;  Service: Pain Management;  Laterality: N/A;    EPIDURAL STEROID INJECTION INTO LUMBAR SPINE N/A 2021    Procedure: Injection-steroid-epidural-lumbar L5/S1;  Surgeon: John Vinson MD;  Location: [facility] OR;  Service: Pain Management;  Laterality: N/A;    EPIDURAL STEROID INJECTION INTO LUMBAR SPINE N/A 2022    Procedure:  Injection-steroid-epidural-lumbar L5/S1 spread to left;  Surgeon: John Vinson MD;  Location: [facility];  Service: Pain Management;  Laterality: N/A;    ESOPHAGOGASTRODUODENOSCOPY      ESOPHAGOGASTRODUODENOSCOPY N/A 11/09/2022    Procedure: EGD (ESOPHAGOGASTRODUODENOSCOPY);  Surgeon: Brett Jasso MD;  Location: [facility];  Service: Endoscopy;  Laterality: N/A;    INJECTION OF ANESTHETIC AGENT AROUND MEDIAL BRANCH NERVES INNERVATING LUMBAR FACET JOINT Bilateral 1/8/2024    Procedure: Block-nerve-medial branch-lumbar    L4/5, L5/S1;  Surgeon: John Vinson MD;  Location: [facility] OR;  Service: Pain Management;  Laterality: Bilateral;    INJECTION OF JOINT Left 09/20/2022    Procedure: INJECTION, JOINT- hip;  Surgeon: John Vinson MD;  Location: [facility];  Service: Pain Management;  Laterality: Left;    LAPAROSCOPIC CHOLECYSTECTOMY N/A 07/13/2022    Procedure: CHOLECYSTECTOMY, LAPAROSCOPIC;  Surgeon: Ra Santos MD;  Location: [facility] OR;  Service: General;  Laterality: N/A;    TRANSFORAMINAL EPIDURAL INJECTION OF STEROID Left 08/16/2022    Procedure: Injection,steroid,epidural,transforaminal approach L3/4 and L4/5;  Surgeon: John Vinson MD;  Location: [facility];  Service: Pain Management;  Laterality: Left;    TRANSFORAMINAL EPIDURAL INJECTION OF STEROID Left 02/22/2023    Procedure: Injection,steroid,epidural,transforaminal approach L3/4 and L4/5-Left;  Surgeon: John Vinson MD;  Location: [facility] OR;  Service: Pain Management;  Laterality: Left;  l3/4 and l4/5    TRANSFORAMINAL EPIDURAL INJECTION OF STEROID Left 11/13/2023    Procedure: Injection,steroid,epidural,transforaminal approach         L3/4,L4/5;  Surgeon: John Vinson MD;  Location: [facility] OR;  Service: Pain Management;  Laterality: Left;    UPPER GASTROINTESTINAL ENDOSCOPY  2020 ?    unsure of results       Social History     Socioeconomic History    Marital status:    Tobacco Use    Smoking status: Former  "    Average packs/day: 1 pack/day for 51.2 years (51.2 ttl pk-yrs)     Types: Cigarettes, Vaping with nicotine     Start date:      Quit date: 10/1/2022     Years since quittin.8     Passive exposure: Past    Smokeless tobacco: Never   Substance and Sexual Activity    Alcohol use: No    Drug use: Not Currently     Frequency: 20.0 times per week     Types: Marijuana     Comment: daily smoke and edibles    Sexual activity: Not Currently     Social Determinants of Health     Financial Resource Strain: High Risk (2024)    Overall Financial Resource Strain (CARDIA)     Difficulty of Paying Living Expenses: Hard   Food Insecurity: Food Insecurity Present (2024)    Hunger Vital Sign     Worried About Running Out of Food in the Last Year: Often true     Ran Out of Food in the Last Year: Sometimes true   Transportation Needs: Unmet Transportation Needs (2024)    PRAPARE - Transportation     Lack of Transportation (Medical): Yes     Lack of Transportation (Non-Medical): Yes   Physical Activity: Inactive (2024)    Exercise Vital Sign     Days of Exercise per Week: 0 days     Minutes of Exercise per Session: 0 min   Stress: Stress Concern Present (2024)    [place] [employer] - Occupational Stress Questionnaire     Feeling of Stress : Rather much   Housing Stability: Low Risk  (2024)    Housing Stability Vital Sign     Unable to Pay for Housing in the Last Year: No     Number of Places Lived in the Last Year: 1     Unstable Housing in the Last Year: No         Medications/Allergies: See med card    Vitals:    08/15/24 1432   BP: (!) 123/58   Pulse: 81   Weight: 115.5 kg (254 lb 10.1 oz)   Height: 5' 6" (1.676 m)   PainSc:   8   PainLoc: Back             Physical exam:  Gen: A and O x3, pleasant, well-groomed  Skin: No rashes or obvious lesions  HEENT: PERRLA, no obvious deformities on ears or in canals.Trachea midline.  CVS: Regular rate and rhythm, normal palpable " pulses.  Abdomen: Soft, NT/ND.  Musculoskeletal:  Presents in wheelchair today    Neuro:  Lower extremities: 5/5 strength bilaterally except for 5/5 left hip flexion,   Reflexes:  Patellar 1+, Achilles 1+ bilaterally.  Sensory: Intact and symmetrical to light touch and pinprick in L2-S1 dermatomes bilaterally.        Lumbar spine:  Lumbar spine:   Range of motion is moderately decreased with flexion with severe increased low back pain during each maneuver.  Rony's test causes no increased pain on either side.    Supine straight leg raise is negative bilaterally.  Internal and external rotation of the hip causes severe groin pain bilaterally.  Myofascial exam:  There is tenderness to palpation over the midline lumbar spine and lumbar paraspinous musculature.      Imagin20 MRI C-spine:  ALIGNMENT: Normal.  Lateral masses of C1 and C2 are congruent.  Slight reversal of the normal lordotic curvature.   BONES: Vertebral body heights are maintained.  Multilevel endplate changes with mild type 1 endplate changes at C4-5.  No aggressive bone marrow signal.   PARASPINAL AREA: Normal.   CERVICAL DISC LEVELS:  C2-C3: No disc herniation or significant posterior osseous ridging. No significant spinal canal or foraminal stenosis.   C3-C4: Mild disc osteophyte complex with prominent central component.  Moderate left facet hypertrophy.  Slight ventral cord flattening with preserved ventral and dorsal CSF.  Mild left foraminal stenosis.   C4-C5: Mild disc osteophyte complex.  Mild-moderate left facet hypertrophy.  Mild ventral cord flattening within sliver preserved ventral and preserved dorsal CSF.  Moderate right and mild-moderate left foraminal stenosis.   C5-C6: Mild-moderate disc osteophyte complex with prominent bilateral uncovertebral spurring.  Mild ventral cord flattening within sliver preserved ventral and preserved dorsal CSF.  Moderate-severe bilateral foraminal stenosis.   C6-C7: Mild-moderate disc  osteophyte complex.  Slight ventral cord flattening with preserved ventral and dorsal CSF.  Moderate left and mild right foraminal stenosis.   C7-T1: No disc herniation or significant posterior osseous ridging.  Mild left facet hypertrophy.  No significant spinal canal or foraminal stenosis.     6/4/18 MRI C-spine:  There is mild lumbar dextrocurvature.  The vertebral body alignment and vertebral body heights are maintained.  The paravertebral soft tissues appear within normal limits.  No marrow replacement process or fracture.  The visualized distal spinal cord and conus medullaris are within normal limits.  The conus terminates at L1.   L1-2: Normal disc signal and disc space height.  Minimal disc bulge seen.  No central canal foraminal stenosis   L2-3: There is disc desiccation and mild disc space narrowing.  There is mild moderate diffuse disc bulge asymmetric right results in mild right lateral recess narrowing with mild abutment of the descending right L3 nerve root.  There is mild moderate right-sided foraminal stenosis.  No central canal stenosis   L3-4: There is disc desiccation.  There is mild moderate diffuse disc bulge.  There is mild bilateral facet arthrosis.  There is mild moderate left neural foraminal stenosis.  There is no central canal stenosis.   L4-5 there is disc desiccation and mild disc space narrowing.  There is mild moderate bilateral facet arthrosis.  There is moderate diffuse disc bulge asymmetric left resulting in mild moderate left-sided foraminal stenosis with mild abutment of the exited left L4 nerve root.  No central canal stenosis.   L5-S1: There is mild diffuse disc bulge.  There is moderate to severe right and mild moderate left facet arthrosis.  There is no central canal stenosis or significant neural foraminal narrowing.      X-ray right and left hip 08/28/2018:  Normal right and left hips.  No significant osteoarthritis.    DEXA 7/29/19:  Spine bone mineral density is 1.047  g/cm 2 with T-score -1.1 and Z-score -1.4.  This compares to previous bone mineral density measurement of 1.075 and T-score of -0.9.  Femoral total mean bone mineral density measurement is 0.958 g/cm 2 with T-score -0.4 and Z-score -0.5.  This compares to previous bone mineral density measurement of 1.028 and T-score of 0.2.  FRAX measurement is provided of 10 year major osteoporotic fracture 10.9 % and hip fracture 0.8 %.    04/25/2022 MRI lumbar spine  T12-L1: Mild bilateral facet arthropathy.  Ligamentum flavum thickening.  No neural foraminal or spinal canal stenosis.  L1-L2: Minimal diffuse disc bulge.  Mild bilateral facet arthropathy.  There is no neural foraminal stenosis.  There is no spinal canal stenosis.  L2-L3: Right asymmetric diffuse disc bulge.  Mild bilateral facet arthropathy.  Mild-to-moderate right neural foraminal stenosis.  Mild spinal canal stenosis.  L3-L4: Left asymmetric severe disc space narrowing with prominent degenerative endplate change and marginal osteophyte formation, progressed since the prior study.  Diffuse disc bulge with osteophytic ridging.  Left subarticular/foraminal disc extrusion has significantly decreased in size since the prior study.  Moderate, left greater than right, facet arthropathy.  Ligamentum flavum thickening.  Severe left neural foraminal stenosis.  Moderate spinal canal stenosis.  Left greater than right lateral recess stenosis.  L4-L5: Diffuse disc bulge with osteophytic ridging.  Moderate bilateral facet arthropathy.  Ligamentum flavum thickening.  Moderate left and mild right neural foraminal stenosis.  Mild spinal canal stenosis.  L5-S1: Mild diffuse disc bulge with osteophytic ridging.  Severe right and moderate left facet arthropathy.  Mild right neural foraminal stenosis.  There is no spinal canal stenosis.    Assessment:  The patient is a 57-year-old woman with a history of diabetes, rheumatoid arthritis with chronic back pain who presents in referral  from Dr. Gibson for  Back pain radiating into the bilateral legs, neck pain radiating into the left arm.    1. Lumbar radiculopathy        2. Lumbar spondylosis        3. DDD (degenerative disc disease), lumbar        4. Type 2 diabetes mellitus treated with insulin        5. Type 2 diabetes mellitus with hyperglycemia, without long-term current use of insulin              Plan:  I believe her lumbar radicular pain has returned.  She reports today in a wheelchair due to severity of pain.  She is not finding relief with Flexeril and gabapentin.  No relief with home health physical therapy.  To address her bilateral lumbar radicular pain I would like to schedule her for an L4/5 interlaminar epidural steroid injection.  Previous left-sided approach gave her 100% relief lasting over 3 months.  Follow-up 4 weeks post procedure or sooner if needed.  If she fails to get lasting relief with this will update her lumbar MRI

## 2024-08-15 NOTE — TELEPHONE ENCOUNTER
Spoke with pt, scheduled procedure with Dr. Vinson, Reviewed pre op instructions. Scheduled follow up.

## 2024-08-19 ENCOUNTER — OFFICE VISIT (OUTPATIENT)
Dept: PULMONOLOGY | Facility: CLINIC | Age: 59
End: 2024-08-19
Payer: MEDICARE

## 2024-08-19 ENCOUNTER — LAB VISIT (OUTPATIENT)
Dept: LAB | Facility: HOSPITAL | Age: 59
End: 2024-08-19
Attending: INTERNAL MEDICINE
Payer: MEDICARE

## 2024-08-19 VITALS — OXYGEN SATURATION: 93 % | HEART RATE: 86 BPM | SYSTOLIC BLOOD PRESSURE: 111 MMHG | DIASTOLIC BLOOD PRESSURE: 72 MMHG

## 2024-08-19 DIAGNOSIS — Z79.899 DRUG-INDUCED IMMUNODEFICIENCY: ICD-10-CM

## 2024-08-19 DIAGNOSIS — G47.33 OSA (OBSTRUCTIVE SLEEP APNEA): ICD-10-CM

## 2024-08-19 DIAGNOSIS — D84.821 DRUG-INDUCED IMMUNODEFICIENCY: ICD-10-CM

## 2024-08-19 DIAGNOSIS — J84.170 INTERSTITIAL LUNG DISEASE WITH PROGRESSIVE FIBROTIC PHENOTYPE IN DISEASES CLASSIFIED ELSEWHERE: ICD-10-CM

## 2024-08-19 DIAGNOSIS — J84.9 ILD (INTERSTITIAL LUNG DISEASE): Primary | ICD-10-CM

## 2024-08-19 DIAGNOSIS — E66.01 MORBID OBESITY WITH BMI OF 40.0-44.9, ADULT: ICD-10-CM

## 2024-08-19 DIAGNOSIS — J84.9 ILD (INTERSTITIAL LUNG DISEASE): ICD-10-CM

## 2024-08-19 DIAGNOSIS — J96.11 CHRONIC RESPIRATORY FAILURE WITH HYPOXIA: ICD-10-CM

## 2024-08-19 LAB
ALBUMIN SERPL BCP-MCNC: 3.6 G/DL (ref 3.5–5.2)
ALP SERPL-CCNC: 123 U/L (ref 55–135)
ALT SERPL W/O P-5'-P-CCNC: 53 U/L (ref 10–44)
AST SERPL-CCNC: 58 U/L (ref 10–40)
BASOPHILS # BLD AUTO: 0.03 K/UL (ref 0–0.2)
BASOPHILS NFR BLD: 0.5 % (ref 0–1.9)
BILIRUB DIRECT SERPL-MCNC: 0.3 MG/DL (ref 0.1–0.3)
BILIRUB SERPL-MCNC: 0.7 MG/DL (ref 0.1–1)
DIFFERENTIAL METHOD BLD: ABNORMAL
EOSINOPHIL # BLD AUTO: 0.1 K/UL (ref 0–0.5)
EOSINOPHIL NFR BLD: 2.1 % (ref 0–8)
ERYTHROCYTE [DISTWIDTH] IN BLOOD BY AUTOMATED COUNT: 18.1 % (ref 11.5–14.5)
HCT VFR BLD AUTO: 40.5 % (ref 37–48.5)
HGB BLD-MCNC: 12.4 G/DL (ref 12–16)
IMM GRANULOCYTES # BLD AUTO: 0.01 K/UL (ref 0–0.04)
IMM GRANULOCYTES NFR BLD AUTO: 0.2 % (ref 0–0.5)
LYMPHOCYTES # BLD AUTO: 2.1 K/UL (ref 1–4.8)
LYMPHOCYTES NFR BLD: 31.3 % (ref 18–48)
MCH RBC QN AUTO: 25.9 PG (ref 27–31)
MCHC RBC AUTO-ENTMCNC: 30.6 G/DL (ref 32–36)
MCV RBC AUTO: 85 FL (ref 82–98)
MONOCYTES # BLD AUTO: 0.5 K/UL (ref 0.3–1)
MONOCYTES NFR BLD: 7.4 % (ref 4–15)
NEUTROPHILS # BLD AUTO: 3.9 K/UL (ref 1.8–7.7)
NEUTROPHILS NFR BLD: 58.5 % (ref 38–73)
NRBC BLD-RTO: 0 /100 WBC
PLATELET # BLD AUTO: 176 K/UL (ref 150–450)
PMV BLD AUTO: 10.3 FL (ref 9.2–12.9)
PROT SERPL-MCNC: 7.2 G/DL (ref 6–8.4)
RBC # BLD AUTO: 4.78 M/UL (ref 4–5.4)
WBC # BLD AUTO: 6.62 K/UL (ref 3.9–12.7)

## 2024-08-19 PROCEDURE — 99213 OFFICE O/P EST LOW 20 MIN: CPT | Mod: PBBFAC,PO,TXP | Performed by: INTERNAL MEDICINE

## 2024-08-19 PROCEDURE — 85025 COMPLETE CBC W/AUTO DIFF WBC: CPT | Performed by: INTERNAL MEDICINE

## 2024-08-19 PROCEDURE — 80076 HEPATIC FUNCTION PANEL: CPT | Performed by: INTERNAL MEDICINE

## 2024-08-19 PROCEDURE — 36415 COLL VENOUS BLD VENIPUNCTURE: CPT | Performed by: INTERNAL MEDICINE

## 2024-08-19 PROCEDURE — 99999 PR PBB SHADOW E&M-EST. PATIENT-LVL III: CPT | Mod: PBBFAC,TXP,, | Performed by: INTERNAL MEDICINE

## 2024-08-19 PROCEDURE — 99214 OFFICE O/P EST MOD 30 MIN: CPT | Mod: S$PBB,NTX,, | Performed by: INTERNAL MEDICINE

## 2024-08-19 NOTE — PROGRESS NOTES
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 with progressive fibrotic phenotype in diseases classified elsewhere - Primary    Chronic respiratory failure with hypoxia       Immunology/Multi System    Drug-induced immunodeficiency       Endocrine    Morbid obesity with BMI of 40.0-44.9, adult       Other    MOLINA (obstructive sleep apnea)           Follow up in about 3 months (around 11/19/2024).    Discussed with patient above for education the following:      Patient Instructions   Restart MMF 3 pills twice daily  continue Ofev- let me know if diarrhea recurs I can drop down to lower dose    Continue trelegy inhaler- prescription sent to pharmacy  Continue oxygen 3L  PFT and walk test 1 month  Biotene spray over the counter for dry mouth

## 2024-08-19 NOTE — PATIENT INSTRUCTIONS
Restart MMF 3 pills twice daily  continue Ofev- let me know if diarrhea recurs I can drop down to lower dose    Continue trelegy inhaler- prescription sent to pharmacy  Continue oxygen 3L  PFT and walk test 1 month  Biotene spray over the counter for dry mouth

## 2024-08-27 DIAGNOSIS — K21.9 GASTROESOPHAGEAL REFLUX DISEASE WITHOUT ESOPHAGITIS: ICD-10-CM

## 2024-08-27 DIAGNOSIS — R11.0 NAUSEA: ICD-10-CM

## 2024-08-27 DIAGNOSIS — R10.13 EPIGASTRIC PAIN: ICD-10-CM

## 2024-08-28 RX ORDER — OMEPRAZOLE 40 MG/1
40 CAPSULE, DELAYED RELEASE ORAL 2 TIMES DAILY
Qty: 180 CAPSULE | Refills: 0 | Status: SHIPPED | OUTPATIENT
Start: 2024-08-28

## 2024-09-16 ENCOUNTER — HOSPITAL ENCOUNTER (OUTPATIENT)
Dept: PULMONOLOGY | Facility: HOSPITAL | Age: 59
Discharge: HOME | End: 2024-09-16
Attending: INTERNAL MEDICINE
Payer: MEDICARE

## 2024-09-16 DIAGNOSIS — J84.9 ILD (INTERSTITIAL LUNG DISEASE): ICD-10-CM

## 2024-09-16 LAB
DLCO SINGLE BREATH LLN: 18.11
DLCO SINGLE BREATH PRE REF: 37.5 %
DLCO SINGLE BREATH REF: 23.84
DLCOC SBVA LLN: 3.14
DLCOC SBVA REF: 4.52
DLCOC SINGLE BREATH LLN: 18.11
DLCOC SINGLE BREATH REF: 23.84
DLCOVA LLN: 3.14
DLCOVA PRE REF: 56.6 %
DLCOVA PRE: 2.56 ML/(MIN*MMHG*L) (ref 3.14–5.9)
DLCOVA REF: 4.52
ERV LLN: -16449.19
ERV PRE REF: 90.3 %
ERV REF: 0.81
FEF 25 75 CHG: 3.4 %
FEF 25 75 LLN: 1.54
FEF 25 75 POST REF: 79.1 %
FEF 25 75 PRE REF: 76.5 %
FEF 25 75 REF: 2.94
FET100 CHG: -10.8 %
FEV1 CHG: 2.7 %
FEV1 FVC CHG: 0.5 %
FEV1 FVC LLN: 67
FEV1 FVC POST REF: 99.7 %
FEV1 FVC PRE REF: 99.2 %
FEV1 FVC REF: 79
FEV1 LLN: 1.99
FEV1 POST REF: 90.6 %
FEV1 PRE REF: 88.3 %
FEV1 REF: 2.63
FRCPLETH LLN: 1.99
FRCPLETH PREREF: 71.3 %
FRCPLETH REF: 2.82
FVC CHG: 2.2 %
FVC LLN: 2.54
FVC POST REF: 90.2 %
FVC PRE REF: 88.3 %
FVC REF: 3.36
IVC PRE: 2.67 L (ref 2.54–4.22)
IVC SINGLE BREATH LLN: 2.54
IVC SINGLE BREATH PRE REF: 79.5 %
IVC SINGLE BREATH REF: 3.36
MVV LLN: 85
MVV PRE REF: 70.7 %
MVV REF: 100
PEF CHG: 3.1 %
PEF LLN: 4.73
PEF POST REF: 90.1 %
PEF PRE REF: 87.4 %
PEF REF: 6.55
POST FEF 25 75: 2.33 L/S (ref 1.54–4.34)
POST FET 100: 9.49 SEC
POST FEV1 FVC: 78.71 % (ref 66.87–89.28)
POST FEV1: 2.39 L (ref 1.99–3.26)
POST FVC: 3.03 L (ref 2.54–4.22)
POST PEF: 5.9 L/S (ref 4.73–8.37)
PRE DLCO: 8.95 ML/(MIN*MMHG) (ref 18.11–29.57)
PRE ERV: 0.73 L (ref -16449.19–16450.81)
PRE FEF 25 75: 2.25 L/S (ref 1.54–4.34)
PRE FET 100: 10.64 SEC
PRE FEV1 FVC: 78.32 % (ref 66.87–89.28)
PRE FEV1: 2.33 L (ref 1.99–3.26)
PRE FRC PL: 2.01 L (ref 1.99–3.64)
PRE FVC: 2.97 L (ref 2.54–4.22)
PRE MVV: 70.64 L/MIN (ref 84.89–114.85)
PRE PEF: 5.73 L/S (ref 4.73–8.37)
PRE RV: 1.28 L (ref 1.43–2.59)
PRE TLC: 4.25 L (ref 4.29–6.26)
RAW LLN: 3.06
RAW PRE REF: 104.3 %
RAW PRE: 3.19 CMH2O*S/L (ref 3.06–3.06)
RAW REF: 3.06
RV LLN: 1.43
RV PRE REF: 63.6 %
RV REF: 2.01
RVTLC LLN: 30
RVTLC PRE REF: 75.8 %
RVTLC PRE: 30.11 % (ref 30.11–49.29)
RVTLC REF: 40
TLC LLN: 4.29
TLC PRE REF: 80.6 %
TLC REF: 5.27
VA PRE: 3.5 L (ref 5.12–5.12)
VA SINGLE BREATH LLN: 5.12
VA SINGLE BREATH PRE REF: 68.3 %
VA SINGLE BREATH REF: 5.12
VC LLN: 2.54
VC PRE REF: 88.3 %
VC PRE: 2.97 L (ref 2.54–4.22)
VC REF: 3.36

## 2024-09-16 PROCEDURE — 94060 EVALUATION OF WHEEZING: CPT

## 2024-09-16 PROCEDURE — 94729 DIFFUSING CAPACITY: CPT

## 2024-09-16 PROCEDURE — 94618 PULMONARY STRESS TESTING: CPT | Performed by: INTERNAL MEDICINE

## 2024-09-16 PROCEDURE — 94726 PLETHYSMOGRAPHY LUNG VOLUMES: CPT

## 2024-09-16 RX ORDER — ALBUTEROL SULFATE 2.5 MG/.5ML
SOLUTION RESPIRATORY (INHALATION)
Status: DISCONTINUED
Start: 2024-09-16 | End: 2024-09-17 | Stop reason: HOSPADM

## 2024-09-19 ENCOUNTER — HOSPITAL ENCOUNTER (OUTPATIENT)
Dept: RADIOLOGY | Facility: HOSPITAL | Age: 59
Discharge: HOME | End: 2024-09-19
Attending: ANESTHESIOLOGY | Admitting: ANESTHESIOLOGY
Payer: MEDICARE

## 2024-09-19 ENCOUNTER — HOSPITAL ENCOUNTER (OUTPATIENT)
Facility: HOSPITAL | Age: 59
Discharge: HOME | End: 2024-09-19
Attending: ANESTHESIOLOGY | Admitting: ANESTHESIOLOGY
Payer: MEDICARE

## 2024-09-19 VITALS
OXYGEN SATURATION: 97 % | WEIGHT: 252 LBS | HEIGHT: 66 IN | HEART RATE: 83 BPM | DIASTOLIC BLOOD PRESSURE: 85 MMHG | TEMPERATURE: 97 F | SYSTOLIC BLOOD PRESSURE: 144 MMHG | RESPIRATION RATE: 18 BRPM | BODY MASS INDEX: 40.5 KG/M2

## 2024-09-19 DIAGNOSIS — M54.50 LOWER BACK PAIN: ICD-10-CM

## 2024-09-19 DIAGNOSIS — M54.16 LUMBAR RADICULOPATHY: ICD-10-CM

## 2024-09-19 LAB — GLUCOSE SERPL-MCNC: 112 MG/DL (ref 70–110)

## 2024-09-19 PROCEDURE — 63600175 PHARM REV CODE 636 W HCPCS: Mod: PO,TXP | Performed by: ANESTHESIOLOGY

## 2024-09-19 PROCEDURE — 62323 NJX INTERLAMINAR LMBR/SAC: CPT | Mod: PO,TXP | Performed by: ANESTHESIOLOGY

## 2024-09-19 PROCEDURE — 62323 NJX INTERLAMINAR LMBR/SAC: CPT | Mod: NTX,,, | Performed by: ANESTHESIOLOGY

## 2024-09-19 PROCEDURE — 82962 GLUCOSE BLOOD TEST: CPT | Mod: PO,TXP | Performed by: ANESTHESIOLOGY

## 2024-09-19 PROCEDURE — 25000003 PHARM REV CODE 250: Mod: PO,TXP | Performed by: ANESTHESIOLOGY

## 2024-09-19 RX ORDER — ALPRAZOLAM 0.5 MG/1
1 TABLET, ORALLY DISINTEGRATING ORAL ONCE AS NEEDED
Status: COMPLETED | OUTPATIENT
Start: 2024-09-19 | End: 2024-09-19

## 2024-09-19 RX ORDER — METHYLPREDNISOLONE ACETATE 80 MG/ML
INJECTION, SUSPENSION INTRA-ARTICULAR; INTRALESIONAL; INTRAMUSCULAR; SOFT TISSUE
Status: DISCONTINUED | OUTPATIENT
Start: 2024-09-19 | End: 2024-09-19 | Stop reason: HOSPADM

## 2024-09-19 RX ORDER — LIDOCAINE HYDROCHLORIDE 10 MG/ML
INJECTION, SOLUTION EPIDURAL; INFILTRATION; INTRACAUDAL; PERINEURAL
Status: DISCONTINUED | OUTPATIENT
Start: 2024-09-19 | End: 2024-09-19 | Stop reason: HOSPADM

## 2024-09-19 RX ADMIN — ALPRAZOLAM 0.5 MG: 0.5 TABLET, ORALLY DISINTEGRATING ORAL at 03:09

## 2024-09-19 NOTE — H&P
CC: Back pain    HPI: The patient is a 60yo woman with a history of lumbar radiculopathy here for L4/5 CORBY. There are no major changes in history and physical from 8/15/24.    Past Medical History:   Diagnosis Date    a Mild Bilateral Carotid Artery Stenosis     Will Repeat A Carotid U/S In 3-4 Years; 23 Bilateral Carotid AA U/S = Mild BICA Disease (See Report)    a Premature Ventricular Contractions     Dr. Austin ernst Hypertension     c Hypercholesterolemia     d Type 2 DM     e Hypothyroidism     23 Increased To 50 Mcg Levothyroxine qAM    f Morbid Obesity     f Osteopenia     History of recurrent UTIs     7/10/23 Refd to Dr Julio gray Asthma     i Oxygen dependent     3l nasal cannula    i Tobacco Use Disorder     j GERD     j H/O Colon Polyps     j Irritable Bowel Syndrome     j Nonalcoholic Steatohepatitis ([place].)     l Bilateral Hip Osteoarthritis     l Bilateral Knee Arthritis     l Carpal tunnel syndrome     l DDD (degenerative disc disease), lumbar     l Lumbar Spinal DDD     l Tarsal Tunnel Syndrome     m Short Term Memory Loss     23 Referred To [facility]rology; 23 Head CT Scan W/O Contrast = Normal (See Report); 23 Vitamin B12 And RPR = Normal; 23 TFTs (On Levothyroxine) = Normal    m Vitamin B12 Deficiency     n Depression And Anxiety     n Insomnia disorder     7/10/23 RXd Seroquel increase from 25 mg QHS to 50 mg QHS    o Allergic rhinosinusitis     On home oxygen therapy 2024    3L    p OU Cataracts     q Lichen Planus     Dr. Lizzy Dominique    q Vitamin D Deficiency     Wellness Visit 2023        Past Surgical History:   Procedure Laterality Date     SECTION      x2    CHOLECYSTECTOMY      COLONOSCOPY   ?    polyps removed per pt report    COLONOSCOPY N/A 2022    Procedure: COLONOSCOPY;  Surgeon: Brett Jasso MD;  Location: [facility];  Service: Endoscopy;  Laterality: N/A;    EPIDURAL STEROID INJECTION INTO CERVICAL SPINE  N/A 09/24/2020    Procedure: Injection-steroid-epidural-cervical to left;  Surgeon: John Vinson MD;  Location: [facility];  Service: Pain Management;  Laterality: N/A;    EPIDURAL STEROID INJECTION INTO LUMBAR SPINE N/A 11/12/2020    Procedure: Injection-steroid-epidural-lumbar, l5/s1 to left;  Surgeon: John Vinson MD;  Location: [facility];  Service: Pain Management;  Laterality: N/A;    EPIDURAL STEROID INJECTION INTO LUMBAR SPINE N/A 06/21/2021    Procedure: Injection-steroid-epidural-lumbar L5/S1;  Surgeon: John Vinson MD;  Location: [facility];  Service: Pain Management;  Laterality: N/A;    EPIDURAL STEROID INJECTION INTO LUMBAR SPINE N/A 06/21/2022    Procedure: Injection-steroid-epidural-lumbar L5/S1 spread to left;  Surgeon: John Vinson MD;  Location: [facility];  Service: Pain Management;  Laterality: N/A;    ESOPHAGOGASTRODUODENOSCOPY      ESOPHAGOGASTRODUODENOSCOPY N/A 11/09/2022    Procedure: EGD (ESOPHAGOGASTRODUODENOSCOPY);  Surgeon: Brett Jasso MD;  Location: [facility];  Service: Endoscopy;  Laterality: N/A;    INJECTION OF ANESTHETIC AGENT AROUND MEDIAL BRANCH NERVES INNERVATING LUMBAR FACET JOINT Bilateral 1/8/2024    Procedure: Block-nerve-medial branch-lumbar    L4/5, L5/S1;  Surgeon: John Vinson MD;  Location: [facility];  Service: Pain Management;  Laterality: Bilateral;    INJECTION OF JOINT Left 09/20/2022    Procedure: INJECTION, JOINT- hip;  Surgeon: John Vinson MD;  Location: [facility];  Service: Pain Management;  Laterality: Left;    LAPAROSCOPIC CHOLECYSTECTOMY N/A 07/13/2022    Procedure: CHOLECYSTECTOMY, LAPAROSCOPIC;  Surgeon: Ra Santos MD;  Location: [facility];  Service: General;  Laterality: N/A;    TRANSFORAMINAL EPIDURAL INJECTION OF STEROID Left 08/16/2022    Procedure: Injection,steroid,epidural,transforaminal approach L3/4 and L4/5;  Surgeon: John Vinson MD;  Location: [facility];  Service: Pain Management;  Laterality: Left;     TRANSFORAMINAL EPIDURAL INJECTION OF STEROID Left 2023    Procedure: Injection,steroid,epidural,transforaminal approach L3/4 and L4/5-Left;  Surgeon: John Vinson MD;  Location: [facility];  Service: Pain Management;  Laterality: Left;  l3/4 and l4/5    TRANSFORAMINAL EPIDURAL INJECTION OF STEROID Left 2023    Procedure: Injection,steroid,epidural,transforaminal approach         L3/4,L4/5;  Surgeon: John Vinson MD;  Location: [facility] OR;  Service: Pain Management;  Laterality: Left;    UPPER GASTROINTESTINAL ENDOSCOPY   ?    unsure of results       Family History   Problem Relation Name Age of Onset    Arthritis Mother      Diabetes Mother      Hyperlipidemia Mother      Cancer Mother          uterine     Allergies Mother      Ovarian cancer Mother  58    Aneurysm Father      Hyperlipidemia Father      Breast cancer Maternal Aunt  65    Cancer Maternal Uncle          brain    Cancer Paternal Aunt          breast, bone and lung     Breast cancer Paternal Aunt  78    Breast cancer Maternal Cousin 1st 43    Brain cancer Maternal Cousin 1st     Cirrhosis Neg Hx         Social History     Socioeconomic History    Marital status:    Tobacco Use    Smoking status: Former     Average packs/day: 1 pack/day for 51.2 years (51.2 ttl pk-yrs)     Types: Cigarettes, Vaping with nicotine     Start date:      Quit date: 10/1/2022     Years since quittin.9     Passive exposure: Past    Smokeless tobacco: Never   Substance and Sexual Activity    Alcohol use: No    Drug use: Not Currently     Frequency: 20.0 times per week     Types: Marijuana     Comment: daily smoke and edibles    Sexual activity: Not Currently     Social Determinants of Health     Financial Resource Strain: High Risk (2024)    Overall Financial Resource Strain (CARDIA)     Difficulty of Paying Living Expenses: Hard   Food Insecurity: Food Insecurity Present (2024)    Hunger Vital Sign     Worried About Running Out  "of Food in the Last Year: Often true     Ran Out of Food in the Last Year: Sometimes true   Transportation Needs: Unmet Transportation Needs (1/8/2024)    PRAPARE - Transportation     Lack of Transportation (Medical): Yes     Lack of Transportation (Non-Medical): Yes   Physical Activity: Inactive (1/8/2024)    Exercise Vital Sign     Days of Exercise per Week: 0 days     Minutes of Exercise per Session: 0 min   Stress: Stress Concern Present (1/8/2024)    [employer] - Occupational Stress Questionnaire     Feeling of Stress : Rather much   Housing Stability: Low Risk  (1/8/2024)    Housing Stability Vital Sign     Unable to Pay for Housing in the Last Year: No     Number of Places Lived in the Last Year: 1     Unstable Housing in the Last Year: No       No current facility-administered medications for this encounter.       Review of patient's allergies indicates:   Allergen Reactions    Iodine and iodide containing products Blisters     Reports it caused lichen planus flare up    Asa [aspirin] Itching and Other (See Comments)     Skin problem      Atorvastatin      Worsened Lichen Planus    Contact metal agent      Other reaction(s): Other (See Comments)    Crestor [rosuvastatin]      Worsens her lichen planus    Doxycycline      Stomach issue/severe diarrhea    Iodine Hives    Lasix [furosemide]      rash    Lisinopril      Hives    Losartan      Confusion    Lovastatin      Worsened Lichen Planus    Metformin Other (See Comments)     Stomach cramps     Salicylates     Sulfamethoxazole Hives    Sulfur      Other reaction(s): Other (See Comments)    Sulfur, elemental     Valsartan      breakouts    Latex Other (See Comments)     Skin problem         Vitals:    09/16/24 1104 09/19/24 1413   BP:  (!) 140/68   Pulse:  90   Resp:  20   Temp:  97 °F (36.1 °C)   TempSrc:  Skin   SpO2:  95%   Weight: 115.2 kg (254 lb) 114.3 kg (252 lb)   Height: 5' 6" (1.676 m) 5' 6" (1.676 m)       ASA 2, " Mallampati 2    REVIEW OF SYSTEMS:     GENERAL: No weight loss, malaise or fevers.  HEENT:  No recent changes in vision or hearing  NECK: Negative for lumps, no difficulty with swallowing.  RESPIRATORY: Negative for cough, wheezing or shortness of breath, patient denies any recent URI.  CARDIOVASCULAR: Negative for chest pain, leg swelling or palpitations.  GI: Negative for abdominal discomfort, blood in stools or black stools or change in bowel habits.  MUSCULOSKELETAL: See HPI.  SKIN: Negative for lesions, rash, and itching.  PSYCH: No suicidal or homicidal ideations, no current mood disturbances.  HEMATOLOGY/LYMPHOLOGY: Negative for prolonged bleeding, bruising easily or swollen nodes. Patient is not currently taking any anti-coagulants  ENDO: No history of diabetes or thyroid dysfunction  NEURO: No history of syncope, paralysis, seizures or tremors.All other reviewed and negative other than HPI.    Physical exam:  Gen: A and O x3, pleasant, well-groomed  Skin: No rashes or obvious lesions  HEENT: PERRLA, no obvious deformities on ears or in canals. No thyroid masses, trachea midline, no palpable lymph nodes in neck, axilla.  CVS: Regular rate and rhythm, normal S1 and S2, no murmurs.  Resp: Clear to auscultation bilaterally.  Abdomen: Soft, NT/ND, normal bowel sounds present.  Musculoskeletal/Neuro: Moving all extremities    Assessment:  Lumbar radiculopathy  -     Place in Outpatient; Standing  -     Vital signs; Standing  -     Verify informed consent; Standing  -     Notify physician ; Standing  -     Notify physician ; Standing  -     Notify physician (specify); Standing  -     Diet NPO; Standing  -     alprazolam ODT dissolvable tablet 1 mg    Other orders  -     IP VTE LOW RISK PATIENT; Standing  -     POCT Glucose, Hand-Held Device; Standing  -     Oxygen Continuous; Standing

## 2024-09-19 NOTE — OP NOTE
PROCEDURE DATE: 9/19/2024    Lumbar Interlaminar Epidural Steroid Injection under Fluoroscopic Guidance, AP Approach.    Procedure:   Interlaminar epidural steroid injection at L4/5 under fluoroscopic guidance.    Diagnosis: lUMBAR Radiculopathy    pOSTOP DIAGNOSIS: sAME    Physician: John Vinson M.D.    Medications injected:80 mg methylprednisone with 4 ml of preservative free NaCl    Local anesthetic injected:    Lidocaine 1% 4 ml total    Sedation Medications: none    Estimated blood loss:  none    Complications:  none    Technique:  Time-out taken to identify patient and procedure prior to starting the procedure.  With the patient laying in a prone position, the area was prepped and draped in the usual sterile fashion using ChloraPrep and a fenestrated drape.  After determining the target level with an AP fluoroscopic view, local anesthetic was given using a 25-gauge 1.5 inch needle by raising a wheal and then infiltrating toward the interlaminar entry space.  A 3.5inch 20-gauge Touhy needle was introduced under AP fluoroscopic guidance to the interlaminar space of L4/5. Once the trajectory was established, the needle was visualized in the lateral view and advanced using loss of resistance technique. Once in the desired position, no contrast was injected due to allergy.  The medication was then injected slowly. The patient tolerated the procedure well.      The patient was monitored after the procedure.   They were given post-procedure and discharge instructions to follow at home.  The patient was discharged in a stable condition.

## 2024-09-19 NOTE — DISCHARGE SUMMARY
[facility] - Surgery  Discharge Note  Short Stay    Procedure(s) (LRB):  Injection-steroid-epidural-lumbar L4/5 (N/A)      OUTCOME: Patient tolerated treatment/procedure well without complication and is now ready for discharge.    DISPOSITION: Home or Self Care    FINAL DIAGNOSIS:  Lumbar radiculopathy    FOLLOWUP: In clinic    DISCHARGE INSTRUCTIONS:    Discharge Procedure Orders   Diet Adult Regular     No dressing needed     Notify your health care provider if you experience any of the following:  temperature >100.4     Activity as tolerated

## 2024-09-23 ENCOUNTER — LAB VISIT (OUTPATIENT)
Dept: LAB | Facility: HOSPITAL | Age: 59
End: 2024-09-23
Payer: MEDICARE

## 2024-09-23 ENCOUNTER — OFFICE VISIT (OUTPATIENT)
Dept: ENDOCRINOLOGY | Facility: CLINIC | Age: 59
End: 2024-09-23
Payer: MEDICARE

## 2024-09-23 VITALS
OXYGEN SATURATION: 97 % | HEART RATE: 94 BPM | HEIGHT: 66 IN | WEIGHT: 249.81 LBS | BODY MASS INDEX: 40.15 KG/M2 | SYSTOLIC BLOOD PRESSURE: 125 MMHG | DIASTOLIC BLOOD PRESSURE: 73 MMHG

## 2024-09-23 DIAGNOSIS — E78.00 HYPERCHOLESTEROLEMIA: ICD-10-CM

## 2024-09-23 DIAGNOSIS — Z79.4 TYPE 2 DIABETES MELLITUS WITH HYPOGLYCEMIA WITHOUT COMA, WITH LONG-TERM CURRENT USE OF INSULIN: ICD-10-CM

## 2024-09-23 DIAGNOSIS — E66.01 MORBID OBESITY WITH BMI OF 40.0-44.9, ADULT: ICD-10-CM

## 2024-09-23 DIAGNOSIS — E11.649 TYPE 2 DIABETES MELLITUS WITH HYPOGLYCEMIA WITHOUT COMA, WITH LONG-TERM CURRENT USE OF INSULIN: ICD-10-CM

## 2024-09-23 DIAGNOSIS — E11.610 TYPE 2 DIABETES MELLITUS WITH DIABETIC NEUROPATHIC ARTHROPATHY, WITHOUT LONG-TERM CURRENT USE OF INSULIN: ICD-10-CM

## 2024-09-23 DIAGNOSIS — E03.8 OTHER SPECIFIED HYPOTHYROIDISM: ICD-10-CM

## 2024-09-23 DIAGNOSIS — E11.65 TYPE 2 DIABETES MELLITUS WITH HYPERGLYCEMIA, WITHOUT LONG-TERM CURRENT USE OF INSULIN: Primary | ICD-10-CM

## 2024-09-23 DIAGNOSIS — I10 PRIMARY HYPERTENSION: ICD-10-CM

## 2024-09-23 LAB
ESTIMATED AVG GLUCOSE: 128 MG/DL (ref 68–131)
HBA1C MFR BLD: 6.1 % (ref 4–5.6)
TSH SERPL DL<=0.005 MIU/L-ACNC: 1.84 UIU/ML (ref 0.4–4)

## 2024-09-23 PROCEDURE — 95251 CONT GLUC MNTR ANALYSIS I&R: CPT | Mod: NTX,,, | Performed by: NURSE PRACTITIONER

## 2024-09-23 PROCEDURE — 84443 ASSAY THYROID STIM HORMONE: CPT | Mod: NTX | Performed by: NURSE PRACTITIONER

## 2024-09-23 PROCEDURE — 99999 PR PBB SHADOW E&M-EST. PATIENT-LVL V: CPT | Mod: PBBFAC,TXP,, | Performed by: NURSE PRACTITIONER

## 2024-09-23 PROCEDURE — 99214 OFFICE O/P EST MOD 30 MIN: CPT | Mod: S$PBB,NTX,, | Performed by: NURSE PRACTITIONER

## 2024-09-23 PROCEDURE — 99215 OFFICE O/P EST HI 40 MIN: CPT | Mod: PBBFAC,PO,TXP | Performed by: NURSE PRACTITIONER

## 2024-09-23 PROCEDURE — 83036 HEMOGLOBIN GLYCOSYLATED A1C: CPT | Mod: TXP | Performed by: NURSE PRACTITIONER

## 2024-09-23 RX ORDER — INSULIN GLARGINE 100 [IU]/ML
INJECTION, SOLUTION SUBCUTANEOUS
Start: 2024-09-23

## 2024-09-23 NOTE — PROGRESS NOTES
CC: This 59 y.o. female presents for management of diabetes and chronic conditions pending review including HTN, HLP, hypothyroidism and morbid obesity, DM PN    HPI: She was diagnosed with T2DM in 2014. Has been hospitalized r/t DM- hyperglycemia  Family hx of DM: brother and sisters, mom and dad, 2 daughters    No acute events since her last visit   Did have a steroid injection in her back last week  See Dexcom download in Media tab  0% Very High  3% High  89% In Range  6% Low  2% Very Low  Having frequent hypoglycemia   Rare pp excursions  Maybe only taking meal dose insulin ~ 3 days a week     Diet: Eats 2 Meals a day, snacks fruit or cake  Exercise: plans to use her peddle machine   CURRENT DM MEDS: Basaglar 25 u bid, Admelog 12 u Ac + correction 150/50  Vial/pen:  Uses vial and pen  Glucometer type:  Accu Check Guide Me    Standards of Care:  Eye exam: 1/5/2023 Dr Pena She will schedule     Regarding hypothyroidism  Dx'ed: 2014  Fmh: brother  Takes Lt4 after breakfast w her other meds  No hoarseness, voice changes,+  dysphagia- has had her esophagus stretched in the past, no compressive symptoms, or head/neck exposure to XRT.   No personal or FH of thyroid cancer or MEN syndrome.   Not taking biotin.   + tremors of the hands  + intolerance to the heat and cold  + hair loss      ROS:   Gen: Appetite good, weight loss 18 lbs  Eyes: Denies visual disturbances  Resp: +SOB  + cough  Cardiac: No palpitations, +chest pain- w anxiety  GI: + nausea- chronic, no  vomiting, diarrhea, + constipation  /GYN: 1+ nocturia, no burning or pain.   MS/Neuro: + numbness in toes and fingers bilaterally;  walks w a cane , speech clear  Psych:  + depression and anxiety  Other systems: negative.    PE:  GENERAL: Well developed, well nourished.  PSYCH: AAOx3, appropriate mood and affect, pleasant expression, conversant, appears relaxed, well groomed.   EYES: Conjunctiva, corneas clear  NECK: Supple, trachea midline,   ABDOMEN:  "Soft, non-tender, non-distended   SKIN:  no acanthosis nigracans.    FOOT EXAMINATION: 9/23/2024   Clubbing of her nail beds nails, in good condiiton and well trimmed, no interspace maceration or ulceration noted.  Decreased hair growth present over toes/feet.    Protective sensation decreased to heel bilaterally with 10 gram monofilament.  +2 dorsalis pedis and posterior pulses noted.    Personally reviewed Past Medical, Surgical, Social History.    /73 (BP Location: Left arm, Patient Position: Sitting, BP Method: Medium (Automatic))   Pulse 94   Ht 5' 6" (1.676 m)   Wt 113.3 kg (249 lb 12.5 oz)   SpO2 97%   BMI 40.32 kg/m²      Personally reviewed the below labs:      Chemistry        Component Value Date/Time     09/28/2023 1051    K 4.2 09/28/2023 1051     09/28/2023 1051    CO2 31 (H) 09/28/2023 1051    BUN 13 09/28/2023 1051    CREATININE 0.9 09/28/2023 1051     (H) 09/28/2023 1051        Component Value Date/Time    CALCIUM 9.9 09/28/2023 1051    ALKPHOS 123 08/19/2024 1322    AST 58 (H) 08/19/2024 1322    AST 25 02/08/2016 1021    ALT 53 (H) 08/19/2024 1322    BILITOT 0.7 08/19/2024 1322    ESTGFRAFRICA >60 02/15/2022 1018    EGFRNONAA >60 02/15/2022 1018            Lab Results   Component Value Date    TSH 4.790 (H) 02/08/2024       Recent Labs   Lab 02/08/24  1601   LDL Cholesterol 135.8   HDL 63   Cholesterol 229 H        Results for orders placed or performed in visit on 07/06/23   Vitamin D   Result Value Ref Range    Vit D, 25-Hydroxy 65 30 - 96 ng/mL     No results found. However, due to the size of the patient record, not all encounters were searched. Please check Results Review for a complete set of results.    Lab Results   Component Value Date    MICALBCREAT 10.7 07/06/2023       Hemoglobin A1C   Date Value Ref Range Status   11/07/2023 6.9 (H) 0.0 - 5.6 % Final     Comment:     Reference Interval:  5.0 - 5.6 Normal   5.7 - 6.4 High Risk   > 6.5 Diabetic      Hgb A1c " results are standardized based on the (NGSP) National   Glycohemoglobin Standardization Program.      Hemoglobin A1C levels are related to mean serum/plasma glucose   during the preceding 2-3 months.        09/14/2023 7.7 (H) 4.0 - 5.6 % Final     Comment:     ADA Screening Guidelines:  5.7-6.4%  Consistent with prediabetes  >or=6.5%  Consistent with diabetes    High levels of fetal hemoglobin interfere with the HbA1C  assay. Heterozygous hemoglobin variants (HbS, HgC, etc)do  not significantly interfere with this assay.   However, presence of multiple variants may affect accuracy.     07/06/2023 10.4 (H) 0.0 - 5.6 % Final     Comment:     Reference Interval:  5.0 - 5.6 Normal   5.7 - 6.4 High Risk   > 6.5 Diabetic      Hgb A1c results are standardized based on the (NGSP) National   Glycohemoglobin Standardization Program.      Hemoglobin A1C levels are related to mean serum/plasma glucose   during the preceding 2-3 months.             ASSESSMENT and PLAN:      1. T2DM with hypoglycemia, DM PN   A1C, UMCR today  Schedule eye exam  Decrease Basaglar to 18 u bid, Hold ademlog for now  Continue Dexcom G7- notify me for any continued hypoglycemia    2. HTN - controlled, continue meds as previously prescribed and monitor.     3. HLP -  allergy to statins     4. Hypothyroidism- Contiue 50 mcg LT4 daily, lab today    5. Morbid obesity - Body mass index is 40.32 kg/m². Continue weight loss     Follow-up: in 6 months with A1C

## 2024-10-29 ENCOUNTER — TELEPHONE (OUTPATIENT)
Dept: TRANSPLANT | Facility: CLINIC | Age: 59
End: 2024-10-29
Payer: MEDICARE

## 2024-10-31 ENCOUNTER — TELEPHONE (OUTPATIENT)
Dept: TRANSPLANT | Facility: CLINIC | Age: 59
End: 2024-10-31
Payer: MEDICARE

## 2024-11-01 ENCOUNTER — TELEPHONE (OUTPATIENT)
Dept: TRANSPLANT | Facility: CLINIC | Age: 59
End: 2024-11-01
Payer: MEDICARE

## 2024-11-05 ENCOUNTER — TELEPHONE (OUTPATIENT)
Dept: TRANSPLANT | Facility: CLINIC | Age: 59
End: 2024-11-05
Payer: MEDICARE

## 2024-11-15 DIAGNOSIS — R11.0 NAUSEA: ICD-10-CM

## 2024-11-15 DIAGNOSIS — R10.13 EPIGASTRIC PAIN: ICD-10-CM

## 2024-11-15 DIAGNOSIS — K21.9 GASTROESOPHAGEAL REFLUX DISEASE WITHOUT ESOPHAGITIS: ICD-10-CM

## 2024-11-18 RX ORDER — OMEPRAZOLE 40 MG/1
40 CAPSULE, DELAYED RELEASE ORAL 2 TIMES DAILY
Qty: 180 CAPSULE | Refills: 0 | Status: SHIPPED | OUTPATIENT
Start: 2024-11-18

## 2024-12-12 DIAGNOSIS — J84.9 ILD (INTERSTITIAL LUNG DISEASE): ICD-10-CM

## 2024-12-13 RX ORDER — MYCOPHENOLATE MOFETIL 500 MG/1
1500 TABLET ORAL 2 TIMES DAILY
Qty: 540 TABLET | Refills: 3 | Status: SHIPPED | OUTPATIENT
Start: 2024-12-13

## 2024-12-13 RX ORDER — FLUTICASONE FUROATE, UMECLIDINIUM BROMIDE AND VILANTEROL TRIFENATATE 200; 62.5; 25 UG/1; UG/1; UG/1
1 POWDER RESPIRATORY (INHALATION)
Qty: 180 EACH | Refills: 3 | Status: SHIPPED | OUTPATIENT
Start: 2024-12-13

## 2024-12-30 DIAGNOSIS — E11.65 TYPE 2 DIABETES MELLITUS WITH HYPERGLYCEMIA, WITHOUT LONG-TERM CURRENT USE OF INSULIN: ICD-10-CM

## 2024-12-31 RX ORDER — INSULIN LISPRO 100 [IU]/ML
INJECTION, SOLUTION INTRAVENOUS; SUBCUTANEOUS
Qty: 30 ML | Refills: 6 | Status: SHIPPED | OUTPATIENT
Start: 2024-12-31

## 2025-02-10 PROBLEM — Z79.4 TYPE 2 DIABETES MELLITUS WITH HYPERGLYCEMIA, WITH LONG-TERM CURRENT USE OF INSULIN: Status: ACTIVE | Noted: 2025-02-10

## 2025-02-10 PROBLEM — E11.65 TYPE 2 DIABETES MELLITUS WITH HYPERGLYCEMIA, WITH LONG-TERM CURRENT USE OF INSULIN: Status: ACTIVE | Noted: 2025-02-10

## 2025-02-10 PROBLEM — M79.672 LEFT FOOT PAIN: Status: ACTIVE | Noted: 2025-02-10

## 2025-02-10 PROBLEM — E78.5 HYPERLIPIDEMIA: Status: ACTIVE | Noted: 2025-02-10

## 2025-02-10 PROBLEM — R52 UNCONTROLLED PAIN: Status: ACTIVE | Noted: 2025-02-10

## 2025-02-10 PROBLEM — Z91.81 AT MODERATE RISK FOR FALL: Status: ACTIVE | Noted: 2025-02-10

## 2025-02-10 PROBLEM — Z80.49 FAMILY HISTORY OF UTERINE CANCER: Status: RESOLVED | Noted: 2023-05-11 | Resolved: 2025-02-10

## 2025-02-10 PROBLEM — R62.7 FAILURE TO THRIVE IN ADULT: Status: ACTIVE | Noted: 2025-02-10

## 2025-02-10 PROBLEM — I70.0 AORTIC ATHEROSCLEROSIS: Status: ACTIVE | Noted: 2025-02-10

## 2025-02-10 PROBLEM — D69.2 SENILE PURPURA: Status: ACTIVE | Noted: 2025-02-10

## 2025-02-10 PROBLEM — R63.8 DECREASED ORAL INTAKE: Status: ACTIVE | Noted: 2025-02-10

## 2025-02-10 PROBLEM — E16.2 HYPOGLYCEMIA: Status: ACTIVE | Noted: 2025-02-10

## 2025-02-10 PROBLEM — J44.9 CHRONIC OBSTRUCTIVE PULMONARY DISEASE, UNSPECIFIED COPD TYPE: Status: ACTIVE | Noted: 2025-02-10

## 2025-02-13 DIAGNOSIS — R10.13 EPIGASTRIC PAIN: ICD-10-CM

## 2025-02-13 DIAGNOSIS — R11.0 NAUSEA: ICD-10-CM

## 2025-02-13 DIAGNOSIS — K21.9 GASTROESOPHAGEAL REFLUX DISEASE WITHOUT ESOPHAGITIS: ICD-10-CM

## 2025-02-13 RX ORDER — OMEPRAZOLE 40 MG/1
40 CAPSULE, DELAYED RELEASE ORAL 2 TIMES DAILY
Qty: 180 CAPSULE | Refills: 0 | Status: SHIPPED | OUTPATIENT
Start: 2025-02-13

## 2025-02-18 DIAGNOSIS — E11.65 TYPE 2 DIABETES MELLITUS WITH HYPERGLYCEMIA, WITHOUT LONG-TERM CURRENT USE OF INSULIN: ICD-10-CM

## 2025-02-20 RX ORDER — PEN NEEDLE, DIABETIC 32GX 5/32"
NEEDLE, DISPOSABLE MISCELLANEOUS
Qty: 400 EACH | Refills: 3 | Status: SHIPPED | OUTPATIENT
Start: 2025-02-20

## 2025-03-25 ENCOUNTER — OFFICE VISIT (OUTPATIENT)
Dept: ENDOCRINOLOGY | Facility: CLINIC | Age: 60
End: 2025-03-25
Payer: MEDICARE

## 2025-03-25 ENCOUNTER — RESULTS FOLLOW-UP (OUTPATIENT)
Dept: ENDOCRINOLOGY | Facility: CLINIC | Age: 60
End: 2025-03-25

## 2025-03-25 ENCOUNTER — HOSPITAL ENCOUNTER (OUTPATIENT)
Dept: RADIOLOGY | Facility: HOSPITAL | Age: 60
Discharge: HOME | End: 2025-03-25
Attending: NURSE PRACTITIONER
Payer: MEDICARE

## 2025-03-25 VITALS
BODY MASS INDEX: 36.63 KG/M2 | WEIGHT: 227.94 LBS | HEIGHT: 66 IN | HEART RATE: 75 BPM | OXYGEN SATURATION: 97 % | DIASTOLIC BLOOD PRESSURE: 82 MMHG | SYSTOLIC BLOOD PRESSURE: 144 MMHG

## 2025-03-25 DIAGNOSIS — E11.65 TYPE 2 DIABETES MELLITUS WITH HYPERGLYCEMIA, WITHOUT LONG-TERM CURRENT USE OF INSULIN: ICD-10-CM

## 2025-03-25 DIAGNOSIS — E04.1 THYROID NODULE: ICD-10-CM

## 2025-03-25 DIAGNOSIS — E11.610 TYPE 2 DIABETES MELLITUS WITH DIABETIC NEUROPATHIC ARTHROPATHY, WITHOUT LONG-TERM CURRENT USE OF INSULIN: Primary | ICD-10-CM

## 2025-03-25 PROCEDURE — 99214 OFFICE O/P EST MOD 30 MIN: CPT | Mod: PBBFAC,25,PO | Performed by: NURSE PRACTITIONER

## 2025-03-25 PROCEDURE — 99999 PR PBB SHADOW E&M-EST. PATIENT-LVL IV: CPT | Mod: PBBFAC,,, | Performed by: NURSE PRACTITIONER

## 2025-03-25 PROCEDURE — 76536 US EXAM OF HEAD AND NECK: CPT | Mod: 26,,, | Performed by: RADIOLOGY

## 2025-03-25 PROCEDURE — 76536 US EXAM OF HEAD AND NECK: CPT | Mod: TC,PO

## 2025-03-25 RX ORDER — INSULIN LISPRO 100 [IU]/ML
INJECTION, SOLUTION INTRAVENOUS; SUBCUTANEOUS
Qty: 15 ML | Refills: 3 | Status: SHIPPED | OUTPATIENT
Start: 2025-03-25

## 2025-03-25 NOTE — PROGRESS NOTES
CC: This 59 y.o. female presents for management of diabetes and chronic conditions pending review including HTN, HLP, hypothyroidism and morbid obesity, DM PN    HPI: She was diagnosed with T2DM in 2014. Has been hospitalized r/t DM- hyperglycemia  Family hx of DM: brother and sisters, mom and dad, 2 daughters    Since last seen 9/2024  Sine last visit she has taken herself of several medications   Decreased appetite;  off oxygen   Missed her GI consult    See Dexcom download in Media tab  0% Very High  7% High  93% In Range  0% Low  <1% Very Low  GMI 6.6%  Rare small pp excursions noted  Reports occasional hypos overnight- I believe this are likely related to pressure, when sleeping on sensor and putting pressure can cause false hypos     Craving salt   Diet: Eats 1 Meals a day, snacks chips- lightly salted, pickled  24 hr recall  1 egg  1 can of corn  Cantaloupe  Exercise: none  CURRENT DM MEDS:  Admelog 6u - only takes when bg elevated post higher carb meal   Vial/pen:  Uses vial and pen  Glucometer type:  Accu Check Guide Me    Standards of Care:  Eye exam: 1/5/2023 Dr Pena  She needs to schedule     Regarding hypothyroidism  Dx'ed: 2014  Fmh: brother  Takes Lt4 after breakfast w her other meds (has been taking 1.5 tablets) (Rx written for 1 tab qd)  + hoarseness, voice changes,+  dysphagia- has had her esophagus stretched in the past, no compressive symptoms, or head/neck exposure to XRT.   No personal or FH of thyroid cancer or MEN syndrome.   Not taking biotin.   No  tremors of the hands  + intolerance to the heat and cold  + hair loss  + hot flashes      ROS:   Gen: Appetite poor , weight loss 22 lbs (down 40 lbs past year- has not been trying to lose weight)  Eyes: Denies visual disturbances  Resp: +SOB  + cough  Cardiac: No palpitations, +chest pain- w anxiety  GI: + nausea- chronic, no  vomiting, +incontinent of stool    /GYN: 1+ nocturia, no burning or pain.   MS/Neuro: + numbness in toes and fingers  "bilaterally;  walks w a cane , speech clear  Psych:  + depression and anxiety  Other systems: negative.    PE:  GENERAL: Well developed, well nourished.  PSYCH: AAOx3, appropriate mood and affect, pleasant expression, conversant, appears relaxed, well groomed.   EYES: Conjunctiva, corneas clear  NECK: Supple, trachea midline,   ABDOMEN: Soft, non-tender, non-distended   SKIN:  no acanthosis nigracans.    FOOT EXAMINATION: 9/23/2024   Clubbing of her nail beds nails, in good condiiton and well trimmed, no interspace maceration or ulceration noted.  Decreased hair growth present over toes/feet.  Protective sensation decreased to heel bilaterally with 10 gram monofilament.  +2 dorsalis pedis and posterior pulses noted.    Personally reviewed Past Medical, Surgical, Social History.    BP (!) 144/82 (BP Location: Right arm, Patient Position: Sitting)   Pulse 75   Ht 5' 6" (1.676 m)   Wt 103.4 kg (227 lb 15.3 oz)   SpO2 97%   BMI 36.79 kg/m²      Personally reviewed the below labs:      Chemistry        Component Value Date/Time     02/10/2025 1641    K 3.6 02/10/2025 1641     02/10/2025 1641    CO2 23 02/10/2025 1641    BUN 6 (L) 02/10/2025 1641    CREATININE 0.71 02/10/2025 1641    GLU 81 02/10/2025 1641        Component Value Date/Time    CALCIUM 10.2 02/10/2025 1641    ALKPHOS 104 02/10/2025 1641    AST 46 (H) 02/10/2025 1641    AST 25 02/08/2016 1021    ALT 22 02/10/2025 1641    BILITOT 1.2 (H) 02/10/2025 1641    ESTGFRAFRICA >60 02/15/2022 1018    EGFRNONAA >60 02/15/2022 1018            Lab Results   Component Value Date    TSH 1.839 09/23/2024       Recent Labs   Lab 02/08/24  1601   LDL Cholesterol 135.8   HDL 63   Cholesterol 229 H        Results for orders placed or performed in visit on 07/06/23   Vitamin D    Collection Time: 07/06/23  1:22 PM   Result Value Ref Range    Vit D, 25-Hydroxy 65 30 - 96 ng/mL     No results found. However, due to the size of the patient record, not all encounters " were searched. Please check Results Review for a complete set of results.    Lab Results   Component Value Date    MICALBCREAT 18.3 09/23/2024       Hemoglobin A1C   Date Value Ref Range Status   09/23/2024 6.1 (H) 4.0 - 5.6 % Final     Comment:     ADA Screening Guidelines:  5.7-6.4%  Consistent with prediabetes  >or=6.5%  Consistent with diabetes    High levels of fetal hemoglobin interfere with the HbA1C  assay. Heterozygous hemoglobin variants (HbS, HgC, etc)do  not significantly interfere with this assay.   However, presence of multiple variants may affect accuracy.     11/07/2023 6.9 (H) 0.0 - 5.6 % Final     Comment:     Reference Interval:  5.0 - 5.6 Normal   5.7 - 6.4 High Risk   > 6.5 Diabetic      Hgb A1c results are standardized based on the (NGSP) National   Glycohemoglobin Standardization Program.      Hemoglobin A1C levels are related to mean serum/plasma glucose   during the preceding 2-3 months.        09/14/2023 7.7 (H) 4.0 - 5.6 % Final     Comment:     ADA Screening Guidelines:  5.7-6.4%  Consistent with prediabetes  >or=6.5%  Consistent with diabetes    High levels of fetal hemoglobin interfere with the HbA1C  assay. Heterozygous hemoglobin variants (HbS, HgC, etc)do  not significantly interfere with this assay.   However, presence of multiple variants may affect accuracy.          ASSESSMENT and PLAN:      1. T2DM with hypoglycemia, DM PN   A1C today  Schedule eye exam  Stop basaglar, Admelog 5 u AC  Continue Dexcom G7 - notify me for issues     2. HTN - uncontrolled today, continue meds as previously prescribed and monitor.     3. HLP -  allergy to statins     4. Hypothyroidism- LT4 written for 50 mcg qd, has been taking 75 mcg qd;  Check TSH today    5. Unexplained weight loss, decrease in appetite, - re-schedule consult appt w GI     6. Obesity - Body mass index is 36.79 kg/m². Awaiting GI consult for further eval, sudden loss of appetite, unexplained weight loss      Follow-up: in 3 months  with A1C

## 2025-04-14 ENCOUNTER — OFFICE VISIT (OUTPATIENT)
Dept: GASTROENTEROLOGY | Facility: CLINIC | Age: 60
End: 2025-04-14
Payer: MEDICARE

## 2025-04-14 VITALS — BODY MASS INDEX: 35.01 KG/M2 | WEIGHT: 217.81 LBS | HEIGHT: 66 IN

## 2025-04-14 DIAGNOSIS — R11.0 NAUSEA: ICD-10-CM

## 2025-04-14 DIAGNOSIS — Z86.0100 HISTORY OF COLON POLYPS: ICD-10-CM

## 2025-04-14 DIAGNOSIS — Z90.49 S/P CHOLECYSTECTOMY: ICD-10-CM

## 2025-04-14 DIAGNOSIS — T17.308A CHOKING, INITIAL ENCOUNTER: ICD-10-CM

## 2025-04-14 DIAGNOSIS — R13.19 ESOPHAGEAL DYSPHAGIA: Primary | ICD-10-CM

## 2025-04-14 DIAGNOSIS — K21.9 GASTROESOPHAGEAL REFLUX DISEASE WITHOUT ESOPHAGITIS: ICD-10-CM

## 2025-04-14 DIAGNOSIS — R10.13 EPIGASTRIC PAIN: ICD-10-CM

## 2025-04-14 DIAGNOSIS — K58.2 IRRITABLE BOWEL SYNDROME WITH BOTH CONSTIPATION AND DIARRHEA: ICD-10-CM

## 2025-04-14 PROCEDURE — 99214 OFFICE O/P EST MOD 30 MIN: CPT | Mod: S$PBB,,, | Performed by: NURSE PRACTITIONER

## 2025-04-14 PROCEDURE — 99215 OFFICE O/P EST HI 40 MIN: CPT | Mod: PBBFAC,PO | Performed by: NURSE PRACTITIONER

## 2025-04-14 PROCEDURE — 99999 PR PBB SHADOW E&M-EST. PATIENT-LVL V: CPT | Mod: PBBFAC,,, | Performed by: NURSE PRACTITIONER

## 2025-04-14 RX ORDER — OMEPRAZOLE 40 MG/1
40 CAPSULE, DELAYED RELEASE ORAL 2 TIMES DAILY
Qty: 180 CAPSULE | Refills: 3 | Status: SHIPPED | OUTPATIENT
Start: 2025-04-14

## 2025-04-14 NOTE — PROGRESS NOTES
Subjective:       Patient ID: Yara Santos is a 59 y.o. female, Body mass index is 35.16 kg/m².    Chief Complaint: GI Problem (Colonoscopy, GI light needed, polyps, nausea,diarrhea, chokes on food, hard time swallowing. Sometimes don't know if pooping.)      Established patient of Dr. Jasso & myself.    Here with daughter, whom assisted with interview.    Gastroesophageal Reflux  She complains of choking (denies having the heimlich maneuver performed), dysphagia (Chief complaint; dysphagia to solids; denies trouble swallowing liquids; esophageal dilation improved symptoms in the past) and heartburn (Rare when taking PPI). She reports no abdominal pain, no belching, no chest pain, no coughing, no early satiety, no globus sensation, no hoarse voice, no nausea, no sore throat, no stridor, no tooth decay, no water brash or no wheezing. This is a recurrent problem. The current episode started more than 1 month ago. The problem occurs frequently. The problem has been unchanged. The heartburn is located in the substernum. The heartburn is of mild intensity. The heartburn does not wake her from sleep. The heartburn does not limit her activity. The heartburn doesn't change with position. The symptoms are aggravated by certain foods. Pertinent negatives include no anemia, melena or weight loss. Risk factors include obesity and smoking/tobacco exposure (Former smoker). She has tried a PPI (Currently: Prilosec 40 mg BID- helps) for the symptoms. Past procedures include an EGD.     Review of Systems   Constitutional:  Negative for appetite change, fever, unexpected weight change and weight loss.   HENT:  Positive for trouble swallowing. Negative for hoarse voice and sore throat.    Respiratory:  Positive for choking (denies having the heimlich maneuver performed). Negative for cough, shortness of breath and wheezing.    Cardiovascular:  Negative for chest pain.   Gastrointestinal:  Positive for constipation (chronic  problem; alternates between constipation and diarrhea, diarrhea lately; describes stool as type 1 or 6 on bristol scale; associated with fecal incontinence; reports recent antibiotics), diarrhea, dysphagia (Chief complaint; dysphagia to solids; denies trouble swallowing liquids; esophageal dilation improved symptoms in the past) and heartburn (Rare when taking PPI). Negative for abdominal distention, abdominal pain, anal bleeding, blood in stool, melena, nausea, rectal pain and vomiting.   Genitourinary:  Negative for difficulty urinating and dysuria.   Musculoskeletal:  Positive for gait problem.   Skin:  Negative for rash.   Neurological:  Negative for speech difficulty.   Psychiatric/Behavioral:  Negative for confusion.        Past Medical History:   Diagnosis Date    a Mild Bilateral Carotid Artery Stenosis     Will Repeat A Carotid U/S In 3-4 Years; 12/19/23 Bilateral Carotid AA U/S = Mild BICA Disease (See Report)    a Premature Ventricular Contractions     Dr. Austin Collier    b Hypertension     c Hypercholesterolemia     d Type 2 DM     e Hypothyroidism     7/7/23 Increased To 50 Mcg Levothyroxine qAM    f Morbid Obesity     f Osteopenia     History of recurrent UTIs     7/10/23 Refd to Dr Kim    i Asthma     i Oxygen dependent     3l nasal cannula    i Tobacco Use Disorder 02/10/2025    j GERD     j H/O Colon Polyps     j Irritable Bowel Syndrome     j Nonalcoholic Steatohepatitis ([facility]     l Bilateral Hip Osteoarthritis     l Bilateral Knee Arthritis     l Carpal tunnel syndrome     l DDD (degenerative disc disease), lumbar     l Lumbar Spinal DDD     l Tarsal Tunnel Syndrome     m Short Term Memory Loss     12/7/23 Referred To [facility]; 12/19/23 Head CT Scan W/O Contrast = Normal (See Report); 12/7/23 Vitamin B12 And RPR = Normal; 1/7/23 TFTs (On Levothyroxine) = Normal    m Vitamin B12 Deficiency     n Depression And Anxiety     n Insomnia disorder     7/10/23 RXd Seroquel increase from 25 mg  QHS to 50 mg QHS    o Allergic rhinosinusitis     On home oxygen therapy 2024    3L    p OU Cataracts     q Lichen Planus     Dr. Lizzy Dominique    q Vitamin D Deficiency     Wellness Visit 2023       Past Surgical History:   Procedure Laterality Date     SECTION      x2    CHOLECYSTECTOMY      COLONOSCOPY   ?    polyps removed per pt report    COLONOSCOPY N/A 2022    Procedure: COLONOSCOPY;  Surgeon: Brett Jasso MD;  Location: [facility];  Service: Endoscopy;  Laterality: N/A;    EPIDURAL STEROID INJECTION INTO CERVICAL SPINE N/A 2020    Procedure: Injection-steroid-epidural-cervical to left;  Surgeon: John Vinson MD;  Location: [facility];  Service: Pain Management;  Laterality: N/A;    EPIDURAL STEROID INJECTION INTO LUMBAR SPINE N/A 2020    Procedure: Injection-steroid-epidural-lumbar, l5/s1 to left;  Surgeon: John Vinson MD;  Location: [facility];  Service: Pain Management;  Laterality: N/A;    EPIDURAL STEROID INJECTION INTO LUMBAR SPINE N/A 2021    Procedure: Injection-steroid-epidural-lumbar L5/S1;  Surgeon: John Vinson MD;  Location: [facility];  Service: Pain Management;  Laterality: N/A;    EPIDURAL STEROID INJECTION INTO LUMBAR SPINE N/A 2022    Procedure: Injection-steroid-epidural-lumbar L5/S1 spread to left;  Surgeon: John Vinson MD;  Location: [facility];  Service: Pain Management;  Laterality: N/A;    EPIDURAL STEROID INJECTION INTO LUMBAR SPINE N/A 2024    Procedure: Injection-steroid-epidural-lumbar L4/5;  Surgeon: John Vinson MD;  Location: [facility];  Service: Pain Management;  Laterality: N/A;  L4/5    ESOPHAGOGASTRODUODENOSCOPY      ESOPHAGOGASTRODUODENOSCOPY N/A 2022    Procedure: EGD (ESOPHAGOGASTRODUODENOSCOPY);  Surgeon: Brett Jasso MD;  Location: [facility];  Service: Endoscopy;  Laterality: N/A;    INJECTION OF ANESTHETIC AGENT AROUND MEDIAL BRANCH NERVES INNERVATING LUMBAR FACET  JOINT Bilateral 1/8/2024    Procedure: Block-nerve-medial branch-lumbar    L4/5, L5/S1;  Surgeon: John Vinson MD;  Location: [facility];  Service: Pain Management;  Laterality: Bilateral;    INJECTION OF JOINT Left 09/20/2022    Procedure: INJECTION, JOINT- hip;  Surgeon: John Vinson MD;  Location: [facility];  Service: Pain Management;  Laterality: Left;    LAPAROSCOPIC CHOLECYSTECTOMY N/A 07/13/2022    Procedure: CHOLECYSTECTOMY, LAPAROSCOPIC;  Surgeon: Ra Santos MD;  Location: [facility];  Service: General;  Laterality: N/A;    TRANSFORAMINAL EPIDURAL INJECTION OF STEROID Left 08/16/2022    Procedure: Injection,steroid,epidural,transforaminal approach L3/4 and L4/5;  Surgeon: John Vinson MD;  Location: [facility];  Service: Pain Management;  Laterality: Left;    TRANSFORAMINAL EPIDURAL INJECTION OF STEROID Left 02/22/2023    Procedure: Injection,steroid,epidural,transforaminal approach L3/4 and L4/5-Left;  Surgeon: John Vinson MD;  Location: [facility];  Service: Pain Management;  Laterality: Left;  l3/4 and l4/5    TRANSFORAMINAL EPIDURAL INJECTION OF STEROID Left 11/13/2023    Procedure: Injection,steroid,epidural,transforaminal approach         L3/4,L4/5;  Surgeon: John Vinson MD;  Location: [facility];  Service: Pain Management;  Laterality: Left;    UPPER GASTROINTESTINAL ENDOSCOPY  2020 ?    unsure of results      Family History   Problem Relation Name Age of Onset    Arthritis Mother      Diabetes Mother      Hyperlipidemia Mother      Cancer Mother          uterine     Allergies Mother      Ovarian cancer Mother  58    Aneurysm Father      Hyperlipidemia Father      Breast cancer Maternal Aunt  65    Cancer Maternal Uncle          brain    Cancer Paternal Aunt          breast, bone and lung     Breast cancer Paternal Aunt  78    Breast cancer Maternal Cousin 1st 43    Brain cancer Maternal Cousin 1st     Cirrhosis Neg Hx        Wt Readings from Last 10 Encounters:    04/14/25 98.8 kg (217 lb 13 oz)   03/31/25 98 kg (216 lb)   03/25/25 103.4 kg (227 lb 15.3 oz)   02/10/25 103.4 kg (228 lb)   02/10/25 103.7 kg (228 lb 9.6 oz)   09/23/24 113.3 kg (249 lb 12.5 oz)   09/19/24 114.3 kg (252 lb)   08/15/24 115.5 kg (254 lb 10.1 oz)   02/08/24 121.1 kg (267 lb)   02/08/24 121.1 kg (267 lb)     Lab Results   Component Value Date    WBC 11.58 02/10/2025    HGB 15.5 02/10/2025    HCT 48.4 02/10/2025    MCV 85 02/10/2025     02/10/2025     CMP  Sodium   Date Value Ref Range Status   02/10/2025 144 136 - 145 mmol/L Final     Potassium   Date Value Ref Range Status   02/10/2025 3.6 3.5 - 5.1 mmol/L Final     Comment:     Anion Gap reference range revised on 4/28/2023     Chloride   Date Value Ref Range Status   02/10/2025 106 95 - 110 mmol/L Final     CO2   Date Value Ref Range Status   02/10/2025 23 23 - 29 mmol/L Final     Glucose   Date Value Ref Range Status   02/10/2025 81 70 - 110 mg/dL Final     Comment:     The ADA recommends the following guidelines for fasting glucose:    Normal:       less than 100 mg/dL    Prediabetes:  100 mg/dL to 125 mg/dL    Diabetes:     126 mg/dL or higher       BUN   Date Value Ref Range Status   02/10/2025 6 (L) 8 - 23 mg/dL Final     Creatinine   Date Value Ref Range Status   02/10/2025 0.71 0.50 - 1.40 mg/dL Final     Calcium   Date Value Ref Range Status   02/10/2025 10.2 8.7 - 10.5 mg/dL Final     Total Protein   Date Value Ref Range Status   02/10/2025 7.8 6.0 - 8.4 g/dL Final     Albumin   Date Value Ref Range Status   02/10/2025 4.1 3.5 - 5.2 g/dL Final     Total Bilirubin   Date Value Ref Range Status   02/10/2025 1.2 (H) 0.2 - 1.0 mg/dL Final     Alkaline Phosphatase   Date Value Ref Range Status   02/10/2025 104 40 - 150 U/L Final     [facility]   Date Value Ref Range Status   02/08/2016 25 14 - 36 U/L Final     AST   Date Value Ref Range Status   02/10/2025 46 (H) 10 - 40 U/L Final     ALT   Date Value Ref Range Status    02/10/2025 22 10 - 44 U/L Final     Anion Gap   Date Value Ref Range Status   02/10/2025 15 8 - 16 mmol/L Final     Comment:     Anion Gap reference range revised on 4/28/2023     eGFR if    Date Value Ref Range Status   02/15/2022 >60 >60 mL/min/1.73 m^2 Final     eGFR if non    Date Value Ref Range Status   02/15/2022 >60 >60 mL/min/1.73 m^2 Final     Comment:     Calculation used to obtain the estimated glomerular filtration  rate (eGFR) is the CKD-EPI equation.          Lab Results   Component Value Date    LIPASERES 7 02/10/2025             Reviewed prior medical records including radiology report of CT of abdomen and pelvis 2/10/25 and GES 5/2/23 & endoscopy history (see surgical history).     Objective:      Physical Exam  Constitutional:       General: She is not in acute distress.     Appearance: She is well-developed.   HENT:      Head: Normocephalic.      Right Ear: Hearing normal.      Left Ear: Hearing normal.      Nose: Nose normal.      Mouth/Throat:      Mouth: No oral lesions.      Pharynx: Uvula midline.   Eyes:      General: Lids are normal.      Conjunctiva/sclera: Conjunctivae normal.      Pupils: Pupils are equal, round, and reactive to light.   Neck:      Trachea: Trachea normal.   Cardiovascular:      Rate and Rhythm: Normal rate and regular rhythm.      Heart sounds: Normal heart sounds. No murmur heard.  Pulmonary:      Effort: Pulmonary effort is normal. No respiratory distress.      Breath sounds: Normal breath sounds. No stridor. No wheezing.   Abdominal:      General: Bowel sounds are normal. There is no distension.      Palpations: Abdomen is soft. There is no mass.      Tenderness: There is abdominal tenderness (mild) in the left upper quadrant. There is no guarding or rebound.   Musculoskeletal:         General: Normal range of motion.      Cervical back: Normal range of motion.      Comments: Ambulates with cane   Skin:     General: Skin is warm and  dry.      Findings: No rash.      Comments: Non jaundiced   Neurological:      Mental Status: She is alert and oriented to person, place, and time.   Psychiatric:         Speech: Speech normal.         Behavior: Behavior normal. Behavior is cooperative.         Assessment:       1. Esophageal dysphagia    2. Choking, initial encounter    3. Gastroesophageal reflux disease without esophagitis    4. Irritable bowel syndrome with both constipation and diarrhea    5. History of colon polyps    6. S/P cholecystectomy           Plan:   All diagnoses and orders for this visit:    Esophageal dysphagia & Choking, initial encounter   - Schedule EGD with possible esophageal dilation if indicated  - Educated patient to eat smaller more frequent meals and to eat slowly and advised to eat a soft diet.  - Possible UGI/esophagram/esophageal manometry if symptoms persist     Gastroesophageal reflux disease without esophagitis   - Continue Prilosec 40 mg BID   - Take PPI in the morning 30 minutes before breakfast and dinner  - Recommend to avoid large meals, avoid eating within 3 hours of bedtime, elevate head of bed if nocturnal symptoms are present, smoking cessation (if current smoker), & weight loss (if overweight).   - Recommend minimize/avoid high-fat foods, chocolate, caffeine, citrus, alcohol, & tomato products.  - Advised to avoid/limit use of NSAID's, since they can cause GI upset, bleeding, and/or ulcers. If needed, take with food.      Irritable bowel syndrome with both constipation and diarrhea (diarrhea predominant)  - Stool Exam-Ova,Cysts,Parasites; Future; Expected date: 04/14/2025  - Stool culture; Future; Expected date: 04/14/2025  - Giardia / Cryptosporidum, EIA; Future; Expected date: 04/14/2025  - Clostridium difficile EIA; Future; Expected date: 04/14/2025  - Recommended increase fiber in diet, especially soluble fiber since this can help bulk up the stool consistency and may help to slow down how fast the stool  goes through the colon and can prevent diarrhea   - Schedule Colonoscopy     History of colon polyps   - Schedule Colonoscopy     S/P cholecystectomy    If no improvement in symptoms or symptoms worsen, call/follow-up at clinic or go to ER

## 2025-05-01 DIAGNOSIS — J84.89 PROGRESSIVE FIBROSING INTERSTITIAL LUNG DISEASE: ICD-10-CM

## 2025-05-01 RX ORDER — NINTEDANIB 150 MG/1
150 CAPSULE ORAL 2 TIMES DAILY
Qty: 60 CAPSULE | Refills: 11 | Status: SHIPPED | OUTPATIENT
Start: 2025-05-01

## 2025-05-07 PROBLEM — J84.89 PROGRESSIVE FIBROSING INTERSTITIAL LUNG DISEASE: Status: ACTIVE | Noted: 2025-05-07

## (undated) DEVICE — TROCAR ENDOPATH XCEL 11MM 10CM

## (undated) DEVICE — ELECTRODE REM PLYHSV RETURN 9

## (undated) DEVICE — GLOVE SURGICAL LATEX SZ 7

## (undated) DEVICE — NDL SPINAL SPINOCAN 22GX3.5

## (undated) DEVICE — TROCAR KII FIOS 5MM X 100MM

## (undated) DEVICE — APPLICATOR CHLORAPREP CLR 10.5

## (undated) DEVICE — GLOVE SURG BIOGEL LATEX SZ 7.5

## (undated) DEVICE — BAG TISS RETRV MONARCH 10MM

## (undated) DEVICE — MARKER SKIN STND TIP BLUE BARR

## (undated) DEVICE — SUT MONOCYRL 4-0 PS2 UND

## (undated) DEVICE — NDL TUOHY EPIDURAL 20G X 3.5

## (undated) DEVICE — TROCAR ENDOPATH XCEL 5MM 7.5CM

## (undated) DEVICE — TRAY NERVE BLOCK

## (undated) DEVICE — SYR GLASS 5CC LUER LOK

## (undated) DEVICE — SYR 10CC LUER LOCK

## (undated) DEVICE — TOWEL OR DISP STRL BLUE 4/PK

## (undated) DEVICE — APPLICATOR CHLORAPREP ORN 26ML

## (undated) DEVICE — Device

## (undated) DEVICE — SEE MEDLINE ITEM 157128

## (undated) DEVICE — KIT ANTIFOG

## (undated) DEVICE — SOL IRR STRL WATER 500ML

## (undated) DEVICE — MARKER SKIN RULER STERILE

## (undated) DEVICE — TROCAR ENDOPATH XCEL 5X75MM

## (undated) DEVICE — TUBING HEATED INSUFFLATOR

## (undated) DEVICE — DRAPE ABDOMINAL TIBURON 14X11

## (undated) DEVICE — SUT 0 VICRYL / UR6 (J603)

## (undated) DEVICE — NDL INSUF ULTRA VERESS 120MM

## (undated) DEVICE — BLADE SURG CARBON STEEL SZ11

## (undated) DEVICE — SEE L#120831

## (undated) DEVICE — SCISSOR 5MMX35CM DIRECT DRIVE

## (undated) DEVICE — APPLIER CLIP EPIX UNIV 5X34

## (undated) DEVICE — GLOVE SENSICARE PI MICRO 7

## (undated) DEVICE — NEPTUNE 4 PORT MANIFOLD